# Patient Record
Sex: MALE | Race: WHITE | NOT HISPANIC OR LATINO | Employment: OTHER | URBAN - METROPOLITAN AREA
[De-identification: names, ages, dates, MRNs, and addresses within clinical notes are randomized per-mention and may not be internally consistent; named-entity substitution may affect disease eponyms.]

---

## 2018-03-05 ENCOUNTER — HOSPITAL ENCOUNTER (OUTPATIENT)
Dept: OCCUPATIONAL THERAPY | Facility: REHABILITATION | Age: 83
Setting detail: THERAPIES SERIES
Discharge: HOME | End: 2018-03-05
Attending: PSYCHIATRY & NEUROLOGY
Payer: MEDICARE

## 2018-03-05 DIAGNOSIS — G60.0 HEREDITARY MOTOR AND SENSORY NEUROPATHY: Primary | ICD-10-CM

## 2018-03-05 PROCEDURE — G8979 MOBILITY GOAL STATUS: HCPCS | Mod: GP,CI

## 2018-03-05 PROCEDURE — 97112 NEUROMUSCULAR REEDUCATION: CPT | Mod: GP

## 2018-03-05 PROCEDURE — G8980 MOBILITY D/C STATUS: HCPCS | Mod: GP,CJ

## 2018-03-05 PROCEDURE — G8978 MOBILITY CURRENT STATUS: HCPCS | Mod: GP,CJ

## 2018-03-05 RX ORDER — TAMSULOSIN HYDROCHLORIDE 0.4 MG/1
0.4 CAPSULE ORAL 2 TIMES DAILY
COMMUNITY
End: 2018-09-13 | Stop reason: HOSPADM

## 2018-03-05 RX ORDER — CLOPIDOGREL BISULFATE 75 MG/1
75 TABLET ORAL ONCE
COMMUNITY
End: 2018-10-18 | Stop reason: HOSPADM

## 2018-03-05 RX ORDER — GUAIFENESIN 600 MG/1
1200 TABLET, EXTENDED RELEASE ORAL AS NEEDED
COMMUNITY
End: 2019-05-12

## 2018-03-05 RX ORDER — ATORVASTATIN CALCIUM 20 MG/1
20 TABLET, FILM COATED ORAL EVERY EVENING
COMMUNITY

## 2018-03-05 RX ORDER — LEVOTHYROXINE SODIUM 25 UG/1
25 TABLET ORAL DAILY
COMMUNITY

## 2018-03-05 RX ORDER — ASPIRIN 81 MG/1
81 TABLET ORAL DAILY
COMMUNITY
End: 2018-10-18 | Stop reason: HOSPADM

## 2018-03-05 NOTE — PROGRESS NOTES
Patient: Michel Fang  Location: [facility]    MRN: [id number]  Today's date: 3/5/2018    Vitals/Pain    03/05/18 0900   Presence of Pain: denies                  Treatment Summary - 03/05/18 1000        Session Details    Document Type discharge evaluation    Mode of Treatment individual therapy    Patient/Family Observations He said balance exercises are most beneficial.       Time Calculation    Start Time 0900    Stop Time 1000    Time Calculation (min) 60 min       Balance    Balance Assessment Static Standing Balance (Group);Dynamic Standing Balance (Group);Dynamic Balance Activity (Group)       Static Standing Balance    Time Able to Maintain Position other (see comments)   romberg 30 sec x 2       Dynamic Standing Balance    Comment, Reaches Outside Midline ball bounce/pass x 2 min, side stepping 10 ft x 4, backwards walking 10 ft x 4        Dynamic Balance Activity    Therapeutic Training Performed backward walking;obstacle course;side stepping;standing ball toss;tap-ups to block       Aerobic Exercise Activity    Exercise(s) Performed stationary bike, recumbent    Time 10       PT Weekly Outcome Summary    Functional Goal Overall Progress progressing toward functional goals as expected    Weekly Outcome Statement He met his functional and balance activities.    Recommendations patient tolerated treatment well.           Goals:     Increase SLR to 80 degrees. Goal met  DGI improve to 20/24. Currently 18/24. Goal partially met (started at 13/24)  Independent in home exercise program.     Education provided this session.Given written copy of exercise program.

## 2018-09-04 ENCOUNTER — APPOINTMENT (EMERGENCY)
Dept: RADIOLOGY | Facility: HOSPITAL | Age: 83
End: 2018-09-04
Attending: EMERGENCY MEDICINE
Payer: MEDICARE

## 2018-09-04 ENCOUNTER — HOSPITAL ENCOUNTER (OUTPATIENT)
Facility: HOSPITAL | Age: 83
Setting detail: OBSERVATION
Discharge: HOME | End: 2018-09-05
Attending: EMERGENCY MEDICINE | Admitting: HOSPITALIST
Payer: MEDICARE

## 2018-09-04 DIAGNOSIS — I95.1 ORTHOSTATIC SYNCOPE: ICD-10-CM

## 2018-09-04 DIAGNOSIS — R55 SYNCOPE, UNSPECIFIED SYNCOPE TYPE: Primary | ICD-10-CM

## 2018-09-04 DIAGNOSIS — E86.0 DEHYDRATION: ICD-10-CM

## 2018-09-04 LAB
ANION GAP SERPL CALC-SCNC: 12 MEQ/L (ref 3–15)
BASOPHILS # BLD: 0.08 K/UL (ref 0.01–0.1)
BASOPHILS NFR BLD: 1.4 %
BILIRUB UR QL STRIP.AUTO: NEGATIVE MG/DL
BUN SERPL-MCNC: 47 MG/DL (ref 8–20)
CALCIUM SERPL-MCNC: 9 MG/DL (ref 8.9–10.3)
CHLORIDE SERPL-SCNC: 107 MMOL/L (ref 98–109)
CLARITY UR REFRACT.AUTO: CLEAR
CO2 SERPL-SCNC: 20 MMOL/L (ref 22–32)
COLOR UR AUTO: YELLOW
CREAT SERPL-MCNC: 1.2 MG/DL (ref 0.8–1.3)
DIFFERENTIAL METHOD BLD: NORMAL
EOSINOPHIL # BLD: 0.09 K/UL (ref 0.04–0.54)
EOSINOPHIL NFR BLD: 1.6 %
ERYTHROCYTE [DISTWIDTH] IN BLOOD BY AUTOMATED COUNT: 14.4 % (ref 11.6–14.4)
GFR SERPL CREATININE-BSD FRML MDRD: 58 ML/MIN/1.73M*2
GLUCOSE SERPL-MCNC: 136 MG/DL (ref 70–99)
GLUCOSE UR STRIP.AUTO-MCNC: NEGATIVE MG/DL
HCT VFR BLDCO AUTO: 32.4 % (ref 40.1–51)
HGB BLD-MCNC: 11.1 G/DL (ref 13.7–17.5)
HGB UR QL STRIP.AUTO: NEGATIVE
IMM GRANULOCYTES # BLD AUTO: 0.01 K/UL (ref 0–0.08)
IMM GRANULOCYTES NFR BLD AUTO: 0.2 %
INR PPP: 1.2 INR
KETONES UR STRIP.AUTO-MCNC: NEGATIVE MG/DL
LACTATE SERPL-SCNC: 2 MMOL/L (ref 0.4–2)
LACTATE SERPL-SCNC: 3.2 MMOL/L (ref 0.4–2)
LEUKOCYTE ESTERASE UR QL STRIP.AUTO: NEGATIVE
LYMPHOCYTES # BLD: 1.52 K/UL (ref 1.2–3.5)
LYMPHOCYTES NFR BLD: 26.8 %
MCH RBC QN AUTO: 30.7 PG (ref 28–33.2)
MCHC RBC AUTO-ENTMCNC: 34.3 G/DL (ref 32.2–36.5)
MCV RBC AUTO: 89.5 FL (ref 83–98)
MONOCYTES # BLD: 0.86 K/UL (ref 0.3–1)
MONOCYTES NFR BLD: 15.2 %
NEUTROPHILS # BLD: 3.11 K/UL (ref 1.7–7)
NEUTS SEG NFR BLD: 54.8 %
NITRITE UR QL STRIP.AUTO: NEGATIVE
NRBC BLD-RTO: 0 %
PDW BLD AUTO: 10.4 FL (ref 9.4–12.4)
PH UR STRIP.AUTO: 5.5 [PH]
PLATELET # BLD AUTO: 146 K/UL (ref 150–350)
POTASSIUM SERPL-SCNC: 4.3 MMOL/L (ref 3.6–5.1)
PROT UR QL STRIP.AUTO: NEGATIVE
PROTHROMBIN TIME: 15.2 SEC. (ref 12.2–14.5)
RBC # BLD AUTO: 3.62 M/UL (ref 4.5–5.8)
SODIUM SERPL-SCNC: 139 MMOL/L (ref 136–144)
SP GR UR REFRACT.AUTO: 1.02
TROPONIN I SERPL-MCNC: <0.03 NG/ML
UROBILINOGEN UR STRIP-ACNC: 0.2 EU/DL
WBC # BLD AUTO: 5.67 K/UL (ref 3.8–10.5)

## 2018-09-04 PROCEDURE — 83605 ASSAY OF LACTIC ACID: CPT | Performed by: EMERGENCY MEDICINE

## 2018-09-04 PROCEDURE — 99220 PR INITIAL OBSERVATION CARE/DAY 70 MINUTES: CPT | Performed by: HOSPITALIST

## 2018-09-04 PROCEDURE — 81003 URINALYSIS AUTO W/O SCOPE: CPT | Performed by: EMERGENCY MEDICINE

## 2018-09-04 PROCEDURE — 3E0337Z INTRODUCTION OF ELECTROLYTIC AND WATER BALANCE SUBSTANCE INTO PERIPHERAL VEIN, PERCUTANEOUS APPROACH: ICD-10-PCS | Performed by: EMERGENCY MEDICINE

## 2018-09-04 PROCEDURE — 36415 COLL VENOUS BLD VENIPUNCTURE: CPT | Performed by: EMERGENCY MEDICINE

## 2018-09-04 PROCEDURE — 63700000 HC SELF-ADMINISTRABLE DRUG: Performed by: HOSPITALIST

## 2018-09-04 PROCEDURE — 80048 BASIC METABOLIC PNL TOTAL CA: CPT | Performed by: EMERGENCY MEDICINE

## 2018-09-04 PROCEDURE — 83605 ASSAY OF LACTIC ACID: CPT | Mod: 91 | Performed by: EMERGENCY MEDICINE

## 2018-09-04 PROCEDURE — 96360 HYDRATION IV INFUSION INIT: CPT

## 2018-09-04 PROCEDURE — G0378 HOSPITAL OBSERVATION PER HR: HCPCS

## 2018-09-04 PROCEDURE — 93005 ELECTROCARDIOGRAM TRACING: CPT | Performed by: EMERGENCY MEDICINE

## 2018-09-04 PROCEDURE — 99285 EMERGENCY DEPT VISIT HI MDM: CPT | Mod: 25

## 2018-09-04 PROCEDURE — 71045 X-RAY EXAM CHEST 1 VIEW: CPT

## 2018-09-04 PROCEDURE — 84484 ASSAY OF TROPONIN QUANT: CPT | Performed by: EMERGENCY MEDICINE

## 2018-09-04 PROCEDURE — 25800000 HC PHARMACY IV SOLUTIONS: Performed by: EMERGENCY MEDICINE

## 2018-09-04 PROCEDURE — 85025 COMPLETE CBC W/AUTO DIFF WBC: CPT | Performed by: EMERGENCY MEDICINE

## 2018-09-04 PROCEDURE — 85610 PROTHROMBIN TIME: CPT | Performed by: EMERGENCY MEDICINE

## 2018-09-04 RX ORDER — CIPROFLOXACIN 500 MG/1
500 TABLET ORAL EVERY 12 HOURS
Status: DISCONTINUED | OUTPATIENT
Start: 2018-09-04 | End: 2018-09-05

## 2018-09-04 RX ORDER — NITROGLYCERIN 0.4 MG/1
0.4 TABLET SUBLINGUAL EVERY 5 MIN PRN
Status: DISCONTINUED | OUTPATIENT
Start: 2018-09-04 | End: 2018-09-05 | Stop reason: HOSPADM

## 2018-09-04 RX ORDER — TAMSULOSIN HYDROCHLORIDE 0.4 MG/1
0.4 CAPSULE ORAL
Status: DISCONTINUED | OUTPATIENT
Start: 2018-09-04 | End: 2018-09-05 | Stop reason: HOSPADM

## 2018-09-04 RX ORDER — LEVOTHYROXINE SODIUM 100 UG/1
100 TABLET ORAL DAILY
Status: DISCONTINUED | OUTPATIENT
Start: 2018-09-04 | End: 2018-09-05 | Stop reason: HOSPADM

## 2018-09-04 RX ORDER — DEXTROSE 50 % IN WATER (D50W) INTRAVENOUS SYRINGE
25 AS NEEDED
Status: DISCONTINUED | OUTPATIENT
Start: 2018-09-04 | End: 2018-09-05 | Stop reason: HOSPADM

## 2018-09-04 RX ORDER — ATORVASTATIN CALCIUM 40 MG/1
40 TABLET, FILM COATED ORAL
Status: DISCONTINUED | OUTPATIENT
Start: 2018-09-04 | End: 2018-09-05 | Stop reason: HOSPADM

## 2018-09-04 RX ORDER — AMOXICILLIN 250 MG
1 CAPSULE ORAL 2 TIMES DAILY
Status: DISCONTINUED | OUTPATIENT
Start: 2018-09-04 | End: 2018-09-05 | Stop reason: HOSPADM

## 2018-09-04 RX ORDER — TAMSULOSIN HYDROCHLORIDE 0.4 MG/1
0.4 CAPSULE ORAL
COMMUNITY
End: 2018-09-05 | Stop reason: HOSPADM

## 2018-09-04 RX ORDER — TAMSULOSIN HYDROCHLORIDE 0.4 MG/1
0.4 CAPSULE ORAL DAILY
Status: DISCONTINUED | OUTPATIENT
Start: 2018-09-05 | End: 2018-09-04

## 2018-09-04 RX ORDER — ACETAMINOPHEN 325 MG/1
650 TABLET ORAL EVERY 4 HOURS PRN
Status: DISCONTINUED | OUTPATIENT
Start: 2018-09-04 | End: 2018-09-05 | Stop reason: HOSPADM

## 2018-09-04 RX ORDER — CIPROFLOXACIN 500 MG/1
TABLET ORAL
Refills: 0 | COMMUNITY
Start: 2018-08-17 | End: 2018-09-05 | Stop reason: HOSPADM

## 2018-09-04 RX ORDER — POLYETHYLENE GLYCOL 3350 17 G/17G
17 POWDER, FOR SOLUTION ORAL DAILY
Status: DISCONTINUED | OUTPATIENT
Start: 2018-09-04 | End: 2018-09-05 | Stop reason: HOSPADM

## 2018-09-04 RX ORDER — IBUPROFEN 200 MG
16-32 TABLET ORAL AS NEEDED
Status: DISCONTINUED | OUTPATIENT
Start: 2018-09-04 | End: 2018-09-05 | Stop reason: HOSPADM

## 2018-09-04 RX ORDER — DEXTROSE 40 %
15-30 GEL (GRAM) ORAL AS NEEDED
Status: DISCONTINUED | OUTPATIENT
Start: 2018-09-04 | End: 2018-09-05 | Stop reason: HOSPADM

## 2018-09-04 RX ORDER — CLOPIDOGREL BISULFATE 75 MG/1
75 TABLET ORAL ONCE
Status: DISPENSED | OUTPATIENT
Start: 2018-09-04 | End: 2018-09-05

## 2018-09-04 RX ORDER — ASPIRIN 81 MG/1
81 TABLET ORAL DAILY
Status: DISCONTINUED | OUTPATIENT
Start: 2018-09-05 | End: 2018-09-04

## 2018-09-04 RX ORDER — ASPIRIN 81 MG/1
81 TABLET ORAL EVERY EVENING
Status: DISCONTINUED | OUTPATIENT
Start: 2018-09-04 | End: 2018-09-05 | Stop reason: HOSPADM

## 2018-09-04 RX ADMIN — POLYETHYLENE GLYCOL 3350 17 G: 17 POWDER, FOR SOLUTION ORAL at 20:34

## 2018-09-04 RX ADMIN — SODIUM CHLORIDE 1000 ML: 900 INJECTION, SOLUTION INTRAVENOUS at 15:58

## 2018-09-04 RX ADMIN — SENNOSIDES AND DOCUSATE SODIUM 1 TABLET: 8.6; 5 TABLET ORAL at 20:35

## 2018-09-04 RX ADMIN — ASPIRIN 81 MG: 81 TABLET, DELAYED RELEASE ORAL at 21:30

## 2018-09-04 RX ADMIN — CIPROFLOXACIN HYDROCHLORIDE 500 MG: 500 TABLET, FILM COATED ORAL at 20:35

## 2018-09-04 RX ADMIN — SODIUM CHLORIDE 1000 ML: 900 INJECTION, SOLUTION INTRAVENOUS at 18:32

## 2018-09-04 RX ADMIN — TAMSULOSIN HYDROCHLORIDE 0.4 MG: 0.4 CAPSULE ORAL at 21:30

## 2018-09-04 RX ADMIN — ATORVASTATIN CALCIUM 40 MG: 40 TABLET, FILM COATED ORAL at 20:35

## 2018-09-04 ASSESSMENT — ENCOUNTER SYMPTOMS
SORE THROAT: 0
COUGH: 0
ABDOMINAL PAIN: 0
NECK PAIN: 0
SYNCOPE: 1
HEADACHES: 0
APPETITE CHANGE: 0
DIZZINESS: 0
RHINORRHEA: 0
DIFFICULTY URINATING: 1
NAUSEA: 1
FATIGUE: 1
SHORTNESS OF BREATH: 0
CONSTIPATION: 1
BACK PAIN: 0

## 2018-09-04 ASSESSMENT — COGNITIVE AND FUNCTIONAL STATUS - GENERAL
WALKING IN HOSPITAL ROOM: 2 - A LOT
DRESSING REGULAR UPPER BODY CLOTHING: 3 - A LITTLE
EATING MEALS: 3 - A LITTLE
STANDING UP FROM CHAIR USING ARMS: 3 - A LITTLE
MOVING TO AND FROM BED TO CHAIR: 3 - A LITTLE
DRESSING REGULAR LOWER BODY CLOTHING: 3 - A LITTLE
TOILETING: 3 - A LITTLE
CLIMB 3 TO 5 STEPS WITH RAILING: 2 - A LOT
HELP NEEDED FOR PERSONAL GROOMING: 3 - A LITTLE
HELP NEEDED FOR BATHING: 3 - A LITTLE

## 2018-09-04 NOTE — ED PROVIDER NOTES
HPI     Chief Complaint   Patient presents with   • Syncope     Pt was an emergency response. Was coming in for an appointment w/ Dr. Taylor for UTI/retention. Reports he felt nauseous and had a syncopal episode. Pt was lowered to ground. Reports he did not hit his head and does not think he lost consciousness. Denies any pain. Alert and oriented. Family at bedside.        84 y/o M w/ h/o arthritis, HLD, HTN, CAD, and CABG presents to ED s/p syncopal episode. He had a follow-up appointment with Dr. Taylor scheduled for today for reevaluation of recent UTI/urinary retention, but while exiting the elevator in he felt nauseous and had a syncopal episode. During episode he was lowered to the ground. On encounter he denies having any pain and is AAOx3. He denies any h/o UTI's prior to recent one or h/o syncopal episodes. His last void was 3 hours PTA and incomplete.        History provided by:  Patient and relative  Syncope   Episode history:  Single  Most recent episode:  Today  Progression:  Resolved  Chronicity:  New  Context: standing up    Witnessed: yes    Relieved by:  Nothing  Worsened by:  Nothing  Ineffective treatments:  None tried  Associated symptoms: nausea    Associated symptoms: no chest pain, no dizziness, no headaches and no shortness of breath         Patient History     Past Medical History:   Diagnosis Date   • Arthritis    • Heart disease    • Hyperlipidemia    • Hypertension        Past Surgical History:   Procedure Laterality Date   • POPLITEAL VENOUS ANEURYSM REPAIR         History reviewed. No pertinent family history.    Social History   Substance Use Topics   • Smoking status: Former Smoker   • Smokeless tobacco: Never Used   • Alcohol use No       Systems Reviewed from Nursing Triage:          Review of Systems     Review of Systems   Constitutional: Positive for fatigue. Negative for appetite change.   HENT: Negative for congestion, rhinorrhea and sore throat.    Respiratory: Negative for  cough and shortness of breath.    Cardiovascular: Positive for syncope. Negative for chest pain.   Gastrointestinal: Positive for constipation and nausea. Negative for abdominal pain.   Genitourinary: Positive for difficulty urinating (onset this morning).   Musculoskeletal: Negative for back pain and neck pain.   Skin: Negative for rash.   Neurological: Positive for syncope. Negative for dizziness and headaches.   All other systems reviewed and are negative.       Physical Exam     ED Triage Vitals [09/04/18 1421]   Temp Heart Rate Resp BP SpO2   36.5 °C (97.7 °F) 72 18 (!) 148/62 97 %      Temp Source Heart Rate Source Patient Position BP Location FiO2 (%) (Set)   Oral -- -- -- --                     Patient Vitals for the past 24 hrs:   BP Temp Temp src Pulse Resp SpO2   09/04/18 1421 (!) 148/62 36.5 °C (97.7 °F) Oral 72 18 97 %           Physical Exam   Constitutional: He appears well-developed and well-nourished.   HENT:   Head: Normocephalic and atraumatic.   Eyes: Conjunctivae are normal.   Neck: Neck supple.   Cardiovascular: Normal rate and regular rhythm.    Murmur heard.   Systolic murmur is present with a grade of 1/6   Pulmonary/Chest: Effort normal and breath sounds normal. No respiratory distress.   Abdominal: Soft. There is no tenderness.   Musculoskeletal: He exhibits no edema or tenderness.   Neurological: He is alert.   Skin: Skin is warm and dry.   Psychiatric: He has a normal mood and affect.   Nursing note and vitals reviewed.           Procedures    ED Course & MDM     Labs Reviewed   TROPONIN I   LACTATE, VENOUS   BASIC METABOLIC PANEL   CBC AND DIFFERENTIAL    Narrative:     The following orders were created for panel order CBC and differential.  Procedure                               Abnormality         Status                     ---------                               -----------         ------                     CBC[[id number]]                                                                           Diff Count[[id number]]                                                                     Please view results for these tests on the individual orders.   URINALYSIS REFLEX CULTURE    Narrative:     The following orders were created for panel order Urinalysis w/ reflex culture.  Procedure                               Abnormality         Status                     ---------                               -----------         ------                     UA Reflex to Culture (Mac...[[id number]]                                                   Please view results for these tests on the individual orders.   PROTIME-INR   CBC   DIFF COUNT   UA REFLEX CULTURE (MACROSCOPIC)   POCT GLUCOSE       ECG 12 lead    (Results Pending)           MDM  Number of Diagnoses or Management Options  Dehydration:   Syncope, unspecified syncope type:   Diagnosis management comments: The patient presents with syncope.  Has a history of CABG and TAVR.  Patient medical comorbidities warranted admission for syncope at this time.  We will admit for continued evaluation treatment.       Amount and/or Complexity of Data Reviewed  Clinical lab tests: reviewed  Tests in the radiology section of CPT®: reviewed  Tests in the medicine section of CPT®: reviewed             Clinical Impressions as of Sep 04 1901   Syncope, unspecified syncope type   Dehydration      Scribe Attestation  By signing my name below, I, Geoff Fritz, attest that this documentation has been prepared under the direction and in the presence of Dr. Sauceda.    9/4/2018 2:43 PM    Rufus GUPTA, personally performed the services described in this documentation, as documented by the scribe in my presence, and it is both accurate and complete.  9/15/2018 11:56 AM         Geoff Fritz  09/04/18 1510       Rufus Sauceda MD  09/15/18 1200

## 2018-09-04 NOTE — H&P
Hospital Medicine Service -  History & Physical        CHIEF COMPLAINT   Syncope     HISTORY OF PRESENT ILLNESS      Michel Fang is a 83 y.o. male with a past medical history of HTN, CAD s/p CABG, AS s/p AVR c/b perivalvular leak, BPH, pulmonary sarcoid, and mild HFpEF (G1DD) who presents with syncopal episode while coming to an appointment today.  Patient had recently been in [facility] on vacation, where he noted having increased frequency and urgency, and went to local hospital and was started on Ciprofloxacin for 10d treatment course.  When he returned home, he went to his PCP because his urinary symptoms had not fully resolved and his course of Cipro was extended by an additional 10 days.  Currently he is on day 17/20.  He reports that until the last two days, his symptoms were improving and he overall felt well.  He denied F/C, dysuria or discharge, suprapubic or flank pain, perineal pain or tenderness, SOB, CP, or palpitations.  The last two days, he reports the frequent and incomplete voiding have returned.  He denies any F/C still, but notes he has been more fatigued the past two days, and has had poor appetite.  He has limited his fluid intake because of his poor appetite and because he does not like going to the bathroom so frequently.  He was coming to the hospital today to see Dr. Taylor, and on the way to the hospital he had some mild nausea.  After parking in the garage he developed some weakness and lightheadedness when walking to the building.  He sat to wait for the elevator, and when he stood up to take the elevator he fell over and had brief LOC per family.  They deny head trauma.  He had no post-ictal symptoms and was clear and oriented after regaining consciousness.  He reports no preceding symptoms except for lightheadedness - no CP, palpitations, auras, vision changes, etc.  He has since received 1L NSS in ED and feels significantly improved, but still has mild LH w/ positional  changes.      PAST MEDICAL AND SURGICAL HISTORY      Past Medical History:   Diagnosis Date   • Arthritis    • BPH (benign prostatic hyperplasia)    • CHF (congestive heart failure) (CMS/HCC) ([facility])    • Coronary artery disease    • Heart disease    • Hyperlipidemia    • Hypertension    • Sarcoidosis (CMS/HCC)        Past Surgical History:   Procedure Laterality Date   • AORTIC VALVE REPLACEMENT     • CORONARY ARTERY BYPASS GRAFT     • POPLITEAL VENOUS ANEURYSM REPAIR         PCP: Johnathan Leone MD    MEDICATIONS      Prior to Admission medications    Medication Sig Start Date End Date Taking? Authorizing Provider   aspirin 81 mg enteric coated tablet Take 81 mg by mouth daily.      Art Jorgensen MD   atorvastatin (LIPITOR) 40 mg tablet Take 40 mg by mouth daily.      Art Jorgensen MD   ciprofloxacin (CIPRO) 500 mg tablet TAKE 1 TABLET BY MOUTH EVERY 12 HOURS FOR 10 DAYS 8/17/18   Art Jorgensen MD   clopidogrel (PLAVIX) 75 mg tablet Take 75 mg by mouth once.      Art Jorgensen MD   guaiFENesin (MUCINEX) 600 mg 12 hr tablet Take 1,200 mg by mouth 2 (two) times a day.    Art Jorgensen MD   HYALUR AC/CHOND SUL/COLG II/AA (HYALURONIC ACID, CHOND-COLLGN, ORAL) Take by mouth.    Art Jorgensen MD   levothyroxine (SYNTHROID) 100 mcg tablet Take 100 mcg by mouth daily.    Art Jorgensen MD   tamsulosin (FLOMAX) 0.4 mg capsule Take 0.4 mg by mouth 2 (two) times a day.      Art Jorgensen MD   tamsulosin (FLOMAX) 0.4 mg capsule 0.4 mg.    Art Jorgensen MD       ALLERGIES      No known allergies    FAMILY HISTORY      History reviewed. No pertinent family history. Noncontributory to current case    SOCIAL HISTORY      Social History     Social History   • Marital status:      Spouse name: N/A   • Number of children: N/A   • Years of education: N/A     Social History Main Topics   • Smoking status: Former Smoker     Quit date: 1978   • Smokeless  tobacco: Never Used   • Alcohol use No   • Drug use: No   • Sexual activity: Not Asked     Other Topics Concern   • None     Social History Narrative   • None       REVIEW OF SYSTEMS      All other systems reviewed and negative except as noted in HPI    PHYSICAL EXAMINATION      Temp:  [36.5 °C (97.7 °F)-37.4 °C (99.3 °F)] 36.6 °C (97.9 °F)  Heart Rate:  [68-85] 85  Resp:  [16-18] 18  BP: (119-150)/(60-73) 150/73  Body mass index is 29.84 kg/m².    Physical Exam   Constitutional: He is oriented to person, place, and time. He appears well-developed and well-nourished. No distress.   HENT:   Head: Normocephalic and atraumatic.   Mouth/Throat: Oropharynx is clear and moist.   Eyes: EOM are normal. Pupils are equal, round, and reactive to light. No scleral icterus.   Neck: Neck supple. No JVD present.   Cardiovascular: Normal rate, regular rhythm and intact distal pulses.    Murmur (I/VI GYPSY, also possible soft I/VI diastolic murmur best appreciated at LLSB) heard.  Pulmonary/Chest: Effort normal and breath sounds normal. No respiratory distress. He has no wheezes. He has no rales.   Abdominal: Soft. Bowel sounds are normal. He exhibits no distension. There is no tenderness. There is no rebound and no guarding.   Musculoskeletal: Normal range of motion. He exhibits no edema.   Lymphadenopathy:     He has no cervical adenopathy.   Neurological: He is alert and oriented to person, place, and time. No cranial nerve deficit or sensory deficit. He exhibits normal muscle tone.   Skin: Skin is warm and dry. No rash noted. He is not diaphoretic.   Psychiatric: He has a normal mood and affect. His behavior is normal.   Vitals reviewed.      LABS / IMAGING / EKG        Labs  I have reviewed the patient's pertinent labs.  Significant abnormals are Lactate 3.2-->2.0, Cr 1.2 (baseline 1.0), BUN 47, HgB 11.1. Urinalysis completely wnl    Imaging  Significant findings include: CXR overall clear, sternotomy wires noted but no acute  changes or findings.    ECG/Telemetry  I have independently reviewed the ECG. Significant findings include TWI in V2, new from prior, otherwise NSR w/ bifasicular block, mildly prolonged QTc at 503.    ASSESSMENT AND PLAN           * Orthostatic syncope   Assessment & Plan    Renal function, lactate, exam and presentation all c/w volume depletion leading to orthostatic syncope.  Difficult to believe that simple avoidance of fluids may have led to this severe a presentation, but no other exam or lab findings to suggest other cause.  - UA negative for persistent UTI  - given 2L NSS in ED, will recheck orthostatic VS and ensure normalized  - PT evaluation  - will check TTE to evaluate perivalvular leak given patient's history and to exclude this as a cause of syncope  - monitor on tele        Acute cystitis without hematuria   Assessment & Plan    Treated with Cipro over the past two weeks.  Symptoms had improved but returned 2d ago.  - current UA does not suggest active infection, given patient has received >2 weeks Abx at this time, will hold further Abx, especially given patient had elevated QTc and further FQ use may be more risky.  - if symptoms recur may have to consider prostatitis as source of recurrent infection.        Lactic acidosis   Assessment & Plan    Resolved w/ IVF, seems unlikely that this was all triggered by dehydration but no other clear cause/source for hypoperfusion is present - UA negative, VS otherwise stable.  Continue to monitor.        Benign prostatic hyperplasia   Assessment & Plan    Continue flomax BID, watch for worsening orthostatic symptoms after administration.  - if symptoms persistent or not improving, consider Uro c/s as known to Dr. Taylor  - would consider prostatitis if symptoms recur.        Aortic stenosis   Assessment & Plan    Known perivalvular leak since AVR w/ mild AI.  Exam not particularly remarkable but difficult to say if worsening valvular disease could have  contributed to current syncopal episode.  - will check TTE just to ensure no significant valvular functional changes which may have precipitated syncope.        Hyperlipidemia   Assessment & Plan    Continue atorvastatin        Pulmonary sarcoidosis (CMS/HCC) (HCC)   Assessment & Plan    Has never required steroids or other treatment. Follows annually at [facility] for PFTs, imaging and labwork.        HTN (hypertension)   Assessment & Plan    Orthostatic symptoms on admission, currently no active treatment for HTN.  Reports previously being on Metoprolol (OSH notes report some giancarlo-operative SVT).  - clarify metoprolol filling history w/ pharmacy or PCP  - no BP meds for now given c/f orthostasis        CAD (coronary artery disease)   Assessment & Plan    No active CP symptoms, initial trop negative, low/no concern for ACS at this point.  Continue DAPT, statin             VTE Assessment: Padua VTE Score: 2  VTE Prophylaxis Plan: Ambulate ppx  Code Status: Full Code  Estimated Discharge Date: 9/5/2018  Disposition Planning: Home pending improvement in symptoms     Adebayo Alcaraz MD  9/5/2018

## 2018-09-05 ENCOUNTER — APPOINTMENT (OUTPATIENT)
Dept: CARDIOLOGY | Facility: HOSPITAL | Age: 83
Setting detail: OBSERVATION
End: 2018-09-05
Attending: HOSPITALIST
Payer: MEDICARE

## 2018-09-05 VITALS
HEIGHT: 70 IN | WEIGHT: 208 LBS | DIASTOLIC BLOOD PRESSURE: 65 MMHG | RESPIRATION RATE: 20 BRPM | TEMPERATURE: 96.5 F | HEART RATE: 80 BPM | SYSTOLIC BLOOD PRESSURE: 145 MMHG | OXYGEN SATURATION: 98 % | BODY MASS INDEX: 29.78 KG/M2

## 2018-09-05 PROBLEM — I25.10 CAD (CORONARY ARTERY DISEASE): Status: ACTIVE | Noted: 2018-09-05

## 2018-09-05 PROBLEM — I35.0 AORTIC STENOSIS: Status: ACTIVE | Noted: 2017-10-25

## 2018-09-05 PROBLEM — D86.0 PULMONARY SARCOIDOSIS (CMS/HCC): Status: ACTIVE | Noted: 2018-09-05

## 2018-09-05 PROBLEM — E87.20 LACTIC ACIDOSIS: Status: ACTIVE | Noted: 2018-09-05

## 2018-09-05 PROBLEM — I10 HTN (HYPERTENSION): Status: ACTIVE | Noted: 2018-09-05

## 2018-09-05 PROBLEM — N40.0 BENIGN PROSTATIC HYPERPLASIA: Status: ACTIVE | Noted: 2017-10-25

## 2018-09-05 PROBLEM — N30.00 ACUTE CYSTITIS WITHOUT HEMATURIA: Status: ACTIVE | Noted: 2018-09-05

## 2018-09-05 PROBLEM — E78.5 HYPERLIPIDEMIA: Status: ACTIVE | Noted: 2018-09-05

## 2018-09-05 LAB
ANION GAP SERPL CALC-SCNC: 7 MEQ/L (ref 3–15)
AORTIC ROOT ANNULUS - M-MODE: 3.31 CM
AORTIC VALVE MEAN VELOCITY: 1.39 M/S
AORTIC VALVE VELOCITY TIME INTEGRAL: 43.2 CM
ATRIAL RATE: 69
AV MEAN GRADIENT: 9.51 MMHG
AV PEAK GRADIENT: 20.3 MMHG
AV PEAK VELOCITY-S: 2.25 M/S
AV VALVE AREA: 2.37 CM2
BSA FOR ECHO PROCEDURE: 2.16 M2
BUN SERPL-MCNC: 32 MG/DL (ref 8–20)
CALCIUM SERPL-MCNC: 8.4 MG/DL (ref 8.9–10.3)
CHLORIDE SERPL-SCNC: 111 MMOL/L (ref 98–109)
CO2 SERPL-SCNC: 22 MMOL/L (ref 22–32)
CREAT SERPL-MCNC: 1.1 MG/DL (ref 0.8–1.3)
DOP CALC LVOT STROKE VOLUME: 96.99 ML
E WAVE DECELERATION TIME: 0.18 S
E/A RATIO: 0.72
E/E' RATIO: 13.7
E/LAT E' RATIO: 11.6
EDV (BP): 95.2 ML
EDV (MM-TEICH): 177 ML
EF (A4C): 64.5 %
EF (MM-TEICH): 60.5 %
EF A2C: 67.5 %
EJECTION FRACTION: 65.8 %
ERYTHROCYTE [DISTWIDTH] IN BLOOD BY AUTOMATED COUNT: 14.4 % (ref 11.6–14.4)
ESV (BP): 32.6 ML
ESV (MM-TEICH): 69.8 ML
GFR SERPL CREATININE-BSD FRML MDRD: >60 ML/MIN/1.73M*2
GLUCOSE SERPL-MCNC: 107 MG/DL (ref 70–99)
HCT VFR BLDCO AUTO: 28.1 % (ref 40.1–51)
HGB BLD-MCNC: 9.4 G/DL (ref 13.7–17.5)
LA ESV (BP): 71 ML
LA ESV INDEX (BP): 32.87 ML/M2
LA/AORTA RATIO: 1.62
LAD 2D - M-MODE: 5.35 CM
LEFT VENTRICLE DIASTOLIC VOLUME INDEX: 50 ML/M2
LEFT VENTRICLE DIASTOLIC VOLUME: 108 ML
LEFT VENTRICLE SYSTOLIC VOLUME INDEX: 17.64 ML/M2
LEFT VENTRICLE SYSTOLIC VOLUME: 38.1 ML
LV DIASTOLIC VOLUME: 82.7 ML
LV ESV (APICAL 2 CHAMBER): 26.9 ML
LVEDVI(A2C): 38.29 ML/M2
LVEDVI(BP): 44.07 ML/M2
LVESVI(A2C): 12.45 ML/M2
LVESVI(BP): 15.09 ML/M2
LVOT 2D: 2.72 CM
LVOT A: 5.81 CM2
LVOT MG: 1.76 MMHG
LVOT MV: 0.62 M/S
LVOT PEAK VELOCITY: 0.92 M/S
LVOT PG: 3.37 MMHG
LVOT VTI: 16.7 CM
M MODE - INTERVENTRICULAR SEPTUM IN END DIASTOLE: 1.15 CM
M MODE - LEFT INTERNAL DIMENSION IN SYSTOLE: 3.99 CM
M MODE - LEFT VENTRICULAR INTERNAL DIMENSION IN DIASTOLE: 5.95 CM
M MODE - LEFT VENTRICULAR POSTERIOR WALL IN END DIASTOLE: 1.15 CM
M MODE - LEFT VENTRICULAR POSTERIOR WALL IN END SYSTOLE: 1.15 CM
MAGNESIUM SERPL-MCNC: 1.9 MG/DL (ref 1.8–2.5)
MCH RBC QN AUTO: 29.7 PG (ref 28–33.2)
MCHC RBC AUTO-ENTMCNC: 33.5 G/DL (ref 32.2–36.5)
MCV RBC AUTO: 88.9 FL (ref 83–98)
MV E'TISSUE VEL-LAT: 0.07 M/S
MV E'TISSUE VEL-MED: 0.06 M/S
MV PEAK A VEL: 1.08 M/S
MV PEAK E VEL: 0.77 M/S
P AXIS: 19
PDW BLD AUTO: 10.6 FL (ref 9.4–12.4)
PLATELET # BLD AUTO: 122 K/UL (ref 150–350)
POTASSIUM SERPL-SCNC: 4.8 MMOL/L (ref 3.6–5.1)
PR INTERVAL: 190
QRS DURATION: 156
QT INTERVAL: 470
QTC CALCULATION(BAZETT): 503
R AXIS: -52
RBC # BLD AUTO: 3.16 M/UL (ref 4.5–5.8)
SODIUM SERPL-SCNC: 140 MMOL/L (ref 136–144)
T WAVE AXIS: 27
TR MAX PG: 21.3 MMHG
TRICUSPID VALVE PEAK REGURGITATION VELOCITY: 2.31 M/S
VENTRICULAR RATE: 69
WBC # BLD AUTO: 6.32 K/UL (ref 3.8–10.5)

## 2018-09-05 PROCEDURE — 85027 COMPLETE CBC AUTOMATED: CPT | Performed by: HOSPITALIST

## 2018-09-05 PROCEDURE — 93306 TTE W/DOPPLER COMPLETE: CPT | Mod: 26 | Performed by: INTERNAL MEDICINE

## 2018-09-05 PROCEDURE — 83735 ASSAY OF MAGNESIUM: CPT | Performed by: HOSPITALIST

## 2018-09-05 PROCEDURE — 93306 TTE W/DOPPLER COMPLETE: CPT

## 2018-09-05 PROCEDURE — 99217 PR OBSERVATION CARE DISCHARGE MANAGEMENT: CPT | Performed by: HOSPITALIST

## 2018-09-05 PROCEDURE — 63700000 HC SELF-ADMINISTRABLE DRUG: Performed by: HOSPITALIST

## 2018-09-05 PROCEDURE — 36415 COLL VENOUS BLD VENIPUNCTURE: CPT | Performed by: HOSPITALIST

## 2018-09-05 PROCEDURE — 80048 BASIC METABOLIC PNL TOTAL CA: CPT | Performed by: HOSPITALIST

## 2018-09-05 PROCEDURE — G0378 HOSPITAL OBSERVATION PER HR: HCPCS

## 2018-09-05 PROCEDURE — 96361 HYDRATE IV INFUSION ADD-ON: CPT

## 2018-09-05 PROCEDURE — 25800000 HC PHARMACY IV SOLUTIONS: Performed by: HOSPITALIST

## 2018-09-05 RX ADMIN — TAMSULOSIN HYDROCHLORIDE 0.4 MG: 0.4 CAPSULE ORAL at 09:30

## 2018-09-05 RX ADMIN — LEVOTHYROXINE SODIUM 100 MCG: 100 TABLET ORAL at 09:30

## 2018-09-05 RX ADMIN — POLYETHYLENE GLYCOL 3350 17 G: 17 POWDER, FOR SOLUTION ORAL at 09:30

## 2018-09-05 RX ADMIN — SODIUM CHLORIDE 1000 ML: 9 INJECTION, SOLUTION INTRAVENOUS at 10:30

## 2018-09-05 NOTE — PLAN OF CARE
Problem: Patient Care Overview  Goal: Discharge Needs Assessment  Outcome: Ongoing (interventions implemented as appropriate)   09/05/18 1026   DC Needs Assessment   Concerns To Be Addressed basic needs concerns;care coordination/care conferences   Readmission Within The Last 30 Days no previous admission in last 30 days   Provider Choice List(s) Given yes  ([facility])   Anticipated Discharge Disposition home with home health services   Type of Home Care Services home PT;nursing   Equipment Needed After Discharge cane, quad;walker, rolling   Activity/Self Care ROS   Equipment Currently Used at Home grab bar;shower chair   Current Health   Anticipated Changes Related to Illness none   Met with patient, assessment completed.  Patient with PMH of HTN, CAD, CABG, AS, s/p AVR c/b perivalvular leak, BPH, pulmonary sarcoid, and mild CHF present to ED following syncopal episode.  Patient came to hospital yesterday to see Dr. Taylor, after parking in the garage he developed some weakness and lightheadedness when walking to the building.  He sat to wait for the elevator, and when he stood up to take the elevator he fell over and had brief LOC per family.  Patient lives alone in a 2 story home, there are 2 steps to enter at front of home, and ramp into rear of home.  There are bedroom and full bathrooms on 1st and 2nd floor, patient primary remains on 1st floor.  Patient will likely need PT/OT eval and would likely benefit from PT following d/c (home PT vs. outpatient).  PCP and pharmacy verified.

## 2018-09-05 NOTE — ASSESSMENT & PLAN NOTE
Treated with Cipro over the past two weeks.  Symptoms had improved but returned 2d ago.  - current UA does not suggest active infection, given patient has received >2 weeks Abx at this time, will hold further Abx, especially given patient had elevated QTc and further FQ use may be more risky.  - if symptoms recur may have to consider prostatitis as source of recurrent infection.

## 2018-09-05 NOTE — NURSING NOTE
Pt. Discharged at home at this time with son - discharge instructions reviewed with pt. And son. Pt. With no complaints. Ortho VS rechecked prior to discharge and negative for orthostatic hypotension. All belongings sent with pt. Pt. On room air. Discharged to home.

## 2018-09-05 NOTE — ASSESSMENT & PLAN NOTE
Continue flomax BID, watch for worsening orthostatic symptoms as outpatient  - Known to Dr. Taylor, can follow up as outpatient

## 2018-09-05 NOTE — DISCHARGE INSTRUCTIONS
Mr. Fang,    You were admitted after having a syncopal episode (passing out) in the elevator on your way to your appointment with urology. In the ER, your blood pressure was low when you stood up and you felt symptoms of lightheadness as well. You admitted to drinking less water recently and yesterday was a particularly hot day. Your symptoms I believe were due to dehydration leading to low blood pressures with standing causing you to pass out.    We gave you 3 liters of IV fluids and your symptoms and blood pressure improved. Please do your best to drink adequate amounts of water (about 8 - 8oz cups of water per day) to stay hydrated. Do not get up to quickly and if you feel dizzy, just sit back down and get back up slowly.    If you have any more symptoms of dizziness or loss of consciousness, please come to the ER for evaluation.

## 2018-09-05 NOTE — DISCHARGE SUMMARY
Hospital Medicine Service -  Inpatient Discharge Summary        BRIEF OVERVIEW   Admitting Provider: Adebayo Alcaraz MD  Attending Provider: Wenceslao Louise MD Attending phys phone: [phone]    PCP: Johnathan Leone [phone]    Admission Date: 9/4/2018  Discharge Date: 9/5/2018     DISCHARGE DIAGNOSES      Primary Discharge Diagnosis  Orthostatic syncope    Secondary Discharge Diagnoses  Active Hospital Problems    Diagnosis Date Noted   • Orthostatic syncope 09/04/2018     Priority: High   • HTN (hypertension) 09/05/2018     Priority: Medium   • Hyperlipidemia 09/05/2018     Priority: Medium   • Lactic acidosis 09/05/2018     Priority: Medium   • Acute cystitis without hematuria 09/05/2018     Priority: Medium   • Aortic stenosis 10/25/2017     Priority: Medium   • Benign prostatic hyperplasia 10/25/2017     Priority: Medium   • CAD (coronary artery disease) 09/05/2018   • Pulmonary sarcoidosis (CMS/HCC) (HCC) 09/05/2018      Resolved Hospital Problems    Diagnosis Date Noted Date Resolved   No resolved problems to display.       Problem List on Day of Discharge  * Orthostatic syncope   Assessment & Plan    Orthostatic hypotension and syncope in setting of poor PO intake and extremely hot weather. Symptoms and labs improved with IVF. No further orthostatic vital signs positive. Instructed to maintain adequate fluid intake. TTE negative. Doubt cardiac in nature.        Acute cystitis without hematuria   Assessment & Plan    Treated with Cipro over the past two weeks.  Symptoms had improved but returned 2d ago.  - current UA does not suggest active infection, given patient has received >2 weeks Abx at this time, will hold further Abx, especially given patient had elevated QTc and further FQ use may be more risky.  - if symptoms recur may have to consider prostatitis as source of recurrent infection.        Lactic acidosis   Assessment & Plan    Resolved w/ IVF, seems unlikely that this was all  triggered by dehydration but no other clear cause/source for hypoperfusion is present - UA negative, VS otherwise stable.  Continue to monitor.        Hyperlipidemia   Assessment & Plan    Continue atorvastatin        HTN (hypertension)   Assessment & Plan    Orthostatic symptoms on admission, currently no active treatment for HTN.  Reports previously being on Metoprolol (OSH notes report some giancarlo-operative SVT).  - clarify metoprolol filling history w/ pharmacy or PCP  - no BP meds for now given c/f orthostasis        Benign prostatic hyperplasia   Assessment & Plan    Continue flomax BID, watch for worsening orthostatic symptoms as outpatient  - Known to Dr. Taylor, can follow up as outpatient        Aortic stenosis   Assessment & Plan    Known perivalvular leak since AVR w/ mild AI.  Exam not particularly remarkable and TTE negative.         Pulmonary sarcoidosis (CMS/HCC) (HCC)   Assessment & Plan    Has never required steroids or other treatment. Follows annually at [facility] for PFTs, imaging and labwork.        CAD (coronary artery disease)   Assessment & Plan    No active CP symptoms, initial trop negative, low/no concern for ACS at this point.  Continue DAPT, statin          SUMMARY OF HOSPITALIZATION      Presenting Problem/History of Present Illness  Orthostatic syncope    This is a 83 y.o. year-old male admitted on 9/4/2018 with Dehydration [E86.0]  Orthostatic syncope [I95.1]  Syncope, unspecified syncope type [R55].       Hospital Course  Mr. Fang presented for evaluation of syncope in the elevator on his way to [place] [facility] He was dehydrated due to volitional avoidance of PO intake to avoid frequent bathroom trips. His symptoms improved with IVF as did his labs.    Exam on Day of Discharge  Physical Exam   Constitutional: He is oriented to person, place, and time. He appears well-developed and well-nourished.   HENT:   Head: Normocephalic and atraumatic.   Neck: Normal range of  motion. Neck supple. No JVD present.   Cardiovascular: Normal rate and regular rhythm.    No murmur heard.  Pulmonary/Chest: Effort normal and breath sounds normal.   Abdominal: Soft. Bowel sounds are normal.   Musculoskeletal: Normal range of motion. He exhibits no edema.   Neurological: He is alert and oriented to person, place, and time.   Skin: Skin is warm and dry. Capillary refill takes 2 to 3 seconds.   Psychiatric: He has a normal mood and affect. His behavior is normal.       Consults During Admission  None    DISCHARGE MEDICATIONS        Medication List      CHANGE how you take these medications    tamsulosin 0.4 mg capsule  Commonly known as:  FLOMAX  Take 0.4 mg by mouth 2 (two) times a day.  What changed:  Another medication with the same name was removed. Continue taking this medication, and follow the directions you see here.        CONTINUE taking these medications    aspirin 81 mg enteric coated tablet  Take 81 mg by mouth daily.     atorvastatin 40 mg tablet  Commonly known as:  LIPITOR  Take 40 mg by mouth daily.     clopidogrel 75 mg tablet  Commonly known as:  PLAVIX  Take 75 mg by mouth once.     guaiFENesin 600 mg 12 hr tablet  Commonly known as:  MUCINEX  Take 1,200 mg by mouth 2 (two) times a day.     HYALURONIC ACID (CHOND-COLLGN) ORAL  Take by mouth.     levothyroxine 100 mcg tablet  Commonly known as:  SYNTHROID  Take 100 mcg by mouth daily.        STOP taking these medications    ciprofloxacin 500 mg tablet  Commonly known as:  CIPRO            Instructions for after discharge     Call provider for:       Call provider for:  extreme fatigue       Diet       Follow-up with primary physician (PCP)       Instructions for follow-up:  Follow up in 1-2 weeks.    Post-Discharge Activity: Normal activity as tolerated.       Normal activity as tolerated.             PROCEDURES / LABS / IMAGING      Operative Procedures  None    Other Procedures  none    Pertinent Labs  labs: wnl    Pertinent  Imaging  X-ray Chest 1 View    Result Date: 9/5/2018  IMPRESSION: No evidence of active disease.      OUTPATIENT  FOLLOW-UP / REFERRALS / PENDING TESTS        Outpatient Follow-Up Appointments  Encounter Information     You do not currently have any appointments scheduled.          Referrals  No orders of the defined types were placed in this encounter.      Test Results Pending at Discharge  Unresulted Labs     None          Important Issues to Address in Follow-Up  Follow up with urology to address BPH.    DISCHARGE DISPOSITION      Disposition: Home     Code Status At Discharge: Full Code    Physician Order for Life-Sustaining Treatment Document Status      No documents found

## 2018-09-05 NOTE — ASSESSMENT & PLAN NOTE
No active CP symptoms, initial trop negative, low/no concern for ACS at this point.  Continue DAPT, statin

## 2018-09-05 NOTE — ASSESSMENT & PLAN NOTE
Known perivalvular leak since AVR w/ mild AI.  Exam not particularly remarkable and TTE negative.

## 2018-09-05 NOTE — ASSESSMENT & PLAN NOTE
Resolved w/ IVF, seems unlikely that this was all triggered by dehydration but no other clear cause/source for hypoperfusion is present - UA negative, VS otherwise stable.  Continue to monitor.

## 2018-09-05 NOTE — ASSESSMENT & PLAN NOTE
Orthostatic symptoms on admission, currently no active treatment for HTN.  Reports previously being on Metoprolol (OSH notes report some giancarlo-operative SVT).  - clarify metoprolol filling history w/ pharmacy or PCP  - no BP meds for now given c/f orthostasis

## 2018-09-05 NOTE — ASSESSMENT & PLAN NOTE
Has never required steroids or other treatment. Follows annually at [facility] for PFTs, imaging and labwork.

## 2018-09-05 NOTE — PLAN OF CARE
Problem: Fall Risk (Adult)  Goal: Absence of Falls  Outcome: Ongoing (interventions implemented as appropriate)   09/04/18 2115   Fall Risk (Adult)   Absence of Falls making progress toward outcome

## 2018-09-05 NOTE — PLAN OF CARE
Problem: Patient Care Overview  Goal: Plan of Care Review  Outcome: Ongoing (interventions implemented as appropriate)   09/05/18 1509   Coping/Psychosocial   Plan Of Care Reviewed With patient   Plan of Care Review   Progress improving   Outcome Summary patient to be d/c today     Goal: Individualization & Mutuality  Outcome: Ongoing (interventions implemented as appropriate)      Problem: Fall Risk (Adult)  Goal: Identify Related Risk Factors and Signs and Symptoms  Outcome: Ongoing (interventions implemented as appropriate)    Goal: Absence of Falls  Outcome: Ongoing (interventions implemented as appropriate)

## 2018-09-05 NOTE — ASSESSMENT & PLAN NOTE
Orthostatic hypotension and syncope in setting of poor PO intake and extremely hot weather. Symptoms and labs improved with IVF. No further orthostatic vital signs positive. Instructed to maintain adequate fluid intake. TTE negative. Doubt cardiac in nature.

## 2018-09-06 ENCOUNTER — APPOINTMENT (EMERGENCY)
Dept: RADIOLOGY | Facility: HOSPITAL | Age: 83
DRG: 377 | End: 2018-09-06
Attending: EMERGENCY MEDICINE
Payer: MEDICARE

## 2018-09-06 ENCOUNTER — HOSPITAL ENCOUNTER (INPATIENT)
Facility: HOSPITAL | Age: 83
LOS: 6 days | Discharge: ANOTHER HOSPITAL | DRG: 377 | End: 2018-09-12
Attending: EMERGENCY MEDICINE | Admitting: INTERNAL MEDICINE
Payer: MEDICARE

## 2018-09-06 ENCOUNTER — APPOINTMENT (INPATIENT)
Dept: RADIOLOGY | Facility: HOSPITAL | Age: 83
DRG: 377 | End: 2018-09-06
Attending: PHYSICIAN ASSISTANT
Payer: MEDICARE

## 2018-09-06 DIAGNOSIS — R56.9 SEIZURE (CMS/HCC): ICD-10-CM

## 2018-09-06 DIAGNOSIS — K92.2 GASTROINTESTINAL HEMORRHAGE, UNSPECIFIED GASTROINTESTINAL HEMORRHAGE TYPE: Primary | ICD-10-CM

## 2018-09-06 PROBLEM — R11.10 VOMITING: Status: ACTIVE | Noted: 2018-09-06

## 2018-09-06 PROBLEM — R09.02 HYPOXIA: Status: ACTIVE | Noted: 2018-09-06

## 2018-09-06 PROBLEM — J69.0 ASPIRATION PNEUMONIA (CMS/HCC): Status: ACTIVE | Noted: 2018-09-06

## 2018-09-06 LAB
ABO + RH BLD: NORMAL
ALBUMIN SERPL-MCNC: 3.1 G/DL (ref 3.4–5)
ALP SERPL-CCNC: 45 U/L (ref 35–126)
ALT SERPL-CCNC: 18 U/L (ref 16–63)
ANION GAP SERPL CALC-SCNC: 9 MEQ/L (ref 3–15)
APTT PPP: 28 SEC (ref 23–35)
AST SERPL-CCNC: 25 U/L (ref 15–41)
BASOPHILS # BLD: 0.02 K/UL (ref 0.01–0.1)
BASOPHILS NFR BLD: 0.4 %
BILIRUB SERPL-MCNC: 0.8 MG/DL (ref 0.3–1.2)
BLD GP AB SCN SERPL QL: NEGATIVE
BNP SERPL-MCNC: 73 PG/ML
BUN SERPL-MCNC: 37 MG/DL (ref 8–20)
CALCIUM SERPL-MCNC: 8 MG/DL (ref 8.9–10.3)
CHLORIDE SERPL-SCNC: 107 MEQ/L (ref 98–109)
CO2 SERPL-SCNC: 19 MEQ/L (ref 22–32)
CREAT SERPL-MCNC: 1 MG/DL (ref 0.8–1.3)
D AG BLD QL: POSITIVE
DIFFERENTIAL METHOD BLD: ABNORMAL
EOSINOPHIL # BLD: 0.04 K/UL (ref 0.04–0.54)
EOSINOPHIL NFR BLD: 0.8 %
ERYTHROCYTE [DISTWIDTH] IN BLOOD BY AUTOMATED COUNT: 14.6 % (ref 11.6–14.4)
GFR SERPL CREATININE-BSD FRML MDRD: >60 ML/MIN/1.73M*2
GLUCOSE SERPL-MCNC: 163 MG/DL (ref 70–99)
HCT VFR BLDCO AUTO: 27.5 % (ref 40.1–51)
HCT VFR BLDCO AUTO: 27.9 % (ref 40.1–51)
HGB BLD-MCNC: 8.9 G/DL (ref 13.7–17.5)
HGB BLD-MCNC: 9 G/DL (ref 13.7–17.5)
IMM GRANULOCYTES # BLD AUTO: 0.01 K/UL (ref 0–0.08)
IMM GRANULOCYTES NFR BLD AUTO: 0.2 %
INR PPP: 1.2 INR
LABORATORY COMMENT REPORT: NORMAL
LACTATE SERPL-SCNC: 4.4 MMOL/L (ref 0.4–2)
LACTATE SERPL-SCNC: 6.9 MMOL/L (ref 0.4–2)
LIPASE SERPL-CCNC: 21 U/L (ref 20–51)
LYMPHOCYTES # BLD: 1.88 K/UL (ref 1.2–3.5)
LYMPHOCYTES NFR BLD: 39.2 %
MAGNESIUM SERPL-MCNC: 1.9 MG/DL (ref 1.8–2.5)
MCH RBC QN AUTO: 29.8 PG (ref 28–33.2)
MCHC RBC AUTO-ENTMCNC: 32.7 G/DL (ref 32.2–36.5)
MCV RBC AUTO: 91.1 FL (ref 83–98)
MONOCYTES # BLD: 0.35 K/UL (ref 0.3–1)
MONOCYTES NFR BLD: 7.3 %
NEUTROPHILS # BLD: 2.5 K/UL (ref 1.7–7)
NEUTS SEG NFR BLD: 52.1 %
NRBC BLD-RTO: 0.2 %
PDW BLD AUTO: 10.9 FL (ref 9.4–12.4)
PLATELET # BLD AUTO: 145 K/UL (ref 150–350)
POTASSIUM SERPL-SCNC: 3.4 MEQ/L (ref 3.6–5.1)
PROT SERPL-MCNC: 6 G/DL (ref 6–8.2)
PROTHROMBIN TIME: 15.3 SEC (ref 12.2–14.5)
RBC # BLD AUTO: 3.02 M/UL (ref 4.5–5.8)
SODIUM SERPL-SCNC: 135 MEQ/L (ref 136–144)
TROPONIN I SERPL-MCNC: <0.02 NG/ML
WBC # BLD AUTO: 4.8 K/UL (ref 3.8–10.5)

## 2018-09-06 PROCEDURE — 87040 BLOOD CULTURE FOR BACTERIA: CPT | Performed by: PHYSICIAN ASSISTANT

## 2018-09-06 PROCEDURE — 86850 RBC ANTIBODY SCREEN: CPT

## 2018-09-06 PROCEDURE — 85610 PROTHROMBIN TIME: CPT | Performed by: PHYSICIAN ASSISTANT

## 2018-09-06 PROCEDURE — 85730 THROMBOPLASTIN TIME PARTIAL: CPT | Performed by: PHYSICIAN ASSISTANT

## 2018-09-06 PROCEDURE — 93005 ELECTROCARDIOGRAM TRACING: CPT | Performed by: PHYSICIAN ASSISTANT

## 2018-09-06 PROCEDURE — 63600000 HC DRUGS/DETAIL CODE: Performed by: PHYSICIAN ASSISTANT

## 2018-09-06 PROCEDURE — 25000000 HC PHARMACY GENERAL: Performed by: PHYSICIAN ASSISTANT

## 2018-09-06 PROCEDURE — 36415 COLL VENOUS BLD VENIPUNCTURE: CPT | Performed by: PHYSICIAN ASSISTANT

## 2018-09-06 PROCEDURE — 85025 COMPLETE CBC W/AUTO DIFF WBC: CPT | Performed by: PHYSICIAN ASSISTANT

## 2018-09-06 PROCEDURE — 83690 ASSAY OF LIPASE: CPT | Performed by: PHYSICIAN ASSISTANT

## 2018-09-06 PROCEDURE — 80053 COMPREHEN METABOLIC PANEL: CPT | Performed by: PHYSICIAN ASSISTANT

## 2018-09-06 PROCEDURE — 20000000 HC ROOM AND CARE ICU

## 2018-09-06 PROCEDURE — 99285 EMERGENCY DEPT VISIT HI MDM: CPT | Mod: 25

## 2018-09-06 PROCEDURE — 83735 ASSAY OF MAGNESIUM: CPT | Performed by: PHYSICIAN ASSISTANT

## 2018-09-06 PROCEDURE — 96375 TX/PRO/DX INJ NEW DRUG ADDON: CPT

## 2018-09-06 PROCEDURE — 86920 COMPATIBILITY TEST SPIN: CPT

## 2018-09-06 PROCEDURE — 83605 ASSAY OF LACTIC ACID: CPT | Performed by: PHYSICIAN ASSISTANT

## 2018-09-06 PROCEDURE — 63600000 HC DRUGS/DETAIL CODE: Performed by: EMERGENCY MEDICINE

## 2018-09-06 PROCEDURE — 74018 RADEX ABDOMEN 1 VIEW: CPT

## 2018-09-06 PROCEDURE — 25800000 HC PHARMACY IV SOLUTIONS: Performed by: PHYSICIAN ASSISTANT

## 2018-09-06 PROCEDURE — 70450 CT HEAD/BRAIN W/O DYE: CPT

## 2018-09-06 PROCEDURE — 96374 THER/PROPH/DIAG INJ IV PUSH: CPT

## 2018-09-06 PROCEDURE — 85018 HEMOGLOBIN: CPT | Performed by: PHYSICIAN ASSISTANT

## 2018-09-06 PROCEDURE — 81003 URINALYSIS AUTO W/O SCOPE: CPT | Performed by: INTERNAL MEDICINE

## 2018-09-06 PROCEDURE — 84484 ASSAY OF TROPONIN QUANT: CPT | Performed by: PHYSICIAN ASSISTANT

## 2018-09-06 PROCEDURE — 83880 ASSAY OF NATRIURETIC PEPTIDE: CPT | Performed by: PHYSICIAN ASSISTANT

## 2018-09-06 PROCEDURE — 71045 X-RAY EXAM CHEST 1 VIEW: CPT

## 2018-09-06 RX ORDER — DEXTROSE 50 % IN WATER (D50W) INTRAVENOUS SYRINGE
25 AS NEEDED
Status: DISCONTINUED | OUTPATIENT
Start: 2018-09-06 | End: 2018-09-13 | Stop reason: HOSPADM

## 2018-09-06 RX ORDER — POTASSIUM CHLORIDE 1.5 G/1.58G
20 POWDER, FOR SOLUTION ORAL AS NEEDED
Status: DISCONTINUED | OUTPATIENT
Start: 2018-09-06 | End: 2018-09-13 | Stop reason: HOSPADM

## 2018-09-06 RX ORDER — POTASSIUM CHLORIDE 1.5 G/1.58G
40 POWDER, FOR SOLUTION ORAL AS NEEDED
Status: DISCONTINUED | OUTPATIENT
Start: 2018-09-06 | End: 2018-09-13 | Stop reason: HOSPADM

## 2018-09-06 RX ORDER — MAGNESIUM SULFATE HEPTAHYDRATE 40 MG/ML
2 INJECTION, SOLUTION INTRAVENOUS AS NEEDED
Status: DISCONTINUED | OUTPATIENT
Start: 2018-09-06 | End: 2018-09-13 | Stop reason: HOSPADM

## 2018-09-06 RX ORDER — ONDANSETRON HYDROCHLORIDE 2 MG/ML
4 INJECTION, SOLUTION INTRAVENOUS ONCE
Status: COMPLETED | OUTPATIENT
Start: 2018-09-06 | End: 2018-09-06

## 2018-09-06 RX ORDER — DEXTROSE 40 %
15-30 GEL (GRAM) ORAL AS NEEDED
Status: DISCONTINUED | OUTPATIENT
Start: 2018-09-06 | End: 2018-09-13 | Stop reason: HOSPADM

## 2018-09-06 RX ORDER — METRONIDAZOLE 500 MG/100ML
500 INJECTION, SOLUTION INTRAVENOUS
Status: COMPLETED | OUTPATIENT
Start: 2018-09-06 | End: 2018-09-10

## 2018-09-06 RX ORDER — ACETAMINOPHEN 650 MG/1
650 SUPPOSITORY RECTAL EVERY 4 HOURS PRN
Status: DISCONTINUED | OUTPATIENT
Start: 2018-09-06 | End: 2018-09-09

## 2018-09-06 RX ORDER — ACETAMINOPHEN 325 MG/1
650 TABLET ORAL EVERY 4 HOURS PRN
Status: DISCONTINUED | OUTPATIENT
Start: 2018-09-06 | End: 2018-09-13 | Stop reason: HOSPADM

## 2018-09-06 RX ORDER — SODIUM CHLORIDE 9 MG/ML
INJECTION, SOLUTION INTRAVENOUS CONTINUOUS
Status: DISCONTINUED | OUTPATIENT
Start: 2018-09-06 | End: 2018-09-08

## 2018-09-06 RX ORDER — POTASSIUM CHLORIDE 750 MG/1
40 TABLET, FILM COATED, EXTENDED RELEASE ORAL AS NEEDED
Status: DISCONTINUED | OUTPATIENT
Start: 2018-09-06 | End: 2018-09-13 | Stop reason: HOSPADM

## 2018-09-06 RX ORDER — ALBUTEROL SULFATE 0.83 MG/ML
10 SOLUTION RESPIRATORY (INHALATION) ONCE
Status: COMPLETED | OUTPATIENT
Start: 2018-09-06 | End: 2018-09-06

## 2018-09-06 RX ORDER — POTASSIUM CHLORIDE 14.9 MG/ML
20 INJECTION INTRAVENOUS AS NEEDED
Status: DISCONTINUED | OUTPATIENT
Start: 2018-09-06 | End: 2018-09-13 | Stop reason: HOSPADM

## 2018-09-06 RX ORDER — LANOLIN ALCOHOL/MO/W.PET/CERES
400 CREAM (GRAM) TOPICAL 2 TIMES DAILY PRN
Status: DISCONTINUED | OUTPATIENT
Start: 2018-09-06 | End: 2018-09-13 | Stop reason: HOSPADM

## 2018-09-06 RX ORDER — IBUPROFEN 200 MG
16-32 TABLET ORAL AS NEEDED
Status: DISCONTINUED | OUTPATIENT
Start: 2018-09-06 | End: 2018-09-13 | Stop reason: HOSPADM

## 2018-09-06 RX ORDER — ACETAMINOPHEN 650 MG/20.3ML
650 LIQUID ORAL EVERY 4 HOURS PRN
Status: DISCONTINUED | OUTPATIENT
Start: 2018-09-06 | End: 2018-09-09

## 2018-09-06 RX ORDER — IPRATROPIUM BROMIDE 0.5 MG/2.5ML
1 SOLUTION RESPIRATORY (INHALATION) CONTINUOUS
Status: DISCONTINUED | OUTPATIENT
Start: 2018-09-06 | End: 2018-09-06

## 2018-09-06 RX ORDER — IPRATROPIUM BROMIDE AND ALBUTEROL SULFATE 2.5; .5 MG/3ML; MG/3ML
3 SOLUTION RESPIRATORY (INHALATION)
Status: DISCONTINUED | OUTPATIENT
Start: 2018-09-06 | End: 2018-09-13 | Stop reason: HOSPADM

## 2018-09-06 RX ORDER — LORAZEPAM 2 MG/ML
1 INJECTION INTRAMUSCULAR ONCE
Status: COMPLETED | OUTPATIENT
Start: 2018-09-06 | End: 2018-09-06

## 2018-09-06 RX ORDER — POTASSIUM CHLORIDE 750 MG/1
20 TABLET, FILM COATED, EXTENDED RELEASE ORAL AS NEEDED
Status: DISCONTINUED | OUTPATIENT
Start: 2018-09-06 | End: 2018-09-13 | Stop reason: HOSPADM

## 2018-09-06 RX ADMIN — SODIUM CHLORIDE: 9 INJECTION, SOLUTION INTRAVENOUS at 21:52

## 2018-09-06 RX ADMIN — IPRATROPIUM BROMIDE 1 MG: 0.5 SOLUTION RESPIRATORY (INHALATION) at 16:04

## 2018-09-06 RX ADMIN — TRIMETHOBENZAMIDE HYDROCHLORIDE 200 MG: 100 INJECTION INTRAMUSCULAR at 17:36

## 2018-09-06 RX ADMIN — LORAZEPAM 1 MG: 2 INJECTION INTRAMUSCULAR at 17:37

## 2018-09-06 RX ADMIN — SODIUM CHLORIDE 8 MG/HR: 900 INJECTION, SOLUTION INTRAVENOUS at 16:20

## 2018-09-06 RX ADMIN — ALBUTEROL SULFATE 10 MG: 2.5 SOLUTION RESPIRATORY (INHALATION) at 16:05

## 2018-09-06 RX ADMIN — CEFTRIAXONE SODIUM 1 G: 2 INJECTION, POWDER, FOR SOLUTION INTRAMUSCULAR; INTRAVENOUS at 22:07

## 2018-09-06 RX ADMIN — METRONIDAZOLE 500 MG: 500 INJECTION, SOLUTION INTRAVENOUS at 22:07

## 2018-09-06 RX ADMIN — ONDANSETRON 4 MG: 2 INJECTION INTRAMUSCULAR; INTRAVENOUS at 16:32

## 2018-09-06 NOTE — CONSULTS
Cardiology Consult Note    Subjective       HPI:  84 y/o M, known to Dr. Arce, with remarkable PmHx of AS s/p TAVR 5/2016, CAD with CABG, HTN, PVD, recent hospitilization for orthostatic syncopal episode with UTI presents after being seen in the office today with Dr. Arce.  Pt was on his way to a urologist office, and had recurrent syncopal episode.  Pt arrived to the ED tachycardiac, hypotensive, and hypoxic.  Pt also had coffee ground emesis noted.  Pt's family at bedside state he has been weakened and unable to ambulate which is unusual for him.  No CP.        Allergies:  No known allergies    Current Facility-Administered Medications   Medication Dose Route Frequency Provider Last Rate Last Dose   • ipratropium (ATROVENT) 0.02 % nebulizer solution 1 mg  1 mg nebulization Continuous María Saleem PA   1 mg at 09/06/18 1604   • pantoprazole (PROTONIX) 200 mg in sodium chloride 0.9 % 250 mL (0.8 mg/mL) infusion  8 mg/hr intravenous Continuous María Saleem PA 10 mL/hr at 09/06/18 1620 8 mg/hr at 09/06/18 1620     Current Outpatient Prescriptions   Medication Sig Dispense Refill   • aspirin 81 mg enteric coated tablet Take 81 mg by mouth daily.       • atorvastatin (LIPITOR) 40 mg tablet Take 40 mg by mouth daily.       • clopidogrel (PLAVIX) 75 mg tablet Take 75 mg by mouth once.       • guaiFENesin (MUCINEX) 600 mg 12 hr tablet Take 1,200 mg by mouth 2 (two) times a day.     • HYALUR AC/CHOND SUL/COLG II/AA (HYALURONIC ACID, CHOND-COLLGN, ORAL) Take by mouth.     • levothyroxine (SYNTHROID) 100 mcg tablet Take 100 mcg by mouth daily.     • tamsulosin (FLOMAX) 0.4 mg capsule Take 0.4 mg by mouth 2 (two) times a day.           Past Medical History:   Diagnosis Date   • Arthritis    • BPH (benign prostatic hyperplasia)    • CHF (congestive heart failure) (CMS/HCC) ([facility])    • Coronary artery disease    • Heart disease    • Hyperlipidemia    • Hypertension    • Sarcoidosis (CMS/HCC)        Past Surgical  History:   Procedure Laterality Date   • AORTIC VALVE REPLACEMENT     • CORONARY ARTERY BYPASS GRAFT     • POPLITEAL VENOUS ANEURYSM REPAIR         Social History   reports that he quit smoking about 40 years ago. He has never used smokeless tobacco. He reports that he does not drink alcohol or use drugs.    History reviewed. No pertinent family history.      Review of Systems: All other systems reviewed and negative except as noted in the HPI.      Objective   Physical Exam   BP (!) 114/52 (BP Location: Right upper arm, Patient Position: Lying)   Pulse (!) 110   Temp 36.2 °C (97.2 °F) (Tympanic)   Resp (!) 26   SpO2 (!) 74%   The patient appears comfortable and is in apparent distress,    Eyes: Eyelids and sclera normal,   Neck: No jugular venous distention, no thyromegaly, no lymphadenopathy, no carotid bruits,    Lungs: Clear to auscultation, normal respiratory effort,   Heart: Regular rhythm, normal S1 and S2, 2/6 sys murmurs and no rubs or gallops,   Abdomen: Soft, nontender,    Extremities: No edema, no calf tenderness or swelling, pulses intact,   Skin: No rashes,   Neuro: Alert and oriented without focal deficit,   Psych: Pt is nonverbal at this time.      Labs   Lab Results   Component Value Date    WBC 4.80 09/06/2018    HGB 9.0 (L) 09/06/2018     (L) 09/06/2018    ALT 18 09/06/2018    AST 25 09/06/2018     (L) 09/06/2018    K 3.4 (L) 09/06/2018     09/06/2018    CREATININE 1.0 09/06/2018    BUN 37 (H) 09/06/2018    CO2 19 (L) 09/06/2018    INR 1.2 09/06/2018    BNP 73 09/06/2018     Troponin I Results       09/06/18 09/04/18                       1548 1446          Troponin I &lt;0.02 &lt;0.03                         Imaging  I have independently reviewed the pertinent imaging from the last 24 hrs.    ECG   sinus rhythm at 82bpm, no acute ST wave abnormality    Telemetry  sinus rhythm    Other cardiac procedures/testing:      · TTE 9/5/18:  Technically difficult study, the patient  was unable to roll onto his left side.  · Mildly dilated cavity size. Mild concentric left ventricular hypertrophy. Preserved systolic function. Estimated EF = 60%. Unable to assess diastolic filling pattern. Cannot adequately assess regional wall motion.  · Right heart structures are not well visualized.  · Bioprosthetic aortic valve replacement with peak velocity 2.3m/s, mean gradient 10mmHg. Moderate paravalvular aortic regurgitation (anterior, between 12-2 o'clock on short axis views.)  · Thickened mitral leaflets. Trace mitral regurgitation.  · Mildly dilated left atrium.  · No evidence of pericardial effusion.  No prior study available for direct comparison.        Assessment     Orthostatic syncope   Assessment & Plan    Pt had recurrent syncopal episode in the setting of UTI and now upper GIB/hypoxia.  He was recently admitted at [facility] for syncopal episode where an echo 9/5/18 showed moderate perivalvular aortic insufficiency which from what I can tell is a new finding since his TAVR 5/2016 (performed at [facility] by Dr. Cardenas).  Pt is currently being admitted by the ICU team.          Aortic stenosis   Assessment & Plan    S/p TAVR 5/2016, recent echo showed mod perivalvular AI.  Now he is hypoxic.  Pt may be volume overloaded and will diuresis if needed.  Will repeat Echo.          CAD (coronary artery disease)   Assessment & Plan    Hx of CABG, Hold ASA and Plavix with upper GIB.                  NEYDA Jack  9/6/2018

## 2018-09-06 NOTE — ASSESSMENT & PLAN NOTE
BPH with recent UTI, was going to follow-up with urology today prior to admission  Renal US w/o hydronephrosis  Voiding w/ some frequecy    · Urology appreciated  · Continue flomax BID  · Monitor PVR

## 2018-09-06 NOTE — ASSESSMENT & PLAN NOTE
Echo with LVEF 60%, moderate aortic regurg  S/p volume replacement,  S/p 1 PRBC 9/8/18    · Cards consult following, monitor AI and outpatient follow up  · Monitor Orthostatic vitals  · Maintain adequate hydration

## 2018-09-06 NOTE — H&P
Critical Care History & Physical    CHIEF COMPLAINT  Syncope, hematemesis    HISTORY OF PRESENT ILLNESS  Michel Fang is a 83 y.o. male with past medical history of CAD status post CABG, hypertension, BPH, orthostatic hypotension, who was just recently admitted 2 days ago to [facility], and discharged yesterday following syncope, presents today with another syncopal episode, and hematemesis.  Records reviewed from [facility] admission, was admitted for IV fluids hydration, and discharged home.  Patient stood up in the wheelchair to go into a car on his way to outpatient follow-up with PA only with urology, when he became unresponsive, had right hand/UE tremoring, and tongue tremoring per the patient's daughter who witnessed this episode, EMS was called and patient was brought to ED for evaluation.  By the time of arrival to ED, patient was regaining consciousness, lethargic but arousable to voice, oriented ×3, and grossly nonfocal.  History obtained per patient, and his family.  He continued to have lightheadedness, nausea since discharge from [facility] yesterday, but vomiting did not start until today following the syncopal episode.  Patient has coffee-ground hematemesis, and continued to have vomiting in ED. Denies associated headache, visual disturbances, chest pain, palpitation, shortness of breath, abdominal pain, diarrhea.  Reportedly has had darker stool bowel movement several days ago.  No recent travels.    Patient arrived to ED afebrile, heart rate in the 80s, respiratory distress, and hypoxic in the 90s on room air, given hour-long nebs and placed on oxygen with some improvement.  By the time of my evaluation, which he is actually tachycardic, has developed a new productive cough.  Workup was remarkable for airspace disease is concerning for aspiration pneumonia with vomiting preceding symptoms.  Lactate elevated at 4.4 with bicarb of 19, hyponatremia 135, hypokalemia 3.4, normal creatinine 1.0.  Hemoglobin of  9.0, which is stable from yesterday value of 9.4.  Abdomen is soft, but mildly distended with hypoactive bowel sounds, and patient continued to have vomiting in the ED.  Secondary to unstable hemodynamics, tenuous respiration with aspiration pneumonia, and recurrent syncope, will admit to ICU for close monitoring continued management.    PAST MEDICAL AND SURGICAL HISTORY  Past Medical History:   Diagnosis Date   • Arthritis    • BPH (benign prostatic hyperplasia)    • CHF (congestive heart failure) (CMS/HCC) ([facility])    • Coronary artery disease    • Heart disease    • Hyperlipidemia    • Hypertension    • Sarcoidosis (CMS/HCC)      Past Surgical History:   Procedure Laterality Date   • AORTIC VALVE REPLACEMENT     • CORONARY ARTERY BYPASS GRAFT     • POPLITEAL VENOUS ANEURYSM REPAIR         FAMILY HISTORY  History reviewed. No pertinent family history.    SOCIAL HISTORY  Social History     Social History   • Marital status:      Spouse name: N/A   • Number of children: N/A   • Years of education: N/A     Social History Main Topics   • Smoking status: Former Smoker     Quit date: 1978   • Smokeless tobacco: Never Used   • Alcohol use No   • Drug use: No   • Sexual activity: Not Asked     Other Topics Concern   • None     Social History Narrative   • None       REVIEW OF SYSTEMS  All other systems reviewed and negative except as noted in HPI.    MEDICATIONS    (Not in a hospital admission)    ALLERGIES  No known allergies    PHYSICAL EXAM  Temp:  [36.2 °C (97.2 °F)] 36.2 °C (97.2 °F)  Heart Rate:  [] 110  Resp:  [22-26] 26  BP: (114-134)/(52-71) 114/52  There is no height or weight on file to calculate BMI.      GEN: elderly, critically ill  HENT: PERRLA, EOMI, no deformity  NECK: supple  CARD: tachycardic, +murmur  RESP: mildly labored, basilar rhonchi  Gi: soft, distended, markedly hypoactive BS  EXT: no edema, pulses throughout  SKIN: warm, dry, no rash  NEURO: lethargic, arousable to voice, oriented x3,  nonfocal      REVIEW OF SYSTEMS  All other systems reviewed and negative except as noted in HPI.    LAB RESULTS  Recent Results (from the past 24 hour(s))   Comprehensive metabolic panel    Collection Time: 09/06/18  3:48 PM   Result Value Ref Range    Sodium 135 (L) 136 - 144 mEQ/L    Potassium 3.4 (L) 3.6 - 5.1 mEQ/L    Chloride 107 98 - 109 mEQ/L    CO2 19 (L) 22 - 32 mEQ/L    BUN 37 (H) 8 - 20 mg/dL    Creatinine 1.0 0.8 - 1.3 mg/dL    Glucose 163 (H) 70 - 99 mg/dL    Calcium 8.0 (L) 8.9 - 10.3 mg/dL    AST (SGOT) 25 15 - 41 U/L    ALT (SGPT) 18 16 - 63 U/L    Alkaline Phosphatase 45 35 - 126 U/L    Total Protein 6.0 6.0 - 8.2 g/dL    Albumin 3.1 (L) 3.4 - 5.0 g/dL    Bilirubin, Total 0.8 0.3 - 1.2 mg/dL    eGFR >60.0 >=60.0 mL/min/1.73m*2    Anion Gap 9 3 - 15 mEQ/L   Troponin I    Collection Time: 09/06/18  3:48 PM   Result Value Ref Range    Troponin I <0.02 <0.05 ng/mL   Lactate, w/ reflex repeat if > 2.0    Collection Time: 09/06/18  3:48 PM   Result Value Ref Range    Lactate 4.4 (HH) 0.4 - 2.0 mmol/L   CBC    Collection Time: 09/06/18  3:48 PM   Result Value Ref Range    WBC 4.80 3.80 - 10.50 K/uL    RBC 3.02 (L) 4.50 - 5.80 M/uL    Hemoglobin 9.0 (L) 13.7 - 17.5 g/dL    Hematocrit 27.5 (L) 40.1 - 51.0 %    MCV 91.1 83.0 - 98.0 fL    MCH 29.8 28.0 - 33.2 pg    MCHC 32.7 32.2 - 36.5 g/dL    RDW 14.6 (H) 11.6 - 14.4 %    Platelets 145 (L) 150 - 350 K/uL    MPV 10.9 9.4 - 12.4 fL   Diff Count    Collection Time: 09/06/18  3:48 PM   Result Value Ref Range    Differential Type Auto     nRBC 0.2 (H) <=0.0 %    Immature Granulocytes 0.2 %    Neutrophils 52.1 %    Lymphocytes 39.2 %    Monocytes 7.3 %    Eosinophils 0.8 %    Basophils 0.4 %    Immature Granulocytes, Absolute 0.01 0.00 - 0.08 K/uL    Neutrophils, Absolute 2.50 1.70 - 7.00 K/uL    Lymphocytes, Absolute 1.88 1.20 - 3.50 K/uL    Monocytes, Absolute 0.35 0.30 - 1.00 K/uL    Eosinophils, Absolute 0.04 0.04 - 0.54 K/uL    Basophils, Absolute 0.02 0.01 -  0.10 K/uL   Lipase    Collection Time: 09/06/18  3:48 PM   Result Value Ref Range    Lipase 21 20 - 51 U/L   Magnesium    Collection Time: 09/06/18  3:48 PM   Result Value Ref Range    Magnesium 1.9 1.8 - 2.5 mg/dL   Type and Screen [facility] Lab    Collection Time: 09/06/18  3:48 PM   Result Value Ref Range    Antibody Screen Negative     ABO O     Rh Factor Positive     History Check Previous type on file    Protime-INR    Collection Time: 09/06/18  3:51 PM   Result Value Ref Range    PT 15.3 (H) 12.2 - 14.5 sec    INR 1.2 <6.0 INR   APTT    Collection Time: 09/06/18  3:51 PM   Result Value Ref Range    PTT 28 23 - 35 sec   B-type natriuretic peptide    Collection Time: 09/06/18  3:51 PM   Result Value Ref Range    BNP 73 <=100 pg/mL     Laboratory results were independently reviewed.    IMAGING  Ct Head Without Iv Contrast    Result Date: 9/6/2018  IMPRESSION: No evidence of acute intracranial pathology.     X-ray Chest 1 View    Result Date: 9/6/2018  IMPRESSION:  Bibasilar airspace opacities, probably due to atelectasis    X-ray Chest 1 View    Result Date: 9/5/2018  IMPRESSION: No evidence of active disease.    Radiography was independently reviewed.    TELEMETRY/EKG  sinus rhythm, rate 80s, RBBB, bifascular block, qtc 558    ASSESSMENT & PLAN  Vomiting   Assessment & Plan    Vomiting started after syncope  Still active, coffee ground    · Trend hgb  · T/c NGT decompression  · KUB pending  · Repeat lactate        Hypoxia   Assessment & Plan    Hypoxic with evidence of aspiration pneumonia on CXR    · ICU level of care required with tenuous respiration  · Supp o2 to keep O2 sat >92%  · Monitor CXR and ABG  · Check sputum culture  · Rocephin and flagyl  · Pulmonary hygiene/toiletting        CAD (coronary artery disease)   Assessment & Plan    · Cards consult  · Hold aspirin/plavix with hematemesis/active vomiting        Benign prostatic hyperplasia   Assessment & Plan    BPH with recent UTI, was going to follow-up  with urology today prior to admission    · Urology consult for management        Orthostatic syncope   Assessment & Plan    Echo with LVEF 60%, moderate aortic regurg    · Cards consult - to repeat echo  · Bed rest  · IVF resuscitation  · Orthostatic vitals when stable  · Consider carotid US                Acting as a scribe in the presence of NEYDA Epperson

## 2018-09-06 NOTE — ASSESSMENT & PLAN NOTE
Pt had recurrent syncopal episode in the setting of UTI and now upper GIB/hypoxia.  He was recently admitted at [facility] for syncopal episode where an echo 9/5/18 showed moderate perivalvular aortic insufficiency.  Per the family, this was also seen on echo at [place] 7/2018

## 2018-09-06 NOTE — ASSESSMENT & PLAN NOTE
W/o CP, rhythmically stable    · Cards consult following  · OK by GI to resume aspirin/plavix as no sign of bleeding on EGD. Will cont. DAPT

## 2018-09-06 NOTE — ED ATTESTATION NOTE
I have personally seen and examined the patient.  I reviewed and agree with physician assistant / nurse practitioner’s assessment and plan of care.     Exam: Patient presents in acute respiratory distress with marked hypoxia prior to administration of supplemental oxygen, pulse ox 86% on room air.  Patient is lethargic but easily arousable and conversant once aroused.  Heart is regular in the 80s.  Lungs have bilateral wheeze with decreased breath sounds.  Patient is afebrile.  Large scar across the sternum indicative of his previous CABG    Plan: Support his respiratory effort with hour-long DuoNeb.  EKG although abnormal was not consistent with ST elevation MI or arrhythmia.  Patient closed or covered in dark/black appearing emesis concerning for possible GI bleed will check type and screen and start Protonix bolus and drip.  Continue aggressive supportive care, IV fluids will be initiated at a slow rate until CHF can be excluded.           Yoni Hsieh, DO  09/06/18 7599

## 2018-09-06 NOTE — ASSESSMENT & PLAN NOTE
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  No treatment planned at this time.  Outpt f/u

## 2018-09-06 NOTE — ED PROVIDER NOTES
"HPI     Chief Complaint   Patient presents with   • Respiratory Distress     Pt was SOB and had episode of vomitting and went unresponsive.        83-year-old male history of BPH, CHF, CAD, hyperlipidemia, hypertension presents for evaluation of vomiting and unresponsive episode.  Patient was in the process of getting in the car to go to [place] to be evaluated by his urologist today when he started vomiting and became unresponsive. Pt's daughter reports mild \"shaking\" prior to pt becoming unresponsive. This occurred approximately 30 minutes prior to arrival, EMS was called immediately. EMS found pt hypotensive and unresponsive, aroused en route. SpO2 86% on RA in ED, placed on 2L O2.              Patient History     Past Medical History:   Diagnosis Date   • Arthritis    • BPH (benign prostatic hyperplasia)    • CHF (congestive heart failure) (CMS/HCC) ([facility])    • Coronary artery disease    • Heart disease    • Hyperlipidemia    • Hypertension    • Sarcoidosis (CMS/HCC)        Past Surgical History:   Procedure Laterality Date   • AORTIC VALVE REPLACEMENT     • CARDIAC SURGERY     • CORONARY ARTERY BYPASS GRAFT     • POPLITEAL VENOUS ANEURYSM REPAIR         History reviewed. No pertinent family history.    Social History   Substance Use Topics   • Smoking status: Former Smoker     Quit date: 1978   • Smokeless tobacco: Never Used   • Alcohol use No       Systems Reviewed from Nursing Triage:  Allergies  Problems          Review of Systems     Review of Systems     Physical Exam     ED Triage Vitals   Temp Heart Rate Resp BP SpO2   09/06/18 1540 09/06/18 1540 09/06/18 1540 09/06/18 1540 09/06/18 1540   36.2 °C (97.2 °F) 82 (!) 22 134/71 (!) 90 %      Temp Source Heart Rate Source Patient Position BP Location FiO2 (%) (Set)   09/06/18 1540 09/06/18 1744 09/06/18 1744 09/06/18 1744 --   Tympanic Monitor Lying Right upper arm        Pulse Ox %: 86 % (09/06/18 1643)  Pulse Ox Interpretation: Low (09/06/18 1643)  Heart " "Rate: 82 (09/06/18 1643)  Rhythm Strip Interpretation: Normal Sinus Rhythm (09/06/18 1643)    Patient Vitals for the past 24 hrs:   BP Temp Temp src Pulse Resp SpO2 Height Weight   09/06/18 2251 - - - - - - 1.718 m (5' 7.64\") 101 kg (222 lb 10.6 oz)   09/06/18 2200 (!) 116/55 - - (!) 105 (!) 26 100 % - -   09/06/18 2143 - - - - - - - 101 kg (222 lb 10.6 oz)   09/06/18 2100 130/62 - - (!) 107 (!) 27 100 % - -   09/06/18 2038 - - - (!) 121 (!) 22 100 % - -   09/06/18 2027 - - - - - 98 % - -   09/06/18 1929 (!) 116/57 - - 98 (!) 24 97 % - -   09/06/18 1759 - - - (!) 110 (!) 26 (!) 74 % - -   09/06/18 1744 (!) 114/52 - - (!) 104 (!) 24 (!) 80 % - -   09/06/18 1543 - - - - - 96 % - -   09/06/18 1540 134/71 36.2 °C (97.2 °F) Tympanic 82 (!) 22 (!) 90 % - -             NIH Stroke Scale (Adult)     Row Name 09/06/18 17:51:43                NIH Stroke Scale (Adult)    NIH Stroke Scale NIH Stroke Scale (Group)  -       Row Name 09/06/18 17:51:43                NIH Stroke Scale    Interval --   Pt unable to be tested with NIH stroke scale. Pt unable to follow command, pt is arousable to pain/loud voices. NIH stroke scale deferred.   -         User Key  (r) = Recorded By, (t) = Taken By, (c) = Cosigned By    Initials Name    Radha Fernandez RN          Physical Exam   Constitutional: He appears well-developed and well-nourished.   HENT:   Head: Normocephalic and atraumatic.   Black saliva on pt's chin   Cardiovascular: Normal rate, regular rhythm, normal heart sounds and intact distal pulses.    Pulmonary/Chest: Effort normal. He has wheezes (diffuse).   Abdominal: Soft. There is no tenderness.   Neurological: He is alert.   Lethargic, but arousable and answers questions appropriately   Skin: Skin is warm and dry.   Nursing note and vitals reviewed.           ECG 12 lead  Date/Time: 9/6/2018 3:57 PM  Performed by: FINN CABRERA  Authorized by: ERICKSON BOSE     ECG reviewed by ED Physician in the absence of a " cardiologist: yes    Previous ECG:     Previous ECG:  Compared to current    Comparison ECG info:  9/5/18    Similarity:  No change  Rate:     ECG rate:  82    ECG rate assessment: normal    Ectopy:     Ectopy: none    QRS:     QRS intervals:  Wide  Conduction:     Conduction: normal    ST segments:     ST segments:  Normal  T waves:     T waves: normal    Comments:      RBBB  Bifascicular block    [id number]          ED Course & MDM     Labs Reviewed   COMPREHENSIVE METABOLIC PANEL - Abnormal        Result Value    Sodium 135 (*)     Potassium 3.4 (*)     Chloride 107      CO2 19 (*)     BUN 37 (*)     Creatinine 1.0      Glucose 163 (*)     Calcium 8.0 (*)     AST (SGOT) 25      ALT (SGPT) 18      Alkaline Phosphatase 45      Total Protein 6.0      Albumin 3.1 (*)     Bilirubin, Total 0.8      eGFR >60.0      Anion Gap 9     LACTATE, VENOUS - Abnormal     Lactate 4.4 (*)    CBC - Abnormal     WBC 4.80      RBC 3.02 (*)     Hemoglobin 9.0 (*)     Hematocrit 27.5 (*)     MCV 91.1      MCH 29.8      MCHC 32.7      RDW 14.6 (*)     Platelets 145 (*)     MPV 10.9     DIFF COUNT - Abnormal     Differential Type Auto      nRBC 0.2 (*)     Immature Granulocytes 0.2      Neutrophils 52.1      Lymphocytes 39.2      Monocytes 7.3      Eosinophils 0.8      Basophils 0.4      Immature Granulocytes, Absolute 0.01      Neutrophils, Absolute 2.50      Lymphocytes, Absolute 1.88      Monocytes, Absolute 0.35      Eosinophils, Absolute 0.04      Basophils, Absolute 0.02     PROTIME-INR - Abnormal     PT 15.3 (*)     INR 1.2     LACTATE, VENOUS (3HR REPEAT) - Abnormal     Lactate 6.9 (*)    HGB & HCT - Abnormal     Hemoglobin 8.9 (*)     Hematocrit 27.9 (*)    TROPONIN I - Normal    Troponin I <0.02     LIPASE - Normal    Lipase 21     MAGNESIUM - Normal    Magnesium 1.9     PTT - Normal    PTT 28     B-TYPE NATRIURETIC PEPTIDE - Normal    BNP 73     BLOOD CULTURE    Narrative:     The following orders were created for panel  order Blood culture.  Procedure                               Abnormality         Status                     ---------                               -----------         ------                     Blood Culture[[id number]]                                                                  Please view results for these tests on the individual orders.   BLOOD CULTURE    Narrative:     The following orders were created for panel order Blood culture.  Procedure                               Abnormality         Status                     ---------                               -----------         ------                     Blood Culture[[id number]]                                     In process                   Please view results for these tests on the individual orders.   BLOOD CULTURE   BLOOD CULTURE   SPUTUM CULTURE / SMEAR    Narrative:     The following orders were created for panel order Sputum culture / smear.  Procedure                               Abnormality         Status                     ---------                               -----------         ------                     Sputum Gram Stain (Lab Only)[[id number]]                                                   Please view results for these tests on the individual orders.   SPUTUM GRAM STAIN   CBC AND DIFFERENTIAL    Narrative:     The following orders were created for panel order CBC and differential.  Procedure                               Abnormality         Status                     ---------                               -----------         ------                     CBC[[id number]]                           Abnormal            Final result               Diff Count[[id number]]                    Abnormal            Final result                 Please view results for these tests on the individual orders.   TYPE AND SCREEN    Antibody Screen Negative      ABO O      Rh Factor Positive      History Check Previous type on file     REFLEX LACTATE, VENOUS    RAINBOW DRAW PANEL    Narrative:     The following orders were created for panel order [facility] Draw Panel.  Procedure                               Abnormality         Status                     ---------                               -----------         ------                     RAINBOW RED[[id number]]                                       In process                 RAINBOW LAVENDER[[id number]]                                  In process                 RAINBOW LT BLUE[[id number]]                                   In process                 RAINBOW GOLD[[id number]]                                      In process                   Please view results for these tests on the individual orders.   UA HOLD FOR UC (MACRO)   HGB & HCT   BEDSIDE BLOOD GAS, ARTERIAL   RAINBOW RED   RAINBOW LAVENDER   RAINBOW LT BLUE   RAINBOW GOLD       X-RAY ABDOMEN 1 VIEW   Final Result   IMPRESSION: Nonspecific bowel gas pattern.      CT HEAD WITHOUT IV CONTRAST   Final Result   IMPRESSION: No evidence of acute intracranial pathology.            X-RAY CHEST 1 VIEW   Final Result   IMPRESSION:  Bibasilar airspace opacities, probably due to atelectasis      ECG 12 lead    (Results Pending)           MDM         ED Course as of Sep 06 2305   Thu Sep 06, 2018   1634 Evidence of CHF on CXR. ED attending  cardiology for evaluation.  [EK]   1642 After hour-long neb treatment Zofran and some time the patient is much more awake and alert his respiratory status has improved and his blood pressure is up to 96 systolic  [KAMERON]   1643 This was discussed with the patient's daughter who is a nurse and very familiar with his care she relates and never having a history of congestive heart failure.  She states 2 years ago he had a TAVR done for aortic valve disease  [KAMERON]   1644 Patient is known to Dr. Arce from cardiology's service was contacted they will evaluate him.  [KAMERON]   1644 There is some concern that this could also have been secondary to seizure as  the patient was described as shaking prior to becoming unresponsive.  Will check CT of the head to further evaluate  [KAMERON]   1723 Pt actively vomiting coffee-grounds. QTC was 558, cannot order QTC prolonging meds. ordered tigan and ativan. HGb 9.0, records show it was 11.2 -2 days ago.  [EK]   1730 Paged GI for c/s.  [EK]   1736 Paged ICU for c/s.  [EK]   1755 ICU in ED for evaluation  [EK]      ED Course User Index  [EK] María Saleem PA  [KAMERON] Yoni Hsieh DO         Clinical Impressions as of Sep 06 2305   Gastrointestinal hemorrhage, unspecified gastrointestinal hemorrhage type   Seizure (CMS/HCC) (HCC) - possible     Admitted     María Saleem PA  09/06/18 2305

## 2018-09-06 NOTE — ASSESSMENT & PLAN NOTE
Vomiting started after syncope  Had coffee ground  No further episode    · Trend hgb  · Anti-ematic prn

## 2018-09-06 NOTE — ASSESSMENT & PLAN NOTE
Hypoxic with evidence of aspiration pneumonia on CXR  CXR 9/9 w/ pulm congestion    · Needed ICU level of care required with tenuous respiration  · Supp o2 to keep O2 sat >92%  · Monitor CXR and ABG prn  · F/u  sputum culture w/ 1 + gpc  · Rocephin and flagyl for 5 day course ( last day ABX 9/10)  · Pulmonary hygiene/toiletting  · Lasix 40 x 1.  9/9

## 2018-09-07 ENCOUNTER — ANESTHESIA EVENT (INPATIENT)
Dept: ENDOSCOPY | Facility: HOSPITAL | Age: 83
DRG: 377 | End: 2018-09-07
Payer: MEDICARE

## 2018-09-07 ENCOUNTER — APPOINTMENT (INPATIENT)
Dept: RADIOLOGY | Facility: HOSPITAL | Age: 83
DRG: 377 | End: 2018-09-07
Attending: UROLOGY
Payer: MEDICARE

## 2018-09-07 ENCOUNTER — ANESTHESIA (INPATIENT)
Dept: ENDOSCOPY | Facility: HOSPITAL | Age: 83
DRG: 377 | End: 2018-09-07
Payer: MEDICARE

## 2018-09-07 ENCOUNTER — APPOINTMENT (INPATIENT)
Dept: RADIOLOGY | Facility: HOSPITAL | Age: 83
DRG: 377 | End: 2018-09-07
Attending: PHYSICIAN ASSISTANT
Payer: MEDICARE

## 2018-09-07 PROBLEM — K92.2 GASTROINTESTINAL HEMORRHAGE: Status: ACTIVE | Noted: 2018-09-06

## 2018-09-07 PROBLEM — K92.2 GI BLEED: Status: ACTIVE | Noted: 2018-09-07

## 2018-09-07 LAB
ANION GAP SERPL CALC-SCNC: 9 MEQ/L (ref 3–15)
ATRIAL RATE: 82
BACTERIA URNS QL MICRO: ABNORMAL /HPF
BASOPHILS # BLD: 0.05 K/UL (ref 0.01–0.1)
BASOPHILS NFR BLD: 0.7 %
BILIRUB UR QL STRIP.AUTO: NEGATIVE MG/DL
BUN SERPL-MCNC: 35 MG/DL (ref 8–20)
CALCIUM SERPL-MCNC: 7.7 MG/DL (ref 8.9–10.3)
CHLORIDE SERPL-SCNC: 110 MEQ/L (ref 98–109)
CLARITY UR REFRACT.AUTO: CLEAR
CO2 SERPL-SCNC: 21 MEQ/L (ref 22–32)
COLOR UR AUTO: YELLOW
CREAT SERPL-MCNC: 1.2 MG/DL (ref 0.8–1.3)
DIFFERENTIAL METHOD BLD: ABNORMAL
EOSINOPHIL # BLD: 0.01 K/UL (ref 0.04–0.54)
EOSINOPHIL NFR BLD: 0.1 %
ERYTHROCYTE [DISTWIDTH] IN BLOOD BY AUTOMATED COUNT: 15 % (ref 11.6–14.4)
GFR SERPL CREATININE-BSD FRML MDRD: 58 ML/MIN/1.73M*2
GLUCOSE SERPL-MCNC: 135 MG/DL (ref 70–99)
GLUCOSE UR STRIP.AUTO-MCNC: ABNORMAL MG/DL
GRAM STN SPEC: NORMAL
HCT VFR BLDCO AUTO: 23.4 % (ref 40.1–51)
HCT VFR BLDCO AUTO: 24 % (ref 40.1–51)
HCT VFR BLDCO AUTO: 24.5 % (ref 40.1–51)
HCT VFR BLDCO AUTO: 24.5 % (ref 40.1–51)
HGB BLD-MCNC: 7.7 G/DL (ref 13.7–17.5)
HGB BLD-MCNC: 7.9 G/DL (ref 13.7–17.5)
HGB BLD-MCNC: 8 G/DL (ref 13.7–17.5)
HGB BLD-MCNC: 8.2 G/DL (ref 13.7–17.5)
HGB UR QL STRIP.AUTO: ABNORMAL
HYALINE CASTS #/AREA URNS LPF: ABNORMAL /LPF
IMM GRANULOCYTES # BLD AUTO: 0.02 K/UL (ref 0–0.08)
IMM GRANULOCYTES NFR BLD AUTO: 0.3 %
KETONES UR STRIP.AUTO-MCNC: NEGATIVE MG/DL
LACTATE SERPL-SCNC: 3.3 MMOL/L (ref 0.4–2)
LACTATE SERPL-SCNC: 5.7 MMOL/L (ref 0.4–2)
LEUKOCYTE ESTERASE UR QL STRIP.AUTO: NEGATIVE
LYMPHOCYTES # BLD: 1.38 K/UL (ref 1.2–3.5)
LYMPHOCYTES NFR BLD: 18.6 %
MAGNESIUM SERPL-MCNC: 1.9 MG/DL (ref 1.8–2.5)
MCH RBC QN AUTO: 29.6 PG (ref 28–33.2)
MCHC RBC AUTO-ENTMCNC: 32.9 G/DL (ref 32.2–36.5)
MCV RBC AUTO: 90 FL (ref 83–98)
MONOCYTES # BLD: 0.82 K/UL (ref 0.3–1)
MONOCYTES NFR BLD: 11 %
MRSA DNA SPEC QL NAA+PROBE: NEGATIVE
NEUTROPHILS # BLD: 5.15 K/UL (ref 1.7–7)
NEUTS SEG NFR BLD: 69.3 %
NITRITE UR QL STRIP.AUTO: NEGATIVE
NRBC BLD-RTO: 0 %
P AXIS: 18
PDW BLD AUTO: 10.9 FL (ref 9.4–12.4)
PH UR STRIP.AUTO: 5 [PH]
PHOSPHATE SERPL-MCNC: 3.5 MG/DL (ref 2.4–4.7)
PLATELET # BLD AUTO: 134 K/UL (ref 150–350)
POTASSIUM SERPL-SCNC: 4.2 MEQ/L (ref 3.6–5.1)
PR INTERVAL: 182
PROT UR QL STRIP.AUTO: ABNORMAL
QRS DURATION: 160
QT INTERVAL: 478
QTC CALCULATION(BAZETT): 558
R AXIS: -51
RAINBOW HOLD SPECIMEN: NORMAL
RBC # BLD AUTO: 2.6 M/UL (ref 4.5–5.8)
RBC #/AREA URNS HPF: ABNORMAL /HPF
SODIUM SERPL-SCNC: 140 MEQ/L (ref 136–144)
SP GR UR REFRACT.AUTO: 1.03
SQUAMOUS URNS QL MICRO: 1 /HPF
T WAVE AXIS: 62
UROBILINOGEN UR STRIP-ACNC: 0.2 EU/DL
VENTRICULAR RATE: 82
WBC # BLD AUTO: 7.43 K/UL (ref 3.8–10.5)
WBC #/AREA URNS HPF: ABNORMAL /HPF

## 2018-09-07 PROCEDURE — 83735 ASSAY OF MAGNESIUM: CPT | Performed by: PHYSICIAN ASSISTANT

## 2018-09-07 PROCEDURE — 63700000 HC SELF-ADMINISTRABLE DRUG: Performed by: PHYSICIAN ASSISTANT

## 2018-09-07 PROCEDURE — 85025 COMPLETE CBC W/AUTO DIFF WBC: CPT | Performed by: PHYSICIAN ASSISTANT

## 2018-09-07 PROCEDURE — 85014 HEMATOCRIT: CPT | Performed by: PHYSICIAN ASSISTANT

## 2018-09-07 PROCEDURE — P9016 RBC LEUKOCYTES REDUCED: HCPCS

## 2018-09-07 PROCEDURE — 84100 ASSAY OF PHOSPHORUS: CPT | Performed by: PHYSICIAN ASSISTANT

## 2018-09-07 PROCEDURE — 80048 BASIC METABOLIC PNL TOTAL CA: CPT | Performed by: PHYSICIAN ASSISTANT

## 2018-09-07 PROCEDURE — 63600000 HC DRUGS/DETAIL CODE: Performed by: PHYSICIAN ASSISTANT

## 2018-09-07 PROCEDURE — 37000001 HC ANESTHESIA GENERAL: Performed by: INTERNAL MEDICINE

## 2018-09-07 PROCEDURE — 88342 IMHCHEM/IMCYTCHM 1ST ANTB: CPT | Performed by: INTERNAL MEDICINE

## 2018-09-07 PROCEDURE — 76775 US EXAM ABDO BACK WALL LIM: CPT

## 2018-09-07 PROCEDURE — 25000000 HC PHARMACY GENERAL: Performed by: PHYSICIAN ASSISTANT

## 2018-09-07 PROCEDURE — 87205 SMEAR GRAM STAIN: CPT | Performed by: PHYSICIAN ASSISTANT

## 2018-09-07 PROCEDURE — 25800000 HC PHARMACY IV SOLUTIONS: Performed by: PHYSICIAN ASSISTANT

## 2018-09-07 PROCEDURE — 25000000 HC PHARMACY GENERAL: Performed by: ANESTHESIOLOGY

## 2018-09-07 PROCEDURE — 83605 ASSAY OF LACTIC ACID: CPT | Performed by: HOSPITALIST

## 2018-09-07 PROCEDURE — 63700000 HC SELF-ADMINISTRABLE DRUG

## 2018-09-07 PROCEDURE — 20000000 HC ROOM AND CARE ICU

## 2018-09-07 PROCEDURE — 75000060 HC EGD BIOPSY: Performed by: INTERNAL MEDICINE

## 2018-09-07 PROCEDURE — 0DB78ZX EXCISION OF STOMACH, PYLORUS, VIA NATURAL OR ARTIFICIAL OPENING ENDOSCOPIC, DIAGNOSTIC: ICD-10-PCS | Performed by: INTERNAL MEDICINE

## 2018-09-07 PROCEDURE — 71045 X-RAY EXAM CHEST 1 VIEW: CPT

## 2018-09-07 PROCEDURE — 87641 MR-STAPH DNA AMP PROBE: CPT | Performed by: HOSPITALIST

## 2018-09-07 PROCEDURE — 36415 COLL VENOUS BLD VENIPUNCTURE: CPT | Performed by: PHYSICIAN ASSISTANT

## 2018-09-07 PROCEDURE — 85018 HEMOGLOBIN: CPT | Performed by: PHYSICIAN ASSISTANT

## 2018-09-07 PROCEDURE — 87070 CULTURE OTHR SPECIMN AEROBIC: CPT | Performed by: PHYSICIAN ASSISTANT

## 2018-09-07 PROCEDURE — 94640 AIRWAY INHALATION TREATMENT: CPT

## 2018-09-07 PROCEDURE — 63600000 HC DRUGS/DETAIL CODE: Mod: JW | Performed by: ANESTHESIOLOGY

## 2018-09-07 PROCEDURE — 36430 TRANSFUSION BLD/BLD COMPNT: CPT

## 2018-09-07 RX ORDER — NAPROXEN SODIUM 220 MG/1
TABLET, FILM COATED ORAL
Status: DISPENSED
Start: 2018-09-07 | End: 2018-09-08

## 2018-09-07 RX ORDER — ASPIRIN 81 MG/1
TABLET ORAL
Status: COMPLETED
Start: 2018-09-07 | End: 2018-09-07

## 2018-09-07 RX ORDER — PANTOPRAZOLE SODIUM 40 MG/1
40 TABLET, DELAYED RELEASE ORAL 2 TIMES DAILY
Status: DISCONTINUED | OUTPATIENT
Start: 2018-09-07 | End: 2018-09-13 | Stop reason: HOSPADM

## 2018-09-07 RX ORDER — TAMSULOSIN HYDROCHLORIDE 0.4 MG/1
0.4 CAPSULE ORAL 2 TIMES DAILY
Status: DISCONTINUED | OUTPATIENT
Start: 2018-09-07 | End: 2018-09-10

## 2018-09-07 RX ORDER — LEVOTHYROXINE SODIUM 100 UG/1
100 TABLET ORAL DAILY
Status: DISCONTINUED | OUTPATIENT
Start: 2018-09-07 | End: 2018-09-13 | Stop reason: HOSPADM

## 2018-09-07 RX ORDER — CLOPIDOGREL BISULFATE 75 MG/1
75 TABLET ORAL DAILY
Status: DISCONTINUED | OUTPATIENT
Start: 2018-09-07 | End: 2018-09-07

## 2018-09-07 RX ORDER — ATORVASTATIN CALCIUM 40 MG/1
40 TABLET, FILM COATED ORAL DAILY
Status: DISCONTINUED | OUTPATIENT
Start: 2018-09-07 | End: 2018-09-13 | Stop reason: HOSPADM

## 2018-09-07 RX ORDER — SODIUM CHLORIDE 9 MG/ML
5 INJECTION, SOLUTION INTRAVENOUS AS NEEDED
Status: ACTIVE | OUTPATIENT
Start: 2018-09-07 | End: 2018-09-08

## 2018-09-07 RX ORDER — CLOPIDOGREL BISULFATE 75 MG/1
75 TABLET ORAL ONCE
Status: DISCONTINUED | OUTPATIENT
Start: 2018-09-07 | End: 2018-09-07

## 2018-09-07 RX ORDER — LIDOCAINE HYDROCHLORIDE 10 MG/ML
INJECTION, SOLUTION INFILTRATION; PERINEURAL AS NEEDED
Status: DISCONTINUED | OUTPATIENT
Start: 2018-09-07 | End: 2018-09-07 | Stop reason: SURG

## 2018-09-07 RX ORDER — PROPOFOL 10 MG/ML
INJECTION, EMULSION INTRAVENOUS AS NEEDED
Status: DISCONTINUED | OUTPATIENT
Start: 2018-09-07 | End: 2018-09-07 | Stop reason: SURG

## 2018-09-07 RX ORDER — ASPIRIN 81 MG/1
81 TABLET ORAL DAILY
Status: DISCONTINUED | OUTPATIENT
Start: 2018-09-07 | End: 2018-09-07

## 2018-09-07 RX ORDER — ETOMIDATE 2 MG/ML
INJECTION INTRAVENOUS AS NEEDED
Status: DISCONTINUED | OUTPATIENT
Start: 2018-09-07 | End: 2018-09-07 | Stop reason: SURG

## 2018-09-07 RX ADMIN — ETOMIDATE 10 MG: 2 INJECTION, SOLUTION INTRAVENOUS at 10:54

## 2018-09-07 RX ADMIN — IPRATROPIUM BROMIDE AND ALBUTEROL SULFATE 3 ML: .5; 3 SOLUTION RESPIRATORY (INHALATION) at 14:21

## 2018-09-07 RX ADMIN — CLOPIDOGREL BISULFATE 75 MG: 75 TABLET ORAL at 14:39

## 2018-09-07 RX ADMIN — TAMSULOSIN HYDROCHLORIDE 0.4 MG: 0.4 CAPSULE ORAL at 21:30

## 2018-09-07 RX ADMIN — ASPIRIN: 81 TABLET, COATED ORAL at 18:12

## 2018-09-07 RX ADMIN — LEVOTHYROXINE SODIUM 100 MCG: 100 TABLET ORAL at 13:45

## 2018-09-07 RX ADMIN — PROPOFOL 100 MG: 10 INJECTION, EMULSION INTRAVENOUS at 10:54

## 2018-09-07 RX ADMIN — IPRATROPIUM BROMIDE AND ALBUTEROL SULFATE 3 ML: .5; 3 SOLUTION RESPIRATORY (INHALATION) at 08:09

## 2018-09-07 RX ADMIN — IPRATROPIUM BROMIDE AND ALBUTEROL SULFATE 3 ML: .5; 3 SOLUTION RESPIRATORY (INHALATION) at 20:27

## 2018-09-07 RX ADMIN — ATORVASTATIN CALCIUM 40 MG: 40 TABLET, FILM COATED ORAL at 14:35

## 2018-09-07 RX ADMIN — SUGAMMADEX 200 MG: 100 INJECTION, SOLUTION INTRAVENOUS at 11:24

## 2018-09-07 RX ADMIN — METRONIDAZOLE 500 MG: 500 INJECTION, SOLUTION INTRAVENOUS at 21:30

## 2018-09-07 RX ADMIN — Medication 100 MG: at 10:54

## 2018-09-07 RX ADMIN — LIDOCAINE HYDROCHLORIDE 5 ML: 10 INJECTION, SOLUTION INFILTRATION; PERINEURAL at 10:54

## 2018-09-07 RX ADMIN — PANTOPRAZOLE SODIUM 40 MG: 40 TABLET, DELAYED RELEASE ORAL at 14:36

## 2018-09-07 RX ADMIN — IPRATROPIUM BROMIDE AND ALBUTEROL SULFATE 3 ML: .5; 3 SOLUTION RESPIRATORY (INHALATION) at 01:21

## 2018-09-07 RX ADMIN — PANTOPRAZOLE SODIUM 40 MG: 40 TABLET, DELAYED RELEASE ORAL at 20:05

## 2018-09-07 RX ADMIN — ASPIRIN: 81 TABLET ORAL at 18:12

## 2018-09-07 RX ADMIN — TAMSULOSIN HYDROCHLORIDE 0.4 MG: 0.4 CAPSULE ORAL at 14:37

## 2018-09-07 RX ADMIN — CEFTRIAXONE SODIUM 1 G: 2 INJECTION, POWDER, FOR SOLUTION INTRAMUSCULAR; INTRAVENOUS at 20:52

## 2018-09-07 RX ADMIN — METRONIDAZOLE 500 MG: 500 INJECTION, SOLUTION INTRAVENOUS at 13:00

## 2018-09-07 RX ADMIN — METRONIDAZOLE 500 MG: 500 INJECTION, SOLUTION INTRAVENOUS at 05:37

## 2018-09-07 ASSESSMENT — COGNITIVE AND FUNCTIONAL STATUS - GENERAL
WALKING IN HOSPITAL ROOM: 3 - A LITTLE
MOVING TO AND FROM BED TO CHAIR: 3 - A LITTLE
STANDING UP FROM CHAIR USING ARMS: 3 - A LITTLE
CLIMB 3 TO 5 STEPS WITH RAILING: 2 - A LOT

## 2018-09-07 ASSESSMENT — PAIN SCALES - GENERAL: PAIN_LEVEL: 0

## 2018-09-07 ASSESSMENT — LIFESTYLE VARIABLES: TOBACCO_USE: 1

## 2018-09-07 NOTE — ASSESSMENT & PLAN NOTE
The patient has had melena 3 days prior and episode of syncope at [facility] 2 days prior to admission.  Now presents with coffee ground emesis, increased bun, and melena on exam.  Concerns that the hypotension prior was from a GI bleed   Transfuse as needed.  PPI gtt  Plan for EGD today.

## 2018-09-07 NOTE — OP NOTE
_______________________________________________________________________________  Patient Name: Michel Fang           Procedure Date: 9/7/2018 10:33 AM  MRN: [id number]                     Account Number: [id number]  YOB: 1935              Age: 83  Gender: Male                          Note Status: Finalized  Attending MD: REDD HERNANDEZ  _______________________________________________________________________________  Procedure:            Upper GI endoscopy  Indications:          Coffee-ground emesis, Melena  Providers:            REDD MCKEON (Doctor), Merlyn Gleason  (Nurse), Shannan Lin (Nurse)  Referring MD:         Johnathan Leone MD, HELEN BERGERON  Requesting Provider:  Medicines:            General Anesthesia  Complications:        No immediate complications. Estimated blood loss:  Minimal.  _______________________________________________________________________________  Procedure:            After obtaining informed consent, the endoscope was  passed under direct vision. Throughout the procedure,  the patient's blood pressure, pulse, and oxygen  saturations were monitored continuously. The scope was  introduced through the mouth, and advanced to the third  part of duodenum. The upper GI endoscopy was  accomplished without difficulty. The patient tolerated  the procedure well.  Estimated Blood Loss: Estimated blood loss was minimal.  Findings:  LA Grade D (one or more mucosal breaks involving at least 75% of  esophageal circumference) esophagitis with no bleeding was found in the  lower third of the esophagus.  A medium-sized hiatus hernia with a few Bolivar ulcers was found.  Patchy moderate inflammation characterized by adherent blood, congestion  (edema), erosions, erythema and granularity was found in the gastric  antrum and in the prepyloric region of the stomach. Biopsies were taken  with a cold forceps for histology.  A few localized erosions  without bleeding were found in the duodenal  bulb.  The 2nd part of the duodenum and 3rd part of the duodenum were normal.  Impression:           - LA Grade D reflux esophagitis.  - Medium-sized hiatus hernia with a few Bolivar ulcers.  - Acute gastritis. Biopsied.  - Duodenal erosions without bleeding.  - Normal 2nd part of the duodenum and 3rd part of the  duodenum.  Recommendation:       - Return patient to ICU for ongoing care.  - Advance diet as tolerated.  - Continue present medications.  - Await pathology results.  Procedure Code(s):    --- Professional ---  54007, Esophagogastroduodenoscopy, flexible, transoral;  with biopsy, single or multiple  Diagnosis Code(s):    --- Professional ---  K21.0, Gastro-esophageal reflux disease with esophagitis  K44.9, Diaphragmatic hernia without obstruction or  gangrene  K25.9, Gastric ulcer, unspecified as acute or chronic,  without hemorrhage or perforation  K29.00, Acute gastritis without bleeding  K26.9, Duodenal ulcer, unspecified as acute or chronic,  without hemorrhage or perforation  K92.0, Hematemesis  K92.1, Melena (includes Hematochezia)  CPT copyright 2015 American Medical Association. All rights reserved.  The codes documented in this report are preliminary and upon  review may  be revised to meet current compliance requirements.  __________________________  REDD HERNANDEZ  9/7/2018 11:34:08 AM  This report has been signed electronically.  Number of Addenda: 0  Note Initiated On: 9/7/2018 10:33 AM

## 2018-09-07 NOTE — PLAN OF CARE
Problem: Patient Care Overview  Goal: Plan of Care Review  Outcome: Ongoing (interventions implemented as appropriate)   09/07/18 0900   Coping/Psychosocial   Plan Of Care Reviewed With patient  (RN)   Plan of Care Review   Progress no change   Outcome Summary Pt NPO for EGD and recent GI bleed. May benefit from cardiac diet when solids resumed.     Recommendations:  1. Await EGD results.  2. May benefit from clears with Breeze bid --> cardiac diet when solids resumed.    Goals:  1. Pt will tolerate > 50-75% of po's to meet estimated needs when diet advanced.

## 2018-09-07 NOTE — CONSULTS
Subjective     Michel Fang is a 83 y.o. male who was admitted for Seizure (CMS/formerly [facility]) (formerly [facility]) [R56.9]  Hypoxia [R09.02]  Gastrointestinal hemorrhage, unspecified gastrointestinal hemorrhage type [K92.2].     This patient has a history of BPH for which he is on tamsulosin with generally stable voiding pattern.  He notes nocturia ×1-2 but no daytime urgency or frequency.  He had no prior history of urinary tract infection, epididymitis, prostatitis, genital or flank trauma, renal disease, renal calculi.  He reported an episode of constipation approximately 10 days ago and then was subsequently admitted to [facility] with syncope where he was treated with a urinary tract infection.  He was due to see my partner yesterday as a follow-up examination when he became unresponsive, had right hand tremoring, and tongue trigone tremoring per his patient's daughter who witnessed this episode.  EMS was called and patient brought to the [facility] emergency room.  By the time of his arrival in the emergency room, the patient had regained consciousness but was lethargic.  Patient had nausea and vomiting and was found to have coffee-ground hematemesis.    Labs in the emergency room included white blood cell count of 4800 with a hemoglobin of 9.0.  Electrolytes included a sodium of 135, potassium 3.4, BUN 37, creatinine 1.0, and calcium 8.0.  Urine analysis was dipstick trace protein and trace blood with no bacteria seen.    Medical History:   Past Medical History:   Diagnosis Date   • Arthritis    • BPH (benign prostatic hyperplasia)    • CHF (congestive heart failure) (CMS/formerly [facility]) (formerly [facility])    • Coronary artery disease    • Heart disease    • Hyperlipidemia    • Hypertension    • Sarcoidosis (CMS/formerly [facility])        Surgical History:   Past Surgical History:   Procedure Laterality Date   • AORTIC VALVE REPLACEMENT     • CARDIAC SURGERY     • CORONARY ARTERY BYPASS GRAFT     • POPLITEAL VENOUS ANEURYSM REPAIR         Social History:   Social  History     Social History Narrative   • No narrative on file       Family History: History reviewed. No pertinent family history.    Allergies: No known allergies    Current Facility-Administered Medications   Medication Dose Route Frequency Provider Last Rate Last Dose   • acetaminophen (TYLENOL) tablet 650 mg  650 mg oral q4h PRN Anuradha Tejeda PA        Or   • acetaminophen (TYLENOL) suppository 650 mg  650 mg rectal q4h PRN Anuradha Tejeda, PA        Or   • acetaminophen (TYLENOL) 650 mg/20.3 mL solution 650 mg  650 mg oral q4h PRN Anuradha Tejeda PA       • cefTRIAXone (ROCEPHIN) 1 g in sodium chloride 0.9 % 100 mL IVPB  1 g intravenous q24h INT Anuradha Tejeda PA   Stopped at 09/06/18 2237   • glucose chewable tablet 16-32 g of dextrose  16-32 g of dextrose oral PRN Anuradha Tejeda PA        Or   • dextrose 40 % oral gel 15-30 g of dextrose  15-30 g of dextrose oral PRN Anuradha Tejeda PA        Or   • glucagon (GLUCAGEN) injection 1 mg  1 mg intramuscular PRN Anuradha Tejeda PA        Or   • dextrose in water injection 12.5 g  25 mL intravenous PRN Anuradha Tejeda PA       • ipratropium-albuterol (DUO-NEB) 0.5-2.5 mg/3 mL nebulizer solution 3 mL  3 mL nebulization q6H TIARA Anuradha Tejeda PA   3 mL at 09/07/18 0809   • magnesium oxide (MAG-OX) tablet 400 mg  400 mg oral 2x daily PRN Anuradha Tejeda PA       • magnesium sulfate in water IVPB premix 2 g  2 g intravenous PRN Anuradha Tejeda PA       • metroNIDAZOLE in NaCl (iso-os) (FLAGYL) IVPB 500 mg  500 mg intravenous q8h INT Anuradha Tejeda  mL/hr at 09/07/18 0537 500 mg at 09/07/18 0537   • pantoprazole (PROTONIX) 200 mg in sodium chloride 0.9 % 250 mL (0.8 mg/mL) infusion  8 mg/hr intravenous Continuous María Saleem PA 10 mL/hr at 09/07/18 0300 8 mg/hr at 09/07/18 0300   • potassium chloride (KLOR-CON) tablet extended release 20 mEq  20 mEq oral PRN Vu, Vy B, PA        Or   • potassium chloride (KLOR-CON) tablet extended release 40 mEq  40 mEq oral PRN Vu, Vy B, PA        Or   • potassium chloride 20 mEq in 100  "mL IVPB  (premix)  20 mEq intravenous PRN Nikhil, Vy B, PA        Or   • potassium chloride 40 mEq in sodium chloride 0.9 % 250 mL IVPB  40 mEq intravenous PRN Vu, Vy B, PA        Or   • potassium chloride 20 mEq packet 20 mEq  20 mEq oral PRN Vu, Vy B, PA        Or   • potassium chloride 20 mEq packet 40 mEq  40 mEq oral PRN Vu, Vy B, PA       • sodium chloride 0.9 % infusion   intravenous Continuous Vu Vy B, PA 80 mL/hr at 09/07/18 0300     • sodium chloride 0.9 % infusion  5 mL/hr intravenous PRN Mckenna Kenney MD       • sodium chloride 0.9 % infusion  5 mL/hr intravenous PRN Nikhil, Vy B, PA            Medication List      CONTINUE taking these medications    aspirin 81 mg enteric coated tablet  Take 81 mg by mouth daily.     atorvastatin 40 mg tablet  Commonly known as:  LIPITOR  Take 40 mg by mouth daily.     clopidogrel 75 mg tablet  Commonly known as:  PLAVIX  Take 75 mg by mouth once.     guaiFENesin 600 mg 12 hr tablet  Commonly known as:  MUCINEX  Take 1,200 mg by mouth 2 (two) times a day.     HYALURONIC ACID (CHOND-COLLGN) ORAL  Take by mouth.     levothyroxine 100 mcg tablet  Commonly known as:  SYNTHROID  Take 100 mcg by mouth daily.     tamsulosin 0.4 mg capsule  Commonly known as:  FLOMAX  Take 0.4 mg by mouth 2 (two) times a day.          Review of Systems  Pertinent items are noted in HPI.    Objective   Labs  I have reviewed the patient's labs.  Current labs are within normal limits.    Imaging  I have reviewed the Imaging from the last 24 hrs.      Physicial Exam  /60   Pulse 76   Temp 36.8 °C (98.2 °F) (Oral)   Resp 16   Ht 1.718 m (5' 7.64\")   Wt 101 kg (223 lb 12.3 oz)   SpO2 100%   BMI 34.39 kg/m²     General Appearance:    Alert, cooperative, no distress, appears stated age   Head:    Normocephalic, without obvious abnormality, atraumatic   Back:     Symmetric, no curvature, ROM normal, no CVA tenderness   Chest wall:    No tenderness or deformity   Abdomen:     Soft, non-tender, bowel " sounds active all four quadrants,     no masses, no organomegaly   Genitalia:    Normal male without lesion, discharge or tenderness   Rectal:    Deferred   Extremities:  Musculoskeletal:   Extremities normal, atraumatic, no cyanosis or edema    No injury or deformity   Pulses:   2+ and symmetric all extremities   Skin:   Skin color, texture, turgor normal, no rashes or lesions   Lymph nodes:   Cervical, supraclavicular, and axillary nodes normal   Neurologic:    Behavior/  Emotional:   CNII-XII intact. Normal strength, sensation and reflexes       throughout    Appropriate, cooperative       Assessment   83 y.o. male being consulted for history of BPH on tamsulosin and with recent treatment of a urinary tract infection while at [facility].  Patient was admitted yesterday with a syncopal event and coffee-ground hematemesis.  His serum chemistry showed normal renal function and analysis was generally unremarkable.     Plan     Patient due for workup of syncope and coffee-ground hematemesis this admission.  Monitor voids and check PVR.  Continue tamsulosin for BPH  Renal ultrasound to be obtained.  Will follow.    Kody Faust MD

## 2018-09-07 NOTE — CONSULTS
GI CONSULT NOTE       HISTORY    LOS: 1 day     Michel Fang is a 83 y.o. male who was admitted for Seizure (CMS/HCC) ([facility]) [R56.9]  Hypoxia [R09.02]  Gastrointestinal hemorrhage, unspecified gastrointestinal hemorrhage type [K92.2]. Michel RHODES was seen in consultation at the request of Fernando Huffman MD for management recommendations related to coffee ground emesis..  Michel RHODES is a gentleman with a history of coronary artery disease status post CABG 2/2000.  The patient subsequently had TAVR 3 years prior.  He has been having issues with orthostatic hypotension.  Recently he was admitted to [facility] after a syncopal episode.  The patient states that he did have black stools a few days prior.  And on admission the patient was witnessed to have coffee-ground emesis.  Patient denies any gross blood per rectum.  He has been taking aspirin and Plavix for his cardiac disease.  The patient currently denies any chest pains or shortness of breath or feelings of lightheadedness.    The patient does have a prior history of colonoscopy 2016 with diverticular disease and a hyperplastic polyp.  Outside records reviewed..    Past Medical History:   Diagnosis Date   • Arthritis    • BPH (benign prostatic hyperplasia)    • CHF (congestive heart failure) (CMS/HCC) ([facility])    • Coronary artery disease    • Heart disease    • Hyperlipidemia    • Hypertension    • Sarcoidosis (CMS/[facility])      Past Surgical History:   Procedure Laterality Date   • AORTIC VALVE REPLACEMENT     • CARDIAC SURGERY     • CORONARY ARTERY BYPASS GRAFT     • POPLITEAL VENOUS ANEURYSM REPAIR       Social History     Social History Narrative   • No narrative on file     History reviewed. No pertinent family history.  Allergies   Allergen Reactions   • No Known Allergies        Home Medications:  •  aspirin, Take 81 mg by mouth daily.    •  atorvastatin, Take 40 mg by mouth daily.    •  clopidogrel, Take 75 mg by mouth once.    •  guaiFENesin, Take 1,200  "mg by mouth 2 (two) times a day.  •  HYALUR AC/CHOND SUL/COLG II/AA (HYALURONIC ACID, CHOND-COLLGN, ORAL), Take by mouth.  •  levothyroxine, Take 100 mcg by mouth daily.  •  tamsulosin, Take 0.4 mg by mouth 2 (two) times a day.      Current Medications:  •  acetaminophen, 650 mg, oral, q4h PRN **OR** acetaminophen, 650 mg, rectal, q4h PRN **OR** acetaminophen, 650 mg, oral, q4h PRN  •  cefTRIAXone, 1 g, intravenous, q24h INT  •  glucose, 16-32 g of dextrose, oral, PRN **OR** dextrose, 15-30 g of dextrose, oral, PRN **OR** glucagon, 1 mg, intramuscular, PRN **OR** dextrose in water, 25 mL, intravenous, PRN  •  ipratropium-albuterol, 3 mL, nebulization, q6H TIARA  •  magnesium oxide, 400 mg, oral, 2x daily PRN  •  magnesium sulfate, 2 g, intravenous, PRN  •  metroNIDAZOLE, 500 mg, intravenous, q8h INT  •  [COMPLETED] pantoprazole in 0.9 % NaCl, 80 mg, intravenous, Once **AND** pantoprozole (PROTONIX) infusion, 8 mg/hr, intravenous, Continuous  •  potassium chloride, 20 mEq, oral, PRN **OR** potassium chloride, 40 mEq, oral, PRN **OR** potassium chloride, 20 mEq, intravenous, PRN **OR** potassium chloride, 40 mEq, intravenous, PRN **OR** potassium chloride, 20 mEq, oral, PRN **OR** potassium chloride, 40 mEq, oral, PRN  •  sodium chloride 0.9 %, , intravenous, Continuous    Review of Systems  All other systems were reviewed and are negative other than as stated in the HPI (10 point review).     PHYSICAL EXAM     Physical Exam  BP (!) 114/52   Pulse 84   Temp 36.2 °C (97.2 °F) (Tympanic)   Resp 19   Ht 1.718 m (5' 7.64\")   Wt 101 kg (222 lb 10.6 oz)   SpO2 100%   BMI 34.22 kg/m²     General appearance: alert, appears stated age and cooperative  Head: normocephalic, without abnormality, atraumatic  Lungs: clear to auscultation b/l  Heart: regular rate and rhythm, S1, S2 normal, no murmur, click, rub or gallop  Abdomen: Soft, non-tender, normal bowel sounds; no bruits, organomegaly or masses.  Rectal: No " masses,normal tone, positive melena on exam  Extremities: extremities normal, warm and well-perfused; no cyanosis, clubbing, or edema  Skin: Skin color, texture, turgor normal. No rashes or lesions  Neurologic: Grossly normal       LABS & IMAGING   Labs      Results from last 7 days  Lab Units 09/06/18  1548   SODIUM mEQ/L 135*   POTASSIUM mEQ/L 3.4*   CHLORIDE mEQ/L 107   CO2 mEQ/L 19*   BUN mg/dL 37*   CREATININE mg/dL 1.0   CALCIUM mg/dL 8.0*   ALBUMIN g/dL 3.1*   BILIRUBIN TOTAL mg/dL 0.8   ALK PHOS U/L 45   ALT U/L 18   AST U/L 25   GLUCOSE mg/dL 163*       Results from last 7 days  Lab Units 09/07/18  0036  09/06/18  1548   WBC K/uL  --   --  4.80   HEMOGLOBIN g/dL 8.0*  < > 9.0*   HEMATOCRIT % 24.5*  < > 27.5*   PLATELETS K/uL  --   --  145*   < > = values in this interval not displayed.    Results from last 7 days  Lab Units 09/06/18  1551   INR INR 1.2       Imaging  Not applicable     X-ray Abdomen 1 View    Result Date: 9/6/2018  IMPRESSION: Nonspecific bowel gas pattern.    Ct Head Without Iv Contrast    Result Date: 9/6/2018  IMPRESSION: No evidence of acute intracranial pathology.     X-ray Chest 1 View    Result Date: 9/6/2018  IMPRESSION:  Bibasilar airspace opacities, probably due to atelectasis    X-ray Chest 1 View    Result Date: 9/5/2018  IMPRESSION: No evidence of active disease.       ASSESSMENT AND PLAN   Active Problems:    Orthostatic syncope    Aortic stenosis    Benign prostatic hyperplasia    CAD (coronary artery disease)    Hypoxia    Vomiting    GI bleed    GI bleed   Assessment & Plan    The patient has had melena 3 days prior and episode of syncope at [facility] 2 days prior to admission.  Now presents with coffee ground emesis, increased bun, and melena on exam.  Concerns that the hypotension prior was from a GI bleed   Transfuse as needed.  PPI gtt  Plan for EGD today.        Aortic stenosis   Assessment & Plan    S/p TAVR 3 years prior  Management per Cardiology                Roberto  ARELY Baumann MD  9/7/2018  5:47 AM

## 2018-09-07 NOTE — ANESTHESIA PREPROCEDURE EVALUATION
Anesthesia ROS/MED HX    Anesthesia History - neg  Pulmonary    history of tobacco use and ex-smoker  Neuro/Psych - neg  Cardiovascular   Valvular problems/murmurs (s/p AVR)   CAD   hypertension   CHF (not at present)   ECG reviewed  Hematological    anemia  GI/Hepatic   GI Bleeding  Musculoskeletal- neg  Renal Disease- neg  Endo/Other  Body Habitus: Obese      Past Surgical History:   Procedure Laterality Date   • AORTIC VALVE REPLACEMENT     • CARDIAC SURGERY     • CORONARY ARTERY BYPASS GRAFT     • POPLITEAL VENOUS ANEURYSM REPAIR         Physical Exam    Airway   Mallampati: II   TM distance: >3 FB   Neck ROM: limited  Cardiovascular - normal   Rhythm: regular   Rate: normal  Pulmonary - normal   clear to auscultation  Dental - normal        Anesthesia Plan    Plan: general    Technique: general endotracheal     Airway: video laryngoscope     not instructed to abstain from smoking on day of procedure    Patient did not smoke on day of surgery  ASA 4  Blood Products:     Use of Blood Products Discussed: Yes     Consented to blood products  Anesthetic plan and risks discussed with: patient  Induction:    intravenous   Comments:    Plan: Pt receiving blood transfusion at present, 2nd unit to be infused.

## 2018-09-07 NOTE — PROGRESS NOTES
"Daily Progress Note    Subjective    Interval History: S/p EGD - results noted.  He feels better today.  Noc ardiac symptoms    Objective    Vital signs in last 24 hours:  Temp:  [36.2 °C (97.2 °F)-37.8 °C (100 °F)] 37.1 °C (98.7 °F)  Heart Rate:  [] 75  Resp:  [13-27] 16  BP: ()/(50-71) 141/63      Intake/Output Summary (Last 24 hours) at 09/07/18 1339  Last data filed at 09/07/18 1230   Gross per 24 hour   Intake          1664.67 ml   Output              500 ml   Net          1164.67 ml       Physical Exam:  BP (!) 141/63   Pulse 75   Temp 37.1 °C (98.7 °F) (Temporal)   Resp 16   Ht 1.718 m (5' 7.64\")   Wt 101 kg (223 lb 12.3 oz)   SpO2 100%   BMI 34.39 kg/m²               Lungs:     Clear to auscultation bilaterally   Heart:    Regular rhythm, S1 and S2 normal, II/VI DSM   Extremities:  GI: No edema  Soft, nontender   Other:          Labs  Lab Results   Component Value Date    WBC 7.43 09/07/2018    HGB 7.7 (L) 09/07/2018    HCT 23.4 (L) 09/07/2018     (L) 09/07/2018    ALT 18 09/06/2018    AST 25 09/06/2018     09/07/2018    K 4.2 09/07/2018     (H) 09/07/2018    CREATININE 1.2 09/07/2018    BUN 35 (H) 09/07/2018    CO2 21 (L) 09/07/2018    INR 1.2 09/06/2018         ECG/Telemetry  I have independently reviewed the telemetry. No events for the last 24 hours.       Assessment & Plan    CAD (coronary artery disease)   Assessment & Plan    Hx of CABG, Hold ASA and Plavix with upper GIB.          Aortic stenosis   Assessment & Plan    S/p TAVR 5/2016, recent echo showed mod perivalvular AI.  No treatment planned at this time.  Outpt f/u        Orthostatic syncope   Assessment & Plan    Pt had recurrent syncopal episode in the setting of UTI and now upper GIB/hypoxia.  He was recently admitted at [facility] for syncopal episode where an echo 9/5/18 showed moderate perivalvular aortic insufficiency.  Per the family, this was also seen on echo at [place] 7/2018      "             Lawrence S. Mendelson, MD  9/7/2018

## 2018-09-07 NOTE — PROGRESS NOTES
Critical Care Progress Note    SUBJECTIVE  ID by name and   S/p EDG found to have gastritis, duodenitis  No signs of active bleeding  No ABD complaint  Denies SOB    OBJECTIVE    Vitals:    18 1800   BP: (!) 117/58   Pulse: 88   Resp: 20   Temp:    SpO2: (!) 90%         Intake/Output Summary (Last 24 hours) at 18 1840  Last data filed at 18 1800   Gross per 24 hour   Intake          2594.67 ml   Output             1180 ml   Net          1414.67 ml            Lab Results   Component Value Date    GLUCOSE 135 (H) 2018    CALCIUM 7.7 (L) 2018     2018    K 4.2 2018    CO2 21 (L) 2018     (H) 2018    BUN 35 (H) 2018    CREATININE 1.2 2018       Lab Results   Component Value Date    WBC 7.43 2018    HGB 8.2 (L) 2018    HCT 24.5 (L) 2018    MCV 90.0 2018     (L) 2018       Lab Results   Component Value Date    MG 1.9 2018    PHOS 3.5 2018         Physical Exam:  GEN: NAD in bed  HEENT: AT/NC,  Neck supple  Cv: RRR, S1,S2  Lungs: CTA w/ upper rhonchi  ABD: Soft, normoactive, NTP  EXT: symmetric  Pv: pulses intact  Neuro: Ao3, nonfocal  Skin: warm, dry    Labs  I have reviewed the patient's pertinent labs.     Imaging  I have independently reviewed the patient's pertinent imaging.     GI bleed   Assessment & Plan    S/p hematemeses  S/p EGD gastritis, duodenitis. No signs of bleeding    - continue PPI BID  - advance diet as tolerated  - trend h/h  - GI consult appreciated          Vomiting   Assessment & Plan    Vomiting started after syncope  Had coffee ground  No further episode    · Trend hgb  · Anti-ematic prn        Hypoxia   Assessment & Plan    Hypoxic with evidence of aspiration pneumonia on CXR    · Needed ICU level of care required with tenuous respiration  · Supp o2 to keep O2 sat >92%  · Monitor CXR and ABG prn  · Check sputum culture  · Rocephin and flagyl for 5 day  course  · Pulmonary hygiene/toiletting        CAD (coronary artery disease)   Assessment & Plan    · Cards consult following  · OK by GI to resume aspirin/plavix as no sign of bleeding on EGD        Benign prostatic hyperplasia   Assessment & Plan    BPH with recent UTI, was going to follow-up with urology today prior to admission  Renal US w/o hydronephrosis    · Urology consult appreciated  · Continue flomax  · Monitor PVR        Orthostatic syncope   Assessment & Plan    Echo with LVEF 60%, moderate aortic regurg    · Cards consult following, monitor AI and outpatient follow up  · Orthostatic vitals when stable                NEYDA Lopez Scribed for and in presence of DR Fernando Huffman  I have reviewed and agree with the above note.  Fernando Huffman MD  [id number].  9/8/2018  9:26 AM

## 2018-09-07 NOTE — NURSING NOTE
, denies discomfort. One episode of coffee ground emesis since ER. Hg this morning 7.7. Prepared for blood transfusion, pt consented. Daughter (Maeve) notified of condition.

## 2018-09-07 NOTE — ASSESSMENT & PLAN NOTE
S/p hematemeses  S/p EGD gastritis, duodenitis. No signs of bleeding  Drop hbg 7.1 but no sign of active bleed 9/8    - continue PPI BID  - advance diet as tolerated  - transfuse prn  - trend h/h  - GI consult appreciated, f/u bx from EGD

## 2018-09-07 NOTE — ANESTHESIA POSTPROCEDURE EVALUATION
Patient: Michel Fang    Procedure Summary     Date:  09/07/18 Room / Location:  [facility] GI 2 / [facility]    Anesthesia Start:  1054 Anesthesia Stop:  1133    Procedure:  UPPER GASTROINTESTINAL ENDOSCOPY WITH BIOPSY (N/A Esophagus) Diagnosis:       Gastrointestinal hemorrhage, unspecified gastrointestinal hemorrhage type      (Gastrointestinal hemorrhage, unspecified gastrointestinal hemorrhage type [K92.2])    Provider:  Niles Jane MD Responsible Provider:  Margarita Valiente MD    Anesthesia Type:  general ASA Status:  4          Anesthesia Type: general  PACU Vitals  9/7/2018 1129 - 9/7/2018 1229      9/7/2018 1130 9/7/2018 1145 9/7/2018 1200       BP: (!)  118/58 137/63 (!)  141/63     Temp: 37.1 °C (98.7 °F) - -     Pulse: 94 83 75     Resp: 16 16 16     SpO2: - 99 % 100 %             Anesthesia Post Evaluation    Pain score: 0  Pain management: satisfactory to patient  Patient location during evaluation: PACU  Patient participation: complete - patient participated  Level of consciousness: awake  Cardiovascular status: acceptable  Airway Patency: adequate  Respiratory status: acceptable  Hydration status: stable  Anesthetic complications: no  Comments: Return to icu

## 2018-09-07 NOTE — PROGRESS NOTES
Pt lives alone in a 3SH w/ bed and bath on first floor. Not known to home care or dme. Has not been to snf in the past. His children live in the area and come by frequently to assist pt. Requested PT eval for assistance w/ dcp

## 2018-09-07 NOTE — ANESTHESIA PROCEDURE NOTES
Airway  Urgency: elective    Start Time: 9/7/2018 11:06 AM  Airway not difficult    General Information and Staff    Patient location during procedure: OR  Anesthesiologist: MISHEL PERALES  Performed: anesthesiologist     Indications and Patient Condition  Indications for airway management: anesthesia  Sedation level: deep  Preoxygenated: yes  Patient position: sniffing  Mask difficulty assessment: 1 - vent by mask    Final Airway Details  Final airway type: endotracheal airway      Successful airway: ETT  Cuffed: yes   Successful intubation technique: video laryngoscopy  Facilitating devices/methods: intubating stylet  Endotracheal tube insertion site: oral  Blade: Samia  Blade size: #3  ETT size: 8.0 mm  Cormack-Lehane Classification: grade I - full view of glottis  Placement verified by: chest auscultation and capnometry   Measured from: teeth  Number of attempts at approach: 1  Number of other approaches attempted: 0  Atraumatic airway insertion

## 2018-09-08 ENCOUNTER — APPOINTMENT (INPATIENT)
Dept: RADIOLOGY | Facility: HOSPITAL | Age: 83
DRG: 377 | End: 2018-09-08
Attending: INTERNAL MEDICINE
Payer: MEDICARE

## 2018-09-08 LAB
ANION GAP SERPL CALC-SCNC: 8 MEQ/L (ref 3–15)
BASOPHILS # BLD: 0.03 K/UL (ref 0.01–0.1)
BASOPHILS NFR BLD: 0.7 %
BUN SERPL-MCNC: 19 MG/DL (ref 8–20)
CALCIUM SERPL-MCNC: 7.4 MG/DL (ref 8.9–10.3)
CHLORIDE SERPL-SCNC: 110 MEQ/L (ref 98–109)
CO2 SERPL-SCNC: 22 MEQ/L (ref 22–32)
CREAT SERPL-MCNC: 1 MG/DL (ref 0.8–1.3)
DIFFERENTIAL METHOD BLD: ABNORMAL
EOSINOPHIL # BLD: 0.2 K/UL (ref 0.04–0.54)
EOSINOPHIL NFR BLD: 4.5 %
ERYTHROCYTE [DISTWIDTH] IN BLOOD BY AUTOMATED COUNT: 16.3 % (ref 11.6–14.4)
GFR SERPL CREATININE-BSD FRML MDRD: >60 ML/MIN/1.73M*2
GLUCOSE SERPL-MCNC: 104 MG/DL (ref 70–99)
GRAM STN SPEC: NORMAL
HCT VFR BLDCO AUTO: 22.1 % (ref 40.1–51)
HCT VFR BLDCO AUTO: 29.7 % (ref 40.1–51)
HGB BLD-MCNC: 10 G/DL (ref 13.7–17.5)
HGB BLD-MCNC: 7.1 G/DL (ref 13.7–17.5)
IMM GRANULOCYTES # BLD AUTO: 0.03 K/UL (ref 0–0.08)
IMM GRANULOCYTES NFR BLD AUTO: 0.7 %
LYMPHOCYTES # BLD: 1.04 K/UL (ref 1.2–3.5)
LYMPHOCYTES NFR BLD: 23.6 %
MAGNESIUM SERPL-MCNC: 2.1 MG/DL (ref 1.8–2.5)
MCH RBC QN AUTO: 29 PG (ref 28–33.2)
MCHC RBC AUTO-ENTMCNC: 32.1 G/DL (ref 32.2–36.5)
MCV RBC AUTO: 90.2 FL (ref 83–98)
MONOCYTES # BLD: 0.53 K/UL (ref 0.3–1)
MONOCYTES NFR BLD: 12 %
NEUTROPHILS # BLD: 2.57 K/UL (ref 1.7–7)
NEUTS SEG NFR BLD: 58.5 %
NRBC BLD-RTO: 0 %
PDW BLD AUTO: 10.8 FL (ref 9.4–12.4)
PHOSPHATE SERPL-MCNC: 2.5 MG/DL (ref 2.4–4.7)
PLATELET # BLD AUTO: 103 K/UL (ref 150–350)
POTASSIUM SERPL-SCNC: 3.6 MEQ/L (ref 3.6–5.1)
RBC # BLD AUTO: 2.45 M/UL (ref 4.5–5.8)
SODIUM SERPL-SCNC: 140 MEQ/L (ref 136–144)
WBC # BLD AUTO: 4.4 K/UL (ref 3.8–10.5)

## 2018-09-08 PROCEDURE — 97161 PT EVAL LOW COMPLEX 20 MIN: CPT | Mod: GP

## 2018-09-08 PROCEDURE — 25000000 HC PHARMACY GENERAL: Performed by: PHYSICIAN ASSISTANT

## 2018-09-08 PROCEDURE — 83735 ASSAY OF MAGNESIUM: CPT | Performed by: PHYSICIAN ASSISTANT

## 2018-09-08 PROCEDURE — 36569 INSJ PICC 5 YR+ W/O IMAGING: CPT

## 2018-09-08 PROCEDURE — 36415 COLL VENOUS BLD VENIPUNCTURE: CPT | Performed by: PHYSICIAN ASSISTANT

## 2018-09-08 PROCEDURE — C1751 CATH, INF, PER/CENT/MIDLINE: HCPCS

## 2018-09-08 PROCEDURE — 87070 CULTURE OTHR SPECIMN AEROBIC: CPT | Performed by: PHYSICIAN ASSISTANT

## 2018-09-08 PROCEDURE — 71045 X-RAY EXAM CHEST 1 VIEW: CPT

## 2018-09-08 PROCEDURE — 97530 THERAPEUTIC ACTIVITIES: CPT | Mod: GP

## 2018-09-08 PROCEDURE — 63600000 HC DRUGS/DETAIL CODE: Performed by: PHYSICIAN ASSISTANT

## 2018-09-08 PROCEDURE — 63700000 HC SELF-ADMINISTRABLE DRUG: Performed by: PHYSICIAN ASSISTANT

## 2018-09-08 PROCEDURE — 85014 HEMATOCRIT: CPT | Performed by: PHYSICIAN ASSISTANT

## 2018-09-08 PROCEDURE — P9016 RBC LEUKOCYTES REDUCED: HCPCS

## 2018-09-08 PROCEDURE — 85025 COMPLETE CBC W/AUTO DIFF WBC: CPT | Performed by: PHYSICIAN ASSISTANT

## 2018-09-08 PROCEDURE — 25800000 HC PHARMACY IV SOLUTIONS: Performed by: PHYSICIAN ASSISTANT

## 2018-09-08 PROCEDURE — 87205 SMEAR GRAM STAIN: CPT | Performed by: PHYSICIAN ASSISTANT

## 2018-09-08 PROCEDURE — 84100 ASSAY OF PHOSPHORUS: CPT | Performed by: PHYSICIAN ASSISTANT

## 2018-09-08 PROCEDURE — 36430 TRANSFUSION BLD/BLD COMPNT: CPT

## 2018-09-08 PROCEDURE — 80048 BASIC METABOLIC PNL TOTAL CA: CPT | Performed by: PHYSICIAN ASSISTANT

## 2018-09-08 PROCEDURE — 94640 AIRWAY INHALATION TREATMENT: CPT

## 2018-09-08 PROCEDURE — 20000000 HC ROOM AND CARE ICU

## 2018-09-08 RX ORDER — SODIUM CHLORIDE 0.9 % (FLUSH) 0.9 %
10 SYRINGE (ML) INJECTION AS NEEDED
Status: DISCONTINUED | OUTPATIENT
Start: 2018-09-08 | End: 2018-09-13 | Stop reason: HOSPADM

## 2018-09-08 RX ORDER — SODIUM CHLORIDE 0.9 % (FLUSH) 0.9 %
10 SYRINGE (ML) INJECTION EVERY 8 HOURS
Status: DISCONTINUED | OUTPATIENT
Start: 2018-09-08 | End: 2018-09-13 | Stop reason: HOSPADM

## 2018-09-08 RX ORDER — SODIUM CHLORIDE 9 MG/ML
5 INJECTION, SOLUTION INTRAVENOUS AS NEEDED
Status: ACTIVE | OUTPATIENT
Start: 2018-09-08 | End: 2018-09-09

## 2018-09-08 RX ADMIN — PANTOPRAZOLE SODIUM 40 MG: 40 TABLET, DELAYED RELEASE ORAL at 20:12

## 2018-09-08 RX ADMIN — CEFTRIAXONE SODIUM 1 G: 2 INJECTION, POWDER, FOR SOLUTION INTRAMUSCULAR; INTRAVENOUS at 20:15

## 2018-09-08 RX ADMIN — PANTOPRAZOLE SODIUM 40 MG: 40 TABLET, DELAYED RELEASE ORAL at 09:39

## 2018-09-08 RX ADMIN — Medication 10 ML: at 13:11

## 2018-09-08 RX ADMIN — LEVOTHYROXINE SODIUM 100 MCG: 100 TABLET ORAL at 09:39

## 2018-09-08 RX ADMIN — METRONIDAZOLE 500 MG: 500 INJECTION, SOLUTION INTRAVENOUS at 05:44

## 2018-09-08 RX ADMIN — TAMSULOSIN HYDROCHLORIDE 0.4 MG: 0.4 CAPSULE ORAL at 20:12

## 2018-09-08 RX ADMIN — IPRATROPIUM BROMIDE AND ALBUTEROL SULFATE 3 ML: .5; 3 SOLUTION RESPIRATORY (INHALATION) at 01:16

## 2018-09-08 RX ADMIN — ATORVASTATIN CALCIUM 40 MG: 40 TABLET, FILM COATED ORAL at 09:38

## 2018-09-08 RX ADMIN — IPRATROPIUM BROMIDE AND ALBUTEROL SULFATE 3 ML: .5; 3 SOLUTION RESPIRATORY (INHALATION) at 07:59

## 2018-09-08 RX ADMIN — Medication 10 ML: at 22:42

## 2018-09-08 RX ADMIN — IPRATROPIUM BROMIDE AND ALBUTEROL SULFATE 3 ML: .5; 3 SOLUTION RESPIRATORY (INHALATION) at 19:55

## 2018-09-08 RX ADMIN — METRONIDAZOLE 500 MG: 500 INJECTION, SOLUTION INTRAVENOUS at 13:10

## 2018-09-08 RX ADMIN — IPRATROPIUM BROMIDE AND ALBUTEROL SULFATE 3 ML: .5; 3 SOLUTION RESPIRATORY (INHALATION) at 13:17

## 2018-09-08 RX ADMIN — POTASSIUM CHLORIDE 20 MEQ: 200 INJECTION, SOLUTION INTRAVENOUS at 06:57

## 2018-09-08 RX ADMIN — TAMSULOSIN HYDROCHLORIDE 0.4 MG: 0.4 CAPSULE ORAL at 09:39

## 2018-09-08 RX ADMIN — METRONIDAZOLE 500 MG: 500 INJECTION, SOLUTION INTRAVENOUS at 22:43

## 2018-09-08 ASSESSMENT — COGNITIVE AND FUNCTIONAL STATUS - GENERAL: DO YOU HAVE SERIOUS DIFFICULTY WALKING OR CLIMBING STAIRS: AMBULATION DIFFICULTY, ASSISTANCE 1 PERSON

## 2018-09-08 NOTE — PLAN OF CARE
Problem: Patient Care Overview  Goal: Plan of Care Review  Outcome: Ongoing (interventions implemented as appropriate)   09/08/18 1227   Coping/Psychosocial   Plan Of Care Reviewed With patient   Plan of Care Review   Progress progress toward functional goals as expected       Problem: Fall Risk (Adult)  Goal: Absence of Falls  Outcome: Ongoing (interventions implemented as appropriate)   09/08/18 1227   Fall Risk (Adult)   Absence of Falls making progress toward outcome

## 2018-09-08 NOTE — ASSESSMENT & PLAN NOTE
A/P  83 y.o. male with BPH on tamsulosin 0.4 mg bid and with recent treatment of a urinary tract infection while at [facility] admitted for GI bleed.  UA now clear and renal ultrasound shows no evidence of mass, stone, or hydronephrosis.  Creat 1.0.  PVR mild incomplete emptying-- 130 ml.     Plan  Monitor voids as you are.  Continue tamsulosin bid for BPH -has had recent issues with orthostatic hypotension -if no other sources are found, would not be unreasonable to decrease Flomax to 1 times daily  Will follow

## 2018-09-08 NOTE — PLAN OF CARE
Problem: Patient Care Overview  Goal: Plan of Care Review  Outcome: Ongoing (interventions implemented as appropriate)   09/08/18 0434   Coping/Psychosocial   Plan Of Care Reviewed With patient   Plan of Care Review   Progress improving       Problem: Fall Risk (Adult)  Goal: Identify Related Risk Factors and Signs and Symptoms  Outcome: Ongoing (interventions implemented as appropriate)    Goal: Absence of Falls  Outcome: Ongoing (interventions implemented as appropriate)      Problem: Pressure Ulcer Risk (Raffaele Scale) (Adult,Obstetrics,Pediatric)  Goal: Identify Related Risk Factors and Signs and Symptoms  Outcome: Ongoing (interventions implemented as appropriate)    Goal: Skin Integrity  Outcome: Ongoing (interventions implemented as appropriate)

## 2018-09-08 NOTE — PROGRESS NOTES
Patient: Michel Fang  Location: [facility] Intensive Care Unit ICU 03  MRN: [id number]  Today's date: 9/8/2018    Patient in chair, to receive transfusion, call bell and belongings in reach, nurse Christine present assisting w/ IV. Mildly orthostatic (nurse aware).        Therapy Pain/Vitals     Row Name 09/08/18 1138 09/08/18 1145 09/08/18 1148       Pain/Comfort/Sleep    Presence of Pain denies  --  --    Preferred Pain Scale number (Numeric Rating Pain Scale)  --  --    Pain Rating (0-10): Rest 0  --  --    Pain Rating (0-10): Activity 0  --  --       Vital Signs    SpO2 98 %  --  --    Patient Activity At rest  --  --    Oxygen Therapy Supplemental oxygen  --  --    O2 Delivery Method Nasal cannula  --  --    O2 Flow Rate (L/min) 2 L/min  --  --    BP (!)  124/57 (!)  116/55   nurse aware of orthostasis (!)  116/55   same as when sitting    Patient Position Lying Sitting Standing    Row Name 09/08/18 1158             Vital Signs    /60      Patient Position Sitting            Prior Living Environment  Lives With: alone  Living Arrangements: house  Home Accessibility:  (2 no HR)  Living Environment Comment: 1st fl bed/bath (3-story home)         Prior Level of Function              PT Evaluation - 09/08/18 1214        Session Details    Document Type initial evaluation    Mode of Treatment physical therapy       Time Calculation    Start Time 1138    Stop Time 1204    Time Calculation (min) 26 min                        Education provided this session. See the Patient Education summary report for full details.    PT Care Plan Goals      Most Recent Value   Stair Goal, PT   PT STG: Stairs  minimum assist (75% or less patient effort)   PT STG: Number of Stairs  2   PT STG Assistive Device: Stairs  cane, straight   PT STG Duration: Stairs  5 days or less   PT STG Outcome: Stairs  goal ongoing   PT LTG: Stairs  independent   PT LTG: Number of Stairs  2   PT LTG Duration: Stairs  14 days or less   PT LTG  Outcome: Stairs  goal ongoing      PT Care Plan Goals      Most Recent Value   Bed Mobility Goal   Time to Achieve Goal: Bed Mobility  5 days   Goal Activity: Bed Mobility  all bed mobility activities   Level of [place] Goal: Bed Mobility  independent   Assistive Device: Bed Mobility  bed rails   Goal Outcome: Bed Mobility  goal ongoing   Gait Goal   Time to Achieve Goal: Gait Training  3 days   Level of [place]  modified independence   Distance Goal: Gait Training (feet)  150 feet   Goal Outcome: Gait Training  goal ongoing

## 2018-09-08 NOTE — PROGRESS NOTES
"Urology Progress Note    Subjective    Interval History: Voiding without complaints.     Objective    Vital signs in last 24 hours:  Temp:  [36.8 °C (98.2 °F)-37.8 °C (100 °F)] 37.2 °C (99 °F)  Heart Rate:  [72-94] 76  Resp:  [13-67] 25  BP: (102-147)/(51-71) 123/58      Intake/Output Summary (Last 24 hours) at 09/08/18 0710  Last data filed at 09/08/18 0544   Gross per 24 hour   Intake             2500 ml   Output             1780 ml   Net              720 ml       Physical Exam:  BP (!) 123/58   Pulse 76   Temp 37.2 °C (99 °F) (Oral)   Resp (!) 25   Ht 1.718 m (5' 7.64\")   Wt 101 kg (223 lb 12.3 oz)   SpO2 98%   BMI 34.39 kg/m²     General Appearance:  Alert, cooperative, no distress, appears stated age   Head:  Normocephalic, without obvious abnormality, atraumatic   Back:   No CVA tenderness   Lungs:   Respirations unlabored   Chest wall:  No tenderness or deformity       Abdomen:   Soft, non-tender, bowel sounds active all four quadrants,  no masses, no organomegaly   : No edema       Extremities:  Musculoskeletal: Extremities normal, atraumatic, no cyanosis or edema  No injury or deformity       Skin: Skin color, texture, turgor normal, no rashes or lesions     Labs  Lab Results   Component Value Date    WBC 4.40 09/08/2018    HGB 7.1 (L) 09/08/2018    HCT 22.1 (L) 09/08/2018     (L) 09/08/2018    ALT 18 09/06/2018    AST 25 09/06/2018     09/08/2018    K 3.6 09/08/2018     (H) 09/08/2018    CREATININE 1.0 09/08/2018    BUN 19 09/08/2018    CO2 22 09/08/2018    INR 1.2 09/06/2018         Imaging  Significant findings include:   Renal ultrasound normal.       Assessment & Plan     Patient Active Problem List   Diagnosis   • Orthostatic syncope   • Aortic stenosis   • Benign prostatic hyperplasia   • CAD (coronary artery disease)   • HTN (hypertension)   • Pulmonary sarcoidosis (CMS/HCC) (HCC)   • Hyperlipidemia   • Lactic acidosis   • Acute cystitis without hematuria   • Hypoxia   • " Vomiting   • GI bleed   • Gastrointestinal hemorrhage       Benign prostatic hyperplasia   Assessment & Plan    A/P  83 y.o. male with BPH on tamsulosin and with recent treatment of a urinary tract infection while at [facility].  UA now clear and renal ultrasound shows no evidence of mass, stone, or hydronephrosis.  Creat 1.0     Plan  Monitor voids and check PVR.  Continue tamsulosin bid for BPH                  Expected Discharge Date:  9/28/2018  No discharge date for patient encounter.  Con Mason MD  9/8/2018

## 2018-09-08 NOTE — PROGRESS NOTES
"Daily Progress Note    Subjective    Interval History: He is feeling well. No cardiac symptoms    Objective    Vital signs in last 24 hours:  Temp:  [36.8 °C (98.2 °F)-37.2 °C (99 °F)] 37.2 °C (98.9 °F)  Heart Rate:  [72-88] 81  Resp:  [13-67] 25  BP: (102-144)/(51-65) 116/58      Intake/Output Summary (Last 24 hours) at 09/08/18 1207  Last data filed at 09/08/18 0915   Gross per 24 hour   Intake             1370 ml   Output             1380 ml   Net              -10 ml       Physical Exam:  BP (!) 116/58 (BP Location: Left upper arm)   Pulse 81   Temp 37.2 °C (98.9 °F) (Oral)   Resp (!) 25   Ht 1.718 m (5' 7.64\")   Wt 101 kg (223 lb 12.3 oz)   SpO2 98%   BMI 34.39 kg/m²               Lungs:     Clear to auscultation bilaterally   Heart:    Regular rhythm, S1 and S2 normal, II/VI DSM   Extremities:  GI: No edema  Soft, nontender   Other:          Labs  Lab Results   Component Value Date    WBC 4.40 09/08/2018    HGB 7.1 (L) 09/08/2018    HCT 22.1 (L) 09/08/2018     (L) 09/08/2018    ALT 18 09/06/2018    AST 25 09/06/2018     09/08/2018    K 3.6 09/08/2018     (H) 09/08/2018    CREATININE 1.0 09/08/2018    BUN 19 09/08/2018    CO2 22 09/08/2018    INR 1.2 09/06/2018         ECG/Telemetry  I have independently reviewed the telemetry. No events for the last 24 hours.       Assessment & Plan    CAD (coronary artery disease)   Assessment & Plan    Hx of CABG, Off ASA and Plavix with upper GIB.  Would restart ASA first.  He is on plavix b/c of popliteal aneurysm.  Eventually restart plavix        Aortic stenosis   Assessment & Plan    S/p TAVR 5/2016, recent echo showed mod perivalvular AI.  No treatment planned at this time.  Outpt f/u        Orthostatic syncope   Assessment & Plan    Follow.  IVf, PRBC as needed                  Lawrence S. Mendelson, MD  9/8/2018      "

## 2018-09-08 NOTE — PROGRESS NOTES
Critical Care Progress Note    SUBJECTIVE  ID by name and   Had episode of orthostatic hypotension. No CP or SOB  Hgb 7.1 today but no acute signs of bleeding.  No GI complaints      OBJECTIVE    Vitals:    18 1400   BP: (!) 117/58   Pulse: 79   Resp: (!) 21   Temp: 36.9 °C (98.5 °F)   SpO2: 99%         Intake/Output Summary (Last 24 hours) at 18 1800  Last data filed at 18 1330   Gross per 24 hour   Intake             1490 ml   Output             1400 ml   Net               90 ml            Lab Results   Component Value Date    GLUCOSE 104 (H) 2018    CALCIUM 7.4 (L) 2018     2018    K 3.6 2018    CO2 22 2018     (H) 2018    BUN 19 2018    CREATININE 1.0 2018       Lab Results   Component Value Date    WBC 4.40 2018    HGB 7.1 (L) 2018    HCT 22.1 (L) 2018    MCV 90.2 2018     (L) 2018       Lab Results   Component Value Date    MG 2.1 2018    PHOS 2.5 2018         Physical Exam:  GEN: NAD in chair  HEENT: AT/NC,  Neck supple  Cv: RRR, S1,S2, + murmur  Lungs: CTA w/ mild basilar crackles  ABD: Soft, normoactive, NTP  EXT: symmetric  Pv: pulses intact  Neuro: Ao3, nonfocal  Skin: warm, dry    Labs  I have reviewed the patient's pertinent labs.     Imaging  I have independently reviewed the patient's pertinent imaging.     GI bleed   Assessment & Plan    S/p hematemeses  S/p EGD gastritis, duodenitis. No signs of bleeding  Drop hbg 7.1 but no sign of active bleed     - continue PPI BID  - advance diet as tolerated  - transfuse 1 PRBC  - trend h/h  - GI consult appreciated, f/u bx from EGD          Vomiting   Assessment & Plan    Vomiting started after syncope  Had coffee ground  No further episode    · Trend hgb  · Anti-ematic prn        Hypoxia   Assessment & Plan    Hypoxic with evidence of aspiration pneumonia on CXR    · Needed ICU level of care required with tenuous  respiration  · Supp o2 to keep O2 sat >92%  · Monitor CXR and ABG prn  · F/u  sputum culture w/ 1 + gpc  · Rocephin and flagyl for 5 day course  · Pulmonary hygiene/toiletting        CAD (coronary artery disease)   Assessment & Plan    W/o CP, rhythmically stable    · Cards consult following  · OK by GI to resume aspirin/plavix as no sign of bleeding on EGD        Benign prostatic hyperplasia   Assessment & Plan    BPH with recent UTI, was going to follow-up with urology today prior to admission  Renal US w/o hydronephrosis    · Urology appreciated  · Continue flomax  · Monitor PVR        Orthostatic syncope   Assessment & Plan    Echo with LVEF 60%, moderate aortic regurg    · Cards consult following, monitor AI and outpatient follow up  · Orthostatic vitals  · Receiving volume, 1 unit of PRBC  · Maintain adequate hydration                NEYDA Lopez Scribed for and in presence of DR Fernando Huffman  I have reviewed and agree with the above note.  Fernando Huffman MD  [id number].  9/9/2018  9:09 AM

## 2018-09-08 NOTE — PLAN OF CARE
Problem: Acute Therapy Services Goal & Intervention Plan  Goal: Bed Mobility Goal  Outcome: Ongoing (interventions implemented as appropriate)   09/08/18 1228   Bed Mobility Goal   Time to Achieve Goal: Bed Mobility 5 days   Goal Activity: Bed Mobility all bed mobility activities   Level of [place] Goal: Bed Mobility independent   Assistive Device: Bed Mobility bed rails   Goal Outcome: Bed Mobility goal ongoing     Goal: Gait Training Goal  Outcome: Ongoing (interventions implemented as appropriate)   09/08/18 1228   Gait Goal   Time to Achieve Goal: Gait Training 3 days   Level of [place] modified independence   Distance Goal: Gait Training (feet) 150 feet   Goal Outcome: Gait Training goal ongoing     Goal: Stairs Goal  Outcome: Ongoing (interventions implemented as appropriate)   09/08/18 1228   Stair Goal, PT   PT STG: Stairs minimum assist (75% or less patient effort)   PT STG: Number of Stairs 2   PT STG Assistive Device: Stairs cane, straight   PT STG Duration: Stairs 5 days or less   PT STG Outcome: Stairs goal ongoing   PT LTG: Stairs independent   PT LTG: Number of Stairs 2   PT LTG Duration: Stairs 14 days or less   PT LTG Outcome: Stairs goal ongoing

## 2018-09-09 ENCOUNTER — APPOINTMENT (INPATIENT)
Dept: RADIOLOGY | Facility: HOSPITAL | Age: 83
DRG: 377 | End: 2018-09-09
Attending: INTERNAL MEDICINE
Payer: MEDICARE

## 2018-09-09 LAB
ANION GAP SERPL CALC-SCNC: 5 MEQ/L (ref 3–15)
ANISOCYTOSIS BLD QL SMEAR: ABNORMAL
BASOPHILS # BLD: 0.05 K/UL (ref 0.01–0.1)
BASOPHILS NFR BLD: 1.2 %
BUN SERPL-MCNC: 13 MG/DL (ref 8–20)
CALCIUM SERPL-MCNC: 7.9 MG/DL (ref 8.9–10.3)
CHLORIDE SERPL-SCNC: 110 MEQ/L (ref 98–109)
CO2 SERPL-SCNC: 25 MEQ/L (ref 22–32)
CREAT SERPL-MCNC: 1 MG/DL (ref 0.8–1.3)
CROSSMATCH: NORMAL
DIFFERENTIAL METHOD BLD: ABNORMAL
EOSINOPHIL # BLD: 0.22 K/UL (ref 0.04–0.54)
EOSINOPHIL NFR BLD: 5.3 %
ERYTHROCYTE [DISTWIDTH] IN BLOOD BY AUTOMATED COUNT: 16.1 % (ref 11.6–14.4)
GFR SERPL CREATININE-BSD FRML MDRD: >60 ML/MIN/1.73M*2
GLUCOSE SERPL-MCNC: 108 MG/DL (ref 70–99)
HCT VFR BLDCO AUTO: 27.1 % (ref 40.1–51)
HCT VFR BLDCO AUTO: 29.7 % (ref 40.1–51)
HGB BLD-MCNC: 9.3 G/DL (ref 13.7–17.5)
HGB BLD-MCNC: 9.9 G/DL (ref 13.7–17.5)
HYPOCHROMIA BLD QL SMEAR: ABNORMAL
IMM GRANULOCYTES # BLD AUTO: 0.01 K/UL (ref 0–0.08)
IMM GRANULOCYTES NFR BLD AUTO: 0.2 %
ISBT CODE: 5100
LYMPHOCYTES # BLD: 0.93 K/UL (ref 1.2–3.5)
LYMPHOCYTES NFR BLD: 22.2 %
MAGNESIUM SERPL-MCNC: 2.2 MG/DL (ref 1.8–2.5)
MCH RBC QN AUTO: 30.3 PG (ref 28–33.2)
MCHC RBC AUTO-ENTMCNC: 34.3 G/DL (ref 32.2–36.5)
MCV RBC AUTO: 88.3 FL (ref 83–98)
MICROORGANISM SPEC CULT: NORMAL
MONOCYTES # BLD: 0.5 K/UL (ref 0.3–1)
MONOCYTES NFR BLD: 11.9 %
NEUTROPHILS # BLD: 2.48 K/UL (ref 1.7–7)
NEUTS SEG NFR BLD: 59.2 %
NRBC BLD-RTO: 0.5 %
OVALOCYTES BLD QL SMEAR: ABNORMAL
PDW BLD AUTO: 10.2 FL (ref 9.4–12.4)
PHOSPHATE SERPL-MCNC: 3.1 MG/DL (ref 2.4–4.7)
PLAT MORPH BLD: NORMAL
PLATELET # BLD AUTO: 100 K/UL (ref 150–350)
PLATELET # BLD EST: ABNORMAL 10*3/UL
POLYCHROMASIA BLD QL SMEAR: ABNORMAL
POTASSIUM SERPL-SCNC: 4.1 MEQ/L (ref 3.6–5.1)
PRODUCT CODE: NORMAL
PRODUCT STATUS: NORMAL
RBC # BLD AUTO: 3.07 M/UL (ref 4.5–5.8)
SODIUM SERPL-SCNC: 140 MEQ/L (ref 136–144)
SPECIMEN EXP DATE BLD: NORMAL
UNIT ABO: NORMAL
UNIT ID: NORMAL
UNIT RH: POSITIVE
WBC # BLD AUTO: 4.19 K/UL (ref 3.8–10.5)

## 2018-09-09 PROCEDURE — 63700000 HC SELF-ADMINISTRABLE DRUG: Performed by: INTERNAL MEDICINE

## 2018-09-09 PROCEDURE — 25000000 HC PHARMACY GENERAL: Performed by: PHYSICIAN ASSISTANT

## 2018-09-09 PROCEDURE — 94640 AIRWAY INHALATION TREATMENT: CPT

## 2018-09-09 PROCEDURE — 85014 HEMATOCRIT: CPT | Performed by: PHYSICIAN ASSISTANT

## 2018-09-09 PROCEDURE — 80048 BASIC METABOLIC PNL TOTAL CA: CPT | Performed by: PHYSICIAN ASSISTANT

## 2018-09-09 PROCEDURE — 63700000 HC SELF-ADMINISTRABLE DRUG: Performed by: PHYSICIAN ASSISTANT

## 2018-09-09 PROCEDURE — 83735 ASSAY OF MAGNESIUM: CPT | Performed by: PHYSICIAN ASSISTANT

## 2018-09-09 PROCEDURE — 71045 X-RAY EXAM CHEST 1 VIEW: CPT

## 2018-09-09 PROCEDURE — 63600000 HC DRUGS/DETAIL CODE: Performed by: PHYSICIAN ASSISTANT

## 2018-09-09 PROCEDURE — 85025 COMPLETE CBC W/AUTO DIFF WBC: CPT | Performed by: PHYSICIAN ASSISTANT

## 2018-09-09 PROCEDURE — 36415 COLL VENOUS BLD VENIPUNCTURE: CPT | Performed by: PHYSICIAN ASSISTANT

## 2018-09-09 PROCEDURE — 21400000 HC ROOM AND CARE CCU/INTERMEDIATE

## 2018-09-09 PROCEDURE — 84100 ASSAY OF PHOSPHORUS: CPT | Performed by: PHYSICIAN ASSISTANT

## 2018-09-09 PROCEDURE — 25800000 HC PHARMACY IV SOLUTIONS: Performed by: PHYSICIAN ASSISTANT

## 2018-09-09 RX ORDER — FUROSEMIDE 40 MG/1
40 TABLET ORAL ONCE
Status: COMPLETED | OUTPATIENT
Start: 2018-09-09 | End: 2018-09-09

## 2018-09-09 RX ADMIN — PANTOPRAZOLE SODIUM 40 MG: 40 TABLET, DELAYED RELEASE ORAL at 08:14

## 2018-09-09 RX ADMIN — TAMSULOSIN HYDROCHLORIDE 0.4 MG: 0.4 CAPSULE ORAL at 08:14

## 2018-09-09 RX ADMIN — IPRATROPIUM BROMIDE AND ALBUTEROL SULFATE 3 ML: .5; 3 SOLUTION RESPIRATORY (INHALATION) at 08:40

## 2018-09-09 RX ADMIN — IPRATROPIUM BROMIDE AND ALBUTEROL SULFATE 3 ML: .5; 3 SOLUTION RESPIRATORY (INHALATION) at 20:57

## 2018-09-09 RX ADMIN — METRONIDAZOLE 500 MG: 500 INJECTION, SOLUTION INTRAVENOUS at 12:44

## 2018-09-09 RX ADMIN — METRONIDAZOLE 500 MG: 500 INJECTION, SOLUTION INTRAVENOUS at 05:59

## 2018-09-09 RX ADMIN — IPRATROPIUM BROMIDE AND ALBUTEROL SULFATE 3 ML: .5; 3 SOLUTION RESPIRATORY (INHALATION) at 14:22

## 2018-09-09 RX ADMIN — TAMSULOSIN HYDROCHLORIDE 0.4 MG: 0.4 CAPSULE ORAL at 20:56

## 2018-09-09 RX ADMIN — ATORVASTATIN CALCIUM 40 MG: 40 TABLET, FILM COATED ORAL at 08:18

## 2018-09-09 RX ADMIN — Medication 10 ML: at 12:44

## 2018-09-09 RX ADMIN — FUROSEMIDE 40 MG: 40 TABLET ORAL at 10:25

## 2018-09-09 RX ADMIN — IPRATROPIUM BROMIDE AND ALBUTEROL SULFATE 3 ML: .5; 3 SOLUTION RESPIRATORY (INHALATION) at 02:30

## 2018-09-09 RX ADMIN — CEFTRIAXONE SODIUM 1 G: 2 INJECTION, POWDER, FOR SOLUTION INTRAMUSCULAR; INTRAVENOUS at 22:33

## 2018-09-09 RX ADMIN — METRONIDAZOLE 500 MG: 500 INJECTION, SOLUTION INTRAVENOUS at 23:16

## 2018-09-09 RX ADMIN — Medication 10 ML: at 22:42

## 2018-09-09 RX ADMIN — LEVOTHYROXINE SODIUM 100 MCG: 100 TABLET ORAL at 08:14

## 2018-09-09 RX ADMIN — Medication 10 ML: at 05:59

## 2018-09-09 RX ADMIN — PANTOPRAZOLE SODIUM 40 MG: 40 TABLET, DELAYED RELEASE ORAL at 20:57

## 2018-09-09 NOTE — PLAN OF CARE
Problem: Patient Care Overview  Goal: Plan of Care Review  Outcome: Ongoing (interventions implemented as appropriate)   09/09/18 0837   Coping/Psychosocial   Plan Of Care Reviewed With patient;daughter   Plan of Care Review   Progress improving   Outcome Summary Pt tolerates regular diet. VSS. Denies rectal bleeding, N,V. Will cont.to mtr.       Problem: Fall Risk (Adult)  Goal: Identify Related Risk Factors and Signs and Symptoms  Outcome: Outcome(s) Achieved Date Met: 09/09/18    Goal: Absence of Falls  Outcome: Ongoing (interventions implemented as appropriate)      Problem: Pressure Ulcer Risk (Raffaele Scale) (Adult,Obstetrics,Pediatric)  Goal: Identify Related Risk Factors and Signs and Symptoms  Outcome: Outcome(s) Achieved Date Met: 09/09/18    Goal: Skin Integrity  Outcome: Ongoing (interventions implemented as appropriate)

## 2018-09-09 NOTE — PROGRESS NOTES
"Urology Progress Note    Subjective    Interval History: Voiding with some frequency, not bothersome.     Objective    Vital signs in last 24 hours:  Temp:  [36.8 °C (98.2 °F)-37.2 °C (98.9 °F)] 36.8 °C (98.3 °F)  Heart Rate:  [] 69  Resp:  [9-33] 18  BP: (112-181)/(55-82) 147/72      Intake/Output Summary (Last 24 hours) at 09/09/18 0906  Last data filed at 09/09/18 0743   Gross per 24 hour   Intake             1250 ml   Output             1050 ml   Net              200 ml       Physical Exam:  BP (!) 147/72   Pulse 69   Temp 36.8 °C (98.3 °F) (Oral)   Resp 18   Ht 1.718 m (5' 7.64\")   Wt 101 kg (223 lb 12.3 oz)   SpO2 99%   BMI 34.39 kg/m²     General Appearance:  Alert, cooperative, no distress, appears stated age   Head:  Normocephalic, without obvious abnormality, atraumatic   Back:   No CVA tenderness   Lungs:   Respirations unlabored   Chest wall:  No tenderness or deformity       Abdomen:   Soft, non-tender, bowel sounds active all four quadrants,  no masses, no organomegaly   : No edema       Extremities:  Musculoskeletal: Extremities normal, atraumatic, no cyanosis or edema  No injury or deformity       Skin: Skin color, texture, turgor normal, no rashes or lesions     Labs  Lab Results   Component Value Date    WBC 4.19 09/09/2018    HGB 9.3 (L) 09/09/2018    HCT 27.1 (L) 09/09/2018     (L) 09/09/2018    ALT 18 09/06/2018    AST 25 09/06/2018     09/09/2018    K 4.1 09/09/2018     (H) 09/09/2018    CREATININE 1.0 09/09/2018    BUN 13 09/09/2018    CO2 25 09/09/2018    INR 1.2 09/06/2018         Imaging  Not applicable       Assessment & Plan     Patient Active Problem List   Diagnosis   • Orthostatic syncope   • Aortic stenosis   • Benign prostatic hyperplasia   • CAD (coronary artery disease)   • HTN (hypertension)   • Pulmonary sarcoidosis (CMS/HCC) (HCC)   • Hyperlipidemia   • Lactic acidosis   • Acute cystitis without hematuria   • Hypoxia   • Vomiting   • GI bleed "   • Gastrointestinal hemorrhage       Benign prostatic hyperplasia   Assessment & Plan    A/P  83 y.o. male with BPH on tamsulosin 0.4 mg bid and with recent treatment of a urinary tract infection while at [facility] admitted for GI bleed.  UA now clear and renal ultrasound shows no evidence of mass, stone, or hydronephrosis.  Creat 1.0.  PVR mild incomplete emptying-- 130 ml.     Plan  Monitor voids as you are.  Continue tamsulosin bid for BPH                  Expected Discharge Date:  9/28/2018  No discharge date for patient encounter.  Con Mason MD  9/9/2018

## 2018-09-09 NOTE — PROGRESS NOTES
"Daily Progress Note    Subjective    Interval History: He is feeling well. No cardiac symptoms    Objective    Vital signs in last 24 hours:  Temp:  [36.8 °C (98.2 °F)-37.2 °C (99 °F)] 37.2 °C (99 °F)  Heart Rate:  [] 70  Resp:  [11-35] 35  BP: (115-181)/(56-96) 126/62      Intake/Output Summary (Last 24 hours) at 09/09/18 1333  Last data filed at 09/09/18 1259   Gross per 24 hour   Intake              610 ml   Output             1600 ml   Net             -990 ml       Physical Exam:  /62   Pulse 70   Temp 37.2 °C (99 °F) (Oral)   Resp (!) 35   Ht 1.718 m (5' 7.64\")   Wt 101 kg (223 lb 12.3 oz)   SpO2 94%   BMI 34.39 kg/m²               Lungs:     Clear to auscultation bilaterally   Heart:    Regular rhythm, S1 and S2 normal, II/VI DSM   Extremities:  GI: No edema  Soft, nontender   Other:          Labs  Lab Results   Component Value Date    WBC 4.19 09/09/2018    HGB 9.9 (L) 09/09/2018    HCT 29.7 (L) 09/09/2018     (L) 09/09/2018    ALT 18 09/06/2018    AST 25 09/06/2018     09/09/2018    K 4.1 09/09/2018     (H) 09/09/2018    CREATININE 1.0 09/09/2018    BUN 13 09/09/2018    CO2 25 09/09/2018    INR 1.2 09/06/2018         ECG/Telemetry  I have independently reviewed the telemetry. No events for the last 24 hours.       Assessment & Plan    CAD (coronary artery disease)   Assessment & Plan    Hx of CABG, Off ASA and Plavix with upper GIB.  Would restart ASA at some point.  He is on plavix b/c of popliteal aneurysm.  Eventually restart plavix        Aortic stenosis   Assessment & Plan    S/p TAVR 5/2016, recent echo showed mod perivalvular AI.  No treatment planned at this time.  Outpt f/u        Orthostatic syncope   Assessment & Plan    Follow.  IVf, PRBC as needed                  Lawrence S. Mendelson, MD  9/9/2018    "

## 2018-09-09 NOTE — PROGRESS NOTES
Critical Care Progress Note    SUBJECTIVE  ID by name and   Had bowel movement overnight.  Tolerating diet. No sign of bleeding.  H/H holding.   No SOB/CP  Afebrile    OBJECTIVE    Vitals:    18 1300   BP:    Pulse: 70   Resp: (!) 35   Temp:    SpO2: 94%         Intake/Output Summary (Last 24 hours) at 18 1417  Last data filed at 18 1355   Gross per 24 hour   Intake              710 ml   Output             1600 ml   Net             -890 ml            Lab Results   Component Value Date    GLUCOSE 108 (H) 2018    CALCIUM 7.9 (L) 2018     2018    K 4.1 2018    CO2 25 2018     (H) 2018    BUN 13 2018    CREATININE 1.0 2018       Lab Results   Component Value Date    WBC 4.19 2018    HGB 9.9 (L) 2018    HCT 29.7 (L) 2018    MCV 88.3 2018     (L) 2018       Lab Results   Component Value Date    MG 2.2 2018    PHOS 3.1 2018         Physical Exam:  GEN: NAD in chair in bed  HEENT: AT/NC,  Neck supple  Cv: RRR, S1,S2, + murmur  Lungs: CTA w/ mild basilar crackles. No wheeze or rhonchi  ABD: Soft, normoactive, NTP  EXT: symmetric  Pv: pulses intact  Neuro: Ao3, nonfocal  Skin: warm, dry    Labs  I have reviewed the patient's pertinent labs.     Imaging  I have independently reviewed the patient's pertinent imaging.     GI bleed   Assessment & Plan    S/p hematemeses  S/p EGD gastritis, duodenitis. No signs of bleeding  Drop hbg 7.1 but no sign of active bleed     - continue PPI BID  - advance diet as tolerated  - transfuse prn  - trend h/h  - GI consult appreciated, f/u bx from EGD          Vomiting   Assessment & Plan    Vomiting started after syncope  Had coffee ground  No further episode    · Trend hgb  · Anti-ematic prn        Hypoxia   Assessment & Plan    Hypoxic with evidence of aspiration pneumonia on CXR  CXR  w/ pulm congestion    · Needed ICU level of care required with tenuous  respiration  · Supp o2 to keep O2 sat >92%  · Monitor CXR and ABG prn  · F/u  sputum culture w/ 1 + gpc  · Rocephin and flagyl for 5 day course ( last day ABX 9/10)  · Pulmonary hygiene/toiletting  · Lasix 40 x 1.  9/9        CAD (coronary artery disease)   Assessment & Plan    W/o CP, rhythmically stable    · Cards consult following  · OK by GI to resume aspirin/plavix as no sign of bleeding on EGD. Will cont. DAPT        Benign prostatic hyperplasia   Assessment & Plan    BPH with recent UTI, was going to follow-up with urology today prior to admission  Renal US w/o hydronephrosis  Voiding w/ some frequecy    · Urology appreciated  · Continue flomax BID  · Monitor PVR        Orthostatic syncope   Assessment & Plan    Echo with LVEF 60%, moderate aortic regurg  S/p volume replacement,  S/p 1 PRBC 9/8/18    · Cards consult following, monitor AI and outpatient follow up  · Monitor Orthostatic vitals  · Maintain adequate hydration                NEYDA Lopez Scribed for and in presence of DR Fernando Huffman  I have reviewed and agree with the above note.  Fernando Huffman MD  [id number].needs lasix today.  9/10/2018  9:18 AM

## 2018-09-10 LAB
ANION GAP SERPL CALC-SCNC: 6 MEQ/L (ref 3–15)
BASOPHILS # BLD: 0.04 K/UL (ref 0.01–0.1)
BASOPHILS NFR BLD: 0.9 %
BUN SERPL-MCNC: 12 MG/DL (ref 8–20)
CALCIUM SERPL-MCNC: 7.9 MG/DL (ref 8.9–10.3)
CHLORIDE SERPL-SCNC: 108 MEQ/L (ref 98–109)
CO2 SERPL-SCNC: 24 MEQ/L (ref 22–32)
CREAT SERPL-MCNC: 1.1 MG/DL (ref 0.8–1.3)
CROSSMATCH: NORMAL
DIFFERENTIAL METHOD BLD: ABNORMAL
EOSINOPHIL # BLD: 0.22 K/UL (ref 0.04–0.54)
EOSINOPHIL NFR BLD: 4.8 %
ERYTHROCYTE [DISTWIDTH] IN BLOOD BY AUTOMATED COUNT: 15.8 % (ref 11.6–14.4)
GFR SERPL CREATININE-BSD FRML MDRD: >60 ML/MIN/1.73M*2
GLUCOSE SERPL-MCNC: 107 MG/DL (ref 70–99)
HCT VFR BLDCO AUTO: 30.2 % (ref 40.1–51)
HGB BLD-MCNC: 10.1 G/DL (ref 13.7–17.5)
IMM GRANULOCYTES # BLD AUTO: 0.02 K/UL (ref 0–0.08)
IMM GRANULOCYTES NFR BLD AUTO: 0.4 %
ISBT CODE: 5100
LYMPHOCYTES # BLD: 0.95 K/UL (ref 1.2–3.5)
LYMPHOCYTES NFR BLD: 20.6 %
MAGNESIUM SERPL-MCNC: 2.1 MG/DL (ref 1.8–2.5)
MCH RBC QN AUTO: 29.5 PG (ref 28–33.2)
MCHC RBC AUTO-ENTMCNC: 33.4 G/DL (ref 32.2–36.5)
MCV RBC AUTO: 88.3 FL (ref 83–98)
MICROORGANISM SPEC CULT: ABNORMAL
MONOCYTES # BLD: 0.62 K/UL (ref 0.3–1)
MONOCYTES NFR BLD: 13.4 %
NEUTROPHILS # BLD: 2.76 K/UL (ref 1.7–7)
NEUTS SEG NFR BLD: 59.9 %
NRBC BLD-RTO: 0 %
PDW BLD AUTO: 10.5 FL (ref 9.4–12.4)
PHOSPHATE SERPL-MCNC: 3.3 MG/DL (ref 2.4–4.7)
PLATELET # BLD AUTO: 113 K/UL (ref 150–350)
POTASSIUM SERPL-SCNC: 3.7 MEQ/L (ref 3.6–5.1)
PRODUCT CODE: NORMAL
PRODUCT STATUS: NORMAL
RBC # BLD AUTO: 3.42 M/UL (ref 4.5–5.8)
SODIUM SERPL-SCNC: 138 MEQ/L (ref 136–144)
SPECIMEN EXP DATE BLD: NORMAL
UNIT ABO: NORMAL
UNIT ID: NORMAL
UNIT RH: POSITIVE
WBC # BLD AUTO: 4.61 K/UL (ref 3.8–10.5)

## 2018-09-10 PROCEDURE — 25800000 HC PHARMACY IV SOLUTIONS: Performed by: PHYSICIAN ASSISTANT

## 2018-09-10 PROCEDURE — 63700000 HC SELF-ADMINISTRABLE DRUG: Performed by: PHYSICIAN ASSISTANT

## 2018-09-10 PROCEDURE — 25000000 HC PHARMACY GENERAL: Performed by: PHYSICIAN ASSISTANT

## 2018-09-10 PROCEDURE — 36415 COLL VENOUS BLD VENIPUNCTURE: CPT | Performed by: PHYSICIAN ASSISTANT

## 2018-09-10 PROCEDURE — 83735 ASSAY OF MAGNESIUM: CPT | Performed by: PHYSICIAN ASSISTANT

## 2018-09-10 PROCEDURE — 97530 THERAPEUTIC ACTIVITIES: CPT | Mod: GP

## 2018-09-10 PROCEDURE — 85025 COMPLETE CBC W/AUTO DIFF WBC: CPT | Performed by: PHYSICIAN ASSISTANT

## 2018-09-10 PROCEDURE — 97165 OT EVAL LOW COMPLEX 30 MIN: CPT | Mod: GO

## 2018-09-10 PROCEDURE — 80048 BASIC METABOLIC PNL TOTAL CA: CPT | Performed by: PHYSICIAN ASSISTANT

## 2018-09-10 PROCEDURE — 21400000 HC ROOM AND CARE CCU/INTERMEDIATE

## 2018-09-10 PROCEDURE — 99232 SBSQ HOSP IP/OBS MODERATE 35: CPT | Performed by: INTERNAL MEDICINE

## 2018-09-10 PROCEDURE — 84100 ASSAY OF PHOSPHORUS: CPT | Performed by: PHYSICIAN ASSISTANT

## 2018-09-10 PROCEDURE — 63600000 HC DRUGS/DETAIL CODE: Performed by: PHYSICIAN ASSISTANT

## 2018-09-10 RX ORDER — TAMSULOSIN HYDROCHLORIDE 0.4 MG/1
0.4 CAPSULE ORAL NIGHTLY
Status: DISCONTINUED | OUTPATIENT
Start: 2018-09-11 | End: 2018-09-13 | Stop reason: HOSPADM

## 2018-09-10 RX ORDER — PANTOPRAZOLE SODIUM 40 MG/1
40 TABLET, DELAYED RELEASE ORAL 2 TIMES DAILY
Qty: 60 TABLET | Refills: 0 | Status: SHIPPED | OUTPATIENT
Start: 2018-09-10 | End: 2018-10-10

## 2018-09-10 RX ORDER — TAMSULOSIN HYDROCHLORIDE 0.4 MG/1
0.4 CAPSULE ORAL DAILY
Status: DISCONTINUED | OUTPATIENT
Start: 2018-09-11 | End: 2018-09-10

## 2018-09-10 RX ORDER — SODIUM CHLORIDE 9 MG/ML
INJECTION, SOLUTION INTRAVENOUS CONTINUOUS
Status: DISCONTINUED | OUTPATIENT
Start: 2018-09-10 | End: 2018-09-13 | Stop reason: HOSPADM

## 2018-09-10 RX ADMIN — PANTOPRAZOLE SODIUM 40 MG: 40 TABLET, DELAYED RELEASE ORAL at 08:36

## 2018-09-10 RX ADMIN — IPRATROPIUM BROMIDE AND ALBUTEROL SULFATE 3 ML: .5; 3 SOLUTION RESPIRATORY (INHALATION) at 14:03

## 2018-09-10 RX ADMIN — METRONIDAZOLE 500 MG: 500 INJECTION, SOLUTION INTRAVENOUS at 14:03

## 2018-09-10 RX ADMIN — METRONIDAZOLE 500 MG: 500 INJECTION, SOLUTION INTRAVENOUS at 20:32

## 2018-09-10 RX ADMIN — SODIUM CHLORIDE: 9 INJECTION, SOLUTION INTRAVENOUS at 16:15

## 2018-09-10 RX ADMIN — Medication 10 ML: at 05:59

## 2018-09-10 RX ADMIN — IPRATROPIUM BROMIDE AND ALBUTEROL SULFATE 3 ML: .5; 3 SOLUTION RESPIRATORY (INHALATION) at 08:36

## 2018-09-10 RX ADMIN — PANTOPRAZOLE SODIUM 40 MG: 40 TABLET, DELAYED RELEASE ORAL at 20:31

## 2018-09-10 RX ADMIN — METRONIDAZOLE 500 MG: 500 INJECTION, SOLUTION INTRAVENOUS at 05:59

## 2018-09-10 RX ADMIN — ATORVASTATIN CALCIUM 40 MG: 40 TABLET, FILM COATED ORAL at 20:31

## 2018-09-10 RX ADMIN — TAMSULOSIN HYDROCHLORIDE 0.4 MG: 0.4 CAPSULE ORAL at 08:36

## 2018-09-10 RX ADMIN — LEVOTHYROXINE SODIUM 100 MCG: 100 TABLET ORAL at 05:59

## 2018-09-10 RX ADMIN — Medication 10 ML: at 14:03

## 2018-09-10 RX ADMIN — CEFTRIAXONE SODIUM 1 G: 2 INJECTION, POWDER, FOR SOLUTION INTRAMUSCULAR; INTRAVENOUS at 22:17

## 2018-09-10 RX ADMIN — IPRATROPIUM BROMIDE AND ALBUTEROL SULFATE 3 ML: .5; 3 SOLUTION RESPIRATORY (INHALATION) at 20:31

## 2018-09-10 ASSESSMENT — COGNITIVE AND FUNCTIONAL STATUS - GENERAL
DRESSING REGULAR UPPER BODY CLOTHING: 3 - A LITTLE
CLIMB 3 TO 5 STEPS WITH RAILING: 3 - A LITTLE
STANDING UP FROM CHAIR USING ARMS: 3 - A LITTLE
HELP NEEDED FOR BATHING: 2 - A LOT
DRESSING REGULAR LOWER BODY CLOTHING: 3 - A LITTLE
HELP NEEDED FOR PERSONAL GROOMING: 3 - A LITTLE
MOVING TO AND FROM BED TO CHAIR: 3 - A LITTLE
EATING MEALS: 4 - NONE
TOILETING: 3 - A LITTLE
WALKING IN HOSPITAL ROOM: 3 - A LITTLE

## 2018-09-10 NOTE — PLAN OF CARE
Problem: Patient Care Overview  Goal: Plan of Care Review  Outcome: Ongoing (interventions implemented as appropriate)   09/10/18 1301   Coping/Psychosocial   Plan Of Care Reviewed With patient   Plan of Care Review   Progress progress toward functional goals as expected   Outcome Summary Pt requires MinAx2 in STS transfers & bed<chair> transfers w/ RW. Additional functional mobility not assessed 2* to orthostatic hypotension. Pt requires supervision in LB dressing in supported sitting. D/c rec to SNF for continuous ADL training.        Problem: Fall Risk (Adult)  Goal: Absence of Falls  Outcome: Ongoing (interventions implemented as appropriate)      Problem: Acute Therapy Services Goal & Intervention Plan  Goal: Bed Mobility Goal  Outcome: Ongoing (interventions implemented as appropriate)    Goal: Lower Body Dressing Goal  Outcome: Ongoing (interventions implemented as appropriate)    Goal: Transfer Training Goal  Outcome: Ongoing (interventions implemented as appropriate)

## 2018-09-10 NOTE — PROGRESS NOTES
Met with pt at bedside.  Pt resides alone but has great family support.  Ind pta.  He has been to [facility] in the past.  Not known to home care.  He feels he will need physical therapy upon discharge.  Pt was orthostatic on 9/8 last PT eval.  Will have PT evaluate pt today to assist in determining best dc plan.  Pt was in agreement with this.     Addendum:  Noted that pt did evaluate patient and he was very orthostatic.  They were recommending snf at discharge.  Discussed with pt and his daughter at bedside.  They are unsure and took my business card to have pts other daughterMaeve give me a call to discuss snf.  Will await a return call.

## 2018-09-10 NOTE — PROGRESS NOTES
Patient: Michel Fang  Location: [facility] 6A 0604  MRN: [id number]  Today's date: 9/10/2018  Patient presents supine in bed at end of session 2* to orthostatic hypotension. Call alarm & personal items nearby, son present in room. Nsg notified.           Therapy Pain/Vitals - 09/10/18 1310        Vital Signs    BP (!)  101/57    Patient Position Standing       Patient Observation    Observations reports mild LH, returned to sitting position, /65          Prior Living Environment  Lives With: alone  Living Arrangements: house  Home Accessibility:  (2 no HR)  Living Environment Comment: Pt lives alone in 1st fl set up home. B+B on 1st w/ handicap bathroom Equipment Currently Used at Home: grab bar, shower chair       Prior Level of Function  Ambulation: independent  Transferring: independent  Toileting: independent  Bathing: assistive equipment  Dressing: independent  Eating: independent  Communication: understands/communicates without difficulty  Swallowing: swallows foods/liquids without difficulty  Equipment Currently Used at Home: grab bar, shower chair           OT Evaluation - 09/10/18 1301        Session Details    Document Type initial evaluation    Mode of Treatment individual therapy;occupational therapy    Patient/Family Observations Pt presents supine in bed, son present in room.        Time Calculation    Start Time 1238    Stop Time 1301    Time Calculation (min) 23 min       General Information    Patient Profile Reviewed? yes    Pertinent History of Current Functional Problem GI Hemorrhage, syncope     Existing Precautions/Restrictions fall    Limitations/Impairments other (see comments)   Orthostatic hypotension       Orientation Log    Comment AAOx3       Cognition/Psychosocial    Safety Awareness intact       Range of Motion (ROM)    General Range of Motion no range of motion deficits identified    Comment, General Range of Motion B UE       Manual Muscle Testing (MMT)    General  MMT Assessment no strength deficits identified    Comment B UE       Bed Mobility    Bed Mobility supine to sit;sit to supine    [place], Supine to Sit supervision    [place], Sit to Supine supervision       Transfers    Comment MinAx2 w/ RW       Sit to Stand Transfer    [place], Sit to Stand Transfer verbal cues;minimum assist (75% patient effort);2 person assist    Verbal Cues hand placement;proper use of assistive device;safety;technique    Assistive Device walker, front-wheeled       Stand to Sit Transfer    [place], Stand to Sit Transfer verbal cues;minimum assist (75% patient effort);2 person assist    Verbal Cues hand placement;safety;technique    Assistive Device walker, front-wheeled       Lower Body Dressing    Lower Body Dressing Tasks doff;don;socks    Lower Body Dressing Position supported sitting    Lower Body Dressing [place] supervision       AM-PAC (TM) - ADL (Current Function)    Putting on and taking off regular lower body clothing? 3 - A Little    Bathing? 2 - A Lot    Toileting? 3 - A Little    Putting on/taking off regular upper body clothing? 3 - A Little    How much help for taking care of personal grooming? 3 - A Little    Eating meals? 4 - None    AM-PAC (TM) ADL Score 18       OT Clinical Impression    Patient's Goals For Discharge return to all previous roles/activities    Plan For Care Reviewed: Occupational Therapy OT plan for care discussed with family;OT plan for care discussed with patient    Impairments Found (OT Eval) aerobic capacity/endurance    Rehab Potential/Prognosis: Occupational Therapy good, to achieve stated therapy goals    OT Frequency of Treatment 3-5 times per week    Problem List: Occupational Therapy strength decreased    Anticipated Equipment Needs at Discharge other (see comments)   TBD at facility    Expected Discharge Disposition skilled nursing facility    Daily Outcome Statement Pt required supervision in supine to sit/sit to supine  bed mobility. Pt required MinAx2 in STS transfers & in bed<chair transfer w/ RW. Further functional mobility not assessed 2* to orthostatic hypotension. Pt required supervision in LB dressing in supported sitting. D/c rec to SNF for continuous ADL training.                         Education provided this session. See the Patient Education summary report for full details.    OT Care Plan Goals      Most Recent Value   Bed Mobility Goal   Time to Achieve Goal: Bed Mobility  by discharge   Goal Activity: Bed Mobility  all bed mobility activities   Level of [place] Goal: Bed Mobility  independent   Assistive Device: Bed Mobility  bed rails   Goal Outcome: Bed Mobility  goal ongoing   Lower Body Dressing Goal   Time to Achieve Goal: Lower Body Dressing  by discharge   Level of [place]  independent   Goal Outcome: Lower Body Dressing  goal ongoing   Transfer Goal   Time to Achieve Goal: Transfer Training  by discharge   Goal Activity: Transfer Training  sit to stand/stand to sit, stand pivot/stand step   Level of [place] Goal: Transfer Training  minimum assist (75% or more patient effort)   Assistive Device: Transfer Training  walker, rolling   Goal Outcome: Transfer Training  goal ongoing

## 2018-09-10 NOTE — ASSESSMENT & PLAN NOTE
Evidence of aspiration pneumonia on chest x-ray, completed 5 days of abx  Continue duo nebs, check amatory sat on room air today  9/9 had an episode of pulmonary congestion on chest x-ray was given 1 dose of Lasix, continue to monitor volume status  sputum culture with normal nishi

## 2018-09-10 NOTE — PROGRESS NOTES
"Daily Progress Note    Subjective    Interval History: He is feeling well. No cardiac symptoms. He feels weak.     Objective    Vital signs in last 24 hours:  Temp:  [36.2 °C (97.1 °F)-37.2 °C (99 °F)] 36.7 °C (98 °F)  Heart Rate:  [64-81] 76  Resp:  [12-35] 16  BP: (126-166)/(62-96) 152/68      Intake/Output Summary (Last 24 hours) at 09/10/18 1058  Last data filed at 09/10/18 0727   Gross per 24 hour   Intake              310 ml   Output             1600 ml   Net            -1290 ml       Physical Exam:  BP (!) 152/68 (BP Location: Left upper arm, Patient Position: Lying)   Pulse 76   Temp 36.7 °C (98 °F) (Oral)   Resp 16   Ht 1.718 m (5' 7.64\")   Wt 101 kg (223 lb 12.3 oz)   SpO2 97%   BMI 34.39 kg/m²               Lungs:     Clear to auscultation bilaterally   Heart:    Regular rhythm, S1 and S2 normal, II/VI DSM   Extremities:  GI: No edema  Soft, nontender   Other:          Labs  Lab Results   Component Value Date    WBC 4.61 09/10/2018    HGB 10.1 (L) 09/10/2018    HCT 30.2 (L) 09/10/2018     (L) 09/10/2018    ALT 18 09/06/2018    AST 25 09/06/2018     09/10/2018    K 3.7 09/10/2018     09/10/2018    CREATININE 1.1 09/10/2018    BUN 12 09/10/2018    CO2 24 09/10/2018    INR 1.2 09/06/2018         ECG/Telemetry  I have independently reviewed the telemetry. No events for the last 24 hours.       Assessment & Plan    CAD (coronary artery disease)   Assessment & Plan    Hx of CABG, Off ASA and Plavix with upper GIB.  Would restart ASA at some point.  He is on plavix b/c of popliteal aneurysm.  Eventually restart plavix        Aortic stenosis   Assessment & Plan    S/p TAVR 5/2016, recent echo showed mod perivalvular AI.  No treatment planned at this time.  Outpt f/u        Orthostatic syncope   Assessment & Plan    Follow.  IVf, PRBC as needed          The patient was seen by PA. I have personally seen and examined the patient.  I have reviewed and edited the note as needed.    Mo HOLLIS" MD Ligia Herman PA  9/10/2018

## 2018-09-10 NOTE — NURSING NOTE
PT is working with PT. Pt orthostatic vitals were standing BP-179/74 HR77, sitting /67 HR 79, Standing /51 HR95. Pt felt dizzy and lightheaded. Vitals were repeated. Sitting /65 and standing BP 83/65. Pt is symptomatic with lightheadedness and dizziness. NEYDA Blanchard was notified. Per PA cardiology will come to see the Pt. Pt is back in bed with PT's help. Will continue to monitor.

## 2018-09-10 NOTE — ASSESSMENT & PLAN NOTE
S/p hematemeses  S/p EGD gastritis, duodenitis. No signs of bleeding  Drop hbg 7.1 but no sign of active bleed 9/8  continue PPI BID  advance diet as tolerated  GI consult appreciated, f/u bx from EGD  Will add folate and B12 levels

## 2018-09-10 NOTE — ASSESSMENT & PLAN NOTE
With history of CABG.  Upper GI okay to resume dual antiplatelet therapy with no evidence of bleeding  Continue aspirin, statin and Plavix

## 2018-09-10 NOTE — PROGRESS NOTES
Patient: Michel Fang  Location: [facility] 6A 0604  MRN: [id number]  Today's date: 9/10/2018     Pt left in bed, alarm on, call bell within reach, nsg notified, pt's son present t/o PT treatment          Therapy Pain/Vitals     Row Name 09/10/18 1300 09/10/18 1309 09/10/18 1310       Pain/Comfort/Sleep    Presence of Pain denies  --  --       Vital Signs    Pulse 77  --  --    Patient Activity At rest  --  --    BP (!)  179/59 (!)  150/67 (!)  101/57    BP Method Automatic  --  --    Patient Position Lying Sitting Standing       Patient Observation    Observations  --  -- reports mild LH, returned to sitting position, /65. After several minutes rest pt stood, reported increased LH, BP 83/53, pt returned to bed. Nsg aware.          Prior Living Environment  Lives With: alone  Living Arrangements: house  Home Accessibility:  (2 no HR)  Living Environment Comment: 1st fl bed/bath (3-story home)         Prior Level of Function              PT Treatment Summary - 09/10/18 1300        Session Details    Document Type daily treatment    Mode of Treatment physical therapy       Time Calculation    Start Time 1245    Stop Time 1300    Time Calculation (min) 15 min       General Information    Patient Profile Reviewed? yes    Onset of Illness/Injury or Date of Surgery 09/06/18    Referring Physician Cheng    Pertinent History of Current Functional Problem syncope    Existing Precautions/Restrictions fall       Bed Mobility    Holiday, Sit to Supine supervision       Sit to Stand Transfer    Holiday, Sit to Stand Transfer minimum assist (75% patient effort);2 person assist;verbal cues    Verbal Cues hand placement;preparatory posture    Assistive Device walker, front-wheeled       Stand to Sit Transfer    Holiday, Stand to Sit Transfer minimum assist (75% patient effort);2 person assist;verbal cues    Verbal Cues hand placement;preparatory posture       Gait Training    Holiday, Gait  minimum assist (75% or more patient effort)    Assistive Device walker, front-wheeled    Distance in Feet 4 feet    Gait Pattern Utilized step-to    Gait Deviations Identified decreased marck    Comment deferred further ambulation d/t symptomatic orthostatic hypotension- returned pt to bed       AM-PAC (TM) - Mobility (Current Function)    Turning from your back to your side while in a flat bed without using bedrails? 4 - None    Moving from lying on your back to sitting on the side of a flat bed without using bedrails? 3 - A Little    Moving to and from a bed to a chair? 3 - A Little    Standing up from a chair using your arms? 3 - A Little    To walk in a hospital room? 3 - A Little    Climbing 3-5 steps with a railing? 3 - A Little    AM-PAC (TM) Mobility Score 19       PT Clinical Impression    Plan For Care Reviewed: Physical Therapy PT plan for care discussed with patient    Impairments Found (PT Eval) gait, locomotion, and balance    PT Frequency of Treatment 3-5 times per week    Problem List impaired balance;decreased strength    Anticipated Equipment Needs at Discharge front wheeled walker    Expected Discharge Disposition skilled nursing facility    Daily Outcome Statement pt requires assitance with mobility, this is a change from baseline, may need SNF, pending progress in acute care hospital                        Education provided this session. See the Patient Education summary report for full details.    PT Care Plan Goals      Most Recent Value   Stair Goal, PT   PT STG: Stairs  minimum assist (75% or less patient effort)   PT STG: Number of Stairs  2   PT STG Assistive Device: Stairs  cane, straight   PT STG Duration: Stairs  5 days or less   PT STG Outcome: Stairs  goal ongoing   PT LTG: Stairs  independent   PT LTG: Number of Stairs  2   PT LTG Duration: Stairs  14 days or less   PT LTG Outcome: Stairs  goal ongoing      PT Care Plan Goals      Most Recent Value   Bed Mobility Goal   Time to  Achieve Goal: Bed Mobility  5 days   Goal Activity: Bed Mobility  all bed mobility activities   Level of Colona Goal: Bed Mobility  independent   Assistive Device: Bed Mobility  bed rails   Goal Outcome: Bed Mobility  goal ongoing   Gait Goal   Time to Achieve Goal: Gait Training  3 days   Level of Col[place]  modified independence   Distance Goal: Gait Training (feet)  150 feet   Goal Outcome: Gait Training  goal ongoing

## 2018-09-10 NOTE — PLAN OF CARE
Problem: Patient Care Overview  Goal: Plan of Care Review  Outcome: Ongoing (interventions implemented as appropriate)   09/10/18 9506   Coping/Psychosocial   Plan Of Care Reviewed With patient   Plan of Care Review   Progress progress towards functional goals is fair   Outcome Summary pt requires assitance with mobility, this is a change from baseline, may need SNF, pending progress in acute care hospital

## 2018-09-10 NOTE — DISCHARGE SUMMARY
Hospital Medicine Service -  Inpatient Discharge Summary        BRIEF OVERVIEW   Admitting Provider: Fernando Huffman MD  Attending Provider: Corona Anand MD Attending phys phone: [phone]    PCP: Johnathan Leone [phone]    Admission Date: 9/6/2018  Discharge Date: 9/12/2018     DISCHARGE DIAGNOSES      Primary Discharge Diagnosis  Gastrointestinal hemorrhage    Secondary Discharge Diagnoses  Active Hospital Problems    Diagnosis Date Noted   • Hypoxia 09/06/2018     Priority: High   • Gastrointestinal hemorrhage 09/06/2018     Priority: High   • Orthostatic syncope 09/04/2018     Priority: Medium   • Aortic stenosis 10/25/2017     Priority: Medium   • Anemia 09/11/2018   • CAD (coronary artery disease) 09/05/2018   • Benign prostatic hyperplasia 10/25/2017      Resolved Hospital Problems    Diagnosis Date Noted Date Resolved   No resolved problems to display.       Problem List on Day of Discharge  Hypoxia   Assessment & Plan    Evidence of aspiration pneumonia on chest x-ray, completed 5 day course of antibiotics   Continue duo nebs, check ambulatory sat on room air today  9/9 had an episode of pulmonary congestion on chest x-ray was given 1 dose of Lasix, continue to monitor volume status  sputum culture with normal nishi        * Gastrointestinal hemorrhage   Assessment & Plan    S/p hematemeses  S/p EGD gastritis, duodenitis. No signs of bleeding  Drop hbg 7.1 but no sign of active bleed 9/8; s/p PRBC transfusion  continue PPI BID  advance diet as tolerated  GI consult appreciated, f/u bx from EGD  Iron studies futile in the setting of recent blood transfusion, hematology has been consulted to evaluate anemia.  Consult is pending at this time        Aortic stenosis   Assessment & Plan    S/p TAVR 5/2016, recent echo showed mod perivalvular AI.  No treatment planned at this time.    Care per Dr. LOU        CAD (coronary artery disease)   Assessment & Plan    With history of CABG  Per GI  okay to resume dual antiplatelet therapy with no evidence of bleeding  Continue aspirin, statin and Plavix        Benign prostatic hyperplasia   Assessment & Plan    Continue Flomax, urology eval appreciated  Reduced to daily given orthostatic BP        Orthostatic syncope   Assessment & Plan    Unfortunately he remains orthostatic despite volume resuscitation  reduced flomax dose to daily 9/10  Continue to monitor volume status  Cardiology following, started on midodrine 5 mg TID today (9/12)          SUMMARY OF HOSPITALIZATION      Presenting Problem/History of Present Illness  Hypoxia    This is a 83 y.o. year-old male admitted on 9/6/2018 with Seizure (CMS/HCC) (HCC) [R56.9]  Hypoxia [R09.02]  Gastrointestinal hemorrhage, unspecified gastrointestinal hemorrhage type [K92.2].         Hospital Course    Michel Fang is a 83 y.o. male with past medical history of CAD status post CABG, hypertension, BPH, aortic valve replacement, and orthostatic hypotension, who was just recently admitted 2 days ago prior to admission to [facility], and discharged the day prior to admission following syncope, presented with another syncopal episode, and hematemesis.  [facility] was admitted for IV fluids hydration, and discharged home.  Patient stood up in the wheelchair to go into a car on his way to outpatient follow-up when he became unresponsive, had right hand/UE tremoring, and tongue tremoring per the patient's daughter who witnessed this episode. EMS was called and patient was brought to ED for evaluation.  By the time of arrival to ED, patient was regaining consciousness, lethargic but arousable to voice, oriented ×3, and grossly nonfocal. He continued to have lightheadedness, nausea since discharge but vomiting did not start until the day of admission following the syncopal episode. Patient had coffee-ground hematemesis, and continued to have vomiting in ED. Denied associated headache, visual  disturbances, chest pain, palpitation, shortness of breath, abdominal pain, diarrhea.  Reportedly has had darker stool bowel movement several days ago.  No recent travels.     Patient arrived to ED afebrile, heart rate in the 80s, respiratory distress, and hypoxic in the 90s on room air, given hour-long nebs and placed on oxygen with some improvement. Progressed to tachycardic, and developed a new productive cough.  Workup was remarkable for airspace disease concerning for aspiration pneumonia with vomiting preceding symptoms.  Lactate elevated at 4.4 with bicarb of 19, hyponatremia 135, hypokalemia 3.4, normal creatinine 1.0.  Hemoglobin of 9.0, which was stable from the day prior.  Abdomen was soft, but mildly distended with hypoactive bowel sounds, and patient continued to have vomiting in the ED. Secondary to unstable hemodynamics, tenuous respiration with aspiration pneumonia, and recurrent syncope, he was admitted to ICU for close monitoring.    The patient was placed on a protonix gtt and underwent an EGD on 9/7 which noted duodenitis/gastritis and he was started on twice daily PPI.  Given no evidence of bleeding, he has been restarted on Plavix.  His hypoxia was likely secondary to an aspiration pneumonia with vomiting episode at the time of syncope and he was treated with a 5 day course of ceftriaxone and Flagyl.  He has had no further syncope, cardiology did evaluate the patient and suspect this was secondary to both UTI previously and now GI bleed in the setting of melena.  Given his persistent orthostatic blood pressure, Flomax has been reduced to daily with attention to postvoid residuals.  The patient has been given several fluid boluses for symptomatic orthostatic hypotension, however h his pressure does continue to drop upon standing.  He has been followed by the cardiology service here, today has been started on midodrine TID.  At the time of this transfer note, a consult has been placed to  hematology for evaluation of anemia.  Neurology eval appreciated and unlikely early stage Parkinson's.        Exam on Day of Discharge  Physical Exam     GENERAL APPEARANCE Awake, alert   MUCUS MEMBRANES Moist   LUNGS Fine crackles in bases b/l   CHEST RRR, no m/g/r   ABDOMEN Soft, NT, ND, +BS   EXTREMITIES No c/c/e   SKIN No obvious rash   HEENT Anicteric   BEHAVIOR Appropriate, cooperative   NEUROLOGIC Grossly normal   MUSCULOSKELETAL Grossly normal   /RECTAL Deferred   NECK Supple   OTHER: N/A         Consults During Admission  IP CONSULT TO UROLOGY  IP CONSULT TO NUTRITION SERVICES  IP CONSULT TO GASTROENTEROLOGY  IP CONSULT TO CARDIOLOGY  IP CONSULT TO IV TEAM  IP CONSULT TO HEMATOLOGY/ONCOLOGY  IP CONSULT TO NEUROLOGY    DISCHARGE MEDICATIONS        Medication List      START taking these medications    pantoprazole 40 mg EC tablet  Commonly known as:  PROTONIX  Take 1 tablet (40 mg total) by mouth 2 (two) times a day.        CHANGE how you take these medications    tamsulosin 0.4 mg capsule  Commonly known as:  FLOMAX  Take 1 capsule (0.4 mg total) by mouth nightly.  What changed:  when to take this        CONTINUE taking these medications    aspirin 81 mg enteric coated tablet  Take 81 mg by mouth daily.     atorvastatin 40 mg tablet  Commonly known as:  LIPITOR  Take 40 mg by mouth daily.     clopidogrel 75 mg tablet  Commonly known as:  PLAVIX  Take 75 mg by mouth once.     guaiFENesin 600 mg 12 hr tablet  Commonly known as:  MUCINEX  Take 1,200 mg by mouth 2 (two) times a day.     HYALURONIC ACID (CHOND-COLLGN) ORAL  Take by mouth.     levothyroxine 100 mcg tablet  Commonly known as:  SYNTHROID  Take 100 mcg by mouth daily.            Instructions for after discharge     Call provider for:  difficulty breathing, headache or visual disturbances       Call provider for:  extreme fatigue       Call provider for:  persistent dizziness or light-headedness       Diet       Diet Type:  Cardiac (2gm Sodium/Low  Fat)    Follow-up with Provider:       Instructions for follow-up:  call GI office for biopsy results at the end of the week    Follow-up with primary physician (PCP)       Instructions for follow-up:  in 1 week    Post-Discharge Activity: Normal activity as tolerated.       Normal activity as tolerated.             PROCEDURES / LABS / IMAGING      Operative Procedures  n/a    Other Procedures  EGD     Pertinent Labs    Results from last 7 days  Lab Units 09/11/18  0640   WBC K/uL 5.11   HEMOGLOBIN g/dL 10.1*   HEMATOCRIT % 30.1*   PLATELETS K/uL 116*       Results from last 7 days  Lab Units 09/11/18  0640   SODIUM mEQ/L 136   POTASSIUM mEQ/L 3.8   CHLORIDE mEQ/L 107   CO2 mEQ/L 22   BUN mg/dL 10   CREATININE mg/dL 1.0   GLUCOSE mg/dL 102*   CALCIUM mg/dL 7.9*         Pertinent Imaging  X-ray Abdomen 1 View    Result Date: 9/6/2018  IMPRESSION: Nonspecific bowel gas pattern.    Ct Head Without Iv Contrast    Result Date: 9/6/2018  IMPRESSION: No evidence of acute intracranial pathology.     Ultrasound Kidneys    Result Date: 9/7/2018  IMPRESSION: No evidence for hydronephrosis. I certify that I have personally reviewed this examination and agree with this report. Allegra Bernstein MD    X-ray Chest 1 View    Result Date: 9/9/2018  IMPRESSION: 1.  Congestive heart failure with small bilateral pleural effusions mildly progressed. 2.  Bibasilar opacities representing atelectasis and/or pneumonia also progressed.     X-ray Chest 1 View    Result Date: 9/8/2018  IMPRESSION: No significant change.    X-ray Chest 1 View    Result Date: 9/7/2018  IMPRESSION:  Stable chest x ray.    X-ray Chest 1 View    Result Date: 9/6/2018  IMPRESSION:  Bibasilar airspace opacities, probably due to atelectasis    X-ray Chest 1 View    Result Date: 9/5/2018  IMPRESSION: No evidence of active disease.      OUTPATIENT  FOLLOW-UP / REFERRALS / PENDING TESTS        Outpatient Follow-Up Appointments  Encounter Information     You do not currently  have any appointments scheduled.          Referrals  No orders of the defined types were placed in this encounter.      Test Results Pending at Discharge  Unresulted Labs     Start     Ordered    09/08/18 0956  Sputum culture / smear  Once      09/08/18 0955    09/07/18 0600  CBC and Differential  Daily     Start Status   09/11/18 0600 Scheduled   09/12/18 0600 Scheduled   09/13/18 0600 Scheduled       09/06/18 2026 09/07/18 0600  Basic metabolic panel  Daily     Start Status   09/11/18 0600 Scheduled   09/12/18 0600 Scheduled   09/13/18 0600 Scheduled       09/06/18 2026 09/07/18 0600  Magnesium  Daily     Start Status   09/11/18 0600 Scheduled   09/12/18 0600 Scheduled   09/13/18 0600 Scheduled       09/06/18 2026 09/07/18 0545  Type and screen  Once      09/07/18 0544    09/06/18 2100  Hemoglobin and hematocrit, blood  Every 6 hours,   Status:  Canceled     Start Status   09/07/18 1200 Collected (09/07/18 1621)       09/06/18 2026    09/06/18 1545  Blood culture  (ED LAB BLOOD CULTURE X2)  STAT      09/06/18 1545    09/06/18 1545  Blood culture  (ED LAB BLOOD CULTURE X2)  STAT     Comments:  Draw from a second site different than first site.      09/06/18 1545    09/06/18 1545  Blood Culture  PROCEDURE ONCE      09/06/18 1545          Important Issues to Address in Follow-Up  Call GI office for biopsy results    DISCHARGE DISPOSITION      Disposition: Transfer to [place]    Code Status At Discharge: Full Code    Physician Order for Life-Sustaining Treatment Document Status      No documents found

## 2018-09-10 NOTE — ASSESSMENT & PLAN NOTE
HPI Comments: 11:02 AM Roberto Suarez is a 28 y.o. male who presents to the ED via POV c/o fever that started yesterday. Pt's Tmax was 102F which he checked by mouth. Pt also complains of rhinorrhea, nasal congestion, sore throat, diffuse, achy chest pain only with coughing, and productive cough with yellow sputum. He took Mucinex DM last night with no relief. Pt does not smoke or take any regular medications. He has not had contact with any sick individuals. He has not had a flu shot this year. The pt denies nausea and any further complaints. The history is provided by the patient. No past medical history on file. No past surgical history on file. No family history on file. Social History     Social History    Marital status: SINGLE     Spouse name: N/A    Number of children: N/A    Years of education: N/A     Occupational History    Not on file. Social History Main Topics    Smoking status: Not on file    Smokeless tobacco: Not on file    Alcohol use Not on file    Drug use: Not on file    Sexual activity: Not on file     Other Topics Concern    Not on file     Social History Narrative         ALLERGIES: Review of patient's allergies indicates no known allergies. Review of Systems   Constitutional: Positive for fever (TMax 102). Negative for chills. HENT: Positive for congestion, rhinorrhea and sore throat. Eyes: Negative. Negative for visual disturbance. Respiratory: Positive for cough (Productive yellow sputum). Negative for chest tightness and shortness of breath. Cardiovascular: Positive for chest pain (when coughing ). Negative for leg swelling. Gastrointestinal: Negative. Negative for abdominal pain, diarrhea, nausea and vomiting. Genitourinary: Negative. Negative for difficulty urinating and dysuria. Musculoskeletal: Negative. Negative for back pain and myalgias. Skin: Negative. Negative for rash and wound. Neurological: Negative.   Negative for Unfortunately he remains orthostatic despite volume resuscitation  reduced flomax dose to daily 9/10  Continue to monitor volume status  Cardiology following  Dtr has concerns for early parkinson's which could contribute to orthostasis, neurology consulted   dizziness, speech difficulty, weakness and light-headedness. Psychiatric/Behavioral: Negative. Negative for self-injury. All other systems reviewed and are negative. Vitals:    04/19/17 1058   BP: 125/70   Pulse: 87   Resp: 16   Temp: 97.9 °F (36.6 °C)   SpO2: 99%   Weight: 82.6 kg (182 lb)   Height: 6' 4\" (1.93 m)            Physical Exam   Constitutional: He is oriented to person, place, and time. He appears well-developed and well-nourished. No distress. HENT:   Head: Normocephalic and atraumatic. Eyes: Conjunctivae and EOM are normal. Pupils are equal, round, and reactive to light. No scleral icterus. Neck: Normal range of motion. Neck supple. No JVD present. No thyromegaly present. Cardiovascular: Normal rate, regular rhythm, S1 normal and S2 normal.  Exam reveals no gallop and no friction rub. No murmur heard. Pulmonary/Chest: Effort normal and breath sounds normal. No accessory muscle usage. No respiratory distress. Abdominal: Soft. Normal appearance. He exhibits no distension. There is no tenderness. There is no rigidity, no rebound and no guarding. Musculoskeletal: Normal range of motion. He exhibits no edema or tenderness. Neurological: He is alert and oriented to person, place, and time. He has normal strength. No cranial nerve deficit or sensory deficit. Coordination normal.   Skin: Skin is warm and intact. No rash noted. Psychiatric: He has a normal mood and affect. His speech is normal and behavior is normal.   Vitals reviewed. MDM  Number of Diagnoses or Management Options  Viral URI with cough:   Diagnosis management comments: Anitha Cavanaugh is a 28 y.o. Male coming in with sx consistent with a DDx including viral URI, PNA, influenza and bronchitis. No concern for ACS or PE. Will treat symptomatically and refer to outpatient follow up.     ED Course       Procedures    Vitals:  Patient Vitals for the past 12 hrs:   Temp Pulse Resp BP SpO2   04/19/17 1058 97.9 °F (36.6 °C) 87 16 125/70 99 %   Pulse ox reviewed and WNL    Medications ordered:   Medications - No data to display      Lab findings:  Recent Results (from the past 12 hour(s))   INFLUENZA A & B AG (RAPID TEST)    Collection Time: 04/19/17 11:15 AM   Result Value Ref Range    Influenza A Antigen NEGATIVE  NEG      Influenza B Antigen NEGATIVE  NEG     EKG, 12 LEAD, INITIAL    Collection Time: 04/19/17 11:16 AM   Result Value Ref Range    Ventricular Rate 80 BPM    Atrial Rate 80 BPM    P-R Interval 166 ms    QRS Duration 86 ms    Q-T Interval 340 ms    QTC Calculation (Bezet) 392 ms    Calculated P Axis 80 degrees    Calculated R Axis 56 degrees    Calculated T Axis 55 degrees    Diagnosis       Normal sinus rhythm  Normal ECG  No previous ECGs available         EKG interpretation by ED Physician:  NSR, rate 80, no acute ischemic changes per Dr. Stephanie Valencia     X-Ray, CT or other radiology findings or impressions:  XR CHEST PA LAT   Final Result   IMPRESSION:     Negative study.          Progress notes, Consult notes or additional Procedure notes:   11:54 AM I have assessed the patient, who will be discharged in stable condition. I've given the patient precautions to return to the emergency room if there are any new or worsening conditions. I've also instructed the patient to follow up regarding their visit today. Patient has verbalized understanding and agreement with treatment plan, plan to discharge, and follow-up. All questions were answered at this time. Disposition:  Diagnosis:   1.  Viral URI with cough        Disposition: Discharge    Follow-up Information     Follow up With Details Comments [address] Call in 2 days  [address] [place] [facility] EMERGENCY DEPT  As needed, If symptoms worsen [address] [facility] [address]  [phone]           Patient's Medications    No medications on file       [facility] STATEMENT  Documented by: Roderick Bob for, and in the presence of, Annemarie Arroyo MD 12:13 PM     Signed by: Lynette Mendenhall, 04/19/17 12:13 PM     PROVIDER ATTESTATION STATEMENT  I personally performed the services described in the documentation, reviewed the documentation, as recorded by the scribe in my presence, and it accurately and completely records my words and actions.   Annemarie Arroyo MD

## 2018-09-10 NOTE — PROGRESS NOTES
Hospital Medicine Service -  Daily Progress Note       SUBJECTIVE   Interval History: Patient seen and examined earlier today.  Felt well this morning, per nursing when he attempted to walk with PT he was orthostatic and symptomatic.  Discharge delayed.     OBJECTIVE      Vital signs in last 24 hours:  Temp:  [36.2 °C (97.1 °F)-36.9 °C (98.5 °F)] 36.7 °C (98 °F)  Heart Rate:  [64-81] 76  Resp:  [12-35] 16  BP: (128-166)/(65-78) 152/68    Intake/Output Summary (Last 24 hours) at 09/10/18 1259  Last data filed at 09/10/18 0727   Gross per 24 hour   Intake              300 ml   Output              850 ml   Net             -550 ml       PHYSICAL EXAMINATION      Physical Exam     GENERAL APPEARANCE Awake, alert   MUCUS MEMBRANES Moist   LUNGS Fine crackles in the bases   CHEST s1 s2   ABDOMEN Soft, NT, ND, +BS   EXTREMITIES No c/c/e   SKIN No obvious rash   HEENT Anicteric   BEHAVIOR Appropriate, cooperative   NEUROLOGIC Grossly normal   MUSCULOSKELETAL Grossly normal   /RECTAL Deferred   NECK Supple   OTHER: N/A        LABS / IMAGING / TELE      Labs    Results from last 7 days  Lab Units 09/10/18  0535   WBC K/uL 4.61   HEMOGLOBIN g/dL 10.1*   HEMATOCRIT % 30.2*   PLATELETS K/uL 113*       Results from last 7 days  Lab Units 09/10/18  0535   SODIUM mEQ/L 138   POTASSIUM mEQ/L 3.7   CHLORIDE mEQ/L 108   CO2 mEQ/L 24   BUN mg/dL 12   CREATININE mg/dL 1.1   GLUCOSE mg/dL 107*   CALCIUM mg/dL 7.9*         Imaging  X-ray Abdomen 1 View    Result Date: 9/6/2018  IMPRESSION: Nonspecific bowel gas pattern.    Ct Head Without Iv Contrast    Result Date: 9/6/2018  IMPRESSION: No evidence of acute intracranial pathology.     Ultrasound Kidneys    Result Date: 9/7/2018  IMPRESSION: No evidence for hydronephrosis. I certify that I have personally reviewed this examination and agree with this report. Allegra Bernstein MD    X-ray Chest 1 View    Result Date: 9/9/2018  IMPRESSION: 1.  Congestive heart failure with small bilateral  pleural effusions mildly progressed. 2.  Bibasilar opacities representing atelectasis and/or pneumonia also progressed.     X-ray Chest 1 View    Result Date: 9/8/2018  IMPRESSION: No significant change.    X-ray Chest 1 View    Result Date: 9/7/2018  IMPRESSION:  Stable chest x ray.    X-ray Chest 1 View    Result Date: 9/6/2018  IMPRESSION:  Bibasilar airspace opacities, probably due to atelectasis    X-ray Chest 1 View    Result Date: 9/5/2018  IMPRESSION: No evidence of active disease.      ECG/Telemetry  I have independently reviewed the telemetry. No events for the last 24 hours.    ASSESSMENT AND PLAN      Hypoxia   Assessment & Plan    Evidence of aspiration pneumonia on chest x-ray, will complete 5 day course of antibiotics today  Continue duo nebs, check amatory sat on room air today  9/9 had an episode of pulmonary congestion on chest x-ray was given 1 dose of Lasix, continue to monitor volume status  sputum culture with normal nishi        * Gastrointestinal hemorrhage   Assessment & Plan    S/p hematemeses  S/p EGD gastritis, duodenitis. No signs of bleeding  Drop hbg 7.1 but no sign of active bleed 9/8  continue PPI BID  advance diet as tolerated  GI consult appreciated, f/u bx from EGD  Will add folate and B12 levels        Aortic stenosis   Assessment & Plan    S/p TAVR 5/2016, recent echo showed mod perivalvular AI.  No treatment planned at this time.  Outpt f/u        Orthostatic syncope   Assessment & Plan    Unfortunately he remains orthostatic despite volume resuscitation  Will reduce flomax dose to daily today  He is status post fluids and then 1 dose of Lasix 9/9  Continue to monitor volume status  Will ask cardiology to re-evaluate        CAD (coronary artery disease)   Assessment & Plan    With history of CABG  Upper GI okay to resume dual antiplatelet therapy with no evidence of bleeding  Continue aspirin, statin and Plavix        Benign prostatic hyperplasia   Assessment & Plan    Continue  Flomax, urology eval appreciated             VTE Assessment: [place] VTE Score: 2  Code Status: Full Code  Estimated discharge date: 9/11/2018     NOELLE Dobbs  9/10/2018  12:59 PM

## 2018-09-11 PROBLEM — D64.9 ANEMIA: Status: ACTIVE | Noted: 2018-09-11

## 2018-09-11 LAB
ANION GAP SERPL CALC-SCNC: 7 MEQ/L (ref 3–15)
BACTERIA BLD CULT: NORMAL
BASOPHILS # BLD: 0.05 K/UL (ref 0.01–0.1)
BASOPHILS NFR BLD: 1 %
BUN SERPL-MCNC: 10 MG/DL (ref 8–20)
CALCIUM SERPL-MCNC: 7.9 MG/DL (ref 8.9–10.3)
CASE RPRT: NORMAL
CHLORIDE SERPL-SCNC: 107 MEQ/L (ref 98–109)
CLINICAL INFO: NORMAL
CO2 SERPL-SCNC: 22 MEQ/L (ref 22–32)
CREAT SERPL-MCNC: 1 MG/DL (ref 0.8–1.3)
DIFFERENTIAL METHOD BLD: ABNORMAL
EOSINOPHIL # BLD: 0.22 K/UL (ref 0.04–0.54)
EOSINOPHIL NFR BLD: 4.3 %
ERYTHROCYTE [DISTWIDTH] IN BLOOD BY AUTOMATED COUNT: 15.6 % (ref 11.6–14.4)
FOLATE SERPL-MCNC: 11 NG/ML
GFR SERPL CREATININE-BSD FRML MDRD: >60 ML/MIN/1.73M*2
GLUCOSE SERPL-MCNC: 102 MG/DL (ref 70–99)
HCT VFR BLDCO AUTO: 30.1 % (ref 40.1–51)
HGB BLD-MCNC: 10.1 G/DL (ref 13.7–17.5)
IMM GRANULOCYTES # BLD AUTO: 0.02 K/UL (ref 0–0.08)
IMM GRANULOCYTES NFR BLD AUTO: 0.4 %
IMMUNE STAIN STUDY: NORMAL
LYMPHOCYTES # BLD: 0.94 K/UL (ref 1.2–3.5)
LYMPHOCYTES NFR BLD: 18.4 %
MAGNESIUM SERPL-MCNC: 2 MG/DL (ref 1.8–2.5)
MCH RBC QN AUTO: 30.4 PG (ref 28–33.2)
MCHC RBC AUTO-ENTMCNC: 33.6 G/DL (ref 32.2–36.5)
MCV RBC AUTO: 90.7 FL (ref 83–98)
MONOCYTES # BLD: 0.7 K/UL (ref 0.3–1)
MONOCYTES NFR BLD: 13.7 %
NEUTROPHILS # BLD: 3.18 K/UL (ref 1.7–7)
NEUTS SEG NFR BLD: 62.2 %
NRBC BLD-RTO: 0 %
PATH REPORT.FINAL DX SPEC: NORMAL
PATH REPORT.GROSS SPEC: NORMAL
PDW BLD AUTO: 10.4 FL (ref 9.4–12.4)
PLATELET # BLD AUTO: 116 K/UL (ref 150–350)
POTASSIUM SERPL-SCNC: 3.8 MEQ/L (ref 3.6–5.1)
RBC # BLD AUTO: 3.32 M/UL (ref 4.5–5.8)
SODIUM SERPL-SCNC: 136 MEQ/L (ref 136–144)
VIT B12 SERPL-MCNC: 557 PG/ML (ref 180–914)
WBC # BLD AUTO: 5.11 K/UL (ref 3.8–10.5)

## 2018-09-11 PROCEDURE — 82746 ASSAY OF FOLIC ACID SERUM: CPT | Performed by: NURSE PRACTITIONER

## 2018-09-11 PROCEDURE — 80048 BASIC METABOLIC PNL TOTAL CA: CPT | Performed by: PHYSICIAN ASSISTANT

## 2018-09-11 PROCEDURE — 83735 ASSAY OF MAGNESIUM: CPT | Performed by: PHYSICIAN ASSISTANT

## 2018-09-11 PROCEDURE — 99232 SBSQ HOSP IP/OBS MODERATE 35: CPT | Performed by: INTERNAL MEDICINE

## 2018-09-11 PROCEDURE — 25800000 HC PHARMACY IV SOLUTIONS: Performed by: PHYSICIAN ASSISTANT

## 2018-09-11 PROCEDURE — 63700000 HC SELF-ADMINISTRABLE DRUG: Performed by: PHYSICIAN ASSISTANT

## 2018-09-11 PROCEDURE — 25000000 HC PHARMACY GENERAL: Performed by: PHYSICIAN ASSISTANT

## 2018-09-11 PROCEDURE — 85025 COMPLETE CBC W/AUTO DIFF WBC: CPT | Performed by: PHYSICIAN ASSISTANT

## 2018-09-11 PROCEDURE — 97530 THERAPEUTIC ACTIVITIES: CPT | Mod: GP

## 2018-09-11 PROCEDURE — 63700000 HC SELF-ADMINISTRABLE DRUG: Performed by: INTERNAL MEDICINE

## 2018-09-11 PROCEDURE — 36415 COLL VENOUS BLD VENIPUNCTURE: CPT | Performed by: PHYSICIAN ASSISTANT

## 2018-09-11 PROCEDURE — 97530 THERAPEUTIC ACTIVITIES: CPT | Mod: GO

## 2018-09-11 PROCEDURE — 82607 VITAMIN B-12: CPT | Performed by: NURSE PRACTITIONER

## 2018-09-11 PROCEDURE — 21400000 HC ROOM AND CARE CCU/INTERMEDIATE

## 2018-09-11 RX ORDER — TAMSULOSIN HYDROCHLORIDE 0.4 MG/1
0.4 CAPSULE ORAL NIGHTLY
Qty: 30 CAPSULE | Refills: 0 | Status: SHIPPED | OUTPATIENT
Start: 2018-09-11 | End: 2018-10-11

## 2018-09-11 RX ORDER — SODIUM CHLORIDE 9 MG/ML
INJECTION, SOLUTION INTRAVENOUS CONTINUOUS
Status: DISCONTINUED | OUTPATIENT
Start: 2018-09-11 | End: 2018-09-13 | Stop reason: HOSPADM

## 2018-09-11 RX ADMIN — IPRATROPIUM BROMIDE AND ALBUTEROL SULFATE 3 ML: .5; 3 SOLUTION RESPIRATORY (INHALATION) at 20:40

## 2018-09-11 RX ADMIN — IPRATROPIUM BROMIDE AND ALBUTEROL SULFATE 3 ML: .5; 3 SOLUTION RESPIRATORY (INHALATION) at 09:34

## 2018-09-11 RX ADMIN — PANTOPRAZOLE SODIUM 40 MG: 40 TABLET, DELAYED RELEASE ORAL at 20:40

## 2018-09-11 RX ADMIN — Medication 10 ML: at 06:41

## 2018-09-11 RX ADMIN — LEVOTHYROXINE SODIUM 100 MCG: 100 TABLET ORAL at 06:41

## 2018-09-11 RX ADMIN — TAMSULOSIN HYDROCHLORIDE 0.4 MG: 0.4 CAPSULE ORAL at 22:37

## 2018-09-11 RX ADMIN — ATORVASTATIN CALCIUM 40 MG: 40 TABLET, FILM COATED ORAL at 20:40

## 2018-09-11 RX ADMIN — Medication 10 ML: at 22:37

## 2018-09-11 RX ADMIN — PANTOPRAZOLE SODIUM 40 MG: 40 TABLET, DELAYED RELEASE ORAL at 09:33

## 2018-09-11 RX ADMIN — IPRATROPIUM BROMIDE AND ALBUTEROL SULFATE 3 ML: .5; 3 SOLUTION RESPIRATORY (INHALATION) at 14:51

## 2018-09-11 RX ADMIN — Medication 10 ML: at 14:52

## 2018-09-11 RX ADMIN — SODIUM CHLORIDE: 9 INJECTION, SOLUTION INTRAVENOUS at 12:08

## 2018-09-11 ASSESSMENT — COGNITIVE AND FUNCTIONAL STATUS - GENERAL
CLIMB 3 TO 5 STEPS WITH RAILING: 3 - A LITTLE
MOVING TO AND FROM BED TO CHAIR: 3 - A LITTLE
HELP NEEDED FOR BATHING: 3 - A LITTLE
DRESSING REGULAR LOWER BODY CLOTHING: 3 - A LITTLE
EATING MEALS: 4 - NONE
STANDING UP FROM CHAIR USING ARMS: 3 - A LITTLE
HELP NEEDED FOR PERSONAL GROOMING: 3 - A LITTLE
DRESSING REGULAR UPPER BODY CLOTHING: 3 - A LITTLE
TOILETING: 3 - A LITTLE
WALKING IN HOSPITAL ROOM: 3 - A LITTLE

## 2018-09-11 NOTE — PROGRESS NOTES
Patient: Michel Fang  Location: [facility]A 0604  MRN: [id number]  Today's date: 9/11/2018  Patient presents upright in chair at end of session w/ seat alarm on & call bell and personal items nearby. Son present in room.          Therapy Pain/Vitals - 09/11/18 1110        Pain/Comfort/Sleep    Presence of Pain denies       Vital Signs    Pulse 79    BP (!)  149/67    BP Location Left upper arm    BP Method Automatic    Patient Position Sitting          Prior Living Environment  Lives With: alone  Living Arrangements: house  Home Accessibility:  (2 no HR)  Living Environment Comment: Pt lives alone in 1st fl set up home. B+B on 1st w/ handicap bathroom Equipment Currently Used at Home: grab bar, shower chair       Prior Level of Function  Ambulation: independent  Transferring: independent  Toileting: independent  Bathing: assistive equipment  Dressing: independent  Eating: independent  Communication: understands/communicates without difficulty  Swallowing: swallows foods/liquids without difficulty  Equipment Currently Used at Home: grab bar, shower chair           OT Treatment Summary - 09/11/18 1110        Session Details    Document Type daily treatment    Mode of Treatment individual therapy;occupational therapy    Patient/Family Observations Pt presents supine in bed, son in room.        Time Calculation    Start Time 1047    Stop Time 1110    Time Calculation (min) 23 min       General Information    Patient Profile Reviewed? yes    Pertinent History of Current Functional Problem Syncope, orthostatic hypotension    Existing Precautions/Restrictions fall       Orientation Log    Comment AAOx3       Cognition/Psychosocial    Safety Awareness intact       Bed Mobility    Bed Mobility supine to sit;sit to supine    [place], Supine to Sit close supervision    [place], Sit to Supine close supervision       Transfers    Comment MinAx2 w/ RW       Bed to Chair Transfer    [place], Bed to  Chair minimum assist (75% patient effort);2 person assist    Assistive Device walker, front-wheeled       Chair to Bed Transfer    [place], Chair to Bed minimum assist (75% patient effort);2 person assist    Assistive Device walker, front-wheeled       Sit to Stand Transfer    [place], Sit to Stand Transfer verbal cues;minimum assist (75% patient effort);2 person assist    Verbal Cues hand placement;safety;technique    Assistive Device walker, front-wheeled       Stand to Sit Transfer    [place], Stand to Sit Transfer verbal cues;minimum assist (75% patient effort);2 person assist    Verbal Cues hand placement;safety;technique    Assistive Device walker, front-wheeled       Lower Body Dressing    Lower Body Dressing Tasks doff;don;socks    Lower Body Dressing Position supported sitting    Lower Body Dressing [place] supervision       AM-PAC (TM) - ADL (Current Function)    Putting on and taking off regular lower body clothing? 3 - A Little    Bathing? 3 - A Little    Toileting? 3 - A Little    Putting on/taking off regular upper body clothing? 3 - A Little    How much help for taking care of personal grooming? 3 - A Little    Eating meals? 4 - None    AM-PAC (TM) ADL Score 19       OT Clinical Impression    Patient's Goals For Discharge return to all previous roles/activities    Plan For Care Reviewed: Occupational Therapy OT plan for care discussed with patient    Impairments Found (OT Eval) aerobic capacity/endurance    Rehab Potential/Prognosis: Occupational Therapy good, to achieve stated therapy goals    OT Frequency of Treatment 3-5 times per week    Problem List: Occupational Therapy strength decreased    Anticipated Equipment Needs at Discharge other (see comments)   TBD at facility    Expected Discharge Disposition skilled nursing facility    Daily Outcome Statement Pt required close supervision in supine to sit/ sit to supine bed mobility. Pt required MinAx2 in STS transfers & in  bed<chair> transfers w/ RW. Futher functional mobility not assessed 2* to orthostatic hypotension. D/c rec to SNF for continuous safe ADL training.                    Education provided this session. See the Patient Education summary report for full details.         OT Care Plan Goals      Most Recent Value   Bed Mobility Goal   Time to Achieve Goal: Bed Mobility  by discharge   Goal Activity: Bed Mobility  all bed mobility activities   Level of [place] Goal: Bed Mobility  supervision required   Assistive Device: Bed Mobility  bed rails   Goal Outcome: Bed Mobility  goal ongoing   Lower Body Dressing Goal   Time to Achieve Goal: Lower Body Dressing  by discharge   Level of [place]  supervision required   Goal Outcome: Lower Body Dressing  goal ongoing   Transfer Goal   Time to Achieve Goal: Transfer Training  by discharge   Goal Activity: Transfer Training  all transfers   Level of [place] Goal: Transfer Training  supervision required   Assistive Device: Transfer Training  walker, rolling   Goal Outcome: Transfer Training  goal ongoing

## 2018-09-11 NOTE — PLAN OF CARE
Problem: Patient Care Overview  Goal: Plan of Care Review  Outcome: Ongoing (interventions implemented as appropriate)   09/11/18 1513   Coping/Psychosocial   Plan Of Care Reviewed With patient;family   Plan of Care Review   Progress improving   Outcome Summary able to tolerate sitting in the chair     Goal: Interprofessional Rounds/Family Conf  Outcome: Ongoing (interventions implemented as appropriate)   09/11/18 1513   Interdisciplinary Rounds/Family Conf   Summary Pt D/C was canceled due to symptomatic orthostaic Hypotention   Participants ;dietitian/nutrition services;advanced practice nurse;nursing;occupational therapy;social work/services       Problem: Fall Risk (Adult)  Goal: Absence of Falls  Outcome: Ongoing (interventions implemented as appropriate)      Problem: Pressure Ulcer Risk (Raffaele Scale) (Adult,Obstetrics,Pediatric)  Goal: Skin Integrity  Outcome: Ongoing (interventions implemented as appropriate)   09/11/18 1513   Pressure Ulcer Risk (Raffaele Scale) (Adult,Obstetrics,Pediatric)   Skin Integrity making progress toward outcome

## 2018-09-11 NOTE — PROGRESS NOTES
Rec'd a call from daughter Maeve that she would like pt to be transferred to [place]. She had an accepting physician from [place].  I called Lo our 6A NP and gave her this information.  She initiated a phone call to the transfer center.  RN made aware.

## 2018-09-11 NOTE — NURSING NOTE
OT worked with Pt. Laying /65 HR 73 sitting /57 HR 85, standing BP 03/50 HR 95. Pt is symptomatic. D/C was canceled. Ligia Crane was notified. Pt is in the chair call bell within reach, chair alarm is on. Will continue to monitor.

## 2018-09-11 NOTE — PROGRESS NOTES
Patient: Michel Fang  Location: [facility] 6A 0604  MRN: [id number]  Today's date: 9/11/2018   Pt left supine in bed, bed alarm, call bell within reach, notified nursing           Therapy Pain/Vitals     Row Name 09/11/18 0805 09/11/18 0806 09/11/18 0807       Pain/Comfort/Sleep    Presence of Pain denies  --  --    Preferred Pain Scale word (verbal rating pain scale)  --  --       Vital Signs    Pulse 67 77 88    SpO2 95 %  --  --    Patient Activity At rest  --  --    Oxygen Therapy None (Room air)  --  --    BP (!)  145/63 125/65 (!)  108/52    BP Method Automatic Automatic Automatic    Patient Position Lying Sitting Standing          Prior Living Environment  Lives With: alone  Living Arrangements: house  Home Accessibility:  (2 no HR)  Living Environment Comment: Pt lives alone in 1st fl set up home. B+B on 1st w/ handicap bathroom Equipment Currently Used at Home: grab bar, shower chair       Prior Level of Function  Ambulation: independent  Transferring: independent  Toileting: independent  Bathing: assistive equipment  Dressing: independent  Eating: independent  Communication: understands/communicates without difficulty  Swallowing: swallows foods/liquids without difficulty  Equipment Currently Used at Home: grab bar, shower chair           PT Treatment Summary - 09/11/18 0805        Session Details    Document Type daily treatment    Mode of Treatment physical therapy       Time Calculation    Start Time 0745    Stop Time 0805    Time Calculation (min) 20 min       General Information    Patient Profile Reviewed? yes    Onset of Illness/Injury or Date of Surgery 09/06/18    Referring Physician Dr. Anand    Pertinent History of Current Functional Problem Pt admitted with syncope; past medical history of CAD status post CABG, hypertension, BPH, orthostatic hypotension    Existing Precautions/Restrictions fall       Orientation Log    Name of [facility] 3-->spontaneous/free recall    Month  3-->spontaneous/free recall    Date 3-->spontaneous/free recall    Year 3-->spontaneous/free recall    Day of Week 3-->spontaneous/free recall       Cognition/Psychosocial    Safety Awareness intact       Bed Mobility    Bed Mobility supine to sit;sit to supine    [place], Supine to Sit 1 person assist;minimum assist (75% patient effort)    [place], Sit to Supine 1 person assist;minimum assist (75% patient effort)       Sit to Stand Transfer    [place], Sit to Stand Transfer 1 person assist;minimum assist (75% patient effort);verbal cues    Verbal Cues safety;proper use of assistive device    Assistive Device walker, front-wheeled       Stand to Sit Transfer    [place], Stand to Sit Transfer 1 person assist;minimum assist (75% patient effort);verbal cues    Verbal Cues safety;proper use of assistive device    Assistive Device walker, front-wheeled       Gait Analysis/Training    Gait/Stairs Locomotion Gait Training (Group)       Gait Training    [place], Gait 1 person assist;minimum assist (75% or more patient effort);verbal cues    Assistive Device walker, front-wheeled    Distance in Feet 3 feet    Gait Pattern Utilized step-to    Gait Deviations Identified decreased marck;decreased gait speed    Comment pt (+) hypontension, vitals noted in flow sheet, ambulation deferred, side step to HOB for repositioning       Stairs Training    [place], Stairs not tested       AM-PAC (TM) - Mobility (Current Function)    Turning from your back to your side while in a flat bed without using bedrails? 4 - None    Moving from lying on your back to sitting on the side of a flat bed without using bedrails? 3 - A Little    Moving to and from a bed to a chair? 3 - A Little    Standing up from a chair using your arms? 3 - A Little    To walk in a hospital room? 3 - A Little    Climbing 3-5 steps with a railing? 3 - A Little    AM-PAC (TM) Mobility Score 19       PT Clinical Impression    Plan For Care  Reviewed: Physical Therapy PT plan for care discussed with patient    System Pathology/Pathophysiology Noted musculoskeletal    Impairments Found (PT Eval) aerobic capacity/endurance;motor function;muscle performance;gait, locomotion, and balance    Functional Limitations in Following Categories (PT Eval) self-care;home management    Rehab Potential/Prognosis good, to achieve stated therapy goals    PT Frequency of Treatment 3-5 times per week    Problem List decreased strength;impaired balance    Anticipated Equipment Needs at Discharge front wheeled walker    Expected Discharge Disposition other (see comments)   SNF pending pt progress    Daily Outcome Statement 9/11/18 Pt orthostatic with standing; reports some dizziness; vitals noted in flow sheet, further activity deferred, nursing notified                         Education provided this session. See the Patient Education summary report for full details.    PT Care Plan Goals      Most Recent Value   Stair Goal, PT   PT STG: Stairs  minimum assist (75% or less patient effort)   PT STG: Number of Stairs  2   PT STG Assistive Device: Stairs  cane, straight   PT STG Duration: Stairs  5 days or less   PT STG Outcome: Stairs  goal ongoing   PT LTG: Stairs  independent   PT LTG: Number of Stairs  2   PT LTG Duration: Stairs  14 days or less   PT LTG Outcome: Stairs  goal ongoing      PT Care Plan Goals      Most Recent Value   Bed Mobility Goal   Time to Achieve Goal: Bed Mobility  by discharge   Goal Activity: Bed Mobility  all bed mobility activities   Level of [place] Goal: Bed Mobility  independent   Assistive Device: Bed Mobility  bed rails   Goal Outcome: Bed Mobility  goal ongoing   Gait Goal   Time to Achieve Goal: Gait Training  3 days   Level of [place]  modified independence   Distance Goal: Gait Training (feet)  150 feet   Goal Outcome: Gait Training  goal ongoing   Transfer Goal   Time to Achieve Goal: Transfer Training  by discharge   Goal  Activity: Transfer Training  sit to stand/stand to sit, stand pivot/stand step   Level of [place] Goal: Transfer Training  minimum assist (75% or more patient effort)   Assistive Device: Transfer Training  walker, rolling   Goal Outcome: Transfer Training  goal ongoing

## 2018-09-11 NOTE — PROGRESS NOTES
"Daily Progress Note    Subjective  No CP or SOB, some dizziness and orthostasis yesterday. Patient given IVF.    Objective    Vital signs in last 24 hours:  Temp:  [36.7 °C (98 °F)-37.1 °C (98.7 °F)] 36.9 °C (98.4 °F)  Heart Rate:  [64-77] 64  Resp:  [16-18] 18  BP: (101-179)/(57-68) 148/67      Intake/Output Summary (Last 24 hours) at 09/11/18 0847  Last data filed at 09/11/18 0700   Gross per 24 hour   Intake              820 ml   Output              100 ml   Net              720 ml       Physical Exam:  BP (!) 148/67 (BP Location: Left upper arm, Patient Position: Lying)   Pulse 64   Temp 36.9 °C (98.4 °F) (Oral)   Resp 18   Ht 1.718 m (5' 7.64\")   Wt 101 kg (223 lb 12.3 oz)   SpO2 94%   BMI 34.39 kg/m²               Lungs:     Clear to auscultation bilaterally   Heart:    Regular rhythm, S1 and S2 normal, II/VI DSM   Extremities:  GI: No edema  Soft, nontender   Other:          Labs  Lab Results   Component Value Date    WBC 5.11 09/11/2018    HGB 10.1 (L) 09/11/2018    HCT 30.1 (L) 09/11/2018     (L) 09/11/2018    ALT 18 09/06/2018    AST 25 09/06/2018     09/11/2018    K 3.8 09/11/2018     09/11/2018    CREATININE 1.0 09/11/2018    BUN 10 09/11/2018    CO2 22 09/11/2018    INR 1.2 09/06/2018         ECG/Telemetry  I have independently reviewed the telemetry. No events for the last 24 hours.       Assessment & Plan    CAD (coronary artery disease)   Assessment & Plan    Hx of CABG, Off ASA and Plavix with upper GIB.  Would restart ASA 81 mg daily on DC. He is on plavix b/c of popliteal aneurysm.  Eventually restart plavix but would hold for now.        Aortic stenosis   Assessment & Plan    S/p TAVR 5/2016, recent echo showed mod perivalvular AI.  No treatment planned at this time.  Outpt f/u        Orthostatic syncope   Assessment & Plan    Re eval ortho VS today.        Close FU Dr Arce on DC.    Mo Herman MD  9/11/2018  "

## 2018-09-11 NOTE — PLAN OF CARE
Problem: Patient Care Overview  Goal: Plan of Care Review  Outcome: Ongoing (interventions implemented as appropriate)   09/11/18 1041   Coping/Psychosocial   Plan Of Care Reviewed With patient   Plan of Care Review   Progress progress toward functional goals as expected   Outcome Summary pt presents with a moderate level of impairment for mobility        Problem: Acute Therapy Services Goal & Intervention Plan  Goal: Bed Mobility Goal  Outcome: Ongoing (interventions implemented as appropriate)    Goal: Gait Training Goal  Outcome: Ongoing (interventions implemented as appropriate)    Goal: Stairs Goal  Outcome: Ongoing (interventions implemented as appropriate)

## 2018-09-11 NOTE — PROGRESS NOTES
Hospital Medicine Service -  Daily Progress Note       SUBJECTIVE   Interval History: Patient felt well at rest but persistent dizziness when attempted to walk with PT.  Denies any pain or shortness of breath     OBJECTIVE      Vital signs in last 24 hours:  Temp:  [36.4 °C (97.6 °F)-37.1 °C (98.7 °F)] 36.4 °C (97.6 °F)  Heart Rate:  [64-88] 72  Resp:  [16-18] 16  BP: (101-179)/(52-67) 108/52    Intake/Output Summary (Last 24 hours) at 09/11/18 1200  Last data filed at 09/11/18 0700   Gross per 24 hour   Intake              820 ml   Output              100 ml   Net              720 ml       PHYSICAL EXAMINATION      Physical Exam     GENERAL APPEARANCE Awake, alert   MUCUS MEMBRANES Moist   LUNGS CTAB, no w/r/r   CHEST RRR, no m/g/r   ABDOMEN Soft, NT, ND, +BS   EXTREMITIES No c/c/e   SKIN No obvious rash   HEENT Anicteric   BEHAVIOR Appropriate, cooperative   NEUROLOGIC Grossly normal   MUSCULOSKELETAL Grossly normal   /RECTAL Deferred   NECK Supple   OTHER: N/A        LABS / IMAGING / TELE      Labs    Results from last 7 days  Lab Units 09/11/18  0640   WBC K/uL 5.11   HEMOGLOBIN g/dL 10.1*   HEMATOCRIT % 30.1*   PLATELETS K/uL 116*       Results from last 7 days  Lab Units 09/11/18  0640   SODIUM mEQ/L 136   POTASSIUM mEQ/L 3.8   CHLORIDE mEQ/L 107   CO2 mEQ/L 22   BUN mg/dL 10   CREATININE mg/dL 1.0   GLUCOSE mg/dL 102*   CALCIUM mg/dL 7.9*         Imaging  X-ray Abdomen 1 View    Result Date: 9/6/2018  CLINICAL HISTORY: Vomiting COMMENT: Two AP supine views of the abdomen were obtained on 9/6/2018. No dilated loops of bowel are demonstrated.  There is a nonspecific bowel gas pattern.  No large volume of stool is demonstrated.  No radiopaque biliary or urinary calculi are demonstrated.  There are sutures within the right lower pelvic region.     IMPRESSION: Nonspecific bowel gas pattern.    Ct Head Without Iv Contrast    Result Date: 9/6/2018  CLINICAL HISTORY:Possible seizure COMMENT: Noncontrast CT the  brain was performed 9/6/2018.  Comparison is made to previous study performed 12/31/2014. CT DOSE:  One or more dose reduction techniques (e.g. automated exposure control, adjustment of the mA and/or kV according to patient size, use of iterative reconstruction technique) utilized for this examination. The cortical sulci and ventricles are enlarged due to age related cerebral volume loss.  There is no evidence of acute intraparenchymal or extra-axial hemorrhage.  No extra-axial collections are demonstrated and there is no midline shift.  There is no mass effect within the brain.  There is no evidence of an acute infarction.  There are a few nonspecific foci of decreased attenuation scattered throughout the subcortical and deeper white matter of bilateral cerebral hemispheres which given this patient's age are most likely the sequela of small vessel ischemia. There is atheromatous calcification of bilateral cavernous carotid arteries. Visualized portions of the paranasal sinuses and mastoid air cells are clear.     IMPRESSION: No evidence of acute intracranial pathology.     Ultrasound Kidneys    Result Date: 9/7/2018  CLINICAL HISTORY: URINARY TRACT INFECTION   . COMMENT: A limited retroperitoneal ultrasound with attention to the kidneys is performed.  Grayscale and color Doppler imaging is utilized. COMPARISON: There are no prior examinations available for comparison. The right kidney measures  5.4 x 5.1 x 10.6  cm. The left kidney measures 4.8  x 5.0 x 10.1  cm. The kidneys are normal in size and homogeneous in echotexture.  There is a left lower pole renal cyst measuring 1.3 x 1.5 x 1.4 cm. No focal solid lesions are identified. There is no hydronephrosis. There are no renal calculi. Both ureteral jets are visualized on limited evaluation of the bladder.     IMPRESSION: No evidence for hydronephrosis. I certify that I have personally reviewed this examination and agree with this report. Allegra Bernstein MD    X-ray  Chest 1 View    Result Date: 9/9/2018  CLINICAL HISTORY: Hypoxia. COMMENT: Single AP view of the chest is performed. COMPARISON: Chest radiograph performed on 9/8/2018 and 9/7/2018 Cardiac silhouette is enlarged. Trachea is midline.  Pulmonary vascular markings are prominent.  Small bilateral pleural effusions are seen.  Bibasilar opacities are visualized.  There is no pneumothorax.  Right arm catheter is seen with the tip located in the region of the axillary vein.     IMPRESSION: 1.  Congestive heart failure with small bilateral pleural effusions mildly progressed. 2.  Bibasilar opacities representing atelectasis and/or pneumonia also progressed.     X-ray Chest 1 View    Result Date: 9/8/2018  CLINICAL HISTORY: Hypoxia COMMENT:  Single view chest compared the x-ray from one day prior. Stable cardiomegaly.  The patient is status post sternotomy.  Mild pleural parenchymal change at the lung bases similar the prior study.  No pneumothorax.     IMPRESSION: No significant change.    X-ray Chest 1 View    Result Date: 9/7/2018  CLINICAL HISTORY: hypoxia COMMENT: 1 view of the chest COMPARISON: Most Recent Prior Chest X Ray. Lines and tubes: Cardiac monitoring leads overlie the patient.   Median sternotomy wires are present. Lungs: Improved aeration of both lower lobes. Pleura: No pleural effusion or pneumothorax. Cardiomediastinal silhouette: Stable     IMPRESSION:  Stable chest x ray.    X-ray Chest 1 View    Result Date: 9/6/2018  CLINICAL HISTORY: Shortness of breath, cough COMMENT: Two AP views of the chest were obtained and compared to prior study from 9/4/18. There are mild bibasilar airspace opacities.  There is no pneumothorax.  The heart is normal in size.  There is a cardiac valve prosthesis.  There are median sternotomy wires.     IMPRESSION:  Bibasilar airspace opacities, probably due to atelectasis    X-ray Chest 1 View    Result Date: 9/5/2018  CLINICAL HISTORY: syncope COMMENT: Frontal view of the chest.   Comparison radiograph 1/3/2018.  No definite focal lung consolidation.  Unchanged cardiomediastinal silhouette. Sternal wires are present.  No pneumothorax or sizable pleural effusion.     IMPRESSION: No evidence of active disease.    Transthoracic Echo (tte) Complete    Result Date: 9/5/2018  · Technically difficult study, the patient was unable to roll onto his left side. · Mildly dilated cavity size. Mild concentric left ventricular hypertrophy. Preserved systolic function. Estimated EF = 60%. Unable to assess diastolic filling pattern. Cannot adequately assess regional wall motion. · Right heart structures are not well visualized. · Bioprosthetic aortic valve replacement with peak velocity 2.3m/s, mean gradient 10mmHg. Moderate paravalvular aortic regurgitation (anterior, between 12-2 o'clock on short axis views.) · Thickened mitral leaflets. Trace mitral regurgitation. · Mildly dilated left atrium. · No evidence of pericardial effusion. · No prior study available for direct comparison.        ECG/Telemetry  I have independently reviewed the telemetry. No events for the last 24 hours.    ASSESSMENT AND PLAN      Hypoxia   Assessment & Plan    Evidence of aspiration pneumonia on chest x-ray, completed 5 days of abx  Continue duo nebs, check amatory sat on room air today  9/9 had an episode of pulmonary congestion on chest x-ray was given 1 dose of Lasix, continue to monitor volume status  sputum culture with normal nishi        * Gastrointestinal hemorrhage   Assessment & Plan    S/p hematemeses  S/p EGD gastritis, duodenitis. No signs of bleeding  Drop hbg 7.1 but no sign of active bleed 9/8  continue PPI BID  advance diet as tolerated  GI consult appreciated, f/u bx from EGD  Will add folate and B12 levels        Aortic stenosis   Assessment & Plan    S/p TAVR 5/2016, recent echo showed mod perivalvular AI.  No treatment planned at this time.  Outpt f/u        Orthostatic syncope   Assessment & Plan     Unfortunately he remains orthostatic despite volume resuscitation  reduced flomax dose to daily 9/10  Continue to monitor volume status  Cardiology following  Dtr has concerns for early parkinson's which could contribute to orthostasis, neurology consulted        Anemia   Assessment & Plan    GI work up as above  Patient received PRBC this admission  B12 and folate pending, Fe studies inaccurate s/p blood transfusion  Hematology eval requested per family        CAD (coronary artery disease)   Assessment & Plan    With history of CABG.  Upper GI okay to resume dual antiplatelet therapy with no evidence of bleeding  Continue aspirin, statin and Plavix        Benign prostatic hyperplasia   Assessment & Plan    Continue Flomax, urology eval appreciated  Dose decreased for concerns of orthostasis             VTE Assessment: Pad[place] VTE Score: 2  Code Status: Full Code  Estimated discharge date: 9/12/2018     NOELLE Dobbs  9/11/2018  12:00 PM

## 2018-09-11 NOTE — ASSESSMENT & PLAN NOTE
GI work up as above  Patient received PRBC this admission  B12 and folate pending, Fe studies inaccurate s/p blood transfusion  Hematology eval requested per family

## 2018-09-11 NOTE — PLAN OF CARE
Problem: Patient Care Overview  Goal: Plan of Care Review  Outcome: Ongoing (interventions implemented as appropriate)   09/11/18 1201   Coping/Psychosocial   Plan Of Care Reviewed With patient;son   Plan of Care Review   Progress progress toward functional goals as expected   Outcome Summary Pt required close supervision in bed mobility. Pt required MinAx2 in STS transfers & bed<chair> transfers w/ RW. Futher functional mobility not assessed 2* to orthostatic hypotension, nsg notified.        Problem: Fall Risk (Adult)  Goal: Absence of Falls  Outcome: Ongoing (interventions implemented as appropriate)      Problem: Acute Therapy Services Goal & Intervention Plan  Goal: Bed Mobility Goal  Outcome: Ongoing (interventions implemented as appropriate)    Goal: Lower Body Dressing Goal  Outcome: Ongoing (interventions implemented as appropriate)    Goal: Transfer Training Goal  Outcome: Ongoing (interventions implemented as appropriate)

## 2018-09-11 NOTE — PROGRESS NOTES
Pt stood this AM and had blood pressure drop from 142/65 to 93/50 and was very dizzy. Discharge has been canceled for today. He has not been eating or drinking much since admission. Appears dry. Will give another 500cc IVF and reassess later today. If he is still orthostatic, will consider initiation of Midodrine.    Also noted that his Flomax has been lowered from BID to once daily at night.

## 2018-09-11 NOTE — ASSESSMENT & PLAN NOTE
Pt admitted with syncope and orthostatic hypotension.  Neuro exam non-focal without evidence for parkinson disease at this time.  Gen med and cardiology eval underway

## 2018-09-11 NOTE — PLAN OF CARE
Problem: Patient Care Overview  Goal: Plan of Care Review  Outcome: Ongoing (interventions implemented as appropriate)   09/11/18 0527   Coping/Psychosocial   Plan Of Care Reviewed With patient   Plan of Care Review   Progress improving   Outcome Summary cough improving; pt continues to require assistance with mobility       Problem: Fall Risk (Adult)  Goal: Absence of Falls  Outcome: Ongoing (interventions implemented as appropriate)      Problem: Pressure Ulcer Risk (Raffaele Scale) (Adult,Obstetrics,Pediatric)  Goal: Skin Integrity  Outcome: Ongoing (interventions implemented as appropriate)

## 2018-09-11 NOTE — PROGRESS NOTES
Noted pts discharge order was in and noted again with significant orthostasis with PT.  Unsure of dc plan due to pt unable to ambulate due to dizziness.  Called pts daughter, Maeve who is pts POA, her cell#[phone] to discuss dc planning.  Maeve agrees that she was unsure of dc plan, home with home care vs snf.  She would prefer home with home care at dc and pt has great family support but feels uncomfortable with pt to be discharged yet due to the orthostasis.  [facility] will continue to follow for dc needs.

## 2018-09-11 NOTE — NURSING NOTE
Pt seen by PT. VS laying /76 HR 68, sitting 145/66 HR 79, standing /59 HR 88. NP Lo notified. Will ambulate Pt per NP's request. Will continue to monitor.

## 2018-09-11 NOTE — CONSULTS
Neurology Consult Note    Subjective     Michel Fang is a 83 y.o. male who was admitted for GI bleed and syncope.      Review of Systems     Constitutional: negative  Eyes: negative  Cardiovascular: dizziness with standing  Musculoskeletal:negative    Allergies: No known allergies    Current Facility-Administered Medications   Medication Dose Route Frequency Provider Last Rate Last Dose   • acetaminophen (TYLENOL) tablet 650 mg  650 mg oral q4h PRN Anuradha Tejeda PA       • atorvastatin (LIPITOR) tablet 40 mg  40 mg oral Daily Trista Long PA   40 mg at 09/10/18 2031   • glucose chewable tablet 16-32 g of dextrose  16-32 g of dextrose oral PRN Anuradha Tejeda PA        Or   • dextrose 40 % oral gel 15-30 g of dextrose  15-30 g of dextrose oral PRN Anuradha Tejeda PA        Or   • glucagon (GLUCAGEN) injection 1 mg  1 mg intramuscular PRN Anuradha Tejeda PA        Or   • dextrose in water injection 12.5 g  25 mL intravenous PRN Anuradha Tejeda PA       • ipratropium-albuterol (DUO-NEB) 0.5-2.5 mg/3 mL nebulizer solution 3 mL  3 mL nebulization q6H TIARA Anuradha Tejeda PA   3 mL at 09/11/18 0934   • levothyroxine (SYNTHROID) tablet 100 mcg  100 mcg oral Daily Trista Long PA   100 mcg at 09/11/18 0641   • magnesium oxide (MAG-OX) tablet 400 mg  400 mg oral 2x daily PRN Anuradha Tejeda PA       • magnesium sulfate in water IVPB premix 2 g  2 g intravenous PRN Anuradha Tejeda PA       • pantoprazole (PROTONIX) tablet,delayed release (DR/EC) 40 mg  40 mg oral BID Trista Long PA   40 mg at 09/11/18 0933   • potassium chloride (KLOR-CON) tablet extended release 20 mEq  20 mEq oral PRN Anuradha Tejeda PA        Or   • potassium chloride (KLOR-CON) tablet extended release 40 mEq  40 mEq oral PRN Anuradha Tejeda PA        Or   • potassium chloride 20 mEq in 100 mL IVPB  (premix)  20 mEq intravenous PRN Anuradha Tejeda PA   Stopped at 09/08/18 0857    Or   • potassium chloride 40 mEq in sodium chloride 0.9 % 250 mL IVPB  40 mEq intravenous PRN Anuradha Tejeda PA        Or    • potassium chloride 20 mEq packet 20 mEq  20 mEq oral PRN Anuradha Tejeda PA        Or   • potassium chloride 20 mEq packet 40 mEq  40 mEq oral PRN Anuradha Tejeda PA       • sodium chloride 0.9 % infusion   intravenous Continuous Ligia Phillips PA   Stopped at 09/11/18 0759   • sodium chloride 0.9 % infusion   intravenous Continuous Ligia Phillips  mL/hr at 09/11/18 1208     • sodium chloride flush 10 mL  10 mL intravenous q8h TAIRA Fernando Huffman MD   10 mL at 09/11/18 0641   • sodium chloride flush 10 mL  10 mL intravenous PRN Fernando Huffman MD       • tamsulosin (FLOMAX) 24 hr ER capsule 0.4 mg  0.4 mg oral Nightly Corona Anand MD           Medical History:   Past Medical History:   Diagnosis Date   • Arthritis    • BPH (benign prostatic hyperplasia)    • CHF (congestive heart failure) (CMS/HCC) (HCC)    • Coronary artery disease    • Heart disease    • Hyperlipidemia    • Hypertension    • Sarcoidosis (CMS/HCC)        Surgical History:   Past Surgical History:   Procedure Laterality Date   • AORTIC VALVE REPLACEMENT     • CARDIAC SURGERY     • CORONARY ARTERY BYPASS GRAFT     • POPLITEAL VENOUS ANEURYSM REPAIR         Social History:   Social History     Social History   • Marital status:      Spouse name: N/A   • Number of children: N/A   • Years of education: N/A     Social History Main Topics   • Smoking status: Former Smoker     Quit date: 1978   • Smokeless tobacco: Never Used   • Alcohol use No   • Drug use: No   • Sexual activity: Not Asked     Other Topics Concern   • None     Social History Narrative   • None       Family History: History reviewed. No pertinent family history.    Objective     Physical Exam    General appearance: alert and cooperative  Eyes: conjunctivae clear. PERRL, EOM's intact.  Extremities: extremities normal, warm and well-perfused; no cyanosis, clubbing, or edema  Skin: Skin color, texture, turgor normal. No rashes or lesions  Neurologic: Grossly  normal    Muusculoskeletal: no injury or deformity  Skin is warm and dry  Normocepalic, atraumatic  Alert and oriented X3  Cn: EOMI , VFF, Fundi flat, face symmetric with intact sensation, tongue midline, palate upgoing bilaterally,sternocleidomastoid and trapezius muscles intact, hearing intact  Motor: 5/5 all 4 extremities  Sens: intact to light touch all 4 extremities  Cerebellar: normal finder to nose, coordination normal  Deep tendon reflexes 2+ throughout  Toes flexor bilaterally    Labs  I have reviewed the patient's labs to the time of note. No new clinical concern.    Imaging  Not applicable    Assessment and Plan    Orthostatic syncope   Assessment & Plan    Pt admitted with syncope and orthostatic hypotension.  Neuro exam non-focal without evidence for parkinson disease at this time.  Gen med and cardiology eval underway

## 2018-09-12 VITALS
HEART RATE: 78 BPM | RESPIRATION RATE: 18 BRPM | HEIGHT: 68 IN | DIASTOLIC BLOOD PRESSURE: 67 MMHG | OXYGEN SATURATION: 94 % | SYSTOLIC BLOOD PRESSURE: 143 MMHG | WEIGHT: 223.77 LBS | BODY MASS INDEX: 33.91 KG/M2 | TEMPERATURE: 98.7 F

## 2018-09-12 LAB
ANION GAP SERPL CALC-SCNC: 8 MEQ/L (ref 3–15)
BASOPHILS # BLD: 0.05 K/UL (ref 0.01–0.1)
BASOPHILS NFR BLD: 1 %
BUN SERPL-MCNC: 10 MG/DL (ref 8–20)
CALCIUM SERPL-MCNC: 7.9 MG/DL (ref 8.9–10.3)
CHLORIDE SERPL-SCNC: 106 MEQ/L (ref 98–109)
CO2 SERPL-SCNC: 21 MEQ/L (ref 22–32)
CREAT SERPL-MCNC: 1.1 MG/DL (ref 0.8–1.3)
DIFFERENTIAL METHOD BLD: ABNORMAL
EOSINOPHIL # BLD: 0.13 K/UL (ref 0.04–0.54)
EOSINOPHIL NFR BLD: 2.5 %
ERYTHROCYTE [DISTWIDTH] IN BLOOD BY AUTOMATED COUNT: 15.6 % (ref 11.6–14.4)
GFR SERPL CREATININE-BSD FRML MDRD: >60 ML/MIN/1.73M*2
GLUCOSE SERPL-MCNC: 108 MG/DL (ref 70–99)
HCT VFR BLDCO AUTO: 29.7 % (ref 40.1–51)
HGB BLD-MCNC: 9.7 G/DL (ref 13.7–17.5)
IMM GRANULOCYTES # BLD AUTO: 0.03 K/UL (ref 0–0.08)
IMM GRANULOCYTES NFR BLD AUTO: 0.6 %
LYMPHOCYTES # BLD: 0.94 K/UL (ref 1.2–3.5)
LYMPHOCYTES NFR BLD: 18.4 %
MAGNESIUM SERPL-MCNC: 2.1 MG/DL (ref 1.8–2.5)
MCH RBC QN AUTO: 29.1 PG (ref 28–33.2)
MCHC RBC AUTO-ENTMCNC: 32.7 G/DL (ref 32.2–36.5)
MCV RBC AUTO: 89.2 FL (ref 83–98)
MONOCYTES # BLD: 0.61 K/UL (ref 0.3–1)
MONOCYTES NFR BLD: 11.9 %
NEUTROPHILS # BLD: 3.35 K/UL (ref 1.7–7)
NEUTS SEG NFR BLD: 65.6 %
NRBC BLD-RTO: 0.2 %
PDW BLD AUTO: 10.8 FL (ref 9.4–12.4)
PLATELET # BLD AUTO: 117 K/UL (ref 150–350)
POTASSIUM SERPL-SCNC: 3.5 MEQ/L (ref 3.6–5.1)
RBC # BLD AUTO: 3.33 M/UL (ref 4.5–5.8)
SODIUM SERPL-SCNC: 135 MEQ/L (ref 136–144)
WBC # BLD AUTO: 5.11 K/UL (ref 3.8–10.5)

## 2018-09-12 PROCEDURE — 85025 COMPLETE CBC W/AUTO DIFF WBC: CPT | Performed by: PHYSICIAN ASSISTANT

## 2018-09-12 PROCEDURE — 63700000 HC SELF-ADMINISTRABLE DRUG: Performed by: PHYSICIAN ASSISTANT

## 2018-09-12 PROCEDURE — 99238 HOSP IP/OBS DSCHRG MGMT 30/<: CPT | Performed by: INTERNAL MEDICINE

## 2018-09-12 PROCEDURE — 21400000 HC ROOM AND CARE CCU/INTERMEDIATE

## 2018-09-12 PROCEDURE — 36415 COLL VENOUS BLD VENIPUNCTURE: CPT | Performed by: PHYSICIAN ASSISTANT

## 2018-09-12 PROCEDURE — 25000000 HC PHARMACY GENERAL: Performed by: PHYSICIAN ASSISTANT

## 2018-09-12 PROCEDURE — 80048 BASIC METABOLIC PNL TOTAL CA: CPT | Performed by: PHYSICIAN ASSISTANT

## 2018-09-12 PROCEDURE — 94640 AIRWAY INHALATION TREATMENT: CPT

## 2018-09-12 PROCEDURE — 83735 ASSAY OF MAGNESIUM: CPT | Performed by: PHYSICIAN ASSISTANT

## 2018-09-12 RX ORDER — MIDODRINE HYDROCHLORIDE 5 MG/1
5 TABLET ORAL
Qty: 269 TABLET | Refills: 0
Start: 2018-09-13 | End: 2018-10-18 | Stop reason: HOSPADM

## 2018-09-12 RX ORDER — MIDODRINE HYDROCHLORIDE 5 MG/1
5 TABLET ORAL
Status: DISCONTINUED | OUTPATIENT
Start: 2018-09-12 | End: 2018-09-13 | Stop reason: HOSPADM

## 2018-09-12 RX ADMIN — IPRATROPIUM BROMIDE AND ALBUTEROL SULFATE 3 ML: .5; 3 SOLUTION RESPIRATORY (INHALATION) at 19:54

## 2018-09-12 RX ADMIN — IPRATROPIUM BROMIDE AND ALBUTEROL SULFATE 3 ML: .5; 3 SOLUTION RESPIRATORY (INHALATION) at 08:32

## 2018-09-12 RX ADMIN — IPRATROPIUM BROMIDE AND ALBUTEROL SULFATE 3 ML: .5; 3 SOLUTION RESPIRATORY (INHALATION) at 13:08

## 2018-09-12 RX ADMIN — PANTOPRAZOLE SODIUM 40 MG: 40 TABLET, DELAYED RELEASE ORAL at 08:56

## 2018-09-12 RX ADMIN — IPRATROPIUM BROMIDE AND ALBUTEROL SULFATE 3 ML: .5; 3 SOLUTION RESPIRATORY (INHALATION) at 03:11

## 2018-09-12 RX ADMIN — POTASSIUM CHLORIDE 20 MEQ: 750 TABLET, FILM COATED, EXTENDED RELEASE ORAL at 11:16

## 2018-09-12 RX ADMIN — LEVOTHYROXINE SODIUM 100 MCG: 100 TABLET ORAL at 08:56

## 2018-09-12 RX ADMIN — PANTOPRAZOLE SODIUM 40 MG: 40 TABLET, DELAYED RELEASE ORAL at 19:55

## 2018-09-12 RX ADMIN — Medication 10 ML: at 13:09

## 2018-09-12 RX ADMIN — ATORVASTATIN CALCIUM 40 MG: 40 TABLET, FILM COATED ORAL at 19:55

## 2018-09-12 RX ADMIN — Medication 10 ML: at 06:43

## 2018-09-12 RX ADMIN — MIDODRINE HYDROCHLORIDE 5 MG: 5 TABLET ORAL at 15:18

## 2018-09-12 NOTE — PROGRESS NOTES
"Daily Progress Note    Subjective  No CP or SOB, some dizziness and orthostasis persist. He has not felt improvement from the IVF. BP is better.     Objective    Vital signs in last 24 hours:  Temp:  [36.6 °C (97.8 °F)-37.7 °C (99.8 °F)] 36.9 °C (98.5 °F)  Heart Rate:  [70-87] 74  Resp:  [16-18] 18  BP: (120-148)/(59-72) 126/61      Intake/Output Summary (Last 24 hours) at 09/12/18 1237  Last data filed at 09/12/18 1000   Gross per 24 hour   Intake             1145 ml   Output             1025 ml   Net              120 ml       Physical Exam:  /61 (BP Location: Left upper arm, Patient Position: Lying)   Pulse 74   Temp 36.9 °C (98.5 °F) (Oral)   Resp 18   Ht 1.718 m (5' 7.64\")   Wt 101 kg (223 lb 12.3 oz)   SpO2 95%   BMI 34.39 kg/m²               Lungs:     Clear to auscultation bilaterally   Heart:    Regular rhythm, S1 and S2 normal, II/VI DSM   Extremities:  GI: No edema  Soft, nontender   Other:          Labs  Lab Results   Component Value Date    WBC 5.11 09/12/2018    HGB 9.7 (L) 09/12/2018    HCT 29.7 (L) 09/12/2018     (L) 09/12/2018    ALT 18 09/06/2018    AST 25 09/06/2018     (L) 09/12/2018    K 3.5 (L) 09/12/2018     09/12/2018    CREATININE 1.1 09/12/2018    BUN 10 09/12/2018    CO2 21 (L) 09/12/2018    INR 1.2 09/06/2018         ECG/Telemetry  I have independently reviewed the telemetry. No events for the last 24 hours.       Assessment & Plan    CAD (coronary artery disease)   Assessment & Plan    Hx of CABG, Off ASA and Plavix with upper GIB.  Would restart ASA 81 mg daily on DC. He is on plavix b/c of popliteal aneurysm.  Eventually restart plavix but would hold for now.        Aortic stenosis   Assessment & Plan    S/p TAVR 5/2016, recent echo showed mod perivalvular AI.  No treatment planned at this time.  Outpt f/u        Orthostatic syncope   Assessment & Plan    He continues to feel dizzy with sitting and standing. Unclear if this is due to OH but will give " trial of Midodrine 5mg TID. If he feels better later today, consider discharge home with close OP follow-up. He has office appt with Dr. Arce on 9/14.         The patient was seen by PA. I have personally seen and examined the patient.  I have reviewed and edited the note as needed.    MD Ligia Bansal PA  9/12/2018

## 2018-09-12 NOTE — PROGRESS NOTES
"Daily Progress Note    Subjective    Interval History: voiding spontaneously with no dysuria or hematuria    Objective    Vital signs in last 24 hours:  Temp:  [36.4 °C (97.6 °F)-37.7 °C (99.8 °F)] 37.7 °C (99.8 °F)  Heart Rate:  [70-95] 79  Resp:  [16-17] 16  BP: ()/(50-72) 124/59      Intake/Output Summary (Last 24 hours) at 09/12/18 0838  Last data filed at 09/12/18 0600   Gross per 24 hour   Intake              920 ml   Output             1025 ml   Net             -105 ml       Physical Exam:  BP (!) 124/59 (BP Location: Left upper arm, Patient Position: Lying)   Pulse 79   Temp 37.7 °C (99.8 °F) (Oral)   Resp 16   Ht 1.718 m (5' 7.64\")   Wt 101 kg (223 lb 12.3 oz)   SpO2 93%   BMI 34.39 kg/m²     General Appearance:    Alert, cooperative, no distress, appears stated age   Head:    Normocephalic, without obvious abnormality, atraumatic   Back:     No CVA tenderness   Lungs:     Rrespirations unlabored   Chest wall:    No tenderness or deformity       Abdomen:     Soft, non-tender, bowel sounds active all four quadrants,     no masses, no organomegaly   Gu:   Unremarkable       Extremities:  Musculoskeletal:   Extremities normal, atraumatic, no cyanosis or edema    No injury or deformity       Skin:   Skin color, texture, turgor normal, no rashes or lesions               Labs  Lab Results   Component Value Date    WBC 5.11 09/12/2018    HGB 9.7 (L) 09/12/2018    HCT 29.7 (L) 09/12/2018     (L) 09/12/2018    ALT 18 09/06/2018    AST 25 09/06/2018     (L) 09/12/2018    K 3.5 (L) 09/12/2018     09/12/2018    CREATININE 1.1 09/12/2018    BUN 10 09/12/2018    CO2 21 (L) 09/12/2018    INR 1.2 09/06/2018     WBC and renal function normal  Imaging  I have independently reviewed the pertinent imaging from the last 24 hrs.      VTE Assessment: I have reassessed and the patient's VTE risk and treatment plan is appropriate.     Assessment & Plan  Patient Active Problem List   Diagnosis   • " Orthostatic syncope   • Aortic stenosis   • Benign prostatic hyperplasia   • CAD (coronary artery disease)   • HTN (hypertension)   • Pulmonary sarcoidosis (CMS/HCC) (HCC)   • Hyperlipidemia   • Lactic acidosis   • Acute cystitis without hematuria   • Hypoxia   • Gastrointestinal hemorrhage   • Anemia     Benign prostatic hyperplasia   Assessment & Plan    A/P  83 y.o. male with BPH on tamsulosin 0.4 mg bid and with recent treatment of a urinary tract infection while at [facility] admitted for GI bleed.  UA now clear and renal ultrasound shows no evidence of mass, stone, or hydronephrosis.  Creat 1.1.  PVR improved     Plan  Monitor voids as you are  Check PVR prn  Continue tamsulosin daily for BPH as he has had recent issues with orthostatic hypotension and reduced dose of Flomax from bid to daily  Will follow            Kody Faust MD  9/12/2018

## 2018-09-12 NOTE — PLAN OF CARE
Problem: Fall Risk (Adult)  Goal: Absence of Falls  Outcome: Ongoing (interventions implemented as appropriate)   09/12/18 3780   Fall Risk (Adult)   Absence of Falls making progress toward outcome

## 2018-09-12 NOTE — NURSING NOTE
Telephone call from [place] at call center at [employer] [facility]; no bed available for pt; they will re-evaluate tomorrow and call when bed is available; I did let pt and son know that he will be staying tonight.

## 2018-09-12 NOTE — NURSING NOTE
Pt complaining of dry crusty eyes; applied cool washcloth to eyes for relief; will continue to monitor; will notify MD.

## 2018-09-13 NOTE — NURSING NOTE
Pt being transferred to [place]; transportation here to pick him up;Report called;  removed cardiac monitor; pt leaving with IV's x2;(midline and peripheral) printed new MAR; pt left floor alert and oriented x 3 and in no distress.

## 2018-10-18 ENCOUNTER — TRANSCRIBE ORDERS (OUTPATIENT)
Dept: SCHEDULING | Age: 83
End: 2018-10-18

## 2018-10-18 ENCOUNTER — TELEPHONE (OUTPATIENT)
Dept: TRANSFER UNIT | Age: 83
End: 2018-10-18

## 2018-10-18 DIAGNOSIS — I72.4 ANEURYSM OF ARTERY OF LOWER EXTREMITY (CMS/HCC): Primary | ICD-10-CM

## 2018-10-18 RX ORDER — TAMSULOSIN HYDROCHLORIDE 0.4 MG/1
0.4 CAPSULE ORAL NIGHTLY
COMMUNITY

## 2018-10-18 RX ORDER — PANTOPRAZOLE SODIUM 40 MG/1
40 TABLET, DELAYED RELEASE ORAL DAILY
COMMUNITY

## 2018-10-18 RX ORDER — ASCORBIC ACID 500 MG
500 TABLET ORAL DAILY
COMMUNITY
End: 2019-05-12

## 2018-10-22 ENCOUNTER — HOSPITAL ENCOUNTER (OUTPATIENT)
Dept: RADIOLOGY | Facility: HOSPITAL | Age: 83
Discharge: HOME | End: 2018-10-22
Attending: SURGERY
Payer: MEDICARE

## 2018-10-22 DIAGNOSIS — I72.4 ANEURYSM OF ARTERY OF LOWER EXTREMITY (CMS/HCC): ICD-10-CM

## 2018-10-22 PROCEDURE — 75635 CT ANGIO ABDOMINAL ARTERIES: CPT

## 2018-10-22 PROCEDURE — 63600105 HC IODINE BASED CONTRAST: Performed by: SURGERY

## 2018-10-22 RX ADMIN — IOHEXOL 150 ML: 350 INJECTION, SOLUTION INTRAVENOUS at 10:38

## 2018-11-15 ENCOUNTER — HOSPITAL ENCOUNTER (OUTPATIENT)
Facility: HOSPITAL | Age: 83
Setting detail: HOSPITAL OUTPATIENT SURGERY
End: 2018-11-15
Attending: SURGERY | Admitting: SURGERY
Payer: MEDICARE

## 2018-11-15 DIAGNOSIS — I72.4 ANEURYSM OF ARTERY OF LOWER EXTREMITY (CMS/HCC): ICD-10-CM

## 2019-02-15 ENCOUNTER — TRANSCRIBE ORDERS (OUTPATIENT)
Dept: SCHEDULING | Age: 84
End: 2019-02-15

## 2019-02-15 DIAGNOSIS — R09.02 HYPOXEMIA: ICD-10-CM

## 2019-02-15 DIAGNOSIS — D86.0 SARCOIDOSIS OF LUNG (CMS/HCC): Primary | ICD-10-CM

## 2019-02-20 ENCOUNTER — HOSPITAL ENCOUNTER (OUTPATIENT)
Dept: RADIOLOGY | Age: 84
Discharge: HOME | End: 2019-02-20
Attending: INTERNAL MEDICINE
Payer: MEDICARE

## 2019-02-20 DIAGNOSIS — D86.0 SARCOIDOSIS OF LUNG (CMS/HCC): ICD-10-CM

## 2019-02-20 DIAGNOSIS — R09.02 HYPOXEMIA: ICD-10-CM

## 2019-02-20 PROCEDURE — 71250 CT THORAX DX C-: CPT

## 2019-03-06 ENCOUNTER — TRANSCRIBE ORDERS (OUTPATIENT)
Dept: SCHEDULING | Age: 84
End: 2019-03-06

## 2019-03-06 DIAGNOSIS — R05.9 COUGH: Primary | ICD-10-CM

## 2019-03-07 ENCOUNTER — HOSPITAL ENCOUNTER (OUTPATIENT)
Dept: RADIOLOGY | Age: 84
Discharge: HOME | End: 2019-03-07
Attending: FAMILY MEDICINE
Payer: MEDICARE

## 2019-03-07 DIAGNOSIS — R05.9 COUGH: ICD-10-CM

## 2019-03-07 PROCEDURE — 71046 X-RAY EXAM CHEST 2 VIEWS: CPT

## 2019-03-10 ENCOUNTER — APPOINTMENT (EMERGENCY)
Dept: RADIOLOGY | Facility: HOSPITAL | Age: 84
End: 2019-03-10
Attending: EMERGENCY MEDICINE
Payer: MEDICARE

## 2019-03-10 ENCOUNTER — HOSPITAL ENCOUNTER (EMERGENCY)
Facility: HOSPITAL | Age: 84
Discharge: HOME | End: 2019-03-10
Attending: EMERGENCY MEDICINE
Payer: MEDICARE

## 2019-03-10 VITALS
SYSTOLIC BLOOD PRESSURE: 145 MMHG | OXYGEN SATURATION: 95 % | HEART RATE: 78 BPM | TEMPERATURE: 99 F | DIASTOLIC BLOOD PRESSURE: 65 MMHG | RESPIRATION RATE: 20 BRPM | BODY MASS INDEX: 31.5 KG/M2 | WEIGHT: 205 LBS

## 2019-03-10 DIAGNOSIS — J10.1 INFLUENZA A: Primary | ICD-10-CM

## 2019-03-10 LAB
FLUAV RNA SPEC QL NAA+PROBE: POSITIVE
FLUBV RNA SPEC QL NAA+PROBE: NEGATIVE
RSV RNA SPEC QL NAA+PROBE: NEGATIVE

## 2019-03-10 PROCEDURE — 63700000 HC SELF-ADMINISTRABLE DRUG: Performed by: PHYSICIAN ASSISTANT

## 2019-03-10 PROCEDURE — 87631 RESP VIRUS 3-5 TARGETS: CPT | Performed by: PHYSICIAN ASSISTANT

## 2019-03-10 PROCEDURE — 25000000 HC PHARMACY GENERAL: Performed by: PHYSICIAN ASSISTANT

## 2019-03-10 PROCEDURE — 94664 DEMO&/EVAL PT USE INHALER: CPT

## 2019-03-10 PROCEDURE — 99283 EMERGENCY DEPT VISIT LOW MDM: CPT | Mod: 25

## 2019-03-10 PROCEDURE — 94640 AIRWAY INHALATION TREATMENT: CPT

## 2019-03-10 PROCEDURE — 71046 X-RAY EXAM CHEST 2 VIEWS: CPT

## 2019-03-10 RX ORDER — LEVOFLOXACIN 500 MG/1
TABLET, FILM COATED ORAL
Refills: 0 | COMMUNITY
Start: 2019-03-06 | End: 2019-05-11

## 2019-03-10 RX ORDER — ALBUTEROL SULFATE 90 UG/1
2 INHALANT RESPIRATORY (INHALATION) SEE ADMIN INSTRUCTIONS
Qty: 1 INHALER | Refills: 0 | Status: SHIPPED | OUTPATIENT
Start: 2019-03-10 | End: 2019-03-10

## 2019-03-10 RX ORDER — ALBUTEROL SULFATE 90 UG/1
2 INHALANT RESPIRATORY (INHALATION) SEE ADMIN INSTRUCTIONS
Qty: 1 INHALER | Refills: 0 | Status: SHIPPED | OUTPATIENT
Start: 2019-03-10 | End: 2019-05-11

## 2019-03-10 RX ORDER — ALBUTEROL SULFATE 90 UG/1
2 INHALANT RESPIRATORY (INHALATION) ONCE
Status: COMPLETED | OUTPATIENT
Start: 2019-03-10 | End: 2019-03-10

## 2019-03-10 RX ORDER — ALBUTEROL SULFATE 90 UG/1
INHALANT RESPIRATORY (INHALATION)
Refills: 0 | COMMUNITY
Start: 2019-02-23 | End: 2022-05-10

## 2019-03-10 RX ORDER — ALBUTEROL SULFATE 0.83 MG/ML
2.5 SOLUTION RESPIRATORY (INHALATION) ONCE
Status: COMPLETED | OUTPATIENT
Start: 2019-03-10 | End: 2019-03-10

## 2019-03-10 RX ADMIN — ALBUTEROL SULFATE 2 PUFF: 90 AEROSOL, METERED RESPIRATORY (INHALATION) at 11:30

## 2019-03-10 RX ADMIN — ALBUTEROL SULFATE 2.5 MG: 2.5 SOLUTION RESPIRATORY (INHALATION) at 10:08

## 2019-03-10 ASSESSMENT — ENCOUNTER SYMPTOMS
SEIZURES: 0
AGITATION: 0
ARTHRALGIAS: 0
TROUBLE SWALLOWING: 0
DYSURIA: 0
NUMBNESS: 0
ABDOMINAL DISTENTION: 0
SPEECH DIFFICULTY: 0
SORE THROAT: 0
COUGH: 1
HEADACHES: 0
PSYCHIATRIC NEGATIVE: 1
WEAKNESS: 0
CHEST TIGHTNESS: 0
CHILLS: 0
NECK PAIN: 0
FEVER: 0
FLANK PAIN: 0
LIGHT-HEADEDNESS: 0
SHORTNESS OF BREATH: 0
STRIDOR: 0
NAUSEA: 0
DIZZINESS: 0
VOMITING: 0
COLOR CHANGE: 0
DIARRHEA: 0
ABDOMINAL PAIN: 0
EYE PAIN: 0
NECK STIFFNESS: 0
PALPITATIONS: 0
BACK PAIN: 0
WHEEZING: 0
HEMATURIA: 0

## 2019-03-10 NOTE — DISCHARGE INSTRUCTIONS
Please call and follow up with your primary care Dr. In the next 5-7 days. Please call them tomorrow to see if they want you to continue to take the antibiotics or not. Return here if your symptoms change, get worse or if you have any other concerns.

## 2019-03-10 NOTE — ED PROVIDER NOTES
HPI     Chief Complaint   Patient presents with   • Cough     Patient is a 83-year-old male with past medical history of high blood pressure hyperlipidemia arthritis CHF heart disease and is in the process for being worked up for sarcoidosis that came in the emergency room today due to a cough.  Patient reports she has had a very dry cough for about 5 days now he was actually evaluated by his primary care doctor and had an x-ray on 3/7/2019 that did not show any infiltrates or anything acute. He also had a CT of his chest without IV contrast on 2/20/2019 which showed minimal worsening of the parenchymal opacities and pulmonary nodules bilaterally.  Patient's primary care doctor started him on Levaquin which she has been taking for the last couple of days but he reports he has not noticed any improvement in the cough.  Patient was concerned because he still has the dry cough despite taking the antibiotics    HPI     Patient History     Past Medical History:   Diagnosis Date   • Arthritis    • BPH (benign prostatic hyperplasia)    • CHF (congestive heart failure) (CMS/HCC) ([facility])    • Coronary artery disease    • Heart disease    • Hyperlipidemia    • Hypertension    • Sarcoidosis        Past Surgical History:   Procedure Laterality Date   • AORTIC VALVE REPLACEMENT     • CARDIAC SURGERY     • CORONARY ARTERY BYPASS GRAFT     • POPLITEAL VENOUS ANEURYSM REPAIR         History reviewed. No pertinent family history.    Social History   Substance Use Topics   • Smoking status: Former Smoker     Quit date: 1978   • Smokeless tobacco: Never Used   • Alcohol use No       Systems Reviewed from Nursing Triage:          Review of Systems     Review of Systems   Constitutional: Negative for chills and fever.   HENT: Negative for sore throat and trouble swallowing.    Eyes: Negative for pain.   Respiratory: Positive for cough. Negative for chest tightness, shortness of breath, wheezing and stridor.    Cardiovascular: Negative for  chest pain, palpitations and leg swelling.   Gastrointestinal: Negative for abdominal distention, abdominal pain, diarrhea, nausea and vomiting.   Genitourinary: Negative for dysuria, flank pain and hematuria.   Musculoskeletal: Negative for arthralgias, back pain, neck pain and neck stiffness.   Skin: Negative for color change.   Neurological: Negative for dizziness, seizures, syncope, speech difficulty, weakness, light-headedness, numbness and headaches.   Psychiatric/Behavioral: Negative.  Negative for agitation.   All other systems reviewed and are negative.       Physical Exam     ED Triage Vitals   Temp Heart Rate Resp BP SpO2   03/10/19 0912 03/10/19 0912 03/10/19 0912 03/10/19 0912 03/10/19 0912   37.1 °C (98.8 °F) 84 16 139/74 98 %      Temp Source Heart Rate Source Patient Position BP Location FiO2 (%) (Set)   03/10/19 0912 03/10/19 1141 03/10/19 1141 03/10/19 1141 --   Tympanic Monitor Sitting Right upper arm                      Patient Vitals for the past 24 hrs:   BP Temp Temp src Pulse Resp SpO2 Weight   03/10/19 1141 (!) 145/65 37.2 °C (99 °F) Tympanic 78 20 95 % -   03/10/19 0912 139/74 37.1 °C (98.8 °F) Tympanic 84 16 98 % 93 kg (205 lb)           Physical Exam   Constitutional: He is oriented to person, place, and time. He appears well-developed and well-nourished.   HENT:   Head: Normocephalic and atraumatic.   Neck: Normal range of motion. Neck supple.   Cardiovascular: Normal rate and regular rhythm.    No murmur heard.  Pulmonary/Chest: Effort normal and breath sounds normal. No respiratory distress. He has no wheezes. He exhibits no tenderness.   Mild to moderate rhonchi b/l, mild decrease air movement b/l   Abdominal: Soft. He exhibits no distension and no mass. There is no tenderness. There is no rebound and no guarding.   Musculoskeletal: Normal range of motion. He exhibits no edema.   Neurological: He is alert and oriented to person, place, and time. No cranial nerve deficit or sensory  deficit. He exhibits normal muscle tone. Coordination normal.   Skin: Skin is warm and dry.   Psychiatric: He has a normal mood and affect. His behavior is normal.   Nursing note and vitals reviewed.           Procedures    ED Course & MDM     Labs Reviewed   RSV AND INFLUENZA A/B NUCLEIC ACIDS, PCR - Abnormal        Result Value    Influenza A Positive (*)     Influenza B Negative      Respiratory Syncytial Virus Negative         X-RAY CHEST 2 VIEWS   Final Result   IMPRESSION:  No active disease                  MDM         ED Course as of Mar 10 1159   Sun Mar 10, 2019   1158 Patient seem to be improved after the breathing treatment he is influenza A positive he is well outside the wound Tamiflu and overall looks well he is afebrile oxygen is within normal limits heart rate is within normal limits will be discharged with albuterol inhaler with spacer will follow up with his primary care doctor.  [TK]      ED Course User Index  [TK] Oscar Gonzalez PA C         Clinical Impressions as of Mar 10 1159   Influenza A        Oscar Gonzalez PA C  03/10/19 1155

## 2019-03-10 NOTE — ED ATTESTATION NOTE
I have personally seen and examined the patient.  I reviewed and agree with physician assistant / nurse practitioner’s assessment and plan of care, with the following exceptions: None  My examination, assessment, and plan of care of Michel Fang is as follows:       Cough  Appears well  Exam scattered ronchi  + flu  Inhaler  D/c  outside tamiflu window     Johnathan Saavedra, DO  03/10/19 1040

## 2019-05-11 ENCOUNTER — APPOINTMENT (EMERGENCY)
Dept: RADIOLOGY | Facility: HOSPITAL | Age: 84
DRG: 872 | End: 2019-05-11
Attending: EMERGENCY MEDICINE
Payer: MEDICARE

## 2019-05-11 ENCOUNTER — HOSPITAL ENCOUNTER (INPATIENT)
Facility: HOSPITAL | Age: 84
LOS: 5 days | Discharge: HOME | DRG: 872 | End: 2019-05-17
Attending: EMERGENCY MEDICINE | Admitting: HOSPITALIST
Payer: MEDICARE

## 2019-05-11 DIAGNOSIS — I25.83 CORONARY ARTERY DISEASE DUE TO LIPID RICH PLAQUE: ICD-10-CM

## 2019-05-11 DIAGNOSIS — I25.10 CORONARY ARTERY DISEASE DUE TO LIPID RICH PLAQUE: ICD-10-CM

## 2019-05-11 DIAGNOSIS — R50.9 FEVER, UNSPECIFIED FEVER CAUSE: Primary | ICD-10-CM

## 2019-05-11 DIAGNOSIS — R55 SYNCOPE, UNSPECIFIED SYNCOPE TYPE: ICD-10-CM

## 2019-05-11 DIAGNOSIS — E87.1 HYPONATREMIA: ICD-10-CM

## 2019-05-11 LAB
ALBUMIN SERPL-MCNC: 3.3 G/DL (ref 3.4–5)
ALP SERPL-CCNC: 74 IU/L (ref 35–126)
ALT SERPL-CCNC: 13 IU/L (ref 16–63)
ANION GAP SERPL CALC-SCNC: 10 MEQ/L (ref 3–15)
AST SERPL-CCNC: 17 IU/L (ref 15–41)
BACTERIA URNS QL MICRO: ABNORMAL /HPF
BASOPHILS # BLD: 0.04 K/UL (ref 0.01–0.1)
BASOPHILS NFR BLD: 0.6 %
BILIRUB SERPL-MCNC: 1.1 MG/DL (ref 0.3–1.2)
BILIRUB UR QL STRIP.AUTO: NEGATIVE MG/DL
BUN SERPL-MCNC: 14 MG/DL (ref 8–20)
CALCIUM SERPL-MCNC: 8.3 MG/DL (ref 8.9–10.3)
CHLORIDE SERPL-SCNC: 99 MEQ/L (ref 98–109)
CLARITY UR REFRACT.AUTO: CLEAR
CO2 SERPL-SCNC: 21 MEQ/L (ref 22–32)
COLOR UR AUTO: YELLOW
CREAT SERPL-MCNC: 1.2 MG/DL
DIFFERENTIAL METHOD BLD: ABNORMAL
EOSINOPHIL # BLD: 0.01 K/UL (ref 0.04–0.54)
EOSINOPHIL NFR BLD: 0.1 %
ERYTHROCYTE [DISTWIDTH] IN BLOOD BY AUTOMATED COUNT: 14.4 % (ref 11.6–14.4)
FLUAV RNA SPEC QL NAA+PROBE: NEGATIVE
FLUBV RNA SPEC QL NAA+PROBE: NEGATIVE
GFR SERPL CREATININE-BSD FRML MDRD: 58 ML/MIN/1.73M*2
GLUCOSE SERPL-MCNC: 185 MG/DL (ref 70–99)
GLUCOSE UR STRIP.AUTO-MCNC: NEGATIVE MG/DL
HCT VFR BLDCO AUTO: 36.9 %
HGB BLD-MCNC: 11.9 G/DL
HGB UR QL STRIP.AUTO: ABNORMAL
HYALINE CASTS #/AREA URNS LPF: ABNORMAL /LPF
IMM GRANULOCYTES # BLD AUTO: 0.03 K/UL (ref 0–0.08)
IMM GRANULOCYTES NFR BLD AUTO: 0.4 %
KETONES UR STRIP.AUTO-MCNC: NEGATIVE MG/DL
LACTATE SERPL-SCNC: 2.3 MMOL/L (ref 0.4–2)
LEUKOCYTE ESTERASE UR QL STRIP.AUTO: NEGATIVE
LIPASE SERPL-CCNC: 22 U/L (ref 20–51)
LYMPHOCYTES # BLD: 0.65 K/UL (ref 1.2–3.5)
LYMPHOCYTES NFR BLD: 9.2 %
MCH RBC QN AUTO: 28.5 PG (ref 28–33.2)
MCHC RBC AUTO-ENTMCNC: 32.2 G/DL (ref 32.2–36.5)
MCV RBC AUTO: 88.5 FL (ref 83–98)
MONOCYTES # BLD: 0.62 K/UL (ref 0.3–1)
MONOCYTES NFR BLD: 8.8 %
NEUTROPHILS # BLD: 5.7 K/UL (ref 1.7–7)
NEUTS SEG NFR BLD: 80.9 %
NITRITE UR QL STRIP.AUTO: NEGATIVE
NRBC BLD-RTO: 0 %
PDW BLD AUTO: 9.9 FL (ref 9.4–12.4)
PH UR STRIP.AUTO: 6 [PH]
PLATELET # BLD AUTO: 147 K/UL
POTASSIUM SERPL-SCNC: 4 MEQ/L (ref 3.6–5.1)
PROT SERPL-MCNC: 7.3 G/DL (ref 6–8.2)
PROT UR QL STRIP.AUTO: ABNORMAL
RBC # BLD AUTO: 4.17 M/UL (ref 4.5–5.8)
RBC #/AREA URNS HPF: ABNORMAL /HPF
RSV RNA SPEC QL NAA+PROBE: NEGATIVE
SODIUM SERPL-SCNC: 130 MEQ/L (ref 136–144)
SP GR UR REFRACT.AUTO: 1.02
SQUAMOUS URNS QL MICRO: ABNORMAL /HPF
TROPONIN I SERPL-MCNC: <0.02 NG/ML
TSH SERPL DL<=0.05 MIU/L-ACNC: 2.42 MIU/L (ref 0.34–5.6)
UROBILINOGEN UR STRIP-ACNC: 1 EU/DL
WBC # BLD AUTO: 7.05 K/UL
WBC #/AREA URNS HPF: ABNORMAL /HPF

## 2019-05-11 PROCEDURE — 80053 COMPREHEN METABOLIC PANEL: CPT | Performed by: PHYSICIAN ASSISTANT

## 2019-05-11 PROCEDURE — 87150 DNA/RNA AMPLIFIED PROBE: CPT | Performed by: PHYSICIAN ASSISTANT

## 2019-05-11 PROCEDURE — 25800000 HC PHARMACY IV SOLUTIONS: Performed by: PHYSICIAN ASSISTANT

## 2019-05-11 PROCEDURE — 99285 EMERGENCY DEPT VISIT HI MDM: CPT | Mod: 25

## 2019-05-11 PROCEDURE — 83605 ASSAY OF LACTIC ACID: CPT | Performed by: PHYSICIAN ASSISTANT

## 2019-05-11 PROCEDURE — 83690 ASSAY OF LIPASE: CPT | Performed by: PHYSICIAN ASSISTANT

## 2019-05-11 PROCEDURE — 70450 CT HEAD/BRAIN W/O DYE: CPT

## 2019-05-11 PROCEDURE — 81001 URINALYSIS AUTO W/SCOPE: CPT | Performed by: PHYSICIAN ASSISTANT

## 2019-05-11 PROCEDURE — 84484 ASSAY OF TROPONIN QUANT: CPT | Performed by: PHYSICIAN ASSISTANT

## 2019-05-11 PROCEDURE — 71046 X-RAY EXAM CHEST 2 VIEWS: CPT

## 2019-05-11 PROCEDURE — 87633 RESP VIRUS 12-25 TARGETS: CPT | Performed by: INTERNAL MEDICINE

## 2019-05-11 PROCEDURE — 63700000 HC SELF-ADMINISTRABLE DRUG: Performed by: PHYSICIAN ASSISTANT

## 2019-05-11 PROCEDURE — 87631 RESP VIRUS 3-5 TARGETS: CPT | Performed by: PHYSICIAN ASSISTANT

## 2019-05-11 PROCEDURE — 74177 CT ABD & PELVIS W/CONTRAST: CPT

## 2019-05-11 PROCEDURE — 36415 COLL VENOUS BLD VENIPUNCTURE: CPT | Performed by: PHYSICIAN ASSISTANT

## 2019-05-11 PROCEDURE — 63600105 HC IODINE BASED CONTRAST: Performed by: PHYSICIAN ASSISTANT

## 2019-05-11 PROCEDURE — 87040 BLOOD CULTURE FOR BACTERIA: CPT | Performed by: PHYSICIAN ASSISTANT

## 2019-05-11 PROCEDURE — 93005 ELECTROCARDIOGRAM TRACING: CPT | Performed by: PHYSICIAN ASSISTANT

## 2019-05-11 PROCEDURE — 84443 ASSAY THYROID STIM HORMONE: CPT | Performed by: PHYSICIAN ASSISTANT

## 2019-05-11 PROCEDURE — 85025 COMPLETE CBC W/AUTO DIFF WBC: CPT | Performed by: PHYSICIAN ASSISTANT

## 2019-05-11 RX ORDER — ACETAMINOPHEN 325 MG/1
975 TABLET ORAL ONCE
Status: COMPLETED | OUTPATIENT
Start: 2019-05-11 | End: 2019-05-11

## 2019-05-11 RX ORDER — ASPIRIN 325 MG
325 TABLET, DELAYED RELEASE (ENTERIC COATED) ORAL DAILY
COMMUNITY
Start: 2018-09-18

## 2019-05-11 RX ORDER — SODIUM CHLORIDE 9 MG/ML
125 INJECTION, SOLUTION INTRAVENOUS CONTINUOUS
Status: DISCONTINUED | OUTPATIENT
Start: 2019-05-11 | End: 2019-05-12 | Stop reason: DRUGHIGH

## 2019-05-11 RX ADMIN — IOHEXOL 135 ML: 300 INJECTION, SOLUTION INTRAVENOUS at 23:32

## 2019-05-11 RX ADMIN — SODIUM CHLORIDE 1000 ML: 9 INJECTION, SOLUTION INTRAVENOUS at 21:43

## 2019-05-11 RX ADMIN — ACETAMINOPHEN 975 MG: 325 TABLET, FILM COATED ORAL at 21:41

## 2019-05-11 ASSESSMENT — ENCOUNTER SYMPTOMS
PALPITATIONS: 0
NAUSEA: 0
ABDOMINAL PAIN: 0
SHORTNESS OF BREATH: 0
DIZZINESS: 0
VOMITING: 0
CHEST TIGHTNESS: 0
LIGHT-HEADEDNESS: 0

## 2019-05-11 NOTE — Clinical Note
LEXUS probe inserted with jaw lift maneuver. Probe inserted without difficulty. Probe insertion attempts: 1.

## 2019-05-12 ENCOUNTER — APPOINTMENT (OUTPATIENT)
Dept: RADIOLOGY | Facility: HOSPITAL | Age: 84
Setting detail: OBSERVATION
DRG: 872 | End: 2019-05-12
Attending: FAMILY MEDICINE
Payer: MEDICARE

## 2019-05-12 PROBLEM — I35.0 AORTIC STENOSIS: Status: ACTIVE | Noted: 2019-05-12

## 2019-05-12 PROBLEM — Z87.19 HISTORY OF GI BLEED: Status: ACTIVE | Noted: 2019-05-12

## 2019-05-12 PROBLEM — Z95.2 S/P TAVR (TRANSCATHETER AORTIC VALVE REPLACEMENT): Status: ACTIVE | Noted: 2019-05-12

## 2019-05-12 LAB
A BAUMANNII DNA SPEC QL NAA+PROBE: NOT DETECTED
ANION GAP SERPL CALC-SCNC: 10 MEQ/L (ref 3–15)
ATRIAL RATE: 75
BUN SERPL-MCNC: 12 MG/DL (ref 8–20)
C ALBICANS DNA BLD QL NAA+PROBE: NOT DETECTED
C GLABRATA DNA BLD QL NAA+PROBE: NOT DETECTED
C KRUSEI DNA BLD QL NAA+PROBE: NOT DETECTED
C PARAP DNA BLD QL NAA+PROBE: NOT DETECTED
C TROPICLS DNA BLD QL NAA+PROBE: NOT DETECTED
CALCIUM SERPL-MCNC: 8.4 MG/DL (ref 8.9–10.3)
CHLORIDE SERPL-SCNC: 105 MEQ/L (ref 98–109)
CO2 SERPL-SCNC: 20 MEQ/L (ref 22–32)
CREAT SERPL-MCNC: 1 MG/DL
E CLOAC COMP DNA BLD POS NAA+NON-PROBE: NOT DETECTED
E COLI DNA SPEC QL NAA+PROBE: NOT DETECTED
ENTEROBACTERIACEAE: NOT DETECTED
ENTEROCOC DNA SPEC QL NAA+PROBE: NOT DETECTED
ERYTHROCYTE [DISTWIDTH] IN BLOOD BY AUTOMATED COUNT: 14.4 % (ref 11.6–14.4)
FLUAV RNA SPEC QL NAA+PROBE: NEGATIVE
FLUBV RNA SPEC QL NAA+PROBE: NEGATIVE
GFR SERPL CREATININE-BSD FRML MDRD: >60 ML/MIN/1.73M*2
GLUCOSE SERPL-MCNC: 107 MG/DL (ref 70–99)
GP B STREP DNA SPEC QL NAA+PROBE: NOT DETECTED
HADV DNA SPEC QL NAA+PROBE: NEGATIVE
HAEM INFLU DNA SPEC QL NAA+PROBE: NOT DETECTED
HCOV 229E RNA SPEC QL NAA+PROBE: NEGATIVE
HCOV HKU1 RNA SPEC QL NAA+PROBE: NEGATIVE
HCOV NL63 RNA SPEC QL NAA+PROBE: NEGATIVE
HCOV OC43 RNA SPEC QL NAA+PROBE: NEGATIVE
HCT VFR BLDCO AUTO: 38 %
HGB BLD-MCNC: 12.5 G/DL
HMPV RNA SPEC QL NAA+PROBE: NEGATIVE
HPIV1 RNA SPEC QL NAA+PROBE: NEGATIVE
HPIV2 RNA SPEC QL NAA+PROBE: NEGATIVE
HPIV3 RNA SPEC QL NAA+PROBE: NEGATIVE
HPIV4 RNA SPEC QL NAA+PROBE: NEGATIVE
K OXYTOCA DNA BLD QL NAA+PROBE: NOT DETECTED
K PNEUMON DNA SPEC QL NAA+PROBE: NOT DETECTED
L MONOCYTOG DNA SPEC QL NAA+PROBE: NOT DETECTED
LACTATE SERPL-SCNC: 1 MMOL/L (ref 0.4–2)
MCH RBC QN AUTO: 28.9 PG (ref 28–33.2)
MCHC RBC AUTO-ENTMCNC: 32.9 G/DL (ref 32.2–36.5)
MCV RBC AUTO: 87.8 FL (ref 83–98)
N MEN DNA BLD QL NAA+PROBE: NOT DETECTED
P AERUGINOSA DNA SPEC QL NAA+PROBE: NOT DETECTED
P AXIS: -24
PDW BLD AUTO: 10 FL (ref 9.4–12.4)
PLATELET # BLD AUTO: 148 K/UL
POTASSIUM SERPL-SCNC: 4 MEQ/L (ref 3.6–5.1)
PR INTERVAL: 206
PROTEUS SP DNA BLD POS QL NAA+NON-PROBE: NOT DETECTED
QRS DURATION: 146
QT INTERVAL: 444
QTC CALCULATION(BAZETT): 495
R AXIS: -60
RBC # BLD AUTO: 4.33 M/UL (ref 4.5–5.8)
RSV RNA SPEC QL NAA+PROBE: NEGATIVE
RV+EV RNA SPEC QL NAA+PROBE: NEGATIVE
S AUREUS DNA SPEC QL NAA+PROBE: NOT DETECTED
S AUREUS+CONS DNA BLD POS NAA+NON-PROBE: NOT DETECTED
S MARCESCENS DNA SPEC QL NAA+PROBE: NOT DETECTED
S PNEUM DNA BLD QL NAA+PROBE: NOT DETECTED
S PYO DNA SPEC NAA+PROBE: NOT DETECTED
SODIUM SERPL-SCNC: 135 MEQ/L (ref 136–144)
STREPTOCOCCUS DNA BLD QL NAA+PROBE: DETECTED
T WAVE AXIS: 21
TEST PERFORMANCE INFO SPEC: ABNORMAL
TEST PERFORMANCE INFO SPEC: NORMAL
VENTRICULAR RATE: 75
WBC # BLD AUTO: 6.5 K/UL

## 2019-05-12 PROCEDURE — 80048 BASIC METABOLIC PNL TOTAL CA: CPT | Performed by: INTERNAL MEDICINE

## 2019-05-12 PROCEDURE — 200200 PR NO CHARGE: Performed by: HOSPITALIST

## 2019-05-12 PROCEDURE — 85027 COMPLETE CBC AUTOMATED: CPT | Performed by: INTERNAL MEDICINE

## 2019-05-12 PROCEDURE — 25800000 HC PHARMACY IV SOLUTIONS: Performed by: PHYSICIAN ASSISTANT

## 2019-05-12 PROCEDURE — G0378 HOSPITAL OBSERVATION PER HR: HCPCS

## 2019-05-12 PROCEDURE — 21400000 HC ROOM AND CARE CCU/INTERMEDIATE

## 2019-05-12 PROCEDURE — 71250 CT THORAX DX C-: CPT

## 2019-05-12 PROCEDURE — 63700000 HC SELF-ADMINISTRABLE DRUG: Performed by: INTERNAL MEDICINE

## 2019-05-12 PROCEDURE — 87077 CULTURE AEROBIC IDENTIFY: CPT | Performed by: INTERNAL MEDICINE

## 2019-05-12 PROCEDURE — 99220 PR INITIAL OBSERVATION CARE/DAY 70 MINUTES: CPT | Performed by: INTERNAL MEDICINE

## 2019-05-12 PROCEDURE — 25800000 HC PHARMACY IV SOLUTIONS: Performed by: INTERNAL MEDICINE

## 2019-05-12 PROCEDURE — 36415 COLL VENOUS BLD VENIPUNCTURE: CPT | Performed by: PHYSICIAN ASSISTANT

## 2019-05-12 PROCEDURE — 63600000 HC DRUGS/DETAIL CODE: Performed by: INTERNAL MEDICINE

## 2019-05-12 PROCEDURE — 83605 ASSAY OF LACTIC ACID: CPT | Performed by: PHYSICIAN ASSISTANT

## 2019-05-12 RX ORDER — DEXTROSE 40 %
15-30 GEL (GRAM) ORAL AS NEEDED
Status: DISCONTINUED | OUTPATIENT
Start: 2019-05-12 | End: 2019-05-17 | Stop reason: HOSPADM

## 2019-05-12 RX ORDER — DEXTROSE 50 % IN WATER (D50W) INTRAVENOUS SYRINGE
25 AS NEEDED
Status: DISCONTINUED | OUTPATIENT
Start: 2019-05-12 | End: 2019-05-17 | Stop reason: HOSPADM

## 2019-05-12 RX ORDER — ATORVASTATIN CALCIUM 40 MG/1
40 TABLET, FILM COATED ORAL
Status: DISCONTINUED | OUTPATIENT
Start: 2019-05-12 | End: 2019-05-17 | Stop reason: HOSPADM

## 2019-05-12 RX ORDER — SODIUM CHLORIDE 9 MG/ML
80 INJECTION, SOLUTION INTRAVENOUS CONTINUOUS
Status: ACTIVE | OUTPATIENT
Start: 2019-05-12 | End: 2019-05-12

## 2019-05-12 RX ORDER — NITROGLYCERIN 0.4 MG/1
0.4 TABLET SUBLINGUAL EVERY 5 MIN PRN
Status: DISCONTINUED | OUTPATIENT
Start: 2019-05-12 | End: 2019-05-17 | Stop reason: HOSPADM

## 2019-05-12 RX ORDER — ACETAMINOPHEN 325 MG/1
650 TABLET ORAL EVERY 4 HOURS PRN
Status: DISCONTINUED | OUTPATIENT
Start: 2019-05-12 | End: 2019-05-17 | Stop reason: HOSPADM

## 2019-05-12 RX ORDER — PANTOPRAZOLE SODIUM 40 MG/1
40 TABLET, DELAYED RELEASE ORAL DAILY
Status: DISCONTINUED | OUTPATIENT
Start: 2019-05-12 | End: 2019-05-17 | Stop reason: HOSPADM

## 2019-05-12 RX ORDER — ATROPINE SULFATE 0.1 MG/ML
0.5 INJECTION INTRAVENOUS EVERY 5 MIN PRN
Status: DISCONTINUED | OUTPATIENT
Start: 2019-05-12 | End: 2019-05-17 | Stop reason: HOSPADM

## 2019-05-12 RX ORDER — IBUPROFEN 200 MG
16-32 TABLET ORAL AS NEEDED
Status: DISCONTINUED | OUTPATIENT
Start: 2019-05-12 | End: 2019-05-17 | Stop reason: HOSPADM

## 2019-05-12 RX ORDER — LEVOTHYROXINE SODIUM 25 UG/1
25 TABLET ORAL
Status: DISCONTINUED | OUTPATIENT
Start: 2019-05-12 | End: 2019-05-17 | Stop reason: HOSPADM

## 2019-05-12 RX ORDER — ACETAMINOPHEN 650 MG/20.3ML
650 LIQUID ORAL EVERY 4 HOURS PRN
Status: DISCONTINUED | OUTPATIENT
Start: 2019-05-12 | End: 2019-05-17 | Stop reason: HOSPADM

## 2019-05-12 RX ORDER — ASPIRIN 325 MG
325 TABLET, DELAYED RELEASE (ENTERIC COATED) ORAL DAILY
Status: DISCONTINUED | OUTPATIENT
Start: 2019-05-12 | End: 2019-05-17 | Stop reason: HOSPADM

## 2019-05-12 RX ORDER — ACETAMINOPHEN 650 MG/1
650 SUPPOSITORY RECTAL EVERY 4 HOURS PRN
Status: DISCONTINUED | OUTPATIENT
Start: 2019-05-12 | End: 2019-05-17 | Stop reason: HOSPADM

## 2019-05-12 RX ORDER — ENOXAPARIN SODIUM 100 MG/ML
40 INJECTION SUBCUTANEOUS
Status: DISCONTINUED | OUTPATIENT
Start: 2019-05-12 | End: 2019-05-17 | Stop reason: HOSPADM

## 2019-05-12 RX ORDER — TAMSULOSIN HYDROCHLORIDE 0.4 MG/1
0.4 CAPSULE ORAL NIGHTLY
Status: DISCONTINUED | OUTPATIENT
Start: 2019-05-12 | End: 2019-05-17 | Stop reason: HOSPADM

## 2019-05-12 RX ADMIN — ATORVASTATIN CALCIUM 40 MG: 40 TABLET ORAL at 17:54

## 2019-05-12 RX ADMIN — CEFTRIAXONE SODIUM 2 G: 2 INJECTION, POWDER, FOR SOLUTION INTRAVENOUS at 23:29

## 2019-05-12 RX ADMIN — LEVOTHYROXINE SODIUM 25 MCG: 25 TABLET ORAL at 06:10

## 2019-05-12 RX ADMIN — ASPIRIN 325 MG: 325 TABLET, DELAYED RELEASE ORAL at 08:23

## 2019-05-12 RX ADMIN — SODIUM CHLORIDE 80 ML/HR: 9 INJECTION, SOLUTION INTRAVENOUS at 02:40

## 2019-05-12 RX ADMIN — ENOXAPARIN SODIUM 40 MG: 100 INJECTION SUBCUTANEOUS at 17:54

## 2019-05-12 RX ADMIN — TAMSULOSIN HYDROCHLORIDE 0.4 MG: 0.4 CAPSULE ORAL at 21:26

## 2019-05-12 RX ADMIN — SODIUM CHLORIDE 125 ML/HR: 9 INJECTION, SOLUTION INTRAVENOUS at 01:06

## 2019-05-12 RX ADMIN — PANTOPRAZOLE SODIUM 40 MG: 40 TABLET, DELAYED RELEASE ORAL at 08:23

## 2019-05-12 ASSESSMENT — COGNITIVE AND FUNCTIONAL STATUS - GENERAL
HELP NEEDED FOR BATHING: 4 - NONE
DRESSING REGULAR UPPER BODY CLOTHING: 4 - NONE
TOILETING: 4 - NONE
HELP NEEDED FOR PERSONAL GROOMING: 4 - NONE
CLIMB 3 TO 5 STEPS WITH RAILING: 4 - NONE
STANDING UP FROM CHAIR USING ARMS: 4 - NONE
DRESSING REGULAR LOWER BODY CLOTHING: 4 - NONE
MOVING TO AND FROM BED TO CHAIR: 4 - NONE
EATING MEALS: 4 - NONE
WALKING IN HOSPITAL ROOM: 3 - A LITTLE

## 2019-05-12 NOTE — UM PHYSICIAN REVIEW NOTE
Inpatient services are appropriate for this 83 year-old man with cough and concern for possible lung infection.

## 2019-05-12 NOTE — PLAN OF CARE
Problem: Patient Care Overview  Goal: Plan of Care Review  Outcome: Ongoing (interventions implemented as appropriate)   05/12/19 0502   Coping/Psychosocial   Plan Of Care Reviewed With patient   Plan of Care Review   Progress no change   Outcome Summary Pt admitted with fever 101.8 on admission. Tylenol administered. Remains afebrile. IVF. VSS on RA. Denies pain, SOB. Rule out origin of fever.      Goal: Individualization & Mutuality  Outcome: Ongoing (interventions implemented as appropriate)   05/12/19 0550   Individualization   Patient Specific Preferences Goes by Michel   Patient Specific Goals Return home today    Patient Specific Interventions IVF        Problem: Fall Risk (Adult)  Goal: Absence of Falls  Outcome: Ongoing (interventions implemented as appropriate)   05/12/19 0500   Fall Risk (Adult)   Absence of Falls making progress toward outcome

## 2019-05-12 NOTE — CONSULTS
Infectious Disease Consult Note    Patient Name: Michel Fang  MR#: [id number]  : 1935  Admission Date: 2019  Consult Date: 19 6:29 PM   Consultant: Richard Pineda MD  Referring Provider: Dr Rod  Reason for Consult: fever unknown origin  History of Present Illness   Michel Fang is a 83 y.o. man who was admitted on 2019 with fever off and on for the past nine months. He had pneumonia last August and has not felt the same since. He gets fever in the afternoon, with fatigue, and malaise. He has not had fever every day since August, but rather, it comes and goes for days at a time.  He has not noticed dyspnea. It feels to him the way he felt when he was first diagnosed with sarcoidosis. He has no headache, change in vision, neck stiffness, chest pain, palpitations, cough, abd pain, nausea, vomiting, diarrhea, joint pain, or rash. He has not been very active.     Allergies:   Allergies   Allergen Reactions   • No Known Allergies      Medical History:   Past Medical History:   Diagnosis Date   • Arthritis    • BPH (benign prostatic hyperplasia)    • CHF (congestive heart failure) (CMS/HCC) ([facility])    • Coronary artery disease    • Heart disease    • Hyperlipidemia    • Hypertension    • Sarcoidosis      Surgical History:   Past Surgical History:   Procedure Laterality Date   • AORTIC VALVE REPLACEMENT     • CARDIAC SURGERY     • CORONARY ARTERY BYPASS GRAFT     • POPLITEAL VENOUS ANEURYSM REPAIR       Social History   Substance Use Topics   • Smoking status: Former Smoker     Quit date:    • Smokeless tobacco: Never Used   • Alcohol use No   No IVDA  Family History: Noncontributory    Review of Systems: All other systems reviewed and negative except as noted in the HPI.    Medications:  Current IP Meds         Frequency     tamsulosin (FLOMAX) 24 hr ER capsule 0.4 mg      Nightly     atorvastatin (LIPITOR) tablet 40 mg      Daily (6p)     enoxaparin (LOVENOX) syringe 40 mg  (Assessed  at increased VTE risk (Padua score greater than or equal to 4))      Daily (6p)     aspirin EC tablet 325 mg      Daily     pantoprazole (PROTONIX) tablet,delayed release (DR/EC) 40 mg      Daily     levothyroxine (SYNTHROID) tablet 25 mcg      Daily (6:30a)     acetaminophen (TYLENOL) tablet 650 mg  (Analgesics - acetaminophen pain or fever)      Every 4 hours PRN     acetaminophen (TYLENOL) suppository 650 mg  (Analgesics - acetaminophen pain or fever)      Every 4 hours PRN     acetaminophen (TYLENOL) 650 mg/20.3 mL solution 650 mg  (Analgesics - acetaminophen pain or fever)      Every 4 hours PRN     atropine injection 0.5 mg      Every 5 min PRN     nitroglycerin (NITROSTAT) SL tablet 0.4 mg      Every 5 min PRN     glucose chewable tablet 16-32 g of dextrose  (Hypoglycemia Treatment Protocol and Hyperglycemia Validation Protocol)      As needed     dextrose 40 % oral gel 15-30 g of dextrose  (Hypoglycemia Treatment Protocol and Hyperglycemia Validation Protocol)      As needed     glucagon (GLUCAGEN) injection 1 mg  (Hypoglycemia Treatment Protocol and Hyperglycemia Validation Protocol)      As needed     dextrose in water injection 12.5 g  (Hypoglycemia Treatment Protocol and Hyperglycemia Validation Protocol)      As needed     sodium chloride 0.9 % infusion      Continuous     iohexol (OMNIPAQUE 350) 350 mg iodine/mL solution 135 mL      Once in imaging     sodium chloride 0.9 % infusion  Status:  Discontinued      Continuous     acetaminophen (TYLENOL) tablet 975 mg      Once     sodium chloride 0.9 % bolus 1,000 mL      Once          Physical Examination:  General: no acute distress  Neurologic: A&A Ox3   Head: normocephalic, atraumatic  Eyes: conjunctivae normal without injection or discharge, pupils equal, round, and reactive to light, no scleral icterus, extraocular muscles intact, visual acuity intact  Ears: external ears normal, no discharge from canals  Nose: no external deformity  Oropharynx: moist  mucous membranes with no lesions  Neck: normal range of motion, supple, with no tracheal deviation  Heart: regular rate, regular rhythm, normal S1, normal S2  Lungs: clear to auscultation bilaterally  Abdomen: soft, normal bowel sounds, no distension, no mass, no tenderness  Musculoskeletal: normal range of motion without deformity  Skin: warm and dry, no rash  Hematologic: no cervical lymphadenopathy   Extremities: no cyanosis, no edema  Vital signs reviewed  Temp (24hrs), Av.3 °C (99.1 °F), Min:36.6 °C (97.9 °F), Max:38.8 °C (101.8 °F)      Labs:  CBC Results       19                    [id number]         WBC 6.50 7.05 5.11         RBC 4.33 (L) 4.17 (L) 3.33 (L)         HGB 12.5 (L) 11.9 (L) 9.7 (L)         HCT 38.0 (L) 36.9 (L) 29.7 (L)         MCV 87.8 88.5 89.2         MCH 28.9 28.5 29.1         MCHC 32.9 32.2 32.7          (L) 147 (L) 117 (L)                   BMP Results       19                    1141 2112 0608          (L) 130 (L) 135 (L)         K 4.0 4.0 3.5 (L)         Cl 105 99 106         CO2 20 (L) 21 (L) 21 (L)         Glucose 107 (H) 185 (H) 108 (H)         BUN 12 14 10         Creatinine 1.0 1.2 1.1         Calcium 8.4 (L) 8.3 (L) 7.9 (L)         Anion Gap 10 10 8         EGFR &gt;60.0 58.0 (L) &gt;60.0         Comment for K at 2 on 19:    Results obtained on plasma. Plasma Potassium values may be up to 0.4 mEQ/L less than serum values. The differences may be greater for patients with high platelet or white cell counts.      PT/PTT Results       18                    1551 0246 1038         PT 15.3 (H) 15.2 (H) 14.0         INR 1.2 1.2 1.1         PTT 28 - -         Comment for INR at 1551 on 18:    Moderate Intensity Anticoagulation = 2.0 to 3.0, High Intensity = 2.5 to 3.5    Comment for INR at 0246 on 18:    Moderate Intensity Anticoagulation = 2.0 to 3.0, High Intensity = 2.5 to 3.5     Comment for INR at 1038 on 02/11/16:  INR HAS NO DEFINED SIGNIFICANCE WHEN PT IS WITHIN THE REFERENCE RANGE.      UA Results       05/11/19 2139           Color Yellow           Clarity Clear           Glucose Negative           Bilirubin Negative           Ketones Negative           Sp Grav 1.018           Blood Trace (A)           Ph 6.0           Protein Trace (A)           Urobilinogen 1.0           Nitrite Negative           Leuk Est Negative           WBC 0 TO 3           RBC 5 TO 9 (A)           Bacteria None Seen           Comment for Blood at 2139 on 05/11/19:    The sensitivity of the occult blood test is equivalent to approximately 4 intact RBC/HPF.    Comment for Protein at 2139 on 05/11/19:    Trace False Positive Protein can be seen with alkaline or highly buffered urines or urine with high specific gravity. Suggest clinical correlation.    Comment for Leuk Est at 2139 on 05/11/19:    Results can be falsely negative due to high specific gravity, some antibiotics, glucose >3 g/dl, or WBC other than neutrophils.      Lactate Results       05/12/19 05/11/19 09/07/18                    0102 2112 0533         Lactate 1.0 2.3 (H) 3.3 (H)                     Microbiology Results     Procedure Component Value Units Date/Time    RSV and Influenza Nucleic Acids, PCR Nasopharynx [[id number]]  (Normal) Collected:  05/11/19 2138    Specimen:  Nasopharyngeal Aspirate from Nasopharynx Updated:  05/11/19 2225     Influenza A Negative     Influenza B Negative     Respiratory Syncytial Virus Negative    Respiratory virus panel, PCR Nasopharynx [[id number]] Collected:  05/11/19 2138    Specimen:  Nasopharyngeal Aspirate from Nasopharynx Updated:  05/12/19 0431     Adenovirus Negative     Coronavirus 229E Negative     Coronavirus HKU1 Negative     Coronavirus NL63 Negative     Coronavirus OC43 Negative     Human Metapneumovirus Negative     Rhinovirus/Enterovirus Negative     Influenza A Negative      Influenza B Negative     Parainfluenza Virus 1 Negative     Parainfluenza Virus 2 Negative     Parainfluenza Virus 3 Negative     Parainfluenza Virus 4 Negative     Respiratory Syncytial Virus Negative     Viral Respiratory Comment --          Imaging: Independent review of admission CXR reveals no evidence of pneumonia        Assessment:   fever, possibly resolving based on today's temperatures, with no localizing signs of infection, pt reports it feels similar to sarcoidosis flares in past    Plan:   hold Abx   monitor temperatures, exam   follow up on pending microbiology   plan to d/w his sarcoidosis physician in the morning    Thank you for the courtesy of the infectious diseases consultation request. The service will follow with you and advise. Please do not hesitate to contact me if you have any questions about this case.     Richard Pineda MD  5/12/2019 6:29 PM

## 2019-05-12 NOTE — PLAN OF CARE
Problem: Patient Care Overview  Goal: Plan of Care Review  Outcome: Ongoing (interventions implemented as appropriate)   05/12/19 1528   Coping/Psychosocial   Plan Of Care Reviewed With patient   Plan of Care Review   Progress no change   Outcome Summary Pt. remains afebrile. Transferred to inpatient. Cultures pending.       Problem: Fall Risk (Adult)  Goal: Absence of Falls  Outcome: Ongoing (interventions implemented as appropriate)

## 2019-05-12 NOTE — H&P
Hospital Medicine Service -  History & Physical        CHIEF COMPLAINT   Fever and lethargy     HISTORY OF PRESENT ILLNESS      Michel Fagn is a 83 y.o. male with a past medical history of BPH, AS s/p TAVR, Sarcoidiosis, HTN, CAD who presents with fever and lethargy. Two daughters and one son is at bedside,   Per Maeve, patient has had a decline in his activity level and overall health since GIB in fall of 2019.   Patient states past 3 days no energy and couldn't leave the house. States intermittent fever and chills for past 3 days as well.     Then patient states fever has been intermittent for months. HE also has night sweats. Denies enlarged nodes. Also his appetite has decreased.     Patient lives alone. Daughter states when patient feels he has some energy he gets around but then for next few days he is down.     Today his other daughter went to check on him. He looked pale, chills and nausea. He then slumped his head down for a few seconds so she immediately called 911/    In ED labs at baseline.    Patient denies headache, neck pain, rashes, abdominal pain, diarrhea or tarry or bloody stools, trouble urinating.    PAST MEDICAL AND SURGICAL HISTORY      Past Medical History:   Diagnosis Date   • Arthritis    • BPH (benign prostatic hyperplasia)    • CHF (congestive heart failure) (CMS/HCC) ([facility])    • Coronary artery disease    • Heart disease    • Hyperlipidemia    • Hypertension    • Sarcoidosis        Past Surgical History:   Procedure Laterality Date   • AORTIC VALVE REPLACEMENT     • CARDIAC SURGERY     • CORONARY ARTERY BYPASS GRAFT     • POPLITEAL VENOUS ANEURYSM REPAIR         PCP: Johnathan Leone MD    MEDICATIONS      Prior to Admission medications    Medication Sig Start Date End Date Taking? Authorizing Provider     Prescriptions Prior to Admission   Medication Sig Dispense Refill Last Dose   • aspirin 325 mg EC tablet Take 325 mg by mouth daily.   5/11/2019 at Unknown time   • atorvastatin  (LIPITOR) 40 mg tablet Take 40 mg by mouth daily.     5/11/2019 at Unknown time   • levothyroxine (SYNTHROID) 25 mcg tablet Take 25 mcg by mouth daily.    5/11/2019 at Unknown time   • pantoprazole (PROTONIX) 40 mg EC tablet Take 40 mg by mouth daily.   5/11/2019 at Unknown time   • tamsulosin (FLOMAX) 0.4 mg capsule Take 0.4 mg by mouth nightly.   5/11/2019 at Unknown time   • albuterol HFA (VENTOLIN HFA) 90 mcg/actuation inhaler TAKE 2 PUFFS BY MOUTH EVERY 4 TO 6 HOURS AS NEEDED FOR BREATHING  0 Unknown at Unknown time         ALLERGIES      No known allergies    FAMILY HISTORY      Family History   Problem Relation Age of Onset   • Ovarian cancer Mother    • Lymphoma Other         age  17   • Lymphoma Son         age 20's       SOCIAL HISTORY      Social History     Social History   • Marital status:      Spouse name: N/A   • Number of children: N/A   • Years of education: N/A     Social History Main Topics   • Smoking status: Former Smoker     Quit date: 1978   • Smokeless tobacco: Never Used   • Alcohol use No   • Drug use: No   • Sexual activity: Defer     Other Topics Concern   • None     Social History Narrative   • None       REVIEW OF SYSTEMS      All other systems reviewed and negative except as noted in HPI    PHYSICAL EXAMINATION      Temp:  [37.3 °C (99.2 °F)-38.8 °C (101.8 °F)] 37.3 °C (99.2 °F)  Heart Rate:  [74-82] 82  Resp:  [16-18] 18  BP: (115-164)/(40-63) 115/40  Body mass index is 27.66 kg/m².    Physical Exam   Constitutional: He is oriented to person, place, and time. He appears well-developed and well-nourished. No distress.   HENT:   Head: Normocephalic and atraumatic.   Mouth/Throat: Oropharynx is clear and moist. No oropharyngeal exudate.   Eyes: Pupils are equal, round, and reactive to light. Conjunctivae and EOM are normal. No scleral icterus.   Neck: Normal range of motion. Neck supple. No JVD present.   Cardiovascular: Regular rhythm and intact distal pulses.    Murmur  heard.  Pulmonary/Chest: Breath sounds normal. No respiratory distress. He has no wheezes. He has no rales.   Abdominal: Soft. Bowel sounds are normal. He exhibits no distension. There is no tenderness. There is no rebound and no guarding.   Musculoskeletal: Normal range of motion. He exhibits no edema.   Neurological: He is alert and oriented to person, place, and time. No cranial nerve deficit.   No focal deficits   Skin: Skin is warm and dry.   Psychiatric: He has a normal mood and affect.   Nursing note and vitals reviewed.      LABS / IMAGING / EKG        Labs  I have reviewed the patient's pertinent labs.  Significant abnormals are Na 130, Lactate 2.3.    Imaging  I have independently reviewed the patient's pertinent imaging for this hospital visit. CXR reviewed by me- heart size normal. No focal lung opacities noted.    ECG/Telemetry  I have independently reviewed the ECG.May 11 2019 20:28AM- NSR 75bpm. RBBB. LAFB. EKG looks similar to 9/6/18    ASSESSMENT AND PLAN           Hyponatremia   Assessment & Plan    A: Na 130. Likely 2/2 poor intake and fever  ? Sarcoid related    P:   IVF  F/u BMP in AM     Pulmonary sarcoidosis (CMS/HCC) (HCC)   Assessment & Plan    No current tx. Follows with Dr. Sophia Castro at [place].   Concern recent intermittent fevers and night sweats are 2/2 sarcoid    Asked daughter to touch base with Dr. Castro about patient's symptoms as CT from Feb had noted mild progression since 2014     * Fever   Assessment & Plan    A: 101.8F on arrival to ED  Has had intermittent fever with night sweats on and off for several months  CXR, UA and WBC unremarkable.  No clear source of infection.  ON review of medical history patient is noted to have dx of sarcoid for which he sees a specialist at [place]. Saw Dr. Sophia Deal, on 5/2/19.She noted that recent imaging in Feb showed mild progression since 2014.She was considering gettting a PET scan based on further eval.    I am concerned if his symptoms  are 2/2 sarcoid. Also his son and grandson have history of lymphoma.      P:  Will f/u blood cx  Check viral panel  F/u CBC in AM  I had a discussion with Maeve, one of his daughters, and I advised her to touch base with Dr. Deal about his fever and sweats and consider exacerbation of sarcoid .  Also I advised f/u with hematologist.  I asked he keep a fever and night sweat diary at home.     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     Continue ASA and LIPITOR     Benign prostatic hyperplasia   Assessment & Plan    Continue FLOMAX          VTE Assessment: Padua VTE Score: 4  VTE Prophylaxis Plan: LOVENOX  Code Status: Full Code  Estimated Discharge Date: 5/13/2019  Disposition Planning: pending repeat labs     Florencia Martino DO  5/12/2019

## 2019-05-12 NOTE — ASSESSMENT & PLAN NOTE
Blood culture +strep  Has had intermittent fever with night sweats on and off for several months. Reports 1 week of worsening chills at home.    Rocephin 2g q24h   ID following   TTE today with PMH of TAVR  No clear source

## 2019-05-12 NOTE — PROGRESS NOTES
"Pt admitted early this morning for fever x many months thought to be due to \"flu\" followed by pneumonia. Was febrile as high as 101.8 on admission. Has hx pulmonary sarcoidosis follows with pulm. Does not see rheumatologist. No localizing signs or symptoms for infection. CT abd/pelvis negative for etiology. Will consult ID and check CT chest given recent pneumonia. CXR did not show any consolidation or effusion. Anticipate d/c in next 24-48 hours if work up including blood cultures remain negative.  "

## 2019-05-12 NOTE — ASSESSMENT & PLAN NOTE
No current tx. Follows with Dr. Sophia Castro at [place]  Diagnosed >10 yr ago    Daughter is touching base with Dr. Castro about patient's symptoms as CT from Feb had noted mild progression since 2014

## 2019-05-12 NOTE — ASSESSMENT & PLAN NOTE
A: GI bleed last fall.   Thought to be 2/2 AVM  EGD- esophagitis  Hgb stable and improved    Continue PPI

## 2019-05-12 NOTE — ED ATTESTATION NOTE
I have personally seen and examined the patient.  I reviewed the physician assistant / nurse practitioner’s assessment and plan of care.  My examination, assessment, and plan of care of Michel Fang is as follows:    83-year-old male, with fever and generalized weakness for the past several days.  Unresponsive episode with loss of consciousness times several minutes, sitting in chair.  Patient with nausea and dry heaving today.  Exam: Abdomen: Positive bowel sounds, soft, nontender nondistended  Plan: Rule out sepsis, rule out pneumonia, rule out flu, rule out UTI.  Given elderly male, dry heaving, fever, would recommend CT of the abdomen to rule out acute inflammatory process.     Tanesha Luong MD  05/11/19 6404

## 2019-05-13 ENCOUNTER — APPOINTMENT (INPATIENT)
Dept: CARDIOLOGY | Facility: HOSPITAL | Age: 84
DRG: 872 | End: 2019-05-13
Attending: INTERNAL MEDICINE
Payer: MEDICARE

## 2019-05-13 PROBLEM — B95.5 STREPTOCOCCAL BACTEREMIA: Status: ACTIVE | Noted: 2019-05-11

## 2019-05-13 PROBLEM — R78.81 STREPTOCOCCAL BACTEREMIA: Status: ACTIVE | Noted: 2019-05-11

## 2019-05-13 PROBLEM — R11.0 NAUSEA: Status: ACTIVE | Noted: 2019-05-13

## 2019-05-13 PROBLEM — R94.31 PROLONGED Q-T INTERVAL ON ECG: Status: ACTIVE | Noted: 2019-05-13

## 2019-05-13 LAB
AORTIC VALVE MEAN VELOCITY: 1.43 M/S
AORTIC VALVE VELOCITY TIME INTEGRAL: 37.2 CM
ATRIAL RATE: 68
AV MEAN GRADIENT: 9 MMHG
AV PEAK GRADIENT: 22 MMHG
AV PEAK VELOCITY-S: 2.35 M/S
AV VALVE AREA: 1.66 CM2
BACTERIA BLD CULT: ABNORMAL
BACTERIA BLD CULT: ABNORMAL
BSA FOR ECHO PROCEDURE: 2.08 M2
DOP CALC LVOT STROKE VOLUME: 61.95 ML
E WAVE DECELERATION TIME: 208 MS
E/A RATIO: 1.2
E/E' RATIO: 12.8
E/LAT E' RATIO: 4.4
EDV (BP): 159 CM3
EF (A4C): 60.2 %
EF A2C: 56.8 %
EJECTION FRACTION: 57 %
ESV (BP): 68.4 CM3
FRACTIONAL SHORTENING: 28.1 %
GRAM STN SPEC: ABNORMAL
GRAM STN SPEC: ABNORMAL
INTERVENTRICULAR SEPTUM: 1.34 CM
LAD 2D: 5.4 CM
LEFT INTERNAL DIMENSION IN SYSTOLE: 3.3 CM (ref 3.04–4.6)
LEFT VENTRICLE DIASTOLIC VOLUME INDEX: 90.38 CM3/M2
LEFT VENTRICLE DIASTOLIC VOLUME: 188 CM3
LEFT VENTRICLE SYSTOLIC VOLUME INDEX: 36.01 CM3/M2
LEFT VENTRICLE SYSTOLIC VOLUME: 74.9 CM3
LEFT VENTRICULAR INTERNAL DIMENSION IN DIASTOLE: 4.59 CM (ref 5.18–7.2)
LEFT VENTRICULAR POSTERIOR WALL IN END DIASTOLE: 0.81 CM (ref 0.67–1.24)
LV DIASTOLIC VOLUME: 129 CM3
LV ESV (APICAL 2 CHAMBER): 55.6 CM3
LVAD-AP2: 35.9 CM2
LVAD-AP4: 44.9 CM2
LVAS-AP2: 21.9 CM2
LVAS-AP4: 24.3 CM2
LVEDVI(A2C): 62.02 CM3/M2
LVEDVI(BP): 76.44 CM3/M2
LVESVI(A2C): 26.73 CM3/M2
LVESVI(BP): 32.88 CM3/M2
LVLD-AP2: 8.38 CM
LVLD-AP4: 8.75 CM
LVLS-AP2: 7.54 CM
LVLS-AP4: 6.63 CM
LVOT 2D: 2.4 CM
LVOT A: 4.52 CM2
LVOT MG: 1 MMHG
LVOT MV: 0.5 M/S
LVOT PEAK VELOCITY: 0.74 M/S
LVOT VTI: 13.7 CM
MV E'TISSUE VEL-LAT: 0.15 M/S
MV E'TISSUE VEL-MED: 0.05 M/S
MV PEAK A VEL: 0.57 M/S
MV PEAK E VEL: 0.66 M/S
MV VALVE AREA P 1/2 METHOD: 3.61 CM2
P AXIS: 25
POSTERIOR WALL: 0.81 CM
PR INTERVAL: 202
QRS DURATION: 152
QT INTERVAL: 462
QTC CALCULATION(BAZETT): 491
R AXIS: -57
T WAVE AXIS: -10
TR MAX PG: 17 MMHG
TRICUSPID VALVE PEAK REGURGITATION VELOCITY: 2.05 M/S
VENTRICULAR RATE: 68
Z-SCORE OF LEFT VENTRICULAR DIMENSION IN END DIASTOLE: -2.85
Z-SCORE OF LEFT VENTRICULAR DIMENSION IN END SYSTOLE: -0.99
Z-SCORE OF LEFT VENTRICULAR POSTERIOR WALL IN END DIASTOLE: -0.59

## 2019-05-13 PROCEDURE — 93306 TTE W/DOPPLER COMPLETE: CPT

## 2019-05-13 PROCEDURE — 21400000 HC ROOM AND CARE CCU/INTERMEDIATE

## 2019-05-13 PROCEDURE — 99232 SBSQ HOSP IP/OBS MODERATE 35: CPT | Performed by: INTERNAL MEDICINE

## 2019-05-13 PROCEDURE — 63700000 HC SELF-ADMINISTRABLE DRUG: Performed by: INTERNAL MEDICINE

## 2019-05-13 PROCEDURE — 63600000 HC DRUGS/DETAIL CODE: Performed by: INTERNAL MEDICINE

## 2019-05-13 PROCEDURE — 93005 ELECTROCARDIOGRAM TRACING: CPT | Performed by: FAMILY MEDICINE

## 2019-05-13 RX ORDER — ONDANSETRON 4 MG/1
4 TABLET, ORALLY DISINTEGRATING ORAL EVERY 8 HOURS PRN
Status: DISCONTINUED | OUTPATIENT
Start: 2019-05-13 | End: 2019-05-13

## 2019-05-13 RX ORDER — ONDANSETRON HYDROCHLORIDE 2 MG/ML
4 INJECTION, SOLUTION INTRAVENOUS EVERY 8 HOURS PRN
Status: DISCONTINUED | OUTPATIENT
Start: 2019-05-13 | End: 2019-05-13

## 2019-05-13 RX ADMIN — ENOXAPARIN SODIUM 40 MG: 100 INJECTION SUBCUTANEOUS at 18:20

## 2019-05-13 RX ADMIN — TAMSULOSIN HYDROCHLORIDE 0.4 MG: 0.4 CAPSULE ORAL at 21:43

## 2019-05-13 RX ADMIN — CEFTRIAXONE SODIUM 2 G: 2 INJECTION, POWDER, FOR SOLUTION INTRAVENOUS at 22:52

## 2019-05-13 RX ADMIN — ASPIRIN 325 MG: 325 TABLET, DELAYED RELEASE ORAL at 08:40

## 2019-05-13 RX ADMIN — PANTOPRAZOLE SODIUM 40 MG: 40 TABLET, DELAYED RELEASE ORAL at 08:40

## 2019-05-13 RX ADMIN — LEVOTHYROXINE SODIUM 25 MCG: 25 TABLET ORAL at 06:01

## 2019-05-13 RX ADMIN — ATORVASTATIN CALCIUM 40 MG: 40 TABLET ORAL at 18:20

## 2019-05-13 NOTE — PLAN OF CARE
Problem: Patient Care Overview  Goal: Discharge Needs Assessment  Outcome: Ongoing (interventions implemented as appropriate)   05/13/19 1300   DC Needs Assessment   Concerns To Be Addressed denies needs/concerns at this time   Readmission Within The Last 30 Days no previous admission in last 30 days   Anticipated Discharge Disposition other (see comments)  (home)   Current Discharge Risk lives alone;chronically ill   Current Health   Anticipated Changes Related to Illness none   Activity/Self Care ROS   Equipment Currently Used at Home walker, rolling   Met with patient. Patient lives alone, independent, drives. Family lives close by. Patient has been to [facility] in the past, has had home care with [facility]. Confirmed pcp, confirmed pharmacy [facility], denies issues accessing services or obtaining medications. ID following for antibiotics.

## 2019-05-13 NOTE — ASSESSMENT & PLAN NOTE
Patient reports mild nausea this AM, no vomiting     [place] breakfast   Tigan if nausea not improving   Repeat EKG for prolonged QTc, avoid Zofran if prolonged

## 2019-05-13 NOTE — CONSULTS
Cardiology Consult Note    Subjective     Michel Fang is a 83 y.o. male who was admitted for Hyponatremia [E87.1]  Fever [R50.9]  Fever, unspecified fever cause [R50.9]  Syncope, unspecified syncope type [R55]  Fever, unspecified fever cause [R50.9].   Refered by: Gen Navarro MD    Michel Fang is an 83 year-old male, known to Dr. 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 His symptoms were the most severe for 3 days prior to admission on 5/12. He reports having various infections since the fall of 2018 including pneumonia, influenza and an upper respiratory infection. His daughter was concerned about this declining function and increased lethargy yesterday.    He overall feels better today. He denies cough, chest pain, shortness of breath, orthopnea or PND. He had dental cavities filled in the past month and did take his prophylactic antibiotics.     Allergies  No known allergies    Prior to Admission medications    Medication Sig Start Date End Date Taking? Authorizing Provider   aspirin 325 mg EC tablet Take 325 mg by mouth daily. 9/18/18  Yes Art Jorgensen MD   atorvastatin (LIPITOR) 40 mg tablet Take 40 mg by mouth daily.     Yes Art Jorgensen MD   levothyroxine (SYNTHROID) 25 mcg tablet Take 25 mcg by mouth daily.    Yes Art Jorgensen MD   pantoprazole (PROTONIX) 40 mg EC tablet Take 40 mg by mouth daily.   Yes Art Jorgensen MD   tamsulosin (FLOMAX) 0.4 mg capsule Take 0.4 mg by mouth nightly.   Yes Art Jorgensen MD   albuterol HFA (VENTOLIN HFA) 90 mcg/actuation inhaler TAKE 2 PUFFS BY MOUTH EVERY 4 TO 6 HOURS AS NEEDED FOR BREATHING 2/23/19   ProviderArt MD       Current Facility-Administered Medications   Medication Dose Route Frequency Provider Last Rate Last Dose   • acetaminophen (TYLENOL)  tablet 650 mg  650 mg oral q4h PRN Skanah, Florencia, DO        Or   • acetaminophen (TYLENOL) suppository 650 mg  650 mg rectal q4h PRN Skariah, Florencia, DO        Or   • acetaminophen (TYLENOL) 650 mg/20.3 mL solution 650 mg  650 mg oral q4h PRN Skariah, Florencia, DO       • aspirin EC tablet 325 mg  325 mg oral Daily Skariaclarence, Florencia, DO   325 mg at 05/13/19 0840   • atorvastatin (LIPITOR) tablet 40 mg  40 mg oral Daily (6p) Skshruthi, Florencia, DO   40 mg at 05/12/19 1754   • atropine injection 0.5 mg  0.5 mg intravenous q5 min PRN Skshruthi, Florencia, DO       • cefTRIAXone (ROCEPHIN) IVPB 2 g in 100 mL NSS vial in bag  2 g intravenous q24h INT Skshruthi, Florencia, DO   Stopped at 05/13/19 0000   • glucose chewable tablet 16-32 g of dextrose  16-32 g of dextrose oral PRN Skshruthi, Florencia, DO        Or   • dextrose 40 % oral gel 15-30 g of dextrose  15-30 g of dextrose oral PRN Skshruthi, Florencia, DO        Or   • glucagon (GLUCAGEN) injection 1 mg  1 mg intramuscular PRN Skshruthi, Florencia, DO        Or   • dextrose in water injection 12.5 g  25 mL intravenous PRN Skshruthi, Florencia, DO       • enoxaparin (LOVENOX) syringe 40 mg  40 mg subcutaneous Daily (6p) Skshruthi, Florencia, DO   40 mg at 05/12/19 1754   • levothyroxine (SYNTHROID) tablet 25 mcg  25 mcg oral Daily (6:30a) Skshruthi, Florencia, DO   25 mcg at 05/13/19 0601   • nitroglycerin (NITROSTAT) SL tablet 0.4 mg  0.4 mg sublingual q5 min PRN Skshruthi, Florencia, DO       • pantoprazole (PROTONIX) tablet,delayed release (DR/EC) 40 mg  40 mg oral Daily Skariah, Florencia, DO   40 mg at 05/13/19 0840   • tamsulosin (FLOMAX) 24 hr ER capsule 0.4 mg  0.4 mg oral Nightly Skshruthi, Florencia, DO   0.4 mg at 05/12/19 2126       Past Medical History:   Diagnosis Date   • Arthritis    • BPH (benign prostatic hyperplasia)    • CHF (congestive heart failure) (CMS/HCC) (HCC)    • Coronary artery disease    • Heart disease    • Hyperlipidemia    • Hypertension    • Sarcoidosis        Past Surgical History:   Procedure Laterality Date   •  "AORTIC VALVE REPLACEMENT     • CARDIAC SURGERY     • CORONARY ARTERY BYPASS GRAFT     • POPLITEAL VENOUS ANEURYSM REPAIR         Social History   reports that he quit smoking about 41 years ago. He has never used smokeless tobacco. He reports that he does not drink alcohol or use drugs.    Family History   Problem Relation Age of Onset   • Ovarian cancer Mother    • Lymphoma Other         age  17   • Lymphoma Son         age 20's         Review of Systems: All other systems reviewed and negative except as noted in the HPI.    Physical Exam     BP (!) 110/56 (BP Location: Left upper arm, Patient Position: Sitting)   Pulse 69   Temp 37.2 °C (98.9 °F) (Oral)   Resp 18   Ht 1.753 m (5' 9\")   Wt 88.5 kg (195 lb)   SpO2 96%   BMI 28.80 kg/m²     The patient appears comfortable and is in no apparent distress,    Eyes: Eyelids and sclera normal,   Neck: No jugular venous distention, no thyromegaly, no lymphadenopathy, no carotid bruits,    Lungs: Clear to auscultation, normal respiratory effort,   Heart: Regular rhythm, normal S1 and S2, Grade II/VI diastolic murmur at RSB   Abdomen: Soft, nontender,  NABS x 4  Extremities: No edema, no calf tenderness or swelling, pulses intact,   Skin: No rashes,   Neuro: Alert and oriented without focal deficit,   Psych: Affect normal.      Objective     Labs   Lab Results   Component Value Date    WBC 6.50 05/12/2019    HGB 12.5 (L) 05/12/2019     (L) 05/12/2019    ALT 13 (L) 05/11/2019    AST 17 05/11/2019     (L) 05/12/2019    K 4.0 05/12/2019     05/12/2019    CREATININE 1.0 05/12/2019    BUN 12 05/12/2019    CO2 20 (L) 05/12/2019    TSH 2.42 05/11/2019    INR 1.2 09/06/2018    BNP 73 09/06/2018     Troponin I Results       05/11/19 09/06/18 09/04/18                    2112 1548 1446         Troponin I &lt;0.02 &lt;0.02 &lt;0.03                       Imaging  I have independently reviewed the pertinent imaging from the last 24 hrs.    Echocardiogram " 9/2018  · Technically difficult study, the patient was unable to roll onto his left side.  · Mildly dilated cavity size. Mild concentric left ventricular hypertrophy. Preserved systolic function. Estimated EF = 60%. Unable to assess diastolic filling pattern. Cannot adequately assess regional wall motion.  · Right heart structures are not well visualized.  · Bioprosthetic aortic valve replacement with peak velocity 2.3m/s, mean gradient 10mmHg. Moderate paravalvular aortic regurgitation (anterior, between 12-2 o'clock on short axis views.)  · Thickened mitral leaflets. Trace mitral regurgitation.  · Mildly dilated left atrium.  · No evidence of pericardial effusion.  · No prior study available for direct comparison.     Cardiac cath 2016  Moderate to Severe AS, EF=55%  Patent LIMA to Lad  Patent radial to RCA  Patent SVG to diagonal    ECG   sinus rhythm, RBBB, LAFB; no change cw 5/2019    Telemetry  sinus rhythm    Outside records reviewed..    Assessment     * Streptococcal bacteremia   Assessment & Plan    He had an echocardiogram today that will be reviewed. He did have dental cavities filled in the past month but says he took prophylactic antibiotics.       CAD (coronary artery disease)   Assessment & Plan    He follows with Dr. Arce in the office setting. Continue aspirin and statin.      Aortic stenosis   Assessment & Plan    He has history of TAVR 2016 and known perivalvular AI since 9/2018. Will review repeat echocardiogram.      HTN (hypertension)   Assessment & Plan    His blood pressure has been labile but overall controlled. He was not on blood pressure medication prior to admission.              NEYDA Mai  5/13/2019

## 2019-05-13 NOTE — ASSESSMENT & PLAN NOTE
His blood pressure has been labile but overall controlled. He was not on blood pressure medication prior to admission.

## 2019-05-13 NOTE — SIGNIFICANT EVENT
Called by RN- 2/4 blood cultures + strep (not agalactiae, pneumoniae or pyogenes).  Spoke with Dr. Pineda. Geoffrey start on ROCEPHIN 2gm q 24hrs.   Will repeat Blood cx.  Ordered ECHO- h/o TAVR

## 2019-05-13 NOTE — PROGRESS NOTES
Infectious Disease Progress Note  Patient Name: Michel Fang MR#: [id number] : 1935 Admitted 2019  Date: 19 Time: 10:59 AM Author: Richard Pineda MD  The patient is seen in follow up today for fever, and for streptococcal septicemia    ROS: no fever, chills, sweats, diarrhea, rash    Physical Examination:  General: no acute distress   Neurologic: alert   Oropharynx: moist mucous membranes with no lesions   Neck: supple, trachea midline   Heart: regular rate, regular rhythm, normal S1, normal S2  Lungs: clear to auscultation bilaterally   Abdomen: soft, normal bowel sounds, no distension, no mass, no tenderness  Musculoskeletal: normal range of motion, no deformity  Skin: no rash   Vital signs reviewed  Temp (24hrs), Av.1 °C (98.8 °F), Min:36.6 °C (97.9 °F), Max:37.9 °C (100.3 °F)    Recent Results (from the past 24 hour(s))   CBC    Collection Time: 19 11:41 AM   Result Value Ref Range    WBC 6.50 3.80 - 10.50 K/uL    RBC 4.33 (L) 4.50 - 5.80 M/uL    Hemoglobin 12.5 (L) 13.7 - 17.5 g/dL    Hematocrit 38.0 (L) 40.1 - 51.0 %    MCV 87.8 83.0 - 98.0 fL    MCH 28.9 28.0 - 33.2 pg    MCHC 32.9 32.2 - 36.5 g/dL    RDW 14.4 11.6 - 14.4 %    Platelets 148 (L) 150 - 350 K/uL    MPV 10.0 9.4 - 12.4 fL   Basic metabolic panel    Collection Time: 19 11:41 AM   Result Value Ref Range    Sodium 135 (L) 136 - 144 mEQ/L    Potassium 4.0 3.6 - 5.1 mEQ/L    Chloride 105 98 - 109 mEQ/L    CO2 20 (L) 22 - 32 mEQ/L    BUN 12 8 - 20 mg/dL    Creatinine 1.0 0.8 - 1.3 mg/dL    Glucose 107 (H) 70 - 99 mg/dL    Calcium 8.4 (L) 8.9 - 10.3 mg/dL    eGFR >60.0 >=60.0 mL/min/1.73m*2    Anion Gap 10 3 - 15 mEQ/L   ECG 12 lead    Collection Time: 19  9:45 AM   Result Value Ref Range    Ventricular rate 68     Atrial rate 68     [place] Interval 202     QRS duration 152     QT Interval 462     QTC Calculation(Bazett) 491     P Axis 25     R Axis -57     T Wave Axis -10      Anti-infectives     Start      Dose/Rate Route Frequency Ordered Stop    05/12/19 2330  cefTRIAXone (ROCEPHIN) IVPB 2 g in 100 mL NSS vial in bag      2 g  200 mL/hr over 30 Minutes intravenous Every 24 hours interval 05/12/19 2310          ASSESSMENT    tolerating antimicrobial therapy     PLAN    continue ceftriaxone   echocardiogram   monitor vital signs, exam   follow up on final microbiology (species identification, susceptibility testing)    Richard Pineda MD  5/13/2019 10:59 AM

## 2019-05-13 NOTE — PLAN OF CARE
Problem: Patient Care Overview  Goal: Plan of Care Review  Outcome: Ongoing (interventions implemented as appropriate)   05/13/19 0532   Coping/Psychosocial   Plan Of Care Reviewed With patient   Plan of Care Review   Progress no change   Outcome Summary patient has slight temp overnight. Blood Culture came back positive, antibiotics started       Problem: Fall Risk (Adult)  Goal: Absence of Falls  Outcome: Ongoing (interventions implemented as appropriate)

## 2019-05-13 NOTE — ASSESSMENT & PLAN NOTE
He has history of TAVR 2016 and known perivalvular AI since 9/2018. The valve function was stable by repeat echocardiogram this admission.

## 2019-05-13 NOTE — PROGRESS NOTES
Hospital Medicine Service -  Daily Progress Note       SUBJECTIVE   Interval History: Patient resting comfortably in bed in no acute distress.  Denies overnight fever, chills, headache, chest pain, shortness of breath.  Admits nausea this morning. Denies vomiting, diarrhea. He is aware of +blood culture. Last colonoscopy 2 years ago with bleeding after but otherwise patient reports normal. Basal cell x 3 removed by OP dermatology 2 weeks ago, no difficulty with wound healing after procedure.      OBJECTIVE      Vital signs in last 24 hours:  Temp:  [36.6 °C (97.9 °F)-37.9 °C (100.3 °F)] 37.2 °C (98.9 °F)  Heart Rate:  [60-76] 67  Resp:  [16-18] 16  BP: (142-177)/(65-78) 148/72    Intake/Output Summary (Last 24 hours) at 05/13/19 0919  Last data filed at 05/13/19 0700   Gross per 24 hour   Intake              480 ml   Output             1025 ml   Net             -545 ml       PHYSICAL EXAMINATION      Physical Exam  General: Awake, alert, well developed, no acute distress  HEENT:  Mucous membranes pink and moist  Respiratory:  Lungs clear to auscultation bilaterally, no wheeze, crackle, or rhonchi  Cardiac: Regular rate and rhythm. GYPSY  Abdomen:  Soft, non-tender, non-distended. Normal bowel sounds.  No rebound tenderness or guarding  Extremities:  No edema   Skin:  Warm, dry, intact  Neuro:  No focal deficit  Psych:  Cooperative, AAOx3   LINES, CATHETERS, DRAINS, AIRWAYS, AND WOUNDS   Lines, Drains, Airways, Wounds:  Peripheral IV 05/11/19 Right Wrist (Active)   Number of days: 2       Peripheral IV 05/11/19 Right Antecubital (Active)   Number of days: 2       Comments:      LABS / IMAGING / TELE      Labs    Lab Results   Component Value Date    WBC 6.50 05/12/2019    HGB 12.5 (L) 05/12/2019    HCT 38.0 (L) 05/12/2019    MCV 87.8 05/12/2019     (L) 05/12/2019     Lab Results   Component Value Date    GLUCOSE 107 (H) 05/12/2019    CALCIUM 8.4 (L) 05/12/2019     (L) 05/12/2019    K 4.0 05/12/2019     CO2 20 (L) 05/12/2019     05/12/2019    BUN 12 05/12/2019    CREATININE 1.0 05/12/2019       Microbiology Results     Procedure Component Value Units Date/Time    RSV and Influenza Nucleic Acids, PCR Nasopharynx [[id number]]  (Normal) Collected:  05/11/19 2138    Specimen:  Nasopharyngeal Aspirate from Nasopharynx Updated:  05/11/19 2225     Influenza A Negative     Influenza B Negative     Respiratory Syncytial Virus Negative    Respiratory virus panel, PCR Nasopharynx [[id number]] Collected:  05/11/19 2138    Specimen:  Nasopharyngeal Aspirate from Nasopharynx Updated:  05/12/19 0431     Adenovirus Negative     Coronavirus 229E Negative     Coronavirus HKU1 Negative     Coronavirus NL63 Negative     Coronavirus OC43 Negative     Human Metapneumovirus Negative     Rhinovirus/Enterovirus Negative     Influenza A Negative     Influenza B Negative     Parainfluenza Virus 1 Negative     Parainfluenza Virus 2 Negative     Parainfluenza Virus 3 Negative     Parainfluenza Virus 4 Negative     Respiratory Syncytial Virus Negative     Viral Respiratory Comment --    Blood Culture Blood, Venous [[id number]]  (Abnormal) Collected:  05/11/19 2112    Specimen:  Blood from Blood, Venous Updated:  05/13/19 0601     Culture **Positive Culture** (AA)     Gram Stain Result Gram positive cocci in chains      Gram positive cocci in chains    Blood Culture Blood, Venous [[id number]]  (Abnormal) Collected:  05/11/19 2112    Specimen:  Blood from Blood, Venous Updated:  05/13/19 0602     Culture **Positive Culture** (AA)     Gram Stain Result Gram positive cocci in chains      Gram positive cocci in chains    Blood Culture PCR Panel Blood, Venous [[id number]]  (Abnormal) Collected:  05/11/19 2112    Specimen:  Blood from Blood, Venous Updated:  05/12/19 2225     Acinetobacter baumannii Not Detected     Candida albicans Not Detected     Candida glabrata Not Detected     Candida krusei Not Detected     Candida parapsilosis Not Detected      Candida tropicalis Not Detected     Enterobacteriaceae Not Detected     Escherichia coli Not Detected     Enterobacter cloacae complex Not Detected     Pseudomonas aeruginosa Not Detected     Klebsiella oxytoca Not Detected     Klebsiella pneumoniae Not Detected     Proteus Not Detected     Serratia marcescens Not Detected     Haemophilus influenzae Not Detected     Neiseria meningitidis Not Detected     Listeria monocytogenes Not Detected     Staphylococcus Not Detected     Staphylococcus aureus Not Detected     Enterococcus Not Detected     Streptococcus Detected (A)     Streptococcus agalactiae (Group B) Not Detected     Streptococcus pneumoniae Not Detected     Streptococcus pyogenes (Group A) Not Detected     Comment: See below    Narrative:       This positive blood culture was tested with a rapid molecular panel that detects Enterococcus species, Listeria monocytogenes, Staphylococcus species, Staphylococcus aureus, Streptococcus agalactiae (Group B), Streptococcus pneumonia, Streptococcus pyogenes (Group A), Acinetobacter baumannii, Haemophilus influenza, Neisseria meningitides, Pseudomonas aeruginosa, Enterobacter cloacae complex, Escherichia coli, Klebsiella oxytoca, Klebsiella pneumonia, Proteus species, Serratia marcescens, Candida albicans, Candida glabrata, Candida krusei, Candida parapsilosis, and Candida tropicalis.          Imaging  I have independently reviewed the pertinent imaging from the last 24 hrs.  Ct Chest Without Iv Contrast    Result Date: 5/12/2019  STUDY:   CT examination of the chest performed the contrast following the [facility] ED standard protocol.  Images were reviewed in soft tissue, lung and bone windows. CT DOSE:  One or more dose reduction techniques (e.g. automated exposure control, adjustment of the mA and/or kV according to patient size, use of iterative reconstruction technique) utilized for this examination. CLINICAL HISTORY: 83-year-old male with left lower lobe  rales. COMPARISON: Chest CT dated 2/20/2019.     IMPRESSION: No acute cardiopulmonary abnormality. COMMENT: Postoperative Changes: Unchanged appearance status post post prior median sternotomy for coronary artery bypass graft and aortic stent placement. Heart and pericardium: Otherwise unchanged enlargement and severe coronary calcifications. Aorta and great vessels: Normal in caliber with mild scattered coarse mural calcifications. [place] and mediastinum: Small to medium hiatal hernia that now other mass or lymphadenopathy. Tracheobronchial tree: Patent. Lungs: Unchanged but unchanged scattered right greater than left basilar-predominant linear scarring with fibrosis in the costophrenic sulci. There is no airspace disease bilaterally. Pleura: No pleural fluid. Lower neck: Normal. Chest wall and axilla : Normal. Bones: Intact. Visualized upper abdomen: Unchanged cholelithiasis.       ECG/Telemetry  I have independently reviewed the telemetry. No events for the last 24 hours.    ASSESSMENT AND PLAN      * Streptococcal bacteremia   Assessment & Plan    Blood culture +strep  Has had intermittent fever with night sweats on and off for several months. Reports 1 week of worsening chills at home.    Rocephin 2g q24h   ID following   TTE today with PMH of TAVR  No clear source      Pulmonary sarcoidosis (CMS/HCC) (HCC)   Assessment & Plan    No current tx. Follows with Dr. Sophia Castro at [place]  Diagnosed >10 yr ago    Daughter is touching base with Dr. Castro about patient's symptoms as CT from Feb had noted mild progression since 2014     Nausea   Assessment & Plan    Patient reports mild nausea this AM, no vomiting     [place] breakfast   Tigan if nausea not improving   Repeat EKG for prolonged QTc, avoid Zofran if prolonged      Prolonged Q-T interval on ECG   Assessment & Plan    QTc 495 on admission     Repeat EKG today, use caution with meds      History of GI bleed   Assessment & Plan    A: GI bleed last fall.    Thought to be 2/2 AVM  EGD- esophagitis  Hgb stable and improved    Continue PPI     Aortic stenosis   Overview    S/p TAVR 5/2016,moderate  perivalvular AI 9/5/18     Assessment & Plan    Continue ASA 325mg daily ( taken off PLAVIX after GIB last fall and put on full dose ASA)       Hyponatremia   Assessment & Plan    Improving since admission     IVF  Follow BMP     CAD (coronary artery disease)   Assessment & Plan    stable    Continue ASA and LIPITOR     Benign prostatic hyperplasia   Assessment & Plan    Continue FLOMAX          VTE Assessment: [place] VTE Score: 4  VTE Prophylaxis Plan: lovenox   Code Status: Full Code  Estimated Discharge Date: 5/13/2019  Disposition Planning: pending      Fernando Shen, DO  5/13/2019

## 2019-05-13 NOTE — PLAN OF CARE
Problem: Patient Care Overview  Goal: Individualization & Mutuality  Outcome: Ongoing (interventions implemented as appropriate)   05/13/19 9756   Individualization   Patient Specific Preferences Pt likes to be called Oh   Mutuality/Individual Preferences   What Anxieties, Fears, Concerns, or Questions Do You Have About Your Care? Pt just wants to make sure okay before discharge.       Problem: Fall Risk (Adult)  Goal: Absence of Falls  Outcome: Ongoing (interventions implemented as appropriate)

## 2019-05-13 NOTE — PATIENT CARE CONFERENCE
Care Progression Rounds Note  Date: 5/13/2019  Time: 10:21 AM     Patient Name: Michel Fang     Medical Record Number: [id number]   YOB: 1935  Sex: Male      Room/Bed: 05    Admitting Diagnosis: Hyponatremia [E87.1]  Fever [R50.9]  Fever, unspecified fever cause [R50.9]  Syncope, unspecified syncope type [R55]  Fever, unspecified fever cause [R50.9]   Admit Date/Time: 5/11/2019  8:23 PM    Primary Diagnosis: Streptococcal bacteremia  Principal Problem: Streptococcal bacteremia    GMLOS: pending  Anticipated Discharge Date: 5/14/2019    AM-PAC  Mobility Score: 23    Discharge Planning:  Concerns To Be Addressed: no discharge needs identified  Anticipated Discharge Disposition: home with home health services  Type of Home Care Services: nursing    Barriers to Discharge:  Barriers to Discharge: Consult pending, Medical issues not resolved  Comment: ECHO pending;  IV antibiotics;  repeat BC    Participants:  , nursing, physical therapy, social work/services

## 2019-05-14 LAB
ANION GAP SERPL CALC-SCNC: 9 MEQ/L (ref 3–15)
BACTERIA BLD CULT: ABNORMAL
BACTERIA BLD CULT: ABNORMAL
BUN SERPL-MCNC: 15 MG/DL (ref 8–20)
CALCIUM SERPL-MCNC: 8.4 MG/DL (ref 8.9–10.3)
CHLORIDE SERPL-SCNC: 106 MEQ/L (ref 98–109)
CO2 SERPL-SCNC: 21 MEQ/L (ref 22–32)
CREAT SERPL-MCNC: 1.1 MG/DL
ERYTHROCYTE [DISTWIDTH] IN BLOOD BY AUTOMATED COUNT: 14.4 % (ref 11.6–14.4)
GFR SERPL CREATININE-BSD FRML MDRD: >60 ML/MIN/1.73M*2
GLUCOSE SERPL-MCNC: 102 MG/DL (ref 70–99)
GRAM STN SPEC: ABNORMAL
GRAM STN SPEC: ABNORMAL
HCT VFR BLDCO AUTO: 34.1 %
HGB BLD-MCNC: 11.3 G/DL
MCH RBC QN AUTO: 28.8 PG (ref 28–33.2)
MCHC RBC AUTO-ENTMCNC: 33.1 G/DL (ref 32.2–36.5)
MCV RBC AUTO: 86.8 FL (ref 83–98)
PDW BLD AUTO: 10.4 FL (ref 9.4–12.4)
PLATELET # BLD AUTO: 148 K/UL
POTASSIUM SERPL-SCNC: 3.9 MEQ/L (ref 3.6–5.1)
RBC # BLD AUTO: 3.93 M/UL (ref 4.5–5.8)
SODIUM SERPL-SCNC: 136 MEQ/L (ref 136–144)
WBC # BLD AUTO: 5.28 K/UL

## 2019-05-14 PROCEDURE — 85027 COMPLETE CBC AUTOMATED: CPT | Performed by: FAMILY MEDICINE

## 2019-05-14 PROCEDURE — 36415 COLL VENOUS BLD VENIPUNCTURE: CPT | Performed by: FAMILY MEDICINE

## 2019-05-14 PROCEDURE — 80048 BASIC METABOLIC PNL TOTAL CA: CPT | Performed by: FAMILY MEDICINE

## 2019-05-14 PROCEDURE — 63600000 HC DRUGS/DETAIL CODE: Performed by: INTERNAL MEDICINE

## 2019-05-14 PROCEDURE — 21400000 HC ROOM AND CARE CCU/INTERMEDIATE

## 2019-05-14 PROCEDURE — 99232 SBSQ HOSP IP/OBS MODERATE 35: CPT | Performed by: INTERNAL MEDICINE

## 2019-05-14 PROCEDURE — 63700000 HC SELF-ADMINISTRABLE DRUG: Performed by: INTERNAL MEDICINE

## 2019-05-14 RX ADMIN — CEFTRIAXONE SODIUM 2 G: 2 INJECTION, POWDER, FOR SOLUTION INTRAVENOUS at 23:20

## 2019-05-14 RX ADMIN — PANTOPRAZOLE SODIUM 40 MG: 40 TABLET, DELAYED RELEASE ORAL at 09:57

## 2019-05-14 RX ADMIN — ENOXAPARIN SODIUM 40 MG: 100 INJECTION SUBCUTANEOUS at 17:31

## 2019-05-14 RX ADMIN — LEVOTHYROXINE SODIUM 25 MCG: 25 TABLET ORAL at 05:35

## 2019-05-14 RX ADMIN — TAMSULOSIN HYDROCHLORIDE 0.4 MG: 0.4 CAPSULE ORAL at 21:27

## 2019-05-14 RX ADMIN — ACETAMINOPHEN 650 MG: 325 TABLET, FILM COATED ORAL at 00:01

## 2019-05-14 RX ADMIN — ATORVASTATIN CALCIUM 40 MG: 40 TABLET ORAL at 17:32

## 2019-05-14 RX ADMIN — ASPIRIN 325 MG: 325 TABLET, DELAYED RELEASE ORAL at 09:57

## 2019-05-14 NOTE — PLAN OF CARE
Problem: Patient Care Overview  Goal: Plan of Care Review  Outcome: Ongoing (interventions implemented as appropriate)   05/14/19 1500   Coping/Psychosocial   Plan Of Care Reviewed With patient   Plan of Care Review   Progress no change   Outcome Summary LEXUS postponed until tomorrow. Pt. aware of plan of care. VSS. Pt. denies any pain.       Problem: Fall Risk (Adult)  Goal: Absence of Falls  Outcome: Ongoing (interventions implemented as appropriate)   05/14/19 1500   Fall Risk (Adult)   Absence of Falls making progress toward outcome       Problem: Infection, Risk/Actual (Adult)  Goal: Infection Prevention/Resolution  Outcome: Ongoing (interventions implemented as appropriate)   05/14/19 1500   Infection, Risk/Actual (Adult)   Infection Prevention/Resolution making progress toward outcome

## 2019-05-14 NOTE — ASSESSMENT & PLAN NOTE
No current tx. Follows with Dr. Sophia Castro at [place]  Diagnosed >10 yr ago    Daughter is touching base with Dr. Castro about patient's symptoms as CT from Feb had noted mild progression since 2014

## 2019-05-14 NOTE — ASSESSMENT & PLAN NOTE
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

## 2019-05-14 NOTE — PLAN OF CARE
Problem: Patient Care Overview  Goal: Plan of Care Review  Outcome: Ongoing (interventions implemented as appropriate)   05/14/19 0538   Coping/Psychosocial   Plan Of Care Reviewed With patient   Plan of Care Review   Progress no change   Outcome Summary Temp max overnight was 100.3. NPO after midnight for LEXUS today. SBP 91, other VSS.       Problem: Fall Risk (Adult)  Goal: Identify Related Risk Factors and Signs and Symptoms  Outcome: Ongoing (interventions implemented as appropriate)      Problem: Infection, Risk/Actual (Adult)  Goal: Identify Related Risk Factors and Signs and Symptoms  Outcome: Outcome(s) Achieved Date Met: 05/14/19    Goal: Infection Prevention/Resolution  Outcome: Ongoing (interventions implemented as appropriate)

## 2019-05-14 NOTE — PROGRESS NOTES
Infectious Disease Progress Note  Patient Name: Michel Fang MR#: [id number] : 1935 Admitted 2019  Date: 19 Time: 8:07 AM Author: Richard Pineda MD  The patient is seen in follow up today for fever, and for Streptococcus mutans septicemia rule out endocarditis    ROS: no fever, chills, sweats, diarrhea, rash    Physical Examination:  General: no acute distress   Neurologic: alert   Oropharynx: moist mucous membranes with no lesions   Neck: supple, trachea midline   Heart: regular rate, regular rhythm, normal S1, normal S2  Lungs: clear to auscultation bilaterally   Abdomen: soft, normal bowel sounds, no distension, no mass, no tenderness  Musculoskeletal: normal range of motion, no deformity  Skin: no rash   Vital signs reviewed  Temp (24hrs), Av.3 °C (99.1 °F), Min:36.5 °C (97.7 °F), Max:37.9 °C (100.3 °F)    Recent Results (from the past 24 hour(s))   ECG 12 lead    Collection Time: 19  9:45 AM   Result Value Ref Range    Ventricular rate 68     Atrial rate 68     IN Interval 202     QRS duration 152     QT Interval 462     QTC Calculation(Bazett) 491     P Axis 25     R Axis -57     T Wave Axis -10    Transthoracic Echo (TTE) Complete    Collection Time: 19 11:50 AM   Result Value Ref Range    BSA 2.08 m2    LV Length-diastolic Apical Two Chamber 8.38 cm    LV Length-diastolic Apical Four Chamber 8.75 cm    LV Area-diastolic Apical Two Chamber 35.90 cm2    LV Area-diastolic Apical Four Chamber 44.90 cm2    LV EDV (apical 2 chamber 129.00 cm3    LV Diastolic Volume 188.00 cm3    End Diastolic Volume Biplane Measurement 159.00 cm3    LV Length-systolic Apical Two Chamber 7.54 cm    LV Length-systolic Apical Four Chamber 6.63 cm    LV Area-systolic Apical Two Chamber 21.90 cm2    LV Area-systolic Apical Four Chamber 24.30 cm2    LV ESV (Apical 2 Chamber) 55.60 cm3    LV Systolic Volume 74.90 cm3    End Systolic Volume Biplane 68.40 cm3    IVS 1.34 cm    LVIDD 4.59 5.18 -  7.20 cm    LVIDS 3.30 3.04 - 4.60 cm    Left Ventricular Outflow Tract Diameter 2D 2.40 cm    Left Ventricular Outflow Tract Mean Gradient 1.00 mmHg    Left Ventricular Outflow Tract VTI 13.70 cm    Left Ventricular Outflow Tract Mean Velocity 0.50 m/s    Left Ventricular Outflow Tract Peak Velocity 0.74 m/s    PW 0.81 cm    LVPWD 0.81 0.67 - 1.24 cm    MV E' Tissue Velocity Lateral 0.15 m/s    MV E' Tissue Velocity Medial 0.05 m/s    Ejection Fraction Apical 2 Chamber 56.80 %    Ejection Fraction (A4C) 60.20 %    EF 57.00 %    Tissue doppler E/ Lateral E' ratio 4.40     E/E' ratio 12.80     FS 28.10 %    Left Ventricular Outflow Tract Cross Section Area 4.52 cm2    LVOT stroke volume 61.95 mL    Aortic valve mean velocity 1.43 m/s    AV mean gradient 9.00 mmHg    Aortic valve velocity time integral 37.20 cm    Aortic Valve Systolic Peak Velocity 2.35 m/s    AV peak gradient 22.00 mmHg    AV valve area 1.66 cm2    Left Atrium Dimension 2D 5.40 cm    E wave decelartion time 208.00 ms    MV Peak A Booker 0.57 m/s    MV Peak E Booker 0.66 m/s    MV valve area p 1/2 method 3.61 cm2    E/A ratio 1.20     Tricuspid valve peak regurgitation velocity 2.05 m/s    Triscuspid Valve Regurgitation Peak Gradient 17.00 mmHg    LV Systolic Volume Index 36.01 cm3/m2    LV Diastolic Volume Index 90.38 cm3/m2    LV Systolic Volume Index (A2C) 26.73 cm3/m2    LV Systolic Volume  Index (BP) 32.88 cm3/m2    LV Diastolic Volume Index (A2C) 62.02 cm3/m2    LV Diastolic Volume Index (BP) 76.44 cm3/m2    ZLVIDD -2.85     ZLVIDS -0.99     ZLVPWD -0.59    CBC    Collection Time: 05/14/19  5:25 AM   Result Value Ref Range    WBC 5.28 3.80 - 10.50 K/uL    RBC 3.93 (L) 4.50 - 5.80 M/uL    Hemoglobin 11.3 (L) 13.7 - 17.5 g/dL    Hematocrit 34.1 (L) 40.1 - 51.0 %    MCV 86.8 83.0 - 98.0 fL    MCH 28.8 28.0 - 33.2 pg    MCHC 33.1 32.2 - 36.5 g/dL    RDW 14.4 11.6 - 14.4 %    Platelets 148 (L) 150 - 350 K/uL    MPV 10.4 9.4 - 12.4 fL   Basic metabolic  panel    Collection Time: 05/14/19  5:25 AM   Result Value Ref Range    Sodium 136 136 - 144 mEQ/L    Potassium 3.9 3.6 - 5.1 mEQ/L    Chloride 106 98 - 109 mEQ/L    CO2 21 (L) 22 - 32 mEQ/L    BUN 15 8 - 20 mg/dL    Creatinine 1.1 0.8 - 1.3 mg/dL    Glucose 102 (H) 70 - 99 mg/dL    Calcium 8.4 (L) 8.9 - 10.3 mg/dL    eGFR >60.0 >=60.0 mL/min/1.73m*2    Anion Gap 9 3 - 15 mEQ/L     Anti-infectives     Start     Dose/Rate Route Frequency Ordered Stop    05/12/19 2330  cefTRIAXone (ROCEPHIN) IVPB 2 g in 100 mL NSS vial in bag      2 g  200 mL/hr over 30 Minutes intravenous Every 24 hours interval 05/12/19 2310          ASSESSMENT    tolerating antimicrobial therapy     PLAN    continue ceftriaxone   LEXUS   monitor vital signs, exam   follow up on final microbiology (species identification, susceptibility testing)    Richard Pineda MD  5/14/2019 8:07 AM

## 2019-05-14 NOTE — PROGRESS NOTES
Hospital Medicine Service -  Daily Progress Note       SUBJECTIVE   Interval History: Patient resting comfortably in bed in no acute distress.  Admits 1 episode of overnight chills with Tmax 100.3. Denies nausea, vomiting, headache, chest pain, shortness of breath.  Blood culture with strep mutans. Had dental procedure on bottom R molar 3-4 weeks ago. Reports he did take amoxicillin 1 hr prior to procedure. Denies oral pain or discharge following procedure.      OBJECTIVE      Vital signs in last 24 hours:  Temp:  [36.5 °C (97.7 °F)-37.9 °C (100.3 °F)] 37 °C (98.6 °F)  Heart Rate:  [57-83] 57  Resp:  [18-20] 20  BP: ()/(53-72) 119/53    Intake/Output Summary (Last 24 hours) at 05/14/19 0982  Last data filed at 05/14/19 0400   Gross per 24 hour   Intake              720 ml   Output             1025 ml   Net             -305 ml       PHYSICAL EXAMINATION      Physical Exam  General: Awake, alert, well developed, no acute distress  HEENT:  Mucous membranes pink and moist. Multiple fillings in teeth, no obvious oral ulceration or abscess.   Respiratory:  Lungs clear to auscultation bilaterally, no wheeze, crackle, or rhonchi  Cardiac: Regular rate and rhythm.  Heart sounds normal.  No rubs, murmurs, or gallops  Abdomen:  Soft, non-tender, non-distended. Normal bowel sounds.  No rebound tenderness or guarding  Extremities:  No edema   Skin:  Warm, dry, intact  Neuro:  No focal deficit  Psych:  Cooperative, AAOx3   LINES, CATHETERS, DRAINS, AIRWAYS, AND WOUNDS   Lines, Drains, Airways, Wounds:  Peripheral IV 05/11/19 Right Wrist (Active)   Number of days: 3       Peripheral IV 05/11/19 Right Antecubital (Active)   Number of days: 3       Comments:      LABS / IMAGING / TELE      Labs    Lab Results   Component Value Date    WBC 5.28 05/14/2019    HGB 11.3 (L) 05/14/2019    HCT 34.1 (L) 05/14/2019    MCV 86.8 05/14/2019     (L) 05/14/2019     Lab Results   Component Value Date    GLUCOSE 102 (H) 05/14/2019     CALCIUM 8.4 (L) 05/14/2019     05/14/2019    K 3.9 05/14/2019    CO2 21 (L) 05/14/2019     05/14/2019    BUN 15 05/14/2019    CREATININE 1.1 05/14/2019       Microbiology Results     Procedure Component Value Units Date/Time    Blood Culture Blood, Venous [[id number]]  (Normal) Collected:  05/12/19 2311    Specimen:  Blood from Blood, Venous Updated:  05/14/19 0500     Culture No growth at 18-24 hours    RSV and Influenza Nucleic Acids, PCR Nasopharynx [[id number]]  (Normal) Collected:  05/11/19 2138    Specimen:  Nasopharyngeal Aspirate from Nasopharynx Updated:  05/11/19 2225     Influenza A Negative     Influenza B Negative     Respiratory Syncytial Virus Negative    Respiratory virus panel, PCR Nasopharynx [[id number]] Collected:  05/11/19 2138    Specimen:  Nasopharyngeal Aspirate from Nasopharynx Updated:  05/12/19 0431     Adenovirus Negative     Coronavirus 229E Negative     Coronavirus HKU1 Negative     Coronavirus NL63 Negative     Coronavirus OC43 Negative     Human Metapneumovirus Negative     Rhinovirus/Enterovirus Negative     Influenza A Negative     Influenza B Negative     Parainfluenza Virus 1 Negative     Parainfluenza Virus 2 Negative     Parainfluenza Virus 3 Negative     Parainfluenza Virus 4 Negative     Respiratory Syncytial Virus Negative     Viral Respiratory Comment --    Blood Culture Blood, Venous [[id number]] Collected:  05/11/19 2112    Specimen:  Blood from Blood, Venous Updated:  05/13/19 1157    Narrative:       The following orders were created for panel order Blood Culture Blood, Venous.  Procedure                               Abnormality         Status                     ---------                               -----------         ------                     Blood Culture Blood, Venous[[id number]]   Abnormal            Final result                 Please view results for these tests on the individual orders.    Blood Culture Blood, Venous [[id number]]  (Abnormal) Collected:   05/11/19 2112    Specimen:  Blood from Blood, Venous Updated:  05/13/19 0601     Culture **Positive Culture** (AA)     Gram Stain Result Gram positive cocci in chains      Gram positive cocci in chains    Blood Culture Blood, Venous [[id number]]  (Abnormal) Collected:  05/11/19 2112    Specimen:  Blood from Blood, Venous Updated:  05/13/19 1157     Culture **Positive Culture** (AA)      Streptococcus mutans     Gram Stain Result Gram positive cocci in chains      Gram positive cocci in chains    Blood Culture PCR Panel Blood, Venous [[id number]]  (Abnormal) Collected:  05/11/19 2112    Specimen:  Blood from Blood, Venous Updated:  05/12/19 2225     Acinetobacter baumannii Not Detected     Candida albicans Not Detected     Candida glabrata Not Detected     Candida krusei Not Detected     Candida parapsilosis Not Detected     Candida tropicalis Not Detected     Enterobacteriaceae Not Detected     Escherichia coli Not Detected     Enterobacter cloacae complex Not Detected     Pseudomonas aeruginosa Not Detected     Klebsiella oxytoca Not Detected     Klebsiella pneumoniae Not Detected     Proteus Not Detected     Serratia marcescens Not Detected     Haemophilus influenzae Not Detected     Neiseria meningitidis Not Detected     Listeria monocytogenes Not Detected     Staphylococcus Not Detected     Staphylococcus aureus Not Detected     Enterococcus Not Detected     Streptococcus Detected (A)     Streptococcus agalactiae (Group B) Not Detected     Streptococcus pneumoniae Not Detected     Streptococcus pyogenes (Group A) Not Detected     Comment: See below    Narrative:       This positive blood culture was tested with a rapid molecular panel that detects Enterococcus species, Listeria monocytogenes, Staphylococcus species, Staphylococcus aureus, Streptococcus agalactiae (Group B), Streptococcus pneumonia, Streptococcus pyogenes (Group A), Acinetobacter baumannii, Haemophilus influenza, Neisseria meningitides, Pseudomonas  aeruginosa, Enterobacter cloacae complex, Escherichia coli, Klebsiella oxytoca, Klebsiella pneumonia, Proteus species, Serratia marcescens, Candida albicans, Candida glabrata, Candida krusei, Candida parapsilosis, and Candida tropicalis.          Imaging    Transthoracic Echo (tte) Complete    Result Date: 5/13/2019  · Normal-sized left ventricular cavity. Concentric left ventricular hypertrophy. Preserved systolic function. Estimated EF = 55- 60%. · Mildly dilated left atrium. · Mild to moderate aortic valve regurgitation. A bioprosthetic aortic valve is present. · Mild mitral valve regurgitation. · Mild tricuspid valve regurgitation. Est PASP 20-25 mm Hg. · These findings are similar to the study of 9/5/2018.        ECG/Telemetry  I have independently reviewed the telemetry. No events for the last 24 hours.    ASSESSMENT AND PLAN      * Streptococcal bacteremia   Assessment & Plan    Blood culture +strep mutans  Dental procedure 3-4 weeks ago, took amoxicillin 1 hr prior to procedure   Denies oral pain since procedure   Tmax 100.3 overnight     Rocephin 2g q24h   ID following   LEXUS today, PMH of TAVR       Pulmonary sarcoidosis (CMS/HCC) ([facility])   Assessment & Plan    No current tx. Follows with Dr. Sophia Castro at [place]  Diagnosed >10 yr ago    Daughter is touching base with Dr. Castro about patient's symptoms as CT from Feb had noted mild progression since 2014     Nausea   Assessment & Plan    Resolved     Tigan for nausea with long QT     Prolonged Q-T interval on ECG   Assessment & Plan    QTc 495 on admission and on repeat EKG    Use caution with meds      History of GI bleed   Assessment & Plan    A: GI bleed last fall.   Thought to be 2/2 AVM  EGD- esophagitis  Hgb stable and improved    Continue PPI     Aortic stenosis   Assessment & Plan    Continue ASA 325mg daily ( taken off PLAVIX after GIB last fall and put on full dose ASA)       Hyponatremia   Assessment & Plan    Stable    Follow BMP     CAD  (coronary artery disease)   Assessment & Plan    stable    Continue ASA and LIPITOR     Benign prostatic hyperplasia   Assessment & Plan    Continue FLOMAX          VTE Assessment: Padua VTE Score: 4  VTE Prophylaxis Plan: lovenox   Code Status: Full Code  Estimated Discharge Date: 5/14/2019  Disposition Planning: >24- 48 h     Fernando Shen DO  5/14/2019

## 2019-05-14 NOTE — PROGRESS NOTES
Daily Progress Note    Subjective    Interval History: has no complaint of chest pain or SOB. He feels better overall today.      Current Facility-Administered Medications   Medication Dose Route Frequency Provider Last Rate Last Dose   • acetaminophen (TYLENOL) tablet 650 mg  650 mg oral q4h PRN SkFlorencia hawkins, DO   650 mg at 05/14/19 0001    Or   • acetaminophen (TYLENOL) suppository 650 mg  650 mg rectal q4h PRN Skshruthi, Florencia, DO        Or   • acetaminophen (TYLENOL) 650 mg/20.3 mL solution 650 mg  650 mg oral q4h PRN Skshruthi, Florencia, DO       • aspirin EC tablet 325 mg  325 mg oral Daily SkFlorencia hawkins, DO   325 mg at 05/14/19 0957   • atorvastatin (LIPITOR) tablet 40 mg  40 mg oral Daily (6p) Florencia Martino, DO   40 mg at 05/13/19 1820   • atropine injection 0.5 mg  0.5 mg intravenous q5 min PRN Gunner, Florencia, DO       • cefTRIAXone (ROCEPHIN) IVPB 2 g in 100 mL NSS vial in bag  2 g intravenous q24h INT Florencia Martino, DO   Stopped at 05/13/19 2348   • glucose chewable tablet 16-32 g of dextrose  16-32 g of dextrose oral PRN Florencia Martino, DO        Or   • dextrose 40 % oral gel 15-30 g of dextrose  15-30 g of dextrose oral PRN Gunner, Florencia, DO        Or   • glucagon (GLUCAGEN) injection 1 mg  1 mg intramuscular PRN Gunner, Florencia, DO        Or   • dextrose in water injection 12.5 g  25 mL intravenous PRN Gunner, Florencia, DO       • enoxaparin (LOVENOX) syringe 40 mg  40 mg subcutaneous Daily (6p) Florencia Martino, DO   40 mg at 05/13/19 1820   • levothyroxine (SYNTHROID) tablet 25 mcg  25 mcg oral Daily (6:30a) Florencia Martino, DO   25 mcg at 05/14/19 0535   • nitroglycerin (NITROSTAT) SL tablet 0.4 mg  0.4 mg sublingual q5 min PRN Gunner, Florencia, DO       • pantoprazole (PROTONIX) tablet,delayed release (DR/EC) 40 mg  40 mg oral Daily Florencia Martino DO   40 mg at 05/14/19 8516   • tamsulosin (FLOMAX) 24 hr ER capsule 0.4 mg  0.4 mg oral Nightly Florencia Martino DO   0.4 mg at 05/13/19 3169       Objective    Vital signs in  "last 24 hours:  Temp:  [36.5 °C (97.7 °F)-37.9 °C (100.3 °F)] 36.7 °C (98.1 °F)  Heart Rate:  [57-83] 81  Resp:  [18-20] 20  BP: ()/(53-61) 101/56      Intake/Output Summary (Last 24 hours) at 05/14/19 1148  Last data filed at 05/14/19 0400   Gross per 24 hour   Intake              720 ml   Output             1025 ml   Net             -305 ml         Physical Exam:  BP (!) 101/56 (BP Location: Right upper arm, Patient Position: Sitting)   Pulse 81   Temp 36.7 °C (98.1 °F) (Temporal)   Resp 20   Ht 1.753 m (5' 9\")   Wt 88.5 kg (195 lb)   SpO2 96%   BMI 28.80 kg/m²     The patient appears comfortable and is in no apparent distress,     Lungs: Clear to auscultation,    Heart: Regular rhythm,    Abdomen: Soft, nontender,     Extremities: No edema,    Neuro: Alert and oriented without focal deficit.     Labs  Lab Results   Component Value Date    WBC 5.28 05/14/2019    HGB 11.3 (L) 05/14/2019    HCT 34.1 (L) 05/14/2019     (L) 05/14/2019    ALT 13 (L) 05/11/2019    AST 17 05/11/2019     05/14/2019    K 3.9 05/14/2019     05/14/2019    CREATININE 1.1 05/14/2019    BUN 15 05/14/2019    CO2 21 (L) 05/14/2019    TSH 2.42 05/11/2019    INR 1.2 09/06/2018     Troponin I Results       05/11/19 09/06/18 09/04/18                    2112 1548 1446         Troponin I &lt;0.02 &lt;0.02 &lt;0.03                       Imaging  I have independently reviewed the pertinent imaging from the last 24 hrs.    TTE 5/13/19  · Normal-sized left ventricular cavity. Concentric left ventricular hypertrophy. Preserved systolic function. Estimated EF = 55- 60%.  · Mildly dilated left atrium.  · Mild to moderate aortic valve regurgitation. A bioprosthetic aortic valve is present.  · Mild mitral valve regurgitation.  · Mild tricuspid valve regurgitation. Est PASP 20-25 mm Hg.  · These findings are similar to the study of 9/5/2018.    ECG/Telemetry  I have independently reviewed the telemetry. No events for the last 24 " hours.     Assessment & Plan    * Streptococcal bacteremia   Assessment & Plan    The echocardiogram did now show evidence of IE. He will have a LEXUS done this admission. Due to scheduling issues, the test will be done tomorrow - 5/15.      CAD (coronary artery disease)   Assessment & Plan    He follows with Dr. Arce in the office setting. Continue aspirin and statin.      Aortic stenosis   Assessment & Plan    He has history of TAVR 2016 and known perivalvular AI since 9/2018. The valve function was stable by repeat echocardiogram this admission.      HTN (hypertension)   Assessment & Plan    His blood pressure has been labile but overall controlled. He was not on blood pressure medication prior to admission.          NEYDA Mai  5/14/2019

## 2019-05-14 NOTE — PATIENT CARE CONFERENCE
Care Progression Rounds Note  Date: 5/14/2019  Time: 10:22 AM     Patient Name: Michel Fang     Medical Record Number: [id number]   YOB: 1935  Sex: Male      Room/Bed: 35    Admitting Diagnosis: Hyponatremia [E87.1]  Fever [R50.9]  Fever, unspecified fever cause [R50.9]  Syncope, unspecified syncope type [R55]  Fever, unspecified fever cause [R50.9]   Admit Date/Time: 5/11/2019  8:23 PM    Primary Diagnosis: Streptococcal bacteremia  Principal Problem: Streptococcal bacteremia    GMLOS: 3.7  Anticipated Discharge Date: 5/16/2019    AM-PAC  Mobility Score: 23    Discharge Planning:  Concerns To Be Addressed: denies needs/concerns at this time  Anticipated Discharge Disposition: other (see comments) (home)  Type of Home Care Services: nursing    Barriers to Discharge:  Barriers to Discharge: Medical issues not resolved, Test pending, Consultant recommendations pending  Comment: bacteremia; needs LEXUS; BC pending    Participants:  advanced practice provider, , social work/services, nursing, physical therapy

## 2019-05-15 ENCOUNTER — ANESTHESIA (INPATIENT)
Dept: CARDIOLOGY | Facility: HOSPITAL | Age: 84
DRG: 872 | End: 2019-05-15
Payer: MEDICARE

## 2019-05-15 ENCOUNTER — APPOINTMENT (INPATIENT)
Dept: CARDIOLOGY | Facility: HOSPITAL | Age: 84
DRG: 872 | End: 2019-05-15
Attending: INTERNAL MEDICINE
Payer: MEDICARE

## 2019-05-15 ENCOUNTER — ANESTHESIA EVENT (INPATIENT)
Dept: CARDIOLOGY | Facility: HOSPITAL | Age: 84
DRG: 872 | End: 2019-05-15
Payer: MEDICARE

## 2019-05-15 LAB
ANION GAP SERPL CALC-SCNC: 9 MEQ/L (ref 3–15)
BSA FOR ECHO PROCEDURE: 2.08 M2
BUN SERPL-MCNC: 16 MG/DL (ref 8–20)
CALCIUM SERPL-MCNC: 8.1 MG/DL (ref 8.9–10.3)
CHLORIDE SERPL-SCNC: 105 MEQ/L (ref 98–109)
CO2 SERPL-SCNC: 21 MEQ/L (ref 22–32)
CREAT SERPL-MCNC: 1.2 MG/DL
ERYTHROCYTE [DISTWIDTH] IN BLOOD BY AUTOMATED COUNT: 14.5 % (ref 11.6–14.4)
GFR SERPL CREATININE-BSD FRML MDRD: 58 ML/MIN/1.73M*2
GLUCOSE SERPL-MCNC: 120 MG/DL (ref 70–99)
HCT VFR BLDCO AUTO: 32.7 %
HGB BLD-MCNC: 11.1 G/DL
LACTATE SERPL-SCNC: 1.5 MMOL/L (ref 0.4–2)
MCH RBC QN AUTO: 29.5 PG (ref 28–33.2)
MCHC RBC AUTO-ENTMCNC: 33.9 G/DL (ref 32.2–36.5)
MCV RBC AUTO: 87 FL (ref 83–98)
PDW BLD AUTO: 10.1 FL (ref 9.4–12.4)
PLATELET # BLD AUTO: 146 K/UL
POTASSIUM SERPL-SCNC: 3.9 MEQ/L (ref 3.6–5.1)
RBC # BLD AUTO: 3.76 M/UL (ref 4.5–5.8)
SODIUM SERPL-SCNC: 135 MEQ/L (ref 136–144)
WBC # BLD AUTO: 5.58 K/UL

## 2019-05-15 PROCEDURE — 87040 BLOOD CULTURE FOR BACTERIA: CPT | Performed by: FAMILY MEDICINE

## 2019-05-15 PROCEDURE — 93312 ECHO TRANSESOPHAGEAL: CPT

## 2019-05-15 PROCEDURE — 71000011 HC PACU PHASE 1 EA ADDL MIN: Performed by: SPECIALIST

## 2019-05-15 PROCEDURE — 63700000 HC SELF-ADMINISTRABLE DRUG: Performed by: INTERNAL MEDICINE

## 2019-05-15 PROCEDURE — 36415 COLL VENOUS BLD VENIPUNCTURE: CPT | Performed by: FAMILY MEDICINE

## 2019-05-15 PROCEDURE — 63600000 HC DRUGS/DETAIL CODE: Performed by: INTERNAL MEDICINE

## 2019-05-15 PROCEDURE — 63600000 HC DRUGS/DETAIL CODE: Mod: JW | Performed by: SPECIALIST

## 2019-05-15 PROCEDURE — 80048 BASIC METABOLIC PNL TOTAL CA: CPT | Performed by: FAMILY MEDICINE

## 2019-05-15 PROCEDURE — 99232 SBSQ HOSP IP/OBS MODERATE 35: CPT | Performed by: INTERNAL MEDICINE

## 2019-05-15 PROCEDURE — 71000001 HC PACU PHASE 1 INITIAL 30MIN: Performed by: SPECIALIST

## 2019-05-15 PROCEDURE — 85027 COMPLETE CBC AUTOMATED: CPT | Performed by: FAMILY MEDICINE

## 2019-05-15 PROCEDURE — 21400000 HC ROOM AND CARE CCU/INTERMEDIATE

## 2019-05-15 PROCEDURE — 83605 ASSAY OF LACTIC ACID: CPT | Performed by: FAMILY MEDICINE

## 2019-05-15 PROCEDURE — 37000001 HC ANESTHESIA GENERAL: Performed by: SPECIALIST

## 2019-05-15 PROCEDURE — B24BZZ4 ULTRASONOGRAPHY OF HEART WITH AORTA, TRANSESOPHAGEAL: ICD-10-PCS | Performed by: INTERNAL MEDICINE

## 2019-05-15 RX ORDER — PROPOFOL 10 MG/ML
INJECTION, EMULSION INTRAVENOUS CONTINUOUS PRN
Status: DISCONTINUED | OUTPATIENT
Start: 2019-05-15 | End: 2019-05-15 | Stop reason: SURG

## 2019-05-15 RX ORDER — PROPOFOL 10 MG/ML
INJECTION, EMULSION INTRAVENOUS AS NEEDED
Status: DISCONTINUED | OUTPATIENT
Start: 2019-05-15 | End: 2019-05-15 | Stop reason: SURG

## 2019-05-15 RX ADMIN — LEVOTHYROXINE SODIUM 25 MCG: 25 TABLET ORAL at 05:51

## 2019-05-15 RX ADMIN — TAMSULOSIN HYDROCHLORIDE 0.4 MG: 0.4 CAPSULE ORAL at 22:37

## 2019-05-15 RX ADMIN — ATORVASTATIN CALCIUM 40 MG: 40 TABLET ORAL at 17:39

## 2019-05-15 RX ADMIN — ASPIRIN 325 MG: 325 TABLET, DELAYED RELEASE ORAL at 14:48

## 2019-05-15 RX ADMIN — ENOXAPARIN SODIUM 40 MG: 100 INJECTION SUBCUTANEOUS at 17:39

## 2019-05-15 RX ADMIN — PROPOFOL 150 MCG/KG/MIN: 10 INJECTION, EMULSION INTRAVENOUS at 13:02

## 2019-05-15 RX ADMIN — PANTOPRAZOLE SODIUM 40 MG: 40 TABLET, DELAYED RELEASE ORAL at 14:48

## 2019-05-15 RX ADMIN — PROPOFOL 40 MG: 10 INJECTION, EMULSION INTRAVENOUS at 13:01

## 2019-05-15 RX ADMIN — ACETAMINOPHEN 650 MG: 325 TABLET, FILM COATED ORAL at 03:03

## 2019-05-15 RX ADMIN — CEFTRIAXONE SODIUM 2 G: 2 INJECTION, POWDER, FOR SOLUTION INTRAVENOUS at 22:45

## 2019-05-15 NOTE — ASSESSMENT & PLAN NOTE
Blood culture +strep mutans  Dental procedure 5/3/19, took amoxicillin 1 hr prior to procedure   Tmax 100.6 overnight     Rocephin 2g q24h   ID following   LEXUS today, PMH of TAVR

## 2019-05-15 NOTE — ANESTHESIA PREPROCEDURE EVALUATION
Anesthesia ROS/MED HX      Cardiovascular   CAD   hypertension   CHF      Past Surgical History:   Procedure Laterality Date   • AORTIC VALVE REPLACEMENT     • CARDIAC SURGERY     • CORONARY ARTERY BYPASS GRAFT     • POPLITEAL VENOUS ANEURYSM REPAIR         Physical Exam    Airway   Mallampati: II  Cardiovascular    Rhythm: regular   Rate: normal  Pulmonary    clear to auscultation        Anesthesia Plan    Plan: general    Technique: total IV anesthesia   ASA 4

## 2019-05-15 NOTE — PROGRESS NOTES
Hospital Medicine Service -  Daily Progress Note       SUBJECTIVE   Interval History: Patient resting comfortably OOB in no acute distress.  Admits overnight fever. Denies overnight chills, headache, chest pain, shortness of breath.  No new complaints.     OBJECTIVE      Vital signs in last 24 hours:  Temp:  [36.7 °C (98.1 °F)-38.1 °C (100.6 °F)] 36.8 °C (98.3 °F)  Heart Rate:  [57-89] 57  Resp:  [16-20] 16  BP: (101-155)/(55-69) 155/69    Intake/Output Summary (Last 24 hours) at 05/15/19 1010  Last data filed at 05/14/19 1700   Gross per 24 hour   Intake              120 ml   Output              800 ml   Net             -680 ml       PHYSICAL EXAMINATION      Physical Exam  General: Awake, alert, well developed, no acute distress  HEENT:  Mucous membranes pink and moist  Respiratory:  Coarse BS, no wheeze, crackle, or rhonchi  Cardiac: Regular rate and rhythm.  GYPSY  Abdomen:  Soft, non-tender, non-distended. Normal bowel sounds.  No rebound tenderness or guarding  Extremities:  No edema   Skin:  Warm, dry, intact  Neuro:  No focal deficit  Psych:  Cooperative, AAOx3   LINES, CATHETERS, DRAINS, AIRWAYS, AND WOUNDS   Lines, Drains, Airways, Wounds:  Peripheral IV 05/11/19 Right Antecubital (Active)   Number of days: 4       Comments:      LABS / IMAGING / TELE      Labs    Lab Results   Component Value Date    WBC 5.58 05/15/2019    HGB 11.1 (L) 05/15/2019    HCT 32.7 (L) 05/15/2019    MCV 87.0 05/15/2019     (L) 05/15/2019     Lab Results   Component Value Date    GLUCOSE 120 (H) 05/15/2019    CALCIUM 8.1 (L) 05/15/2019     (L) 05/15/2019    K 3.9 05/15/2019    CO2 21 (L) 05/15/2019     05/15/2019    BUN 16 05/15/2019    CREATININE 1.2 05/15/2019       Microbiology Results     Procedure Component Value Units Date/Time    Blood Culture Blood, Venous [[id number]]  (Abnormal) Collected:  05/12/19 2311    Specimen:  Blood from Blood, Venous Updated:  05/15/19 0980     Culture **Positive Culture**  (AA)     Gram Stain Result Gram positive cocci      Gram positive cocci in chains    RSV and Influenza Nucleic Acids, PCR Nasopharynx [[id number]]  (Normal) Collected:  05/11/19 2138    Specimen:  Nasopharyngeal Aspirate from Nasopharynx Updated:  05/11/19 2225     Influenza A Negative     Influenza B Negative     Respiratory Syncytial Virus Negative    Respiratory virus panel, PCR Nasopharynx [[id number]] Collected:  05/11/19 2138    Specimen:  Nasopharyngeal Aspirate from Nasopharynx Updated:  05/12/19 0431     Adenovirus Negative     Coronavirus 229E Negative     Coronavirus HKU1 Negative     Coronavirus NL63 Negative     Coronavirus OC43 Negative     Human Metapneumovirus Negative     Rhinovirus/Enterovirus Negative     Influenza A Negative     Influenza B Negative     Parainfluenza Virus 1 Negative     Parainfluenza Virus 2 Negative     Parainfluenza Virus 3 Negative     Parainfluenza Virus 4 Negative     Respiratory Syncytial Virus Negative     Viral Respiratory Comment --    Blood Culture Blood, Venous [[id number]] Collected:  05/11/19 2112    Specimen:  Blood from Blood, Venous Updated:  05/14/19 1145    Narrative:       The following orders were created for panel order Blood Culture Blood, Venous.  Procedure                               Abnormality         Status                     ---------                               -----------         ------                     Blood Culture PCR Panel B...[[id number]]  Abnormal            Final result               Blood Culture Blood, Venous[[id number]]   Abnormal            Final result                 Please view results for these tests on the individual orders.    Blood Culture Blood, Venous [[id number]] Collected:  05/11/19 2112    Specimen:  Blood from Blood, Venous Updated:  05/13/19 1157    Narrative:       The following orders were created for panel order Blood Culture Blood, Venous.  Procedure                               Abnormality         Status                      ---------                               -----------         ------                     Blood Culture Blood, Venous[[id number]]   Abnormal            Final result                 Please view results for these tests on the individual orders.    Blood Culture Blood, Venous [[id number]]  (Abnormal) Collected:  05/11/19 2112    Specimen:  Blood from Blood, Venous Updated:  05/14/19 1145     Culture **Positive Culture** (AA)      Streptococcus mutans     Gram Stain Result Gram positive cocci in chains      Gram positive cocci in chains    Blood Culture Blood, Venous [[id number]]  (Abnormal) Collected:  05/11/19 2112    Specimen:  Blood from Blood, Venous Updated:  05/13/19 1157     Culture **Positive Culture** (AA)      Streptococcus mutans     Gram Stain Result Gram positive cocci in chains      Gram positive cocci in chains    Blood Culture PCR Panel Blood, Venous [[id number]]  (Abnormal) Collected:  05/11/19 2112    Specimen:  Blood from Blood, Venous Updated:  05/12/19 2225     Acinetobacter baumannii Not Detected     Candida albicans Not Detected     Candida glabrata Not Detected     Candida krusei Not Detected     Candida parapsilosis Not Detected     Candida tropicalis Not Detected     Enterobacteriaceae Not Detected     Escherichia coli Not Detected     Enterobacter cloacae complex Not Detected     Pseudomonas aeruginosa Not Detected     Klebsiella oxytoca Not Detected     Klebsiella pneumoniae Not Detected     Proteus Not Detected     Serratia marcescens Not Detected     Haemophilus influenzae Not Detected     Neiseria meningitidis Not Detected     Listeria monocytogenes Not Detected     Staphylococcus Not Detected     Staphylococcus aureus Not Detected     Enterococcus Not Detected     Streptococcus Detected (A)     Streptococcus agalactiae (Group B) Not Detected     Streptococcus pneumoniae Not Detected     Streptococcus pyogenes (Group A) Not Detected     Comment: See below    Narrative:       This positive blood  culture was tested with a rapid molecular panel that detects Enterococcus species, Listeria monocytogenes, Staphylococcus species, Staphylococcus aureus, Streptococcus agalactiae (Group B), Streptococcus pneumonia, Streptococcus pyogenes (Group A), Acinetobacter baumannii, Haemophilus influenza, Neisseria meningitides, Pseudomonas aeruginosa, Enterobacter cloacae complex, Escherichia coli, Klebsiella oxytoca, Klebsiella pneumonia, Proteus species, Serratia marcescens, Candida albicans, Candida glabrata, Candida krusei, Candida parapsilosis, and Candida tropicalis.          Imaging  Not applicable  No results found.    ECG/Telemetry  I have independently reviewed the telemetry. No events for the last 24 hours.    ASSESSMENT AND PLAN      * Streptococcal bacteremia   Assessment & Plan    Blood culture +strep mutans  Dental procedure 5/3/19, took amoxicillin 1 hr prior to procedure   Tmax 100.6 overnight     Rocephin 2g q24h   ID following   LEXUS today, PMH of TAVR       Pulmonary sarcoidosis (CMS/HCC) (HCC)   Assessment & Plan    Stable   No current tx. Follows with Dr. Sophia Castro at [place]  Diagnosed >10 yr ago     Nausea   Assessment & Plan    Resolved     Tigan for nausea with long QT     Prolonged Q-T interval on ECG   Assessment & Plan    QTc 495 on admission and on repeat EKG    Use caution with meds      History of GI bleed   Assessment & Plan    A: GI bleed last fall.   Thought to be 2/2 AVM  EGD- esophagitis  Hgb stable and improved    Continue PPI     Aortic stenosis   Assessment & Plan    Continue ASA 325mg daily ( taken off PLAVIX after GIB last fall and put on full dose ASA)       Hyponatremia   Assessment & Plan    Stable    Follow BMP     CAD (coronary artery disease)   Assessment & Plan    stable    Continue ASA and LIPITOR     Benign prostatic hyperplasia   Assessment & Plan    Continue FLOMAX          VTE Assessment: Padua VTE Score: 4  VTE Prophylaxis Plan: Lovenox  Code Status: Full Code  Estimated  Discharge Date: 5/16/2019  Disposition Planning: pending LEXUS Shen,   5/15/2019

## 2019-05-15 NOTE — PROGRESS NOTES
Infectious Disease Progress Note  Patient Name: Michel Fang MR#: [id number] : 1935 Admitted 2019  Date: 05/15/19 Time: 12:01 PM Author: Richard Pineda MD  The patient is seen in follow up today for fever, and for Streptococcus mutans septicemia rule out endocarditis    ROS: no fever, chills, sweats, diarrhea, rash    Physical Examination:  General: no acute distress   Neurologic: alert   Oropharynx: moist mucous membranes with no lesions   Neck: supple, trachea midline   Heart: regular rate, regular rhythm, normal S1, normal S2  Lungs: clear to auscultation bilaterally   Abdomen: soft, normal bowel sounds, no distension, no mass, no tenderness  Musculoskeletal: normal range of motion, no deformity  Skin: no rash   Vital signs reviewed  Temp (24hrs), Av.3 °C (99.2 °F), Min:36.8 °C (98.3 °F), Max:38.1 °C (100.6 °F)    Recent Results (from the past 24 hour(s))   CBC    Collection Time: 05/15/19  5:36 AM   Result Value Ref Range    WBC 5.58 3.80 - 10.50 K/uL    RBC 3.76 (L) 4.50 - 5.80 M/uL    Hemoglobin 11.1 (L) 13.7 - 17.5 g/dL    Hematocrit 32.7 (L) 40.1 - 51.0 %    MCV 87.0 83.0 - 98.0 fL    MCH 29.5 28.0 - 33.2 pg    MCHC 33.9 32.2 - 36.5 g/dL    RDW 14.5 (H) 11.6 - 14.4 %    Platelets 146 (L) 150 - 350 K/uL    MPV 10.1 9.4 - 12.4 fL   Basic metabolic panel    Collection Time: 05/15/19  5:36 AM   Result Value Ref Range    Sodium 135 (L) 136 - 144 mEQ/L    Potassium 3.9 3.6 - 5.1 mEQ/L    Chloride 105 98 - 109 mEQ/L    CO2 21 (L) 22 - 32 mEQ/L    BUN 16 8 - 20 mg/dL    Creatinine 1.2 0.8 - 1.3 mg/dL    Glucose 120 (H) 70 - 99 mg/dL    Calcium 8.1 (L) 8.9 - 10.3 mg/dL    eGFR 58.0 (L) >=60.0 mL/min/1.73m*2    Anion Gap 9 3 - 15 mEQ/L     Anti-infectives     Start     Dose/Rate Route Frequency Ordered Stop    19 2330  cefTRIAXone (ROCEPHIN) IVPB 2 g in 100 mL NSS vial in bag      2 g  200 mL/hr over 30 Minutes intravenous Every 24 hours interval 19 2310          ASSESSMENT     tolerating antimicrobial therapy     PLAN    continue ceftriaxone   f/u on LEXUS   monitor vital signs, exam   follow up on final microbiology (susceptibility testing)    Richard Pineda MD  5/15/2019 12:01 PM

## 2019-05-15 NOTE — POST-PROCEDURE NOTE
The patient recovered by anesthesia, vital signs intact. The patient opening his eyes without difficulty.

## 2019-05-15 NOTE — ANESTHESIA POSTPROCEDURE EVALUATION
Patient: Michel Fang    Procedure Summary     Date:  05/15/19 Room / Location:  [facility] Cardiology; [facility] Cardiac Cath/EP/Neuro/INV Holding    Anesthesia Start:  1256 Anesthesia Stop:  1328    Procedure:  TRANSESOPHAGEAL ECHO (LEXUS) Diagnosis:      Scheduled Providers:  Darryl Larson MD Responsible Provider:  Darryl Larson MD    Anesthesia Type:  general ASA Status:  4          Anesthesia Type: general  PACU Vitals     No data found.            Anesthesia Post Evaluation    Pain management: satisfactory to patient  Patient location during evaluation: PACU  Patient participation: complete - patient participated  Cardiovascular status: acceptable  Airway Patency: adequate  Respiratory status: acceptable  Hydration status: stable  Anesthetic complications: no

## 2019-05-15 NOTE — NURSING NOTE
Patient's right antecubiteal number twenty gauge intravenous line, flushed and patent, no redness or pain at site.

## 2019-05-15 NOTE — PATIENT CARE CONFERENCE
Care Progression Rounds Note  Date: 5/15/2019  Time: 10:34 AM     Patient Name: Michel Fang     Medical Record Number: [id number]   YOB: 1935  Sex: Male      Room/Bed: [facility]    Admitting Diagnosis: Hyponatremia [E87.1]  Fever [R50.9]  Fever, unspecified fever cause [R50.9]  Syncope, unspecified syncope type [R55]  Fever, unspecified fever cause [R50.9]   Admit Date/Time: 5/11/2019  8:23 PM    Primary Diagnosis: Streptococcal bacteremia  Principal Problem: Streptococcal bacteremia    GMLOS: 3.7  Anticipated Discharge Date: 5/16/2019    AM-PAC  Mobility Score: 23    Discharge Planning:  Concerns To Be Addressed: denies needs/concerns at this time  Anticipated Discharge Disposition: other (see comments) (home)  Type of Home Care Services: nursing    Barriers to Discharge:  Barriers to Discharge: Medical issues not resolved, Consultant recommendations pending  Comment: LEXUS today; febrile overnight    Participants:  , social work/services, nursing, physical therapy

## 2019-05-15 NOTE — PLAN OF CARE
Problem: Patient Care Overview  Goal: Plan of Care Review  Outcome: Ongoing (interventions implemented as appropriate)   05/15/19 0543   Coping/Psychosocial   Plan Of Care Reviewed With patient   Plan of Care Review   Progress no change   Outcome Summary Temp max 100.6, tylenol administered. Receiving IV abx. VSS. NPO after midnight for LEXUS today.       Problem: Fall Risk (Adult)  Goal: Absence of Falls  Outcome: Ongoing (interventions implemented as appropriate)      Problem: Infection, Risk/Actual (Adult)  Goal: Infection Prevention/Resolution  Outcome: Ongoing (interventions implemented as appropriate)

## 2019-05-15 NOTE — PROGRESS NOTES
Daily Progress Note    Subjective    Interval History: he had a slight fever overnight but denies cough or pain. No SOB.       Current Facility-Administered Medications   Medication Dose Route Frequency Provider Last Rate Last Dose   • acetaminophen (TYLENOL) tablet 650 mg  650 mg oral q4h PRN Skshruthi, Florencia, DO   650 mg at 05/15/19 0303    Or   • acetaminophen (TYLENOL) suppository 650 mg  650 mg rectal q4h PRN Skariah, Florencia, DO        Or   • acetaminophen (TYLENOL) 650 mg/20.3 mL solution 650 mg  650 mg oral q4h PRN Skariah, Florencia, DO       • aspirin EC tablet 325 mg  325 mg oral Daily Skariaclarence, Florencia, DO   325 mg at 05/14/19 0957   • atorvastatin (LIPITOR) tablet 40 mg  40 mg oral Daily (6p) Skshruthi, Florencia, DO   40 mg at 05/14/19 1732   • atropine injection 0.5 mg  0.5 mg intravenous q5 min PRN Skanah, Florencia, DO       • cefTRIAXone (ROCEPHIN) IVPB 2 g in 100 mL NSS vial in bag  2 g intravenous q24h INT Skshruthi, Florencia, DO   Stopped at 05/15/19 0011   • glucose chewable tablet 16-32 g of dextrose  16-32 g of dextrose oral PRN Skshruthi, Florencia, DO        Or   • dextrose 40 % oral gel 15-30 g of dextrose  15-30 g of dextrose oral PRN Skanah, Florencia, DO        Or   • glucagon (GLUCAGEN) injection 1 mg  1 mg intramuscular PRN Skshruthi, Florencia, DO        Or   • dextrose in water injection 12.5 g  25 mL intravenous PRN Skshruthi, Florencia, DO       • enoxaparin (LOVENOX) syringe 40 mg  40 mg subcutaneous Daily (6p) Skshruthi, Florencia, DO   40 mg at 05/14/19 1731   • levothyroxine (SYNTHROID) tablet 25 mcg  25 mcg oral Daily (6:30a) Gunner, Florencia, DO   25 mcg at 05/15/19 0551   • nitroglycerin (NITROSTAT) SL tablet 0.4 mg  0.4 mg sublingual q5 min PRN Skshruthi, Florencia, DO       • pantoprazole (PROTONIX) tablet,delayed release (DR/EC) 40 mg  40 mg oral Daily Skshruthi, Florencia, DO   40 mg at 05/14/19 0910   • tamsulosin (FLOMAX) 24 hr ER capsule 0.4 mg  0.4 mg oral Nightly Florencia Martino DO   0.4 mg at 05/14/19 2127       Objective    Vital signs in last  "24 hours:  Temp:  [36.7 °C (98.1 °F)-38.1 °C (100.6 °F)] 36.8 °C (98.3 °F)  Heart Rate:  [57-89] 57  Resp:  [16-20] 16  BP: (101-155)/(55-69) 155/69      Intake/Output Summary (Last 24 hours) at 05/15/19 1017  Last data filed at 05/14/19 1700   Gross per 24 hour   Intake              120 ml   Output              800 ml   Net             -680 ml         Physical Exam:  BP (!) 155/69 (BP Location: Right upper arm, Patient Position: Lying)   Pulse (!) 57   Temp 36.8 °C (98.3 °F) (Oral)   Resp 16   Ht 1.753 m (5' 9\")   Wt 88.5 kg (195 lb)   SpO2 95%   BMI 28.80 kg/m²     The patient appears comfortable and is in no apparent distress,     Lungs: Clear to auscultation,    Heart: Regular rhythm,    Abdomen: Soft, nontender,     Extremities: No edema,    Neuro: Alert and oriented without focal deficit.     Labs  Lab Results   Component Value Date    WBC 5.58 05/15/2019    HGB 11.1 (L) 05/15/2019    HCT 32.7 (L) 05/15/2019     (L) 05/15/2019    ALT 13 (L) 05/11/2019    AST 17 05/11/2019     (L) 05/15/2019    K 3.9 05/15/2019     05/15/2019    CREATININE 1.2 05/15/2019    BUN 16 05/15/2019    CO2 21 (L) 05/15/2019    TSH 2.42 05/11/2019    INR 1.2 09/06/2018     Troponin I Results       05/11/19 09/06/18 09/04/18                    2112 1548 1446         Troponin I &lt;0.02 &lt;0.02 &lt;0.03                       Imaging  I have independently reviewed the pertinent imaging from the last 24 hrs.    TTE 5/13/19  · Normal-sized left ventricular cavity. Concentric left ventricular hypertrophy. Preserved systolic function. Estimated EF = 55- 60%.  · Mildly dilated left atrium.  · Mild to moderate aortic valve regurgitation. A bioprosthetic aortic valve is present.  · Mild mitral valve regurgitation.  · Mild tricuspid valve regurgitation. Est PASP 20-25 mm Hg.  · These findings are similar to the study of 9/5/2018.    ECG/Telemetry  I have independently reviewed the telemetry. No events for the last 24 " hours.     Assessment & Plan    * Streptococcal bacteremia   Assessment & Plan    The echocardiogram did now show evidence of IE. He will have a LEXUS done later today.      CAD (coronary artery disease)   Assessment & Plan    He follows with Dr. Arce in the office setting. Continue aspirin and statin.      Aortic stenosis   Assessment & Plan    He has history of TAVR 2016 and known perivalvular AI since 9/2018. The valve function was stable by repeat echocardiogram this admission.      HTN (hypertension)   Assessment & Plan    His blood pressure has been labile but overall controlled. He was not on blood pressure medication prior to admission.          NEYDA Mai  5/15/2019

## 2019-05-16 LAB
ANION GAP SERPL CALC-SCNC: 8 MEQ/L (ref 3–15)
BACTERIA BLD CULT: ABNORMAL
BACTERIA BLD CULT: ABNORMAL
BUN SERPL-MCNC: 15 MG/DL (ref 8–20)
CALCIUM SERPL-MCNC: 8.2 MG/DL (ref 8.9–10.3)
CHLORIDE SERPL-SCNC: 108 MEQ/L (ref 98–109)
CO2 SERPL-SCNC: 20 MEQ/L (ref 22–32)
CREAT SERPL-MCNC: 1.2 MG/DL
ERYTHROCYTE [DISTWIDTH] IN BLOOD BY AUTOMATED COUNT: 14.5 % (ref 11.6–14.4)
GFR SERPL CREATININE-BSD FRML MDRD: 58 ML/MIN/1.73M*2
GLUCOSE SERPL-MCNC: 106 MG/DL (ref 70–99)
GRAM STN SPEC: ABNORMAL
GRAM STN SPEC: ABNORMAL
HCT VFR BLDCO AUTO: 32.5 %
HGB BLD-MCNC: 10.7 G/DL
MCH RBC QN AUTO: 28.9 PG (ref 28–33.2)
MCHC RBC AUTO-ENTMCNC: 32.9 G/DL (ref 32.2–36.5)
MCV RBC AUTO: 87.8 FL (ref 83–98)
PDW BLD AUTO: 10 FL (ref 9.4–12.4)
PLATELET # BLD AUTO: 146 K/UL
POTASSIUM SERPL-SCNC: 4.1 MEQ/L (ref 3.6–5.1)
RBC # BLD AUTO: 3.7 M/UL (ref 4.5–5.8)
SODIUM SERPL-SCNC: 136 MEQ/L (ref 136–144)
WBC # BLD AUTO: 5.57 K/UL

## 2019-05-16 PROCEDURE — 80048 BASIC METABOLIC PNL TOTAL CA: CPT | Performed by: FAMILY MEDICINE

## 2019-05-16 PROCEDURE — 63600000 HC DRUGS/DETAIL CODE: Performed by: INTERNAL MEDICINE

## 2019-05-16 PROCEDURE — 63700000 HC SELF-ADMINISTRABLE DRUG: Performed by: INTERNAL MEDICINE

## 2019-05-16 PROCEDURE — 63700000 HC SELF-ADMINISTRABLE DRUG: Performed by: FAMILY MEDICINE

## 2019-05-16 PROCEDURE — 97161 PT EVAL LOW COMPLEX 20 MIN: CPT | Mod: GP | Performed by: PHYSICAL THERAPIST

## 2019-05-16 PROCEDURE — 97116 GAIT TRAINING THERAPY: CPT | Mod: GP | Performed by: PHYSICAL THERAPIST

## 2019-05-16 PROCEDURE — 86431 RHEUMATOID FACTOR QUANT: CPT | Performed by: FAMILY MEDICINE

## 2019-05-16 PROCEDURE — 21400000 HC ROOM AND CARE CCU/INTERMEDIATE

## 2019-05-16 PROCEDURE — 99232 SBSQ HOSP IP/OBS MODERATE 35: CPT | Performed by: INTERNAL MEDICINE

## 2019-05-16 PROCEDURE — 85027 COMPLETE CBC AUTOMATED: CPT | Performed by: FAMILY MEDICINE

## 2019-05-16 PROCEDURE — 36415 COLL VENOUS BLD VENIPUNCTURE: CPT | Performed by: FAMILY MEDICINE

## 2019-05-16 RX ORDER — DOCUSATE SODIUM 100 MG/1
100 CAPSULE, LIQUID FILLED ORAL 2 TIMES DAILY PRN
Status: DISCONTINUED | OUTPATIENT
Start: 2019-05-16 | End: 2019-05-17 | Stop reason: HOSPADM

## 2019-05-16 RX ORDER — SENNOSIDES 8.6 MG/1
1 TABLET ORAL 2 TIMES DAILY PRN
Status: DISCONTINUED | OUTPATIENT
Start: 2019-05-16 | End: 2019-05-17 | Stop reason: HOSPADM

## 2019-05-16 RX ADMIN — ENOXAPARIN SODIUM 40 MG: 100 INJECTION SUBCUTANEOUS at 17:11

## 2019-05-16 RX ADMIN — PANTOPRAZOLE SODIUM 40 MG: 40 TABLET, DELAYED RELEASE ORAL at 09:01

## 2019-05-16 RX ADMIN — TAMSULOSIN HYDROCHLORIDE 0.4 MG: 0.4 CAPSULE ORAL at 21:07

## 2019-05-16 RX ADMIN — ASPIRIN 325 MG: 325 TABLET, DELAYED RELEASE ORAL at 09:01

## 2019-05-16 RX ADMIN — LEVOTHYROXINE SODIUM 25 MCG: 25 TABLET ORAL at 06:27

## 2019-05-16 RX ADMIN — SENNOSIDES 1 TABLET: 8.6 TABLET, FILM COATED ORAL at 15:02

## 2019-05-16 RX ADMIN — CEFTRIAXONE SODIUM 2 G: 2 INJECTION, POWDER, FOR SOLUTION INTRAVENOUS at 23:36

## 2019-05-16 RX ADMIN — DOCUSATE SODIUM 100 MG: 100 CAPSULE, LIQUID FILLED ORAL at 15:02

## 2019-05-16 RX ADMIN — ATORVASTATIN CALCIUM 40 MG: 40 TABLET ORAL at 17:12

## 2019-05-16 ASSESSMENT — COGNITIVE AND FUNCTIONAL STATUS - GENERAL
WALKING IN HOSPITAL ROOM: 3 - A LITTLE
CLIMB 3 TO 5 STEPS WITH RAILING: 3 - A LITTLE
STANDING UP FROM CHAIR USING ARMS: 3 - A LITTLE
MOVING TO AND FROM BED TO CHAIR: 3 - A LITTLE

## 2019-05-16 NOTE — PATIENT CARE CONFERENCE
Care Progression Rounds Note  Date: 5/16/2019  Time: 2:42 PM     Patient Name: Michel Fang     Medical Record Number: [id number]   YOB: 1935  Sex: Male      Room/Bed: [facility]    Admitting Diagnosis: Hyponatremia [E87.1]  Fever [R50.9]  Fever, unspecified fever cause [R50.9]  Syncope, unspecified syncope type [R55]  Fever, unspecified fever cause [R50.9]   Admit Date/Time: 5/11/2019  8:23 PM    Primary Diagnosis: Streptococcal bacteremia  Principal Problem: Streptococcal bacteremia    GMLOS: 3.7  Anticipated Discharge Date: 5/16/2019    AM-PAC  Mobility Score: 23    Discharge Planning:  Concerns To Be Addressed: denies needs/concerns at this time  Anticipated Discharge Disposition: other (see comments) (home)  Type of Home Care Services: nursing    Barriers to Discharge:  Barriers to Discharge: Consultant recommendations pending  Comment: ID recommendations needed    Participants:  , social work/services, nursing

## 2019-05-16 NOTE — PROGRESS NOTES
Patient: Michel Fang  Location: [facility] 5A 4708  MRN: [id number]  Today's date: 5/16/2019  Patient seated in recliner, BLE elevated, VSS, NAD, Posey alarm set, Nurse notified of PT Evaluation results, call bell set by patient's hand   Hospital Course  Oh is a 83 y.o. male admitted on 5/11/2019 with Hyponatremia [E87.1]  Fever [R50.9]  Fever, unspecified fever cause [R50.9]  Syncope, unspecified syncope type [R55]  Fever, unspecified fever cause [R50.9]. Principal problem is Streptococcal bacteremia.    Oh has a past medical history of Arthritis; BPH (benign prostatic hyperplasia); CHF (congestive heart failure) (CMS/HCC) ([facility]); Coronary artery disease; Heart disease; Hyperlipidemia; Hypertension; and Sarcoidosis.          Therapy Pain/Vitals - 05/16/19 1400        Pain/Comfort/Sleep    Pain Charting Type Pain Assessment    Presence of Pain denies       Vital Signs    Pulse 66    SpO2 94 %    Patient Activity At rest    Oxygen Therapy None (Room air)    BP (!)  146/68    BP Location Right upper arm    BP Method Automatic    Patient Position Sitting          Prior Living Environment      Most Recent Value   Lives With  alone   Living Environment Comment  patient lives w/ son, 1SH, 1STE, amb w/o AD but owns RW and SPC, retired    Equipment Currently Used at Home  cane, straight, walker, rolling          Prior Level of Function      Most Recent Value   Ambulation  independent   Transferring  independent   Toileting  independent   Bathing  independent   Dressing  independent   Eating  independent   Communication  understands/communicates without difficulty   Swallowing  swallows foods/liquids without difficulty   Equipment Currently Used at Home  cane, straight, walker, rolling   Prior Functional Level Comment  amb w/o AD PTA                 PT Evaluation - 05/16/19 1401        Session Details    Document Type initial evaluation    Mode of Treatment physical therapy       Time  Calculation    Start Time 1401    Stop Time 1425    Time Calculation (min) 24 min       General Information    Patient Profile Reviewed? yes    Onset of Illness/Injury or Date of Surgery 05/12/19    Pertinent History of Current Functional Problem adm w/ strep bacteremia, nausea, prolonged QT, LEXUS Neg 5/15, h/o pulm sarcodosis     Existing Precautions/Restrictions fall;cardiac   I+O, AAT       Orientation Log    [facility] 3-->spontaneous/free recall    Kind of Place 3-->spontaneous/free recall    Name of [facility] 3-->spontaneous/free recall    Month 3-->spontaneous/free recall    Date 3-->spontaneous/free recall    Year 3-->spontaneous/free recall    Day of Week 3-->spontaneous/free recall    Clock Time 3-->spontaneous/free recall    Etiology/Event 3-->spontaneous/free recall    Pathology Deficits 3-->spontaneous/free recall    Total Score 30       Cognition/Psychosocial    Safety Awareness intact       Sensory    Sensory General Assessment no sensation deficits identified       Range of Motion (ROM)    General Range of Motion no range of motion deficits identified       Manual Muscle Testing (MMT)    General MMT Assessment no strength deficits identified       Bed Mobility    Comment (Bed Mobility) OOB w/ nurse        Sit to Stand Transfer    [place], Sit to Stand Transfer supervision    Assistive Device walker, front-wheeled       Stand to Sit Transfer    [place], Stand to Sit Transfer supervision    Assistive Device walker, front-wheeled       Gait Analysis/Training    Gait/Stairs Locomotion Gait Training (Group)       Gait Training    [place], Gait supervision    Assistive Device walker, front-wheeled    Distance in Feet 80 feet    Gait Pattern Utilized step-through    Gait Deviations Identified decreased marck;decreased gait speed    Comment amb 80 ft on 5a w/ RW at S for vcs, no LOB, no [place], VSS, NAD, nurse notified        Stairs Training    [place], Stairs not tested       AM-PAC (TM) -  Mobility (Current Function)    Turning from your back to your side while in a flat bed without using bedrails? 4 - None    Moving from lying on your back to sitting on the side of a flat bed without using bedrails? 4 - None    Moving to and from a bed to a chair? 3 - A Little    Standing up from a chair using your arms? 3 - A Little    To walk in a hospital room? 3 - A Little    Climbing 3-5 steps with a railing? 3 - A Little    AM-PAC (TM) Mobility Score 20       PT Clinical Impression    Patient's Goals For Discharge return home;take care of myself at home;return to all previous roles/activities    Family Goals For Discharge patient able to provide self-care independently;patient able to return to all previous activities/roles    Plan For Care Reviewed: Physical Therapy family voices agreement with PT plan for care;patient voices agreement with PT plan for care    System Pathology/Pathophysiology Noted musculoskeletal;cardiovascular;pulmonary    Impairments Found (PT Eval) gait, locomotion, and balance;muscle performance;ROM (range of motion)    Functional Limitations in Following Categories (PT Eval) home management;community/leisure;self-care    Rehab Potential/Prognosis good, to achieve stated therapy goals    PT Frequency of Treatment 3-5 times per week    Estimated Length of Stay 1 week    Problem List decreased ROM;decreased strength    Activity Limitations Related to Problem List BADL activities not performed adequately or safely;functional mobility not performed adequately or safely for household activity;functional mobility not performed adequately or safely for community activity;IADLs not performed adequately or safely    Anticipated Equipment Needs at Discharge bedside commode;front wheeled walker;shower chair    PT Recommended Discharge Disposition home with assist;home with home health    Daily Outcome Statement S func alda, [facility] 20, dc home w/ family assist, HPT           Pain  Assessment/Intervention  Pain Charting Type: Pain Assessment         Education provided this session. See the Patient Education summary report for full details.    PT Care Plan Goals      Most Recent Value   Stair Goal, PT   PT STG: Stairs  modified independence   PT STG: Number of Stairs  4   PT STG Assistive Device: Stairs  cane, straight   PT STG Date Established: Stairs  05/12/19   PT STG Duration: Stairs  3 days or less   PT STG Date Reviewed: Stairs  05/16/19   PT STG Outcome: Stairs  goal ongoing      PT Care Plan Goals      Most Recent Value   Gait Goal   Date Goal Established: Gait Training  05/16/19   Time to Achieve Goal: Gait Training  2 - 3 days   Level of [place]  modified independence   Assistive Device: Gait Training  walker, front-wheeled   Distance Goal: Gait Training (feet)  200 feet   Date Goal Reviewed: Gait Training  05/16/19   Goal Outcome: Gait Training  goal ongoing

## 2019-05-16 NOTE — PROGRESS NOTES
Met with patient, discussed home care. Patient undecided if he wants home care. Gave patient my contact information.

## 2019-05-16 NOTE — PROGRESS NOTES
"  Daily Progress Note    Subjective   No chest pain or shortness of breath.  Pt ambulated the hallways today without difficulty.      Objective    Vital signs in last 24 hours:  Temp:  [36.6 °C (97.9 °F)-37.8 °C (100 °F)] 36.6 °C (97.9 °F)  Heart Rate:  [63-83] 68  Resp:  [16-18] 18  BP: (129-147)/(61-70) 137/63      Intake/Output Summary (Last 24 hours) at 05/16/19 1515  Last data filed at 05/16/19 1500   Gross per 24 hour   Intake              640 ml   Output              550 ml   Net               90 ml       Physical Exam:  /63 (BP Location: Right upper arm, Patient Position: Sitting)   Pulse 68   Temp 36.6 °C (97.9 °F) (Oral)   Resp 18   Ht 1.753 m (5' 9\")   Wt 88.5 kg (195 lb)   SpO2 96%   BMI 28.80 kg/m²   The patient appears comfortable and is in no apparent distress,    Lungs: Clear to auscultation,   Heart: Regular rhythm,   Extremities: No edema,   Neuro: Alert and oriented without focal deficit.    •  acetaminophen, 650 mg, oral, q4h PRN **OR** acetaminophen, 650 mg, rectal, q4h PRN **OR** acetaminophen, 650 mg, oral, q4h PRN  •  aspirin, 325 mg, oral, Daily  •  atorvastatin, 40 mg, oral, Daily (6p)  •  atropine, 0.5 mg, intravenous, q5 min PRN  •  cefTRIAXone, 2 g, intravenous, q24h INT  •  glucose, 16-32 g of dextrose, oral, PRN **OR** dextrose, 15-30 g of dextrose, oral, PRN **OR** glucagon, 1 mg, intramuscular, PRN **OR** dextrose in water, 25 mL, intravenous, PRN  •  docusate sodium, 100 mg, oral, 2x daily PRN  •  enoxaparin, 40 mg, subcutaneous, Daily (6p)  •  levothyroxine, 25 mcg, oral, Daily (6:30a)  •  nitroglycerin, 0.4 mg, sublingual, q5 min PRN  •  pantoprazole, 40 mg, oral, Daily  •  senna, 1 tablet, oral, 2x daily PRN  •  tamsulosin, 0.4 mg, oral, Nightly    Labs  Lab Results   Component Value Date    WBC 5.57 05/16/2019    HGB 10.7 (L) 05/16/2019     (L) 05/16/2019    ALT 13 (L) 05/11/2019    AST 17 05/11/2019     05/16/2019    K 4.1 05/16/2019     " 05/16/2019    CREATININE 1.2 05/16/2019    BUN 15 05/16/2019    CO2 20 (L) 05/16/2019    TSH 2.42 05/11/2019    INR 1.2 09/06/2018    BNP 73 09/06/2018     Troponin I Results       05/11/19     2112    Troponin I &lt;0.02                 Imaging  I have independently reviewed the pertinent imaging from the last 24 hrs.      ECG/Telemetry  I have independently reviewed the telemetry. No events for the last 24 hours.    LEXUS 5/15/19:    · Normal-sized left ventricular cavity. Preserved systolic function. Estimated EF = 55%. There are regional wall motion abnormalities. Hypokinesis (severity) in the following segment(s): , basal inferoseptal, basal inferior, mid inferior.  · Thickening of the aortic valve. A bioprosthetic aortic valve is present. Moderate para-valvular regurgitation (anterior to the annulus). No vegetation.  · Tricuspid valve structure is normal. No evidence of tricuspid valve regurgitation. No vegetation  · Mild mitral valve regurgitation. There is No vegetation present. of the mitral valve.       Assessment & Plan    Aortic stenosis   Assessment & Plan    He has history of TAVR 2016 and known perivalvular AI since 9/2018. The valve function was stable by repeat echocardiogram this admission.      HTN (hypertension)   Assessment & Plan    His blood pressure has been labile but overall controlled. He was not on blood pressure medication prior to admission.      CAD (coronary artery disease)   Assessment & Plan    He follows with Dr. Isaacs ([place]) outpatient.  He will make f/u appt in 1-2 weeks. Continue aspirin and statin.      * Streptococcal bacteremia   Assessment & Plan    The echocardiogram did now show evidence of IE. His LEXUS was negative for vegetation.  He will be discharged on po axbx per HMS/ID.                     NEYDA Jack  5/16/2019

## 2019-05-16 NOTE — PLAN OF CARE
Problem: Patient Care Overview  Goal: Plan of Care Review  Outcome: Ongoing (interventions implemented as appropriate)   05/16/19 0637   Coping/Psychosocial   Plan Of Care Reviewed With patient   Plan of Care Review   Progress no change   Outcome Summary continues to have low grade temp overnight (max 100.0 f); continues on antibiotic with second set of blood cultures pending       Problem: Fall Risk (Adult)  Goal: Absence of Falls  Outcome: Ongoing (interventions implemented as appropriate)      Problem: Infection, Risk/Actual (Adult)  Goal: Infection Prevention/Resolution  Outcome: Ongoing (interventions implemented as appropriate)

## 2019-05-16 NOTE — PATIENT CARE CONFERENCE
Care Progression Rounds Note  Date: 5/16/2019  Time: 10:41 AM     Patient Name: Michel Fang     Medical Record Number: [id number]   YOB: 1935  Sex: Male      Room/Bed: [facility]    Admitting Diagnosis: Hyponatremia [E87.1]  Fever [R50.9]  Fever, unspecified fever cause [R50.9]  Syncope, unspecified syncope type [R55]  Fever, unspecified fever cause [R50.9]   Admit Date/Time: 5/11/2019  8:23 PM    Primary Diagnosis: Streptococcal bacteremia  Principal Problem: Streptococcal bacteremia    GMLOS: 3.7  Anticipated Discharge Date: 5/16/2019    AM-PAC  Mobility Score: 23    Discharge Planning:  Concerns To Be Addressed: denies needs/concerns at this time  Anticipated Discharge Disposition: other (see comments) (home)  Type of Home Care Services: nursing    Barriers to Discharge:  Barriers to Discharge: Consultant recommendations pending, Medical issues not resolved  Comment: ID recommendations needed    Participants:  , social work/services, nursing, physical therapy

## 2019-05-16 NOTE — PLAN OF CARE
Problem: Patient Care Overview  Goal: Plan of Care Review  Outcome: Ongoing (interventions implemented as appropriate)   05/16/19 8840   Coping/Psychosocial   Plan Of Care Reviewed With patient;son   Plan of Care Review   Progress progress toward functional goals as expected   Outcome Summary S func mob, AMPAC 20, dc home w/ family assist, HPT      Goal: Individualization & Mutuality  Outcome: Ongoing (interventions implemented as appropriate)      Problem: Acute Therapy Services Goal & Intervention Plan  Goal: Gait Training Goal  Outcome: Ongoing (interventions implemented as appropriate)    Goal: Stairs Goal  Outcome: Ongoing (interventions implemented as appropriate)      Problem: Mobility, Physical Impaired (Adult)  Goal: Identify Related Risk Factors and Signs and Symptoms  Outcome: Outcome(s) Achieved Date Met: 05/16/19

## 2019-05-16 NOTE — PROGRESS NOTES
Hospital Medicine Service -  Daily Progress Note       SUBJECTIVE   Interval History: Patient resting comfortably OOB in no acute distress.  Tmax overnight 100F. Denies chills, headache, chest pain, shortness of breath, cough.  No new complaints.     OBJECTIVE      Vital signs in last 24 hours:  Temp:  [36.6 °C (97.9 °F)-37.8 °C (100 °F)] 36.6 °C (97.9 °F)  Heart Rate:  [58-83] 74  Resp:  [16-18] 18  BP: ()/(55-70) 137/63    Intake/Output Summary (Last 24 hours) at 05/16/19 1255  Last data filed at 05/16/19 1000   Gross per 24 hour   Intake              460 ml   Output              350 ml   Net              110 ml       PHYSICAL EXAMINATION      Physical Exam  General: Awake, alert, well developed, no acute distress  HEENT:  Mucous membranes pink and moist  Respiratory:  Coarse BS, no wheeze, crackle, or rhonchi  Cardiac: Regular rate and rhythm.  GYPSY  Abdomen:  Soft, non-tender, non-distended. Normal bowel sounds.  No rebound tenderness or guarding  Extremities:  No edema   Skin:  Warm, dry, intact  Neuro:  No focal deficit  Psych:  Cooperative, AAOx3   LINES, CATHETERS, DRAINS, AIRWAYS, AND WOUNDS   Lines, Drains, Airways, Wounds:  Peripheral IV 05/11/19 Right Antecubital (Active)   Number of days: 5       Comments:      LABS / IMAGING / TELE      Labs    Lab Results   Component Value Date    WBC 5.57 05/16/2019    HGB 10.7 (L) 05/16/2019    HCT 32.5 (L) 05/16/2019    MCV 87.8 05/16/2019     (L) 05/16/2019     Lab Results   Component Value Date    GLUCOSE 106 (H) 05/16/2019    CALCIUM 8.2 (L) 05/16/2019     05/16/2019    K 4.1 05/16/2019    CO2 20 (L) 05/16/2019     05/16/2019    BUN 15 05/16/2019    CREATININE 1.2 05/16/2019       Microbiology Results     Procedure Component Value Units Date/Time    Blood Culture Blood, Venous [[id number]] Collected:  05/12/19 8730    Specimen:  Blood from Blood, Venous Updated:  05/16/19 0861    Narrative:       The following orders were created for  panel order Blood Culture Blood, Venous.  Procedure                               Abnormality         Status                     ---------                               -----------         ------                     Blood Culture Blood, Venous[[id number]]   Abnormal            Final result                 Please view results for these tests on the individual orders.    Blood Culture Blood, Venous [[id number]]  (Abnormal) Collected:  05/12/19 2311    Specimen:  Blood from Blood, Venous Updated:  05/16/19 0857     Culture **Positive Culture** (AA)      Streptococcus mutans     Gram Stain Result Gram positive cocci      Gram positive cocci in chains    RSV and Influenza Nucleic Acids, PCR Nasopharynx [[id number]]  (Normal) Collected:  05/11/19 2138    Specimen:  Nasopharyngeal Aspirate from Nasopharynx Updated:  05/11/19 2225     Influenza A Negative     Influenza B Negative     Respiratory Syncytial Virus Negative    Respiratory virus panel, PCR Nasopharynx [[id number]] Collected:  05/11/19 2138    Specimen:  Nasopharyngeal Aspirate from Nasopharynx Updated:  05/12/19 0431     Adenovirus Negative     Coronavirus 229E Negative     Coronavirus HKU1 Negative     Coronavirus NL63 Negative     Coronavirus OC43 Negative     Human Metapneumovirus Negative     Rhinovirus/Enterovirus Negative     Influenza A Negative     Influenza B Negative     Parainfluenza Virus 1 Negative     Parainfluenza Virus 2 Negative     Parainfluenza Virus 3 Negative     Parainfluenza Virus 4 Negative     Respiratory Syncytial Virus Negative     Viral Respiratory Comment --    Blood Culture Blood, Venous [[id number]] Collected:  05/11/19 2112    Specimen:  Blood from Blood, Venous Updated:  05/14/19 1145    Narrative:       The following orders were created for panel order Blood Culture Blood, Venous.  Procedure                               Abnormality         Status                     ---------                               -----------         ------                      Blood Culture PCR Panel B...[[id number]]  Abnormal            Final result               Blood Culture Blood, Venous[[id number]]   Abnormal            Final result                 Please view results for these tests on the individual orders.    Blood Culture Blood, Venous [[id number]] Collected:  05/11/19 2112    Specimen:  Blood from Blood, Venous Updated:  05/13/19 1157    Narrative:       The following orders were created for panel order Blood Culture Blood, Venous.  Procedure                               Abnormality         Status                     ---------                               -----------         ------                     Blood Culture Blood, Venous[[id number]]   Abnormal            Final result                 Please view results for these tests on the individual orders.    Blood Culture Blood, Venous [[id number]]  (Abnormal) Collected:  05/11/19 2112    Specimen:  Blood from Blood, Venous Updated:  05/14/19 1145     Culture **Positive Culture** (AA)      Streptococcus mutans     Gram Stain Result Gram positive cocci in chains      Gram positive cocci in chains    Blood Culture Blood, Venous [[id number]]  (Abnormal) Collected:  05/11/19 2112    Specimen:  Blood from Blood, Venous Updated:  05/13/19 1157     Culture **Positive Culture** (AA)      Streptococcus mutans     Gram Stain Result Gram positive cocci in chains      Gram positive cocci in chains    Blood Culture PCR Panel Blood, Venous [[id number]]  (Abnormal) Collected:  05/11/19 2112    Specimen:  Blood from Blood, Venous Updated:  05/12/19 2224     Acinetobacter baumannii Not Detected     Candida albicans Not Detected     Candida glabrata Not Detected     Candida krusei Not Detected     Candida parapsilosis Not Detected     Candida tropicalis Not Detected     Enterobacteriaceae Not Detected     Escherichia coli Not Detected     Enterobacter cloacae complex Not Detected     Pseudomonas aeruginosa Not Detected     Klebsiella oxytoca Not  Detected     Klebsiella pneumoniae Not Detected     Proteus Not Detected     Serratia marcescens Not Detected     Haemophilus influenzae Not Detected     Neiseria meningitidis Not Detected     Listeria monocytogenes Not Detected     Staphylococcus Not Detected     Staphylococcus aureus Not Detected     Enterococcus Not Detected     Streptococcus Detected (A)     Streptococcus agalactiae (Group B) Not Detected     Streptococcus pneumoniae Not Detected     Streptococcus pyogenes (Group A) Not Detected     Comment: See below    Narrative:       This positive blood culture was tested with a rapid molecular panel that detects Enterococcus species, Listeria monocytogenes, Staphylococcus species, Staphylococcus aureus, Streptococcus agalactiae (Group B), Streptococcus pneumonia, Streptococcus pyogenes (Group A), Acinetobacter baumannii, Haemophilus influenza, Neisseria meningitides, Pseudomonas aeruginosa, Enterobacter cloacae complex, Escherichia coli, Klebsiella oxytoca, Klebsiella pneumonia, Proteus species, Serratia marcescens, Candida albicans, Candida glabrata, Candida krusei, Candida parapsilosis, and Candida tropicalis.          Imaging  Transesophageal Echo (alberto)    Result Date: 5/15/2019  · Normal-sized left ventricular cavity. Preserved systolic function. Estimated EF = 55%. There are regional wall motion abnormalities. Hypokinesis (severity) in the following segment(s): , basal inferoseptal, basal inferior, mid inferior. · Thickening of the aortic valve. A bioprosthetic aortic valve is present. Moderate para-valvular regurgitation (anterior to the annulus). No vegetation. · Tricuspid valve structure is normal. No evidence of tricuspid valve regurgitation. No vegetation · Mild mitral valve regurgitation. There is No vegetation present. of the mitral valve.        ECG/Telemetry  I have independently reviewed the telemetry. No events for the last 24 hours.    ASSESSMENT AND PLAN      * Streptococcal bacteremia    Assessment & Plan    Blood culture +strep mutans  Dental procedure 5/3/19, took amoxicillin 1 hr prior to procedure   Tmax 100.6 overnight   TTE/LEXUS no visible vegetation   [facility]KE criteria: 1 major, 2 minor - possible endocarditis     Rocephin 2g q24h   Repeat blood cx pending   ID following   Check RF   Possible dc home today if able to switch to PO abx for prolonged course        Pulmonary sarcoidosis (CMS/HCC) (HCC)   Assessment & Plan    Stable   No current tx. Follows with Dr. Sophia Castro at [facility]  Diagnosed >10 yr ago     Nausea   Assessment & Plan    Resolved     Tigan for nausea with long QT     Prolonged Q-T interval on ECG   Assessment & Plan    QTc 495 on admission and on repeat EKG    Use caution with meds      History of GI bleed   Assessment & Plan    A: GI bleed last fall.   Thought to be 2/2 AVM  EGD- esophagitis  Hgb stable and improved    Continue PPI     Aortic stenosis   Assessment & Plan    Continue ASA 325mg daily ( taken off PLAVIX after GIB last fall and put on full dose ASA)       Hyponatremia   Assessment & Plan    Stable    Follow BMP     CAD (coronary artery disease)   Assessment & Plan    stable    Continue ASA and LIPITOR     Benign prostatic hyperplasia   Assessment & Plan    Continue FLOMAX          VTE Assessment: [place] VTE Score: 4  VTE Prophylaxis Plan: lovenox  Code Status: Full Code  Estimated Discharge Date: 2019  Disposition Planninh     Fernando hSen,   2019

## 2019-05-16 NOTE — PLAN OF CARE
Problem: Patient Care Overview  Goal: Plan of Care Review  Outcome: Ongoing (interventions implemented as appropriate)   05/16/19 3814   Coping/Psychosocial   Plan Of Care Reviewed With patient   Plan of Care Review   Progress improving   Outcome Summary no fever during day shift; awaiting i/d recs     Goal: Individualization & Mutuality  Outcome: Ongoing (interventions implemented as appropriate)    Goal: Discharge Needs Assessment  Outcome: Ongoing (interventions implemented as appropriate)    Goal: Interprofessional Rounds/Family Conf  Outcome: Ongoing (interventions implemented as appropriate)      Problem: Fall Risk (Adult)  Goal: Identify Related Risk Factors and Signs and Symptoms  Outcome: Ongoing (interventions implemented as appropriate)      Problem: Infection, Risk/Actual (Adult)  Goal: Infection Prevention/Resolution  Outcome: Ongoing (interventions implemented as appropriate)

## 2019-05-16 NOTE — ASSESSMENT & PLAN NOTE
Blood culture +strep mutans  Dental procedure 5/3/19, took amoxicillin 1 hr prior to procedure   Tmax 100.6 overnight   TTE/LEXUS no visible vegetation   DUKE criteria: 1 major, 2 minor - possible endocarditis     Rocephin 2g q24h   Repeat blood cx pending   ID following   Check RF   Possible dc home today if able to switch to PO abx for prolonged course

## 2019-05-16 NOTE — HOSPITAL COURSE
Oh is a 83 y.o. male admitted on 5/11/2019 with Hyponatremia [E87.1]  Fever [R50.9]  Fever, unspecified fever cause [R50.9]  Syncope, unspecified syncope type [R55]  Fever, unspecified fever cause [R50.9]. Principal problem is Streptococcal bacteremia.    Oh has a past medical history of Arthritis; BPH (benign prostatic hyperplasia); CHF (congestive heart failure) (CMS/HCC) (HCC); Coronary artery disease; Heart disease; Hyperlipidemia; Hypertension; and Sarcoidosis.

## 2019-05-17 VITALS
DIASTOLIC BLOOD PRESSURE: 61 MMHG | BODY MASS INDEX: 28.88 KG/M2 | RESPIRATION RATE: 18 BRPM | OXYGEN SATURATION: 95 % | WEIGHT: 195 LBS | HEIGHT: 69 IN | SYSTOLIC BLOOD PRESSURE: 131 MMHG | TEMPERATURE: 98.8 F | HEART RATE: 79 BPM

## 2019-05-17 LAB
ANION GAP SERPL CALC-SCNC: 9 MEQ/L (ref 3–15)
BUN SERPL-MCNC: 14 MG/DL (ref 8–20)
CALCIUM SERPL-MCNC: 8.3 MG/DL (ref 8.9–10.3)
CHLORIDE SERPL-SCNC: 105 MEQ/L (ref 98–109)
CO2 SERPL-SCNC: 21 MEQ/L (ref 22–32)
CREAT SERPL-MCNC: 1 MG/DL
ERYTHROCYTE [DISTWIDTH] IN BLOOD BY AUTOMATED COUNT: 14.6 % (ref 11.6–14.4)
GFR SERPL CREATININE-BSD FRML MDRD: >60 ML/MIN/1.73M*2
GLUCOSE SERPL-MCNC: 99 MG/DL (ref 70–99)
HCT VFR BLDCO AUTO: 33 %
HGB BLD-MCNC: 10.8 G/DL
MCH RBC QN AUTO: 28.3 PG (ref 28–33.2)
MCHC RBC AUTO-ENTMCNC: 32.7 G/DL (ref 32.2–36.5)
MCV RBC AUTO: 86.4 FL (ref 83–98)
PDW BLD AUTO: 10.1 FL (ref 9.4–12.4)
PLATELET # BLD AUTO: 152 K/UL
POTASSIUM SERPL-SCNC: 4.2 MEQ/L (ref 3.6–5.1)
RBC # BLD AUTO: 3.82 M/UL (ref 4.5–5.8)
RHEUMATOID FACT SERPL-ACNC: <20 IU/ML
SODIUM SERPL-SCNC: 135 MEQ/L (ref 136–144)
WBC # BLD AUTO: 4.67 K/UL

## 2019-05-17 PROCEDURE — 97166 OT EVAL MOD COMPLEX 45 MIN: CPT | Mod: GO

## 2019-05-17 PROCEDURE — 99239 HOSP IP/OBS DSCHRG MGMT >30: CPT | Performed by: INTERNAL MEDICINE

## 2019-05-17 PROCEDURE — 36415 COLL VENOUS BLD VENIPUNCTURE: CPT | Performed by: FAMILY MEDICINE

## 2019-05-17 PROCEDURE — 85027 COMPLETE CBC AUTOMATED: CPT | Performed by: FAMILY MEDICINE

## 2019-05-17 PROCEDURE — 84295 ASSAY OF SERUM SODIUM: CPT | Performed by: FAMILY MEDICINE

## 2019-05-17 PROCEDURE — 63700000 HC SELF-ADMINISTRABLE DRUG: Performed by: INTERNAL MEDICINE

## 2019-05-17 RX ORDER — PENICILLIN V POTASSIUM 500 MG/1
500 TABLET, FILM COATED ORAL 4 TIMES DAILY
Qty: 56 TABLET | Refills: 0 | Status: SHIPPED | OUTPATIENT
Start: 2019-05-17 | End: 2019-05-31

## 2019-05-17 RX ORDER — PENICILLIN V POTASSIUM 500 MG/1
500 TABLET, FILM COATED ORAL 4 TIMES DAILY
Qty: 56 TABLET | Refills: 0 | Status: SHIPPED | OUTPATIENT
Start: 2019-05-17 | End: 2019-05-17

## 2019-05-17 RX ADMIN — ASPIRIN 325 MG: 325 TABLET, DELAYED RELEASE ORAL at 09:18

## 2019-05-17 RX ADMIN — LEVOTHYROXINE SODIUM 25 MCG: 25 TABLET ORAL at 06:29

## 2019-05-17 RX ADMIN — PANTOPRAZOLE SODIUM 40 MG: 40 TABLET, DELAYED RELEASE ORAL at 09:18

## 2019-05-17 ASSESSMENT — COGNITIVE AND FUNCTIONAL STATUS - GENERAL
TOILETING: 4 - NONE
EATING MEALS: 4 - NONE
HELP NEEDED FOR PERSONAL GROOMING: 4 - NONE
DRESSING REGULAR UPPER BODY CLOTHING: 4 - NONE
DRESSING REGULAR LOWER BODY CLOTHING: 4 - NONE
HELP NEEDED FOR BATHING: 4 - NONE

## 2019-05-17 NOTE — PROGRESS NOTES
Infectious Disease Progress Note  Patient Name: Michel Fang MR#: [id number] : 1935 Admitted 2019  Date: 19 Time: 11:45 AM Author: Richard Pineda MD  The patient is seen in follow up today for fever, and for Streptococcus mutans septicemia rule out endocarditis    ROS: no fever, chills, sweats, diarrhea, rash    Physical Examination:  General: no acute distress   Neurologic: alert   Oropharynx: moist mucous membranes with no lesions   Neck: supple, trachea midline   Heart: regular rate, regular rhythm, normal S1, normal S2  Lungs: clear to auscultation bilaterally   Abdomen: soft, normal bowel sounds, no distension, no mass, no tenderness  Musculoskeletal: normal range of motion, no deformity  Skin: no rash   Vital signs reviewed  Temp (24hrs), Av.9 °C (98.5 °F), Min:36.3 °C (97.4 °F), Max:37.8 °C (100.1 °F)    Recent Results (from the past 24 hour(s))   CBC    Collection Time: 19  5:20 AM   Result Value Ref Range    WBC 4.67 3.80 - 10.50 K/uL    RBC 3.82 (L) 4.50 - 5.80 M/uL    Hemoglobin 10.8 (L) 13.7 - 17.5 g/dL    Hematocrit 33.0 (L) 40.1 - 51.0 %    MCV 86.4 83.0 - 98.0 fL    MCH 28.3 28.0 - 33.2 pg    MCHC 32.7 32.2 - 36.5 g/dL    RDW 14.6 (H) 11.6 - 14.4 %    Platelets 152 150 - 350 K/uL    MPV 10.1 9.4 - 12.4 fL   Basic metabolic panel    Collection Time: 19  5:20 AM   Result Value Ref Range    Sodium 135 (L) 136 - 144 mEQ/L    Potassium 4.2 3.6 - 5.1 mEQ/L    Chloride 105 98 - 109 mEQ/L    CO2 21 (L) 22 - 32 mEQ/L    BUN 14 8 - 20 mg/dL    Creatinine 1.0 0.8 - 1.3 mg/dL    Glucose 99 70 - 99 mg/dL    Calcium 8.3 (L) 8.9 - 10.3 mg/dL    eGFR >60.0 >=60.0 mL/min/1.73m*2    Anion Gap 9 3 - 15 mEQ/L     Anti-infectives     Start     Dose/Rate Route Frequency Ordered Stop    19 2330  cefTRIAXone (ROCEPHIN) IVPB 2 g in 100 mL NSS vial in bag      2 g  200 mL/hr over 30 Minutes intravenous Every 24 hours interval 19 2310          ASSESSMENT    tolerating  antimicrobial therapy   LEXUS showed no evidence of endocarditis      PLAN    continue ceftriaxone while inpt, but at this time, may DC home with Rx pen  mg PO QID for two weeks   as d/w Dr Mendelson today, consider repeat LEXUS in 1-2 weeks    Richard Pineda MD  5/17/2019 11:45 AM

## 2019-05-17 NOTE — ASSESSMENT & PLAN NOTE
Blood culture +strep mutans  Dental procedure 5/3/19, took amoxicillin 1 hr prior to procedure   Afebrile overnight (Tmax 100.1)  TTE/LEXUS no visible vegetation   [facility] criteria: 1 major, 2 minor - possible endocarditis     Rocephin 2g q24h   Repeat blood cx NGTD  ID following   Possible dc home today if able to switch to PO abx for prolonged course

## 2019-05-17 NOTE — DISCHARGE INSTRUCTIONS
You were admitted to [facility] for fever.  Following treatment, you were discharged in stable condition.  Be sure to follow up with providers as instrcuted above.  Review any changes to medications that may have been made and review them with your doctors at your follow-up appointments.    MEDICATION:  Penicillin 500mg every 6 hours x 2 weeks    it is important you take entire course of antibiotics as prescribed and finish the entire course. You make take a probiotic or eat yogurt while taking antibiotics to reduce likelihood of GI side effects     Repeat transesophageal echocardiogram in 1-2 weeks     Follow up with infectious disease  Follow up with your PCP  Follow up with cardiology   Follow up with Dr. Castro

## 2019-05-17 NOTE — SIGNIFICANT EVENT
Spoke with daughter Maeve on phone. Reviewed hospital stay and plan. She is aware of antibiotic treatment for next 2 weeks and follow up with cardiology in next 7-12 days. All questions answered

## 2019-05-17 NOTE — PROGRESS NOTES
"  Daily Progress Note    Subjective   No chest pain or shortness of breath.   Pt is scheduled for discharge today on axbx.      Objective    Vital signs in last 24 hours:  Temp:  [36.3 °C (97.4 °F)-37.8 °C (100.1 °F)] 37.8 °C (100.1 °F)  Heart Rate:  [56-74] 70  Resp:  [18-19] 18  BP: (137-161)/(63-74) 142/65      Intake/Output Summary (Last 24 hours) at 05/17/19 1121  Last data filed at 05/17/19 0521   Gross per 24 hour   Intake              280 ml   Output             1000 ml   Net             -720 ml       Physical Exam:  BP (!) 142/65 (BP Location: Right upper arm, Patient Position: Lying)   Pulse 70   Temp 37.8 °C (100.1 °F) (Temporal)   Resp 18   Ht 1.753 m (5' 9\")   Wt 88.5 kg (195 lb)   SpO2 95%   BMI 28.80 kg/m²   The patient appears comfortable and is in no apparent distress,    Lungs: Clear to auscultation,   Heart: Regular rhythm,   Extremities: No edema,   Neuro: Alert and oriented without focal deficit.    •  acetaminophen, 650 mg, oral, q4h PRN **OR** acetaminophen, 650 mg, rectal, q4h PRN **OR** acetaminophen, 650 mg, oral, q4h PRN  •  aspirin, 325 mg, oral, Daily  •  atorvastatin, 40 mg, oral, Daily (6p)  •  atropine, 0.5 mg, intravenous, q5 min PRN  •  cefTRIAXone, 2 g, intravenous, q24h INT  •  glucose, 16-32 g of dextrose, oral, PRN **OR** dextrose, 15-30 g of dextrose, oral, PRN **OR** glucagon, 1 mg, intramuscular, PRN **OR** dextrose in water, 25 mL, intravenous, PRN  •  docusate sodium, 100 mg, oral, 2x daily PRN  •  enoxaparin, 40 mg, subcutaneous, Daily (6p)  •  levothyroxine, 25 mcg, oral, Daily (6:30a)  •  nitroglycerin, 0.4 mg, sublingual, q5 min PRN  •  pantoprazole, 40 mg, oral, Daily  •  senna, 1 tablet, oral, 2x daily PRN  •  tamsulosin, 0.4 mg, oral, Nightly    Labs  Lab Results   Component Value Date    WBC 4.67 05/17/2019    HGB 10.8 (L) 05/17/2019     05/17/2019    ALT 13 (L) 05/11/2019    AST 17 05/11/2019     (L) 05/17/2019    K 4.2 05/17/2019     " 05/17/2019    CREATININE 1.0 05/17/2019    BUN 14 05/17/2019    CO2 21 (L) 05/17/2019    TSH 2.42 05/11/2019    INR 1.2 09/06/2018    BNP 73 09/06/2018     Troponin I Results       05/11/19     2112    Troponin I &lt;0.02                 Imaging  I have independently reviewed the pertinent imaging from the last 24 hrs.      ECG/Telemetry  I have independently reviewed the telemetry. No events for the last 24 hours.    ELXUS 5/15/19:    · Normal-sized left ventricular cavity. Preserved systolic function. Estimated EF = 55%. There are regional wall motion abnormalities. Hypokinesis (severity) in the following segment(s): , basal inferoseptal, basal inferior, mid inferior.  · Thickening of the aortic valve. A bioprosthetic aortic valve is present. Moderate para-valvular regurgitation (anterior to the annulus). No vegetation.  · Tricuspid valve structure is normal. No evidence of tricuspid valve regurgitation. No vegetation  · Mild mitral valve regurgitation. There is No vegetation present. of the mitral valve.       Assessment & Plan    Aortic stenosis   Assessment & Plan    He has history of TAVR 2016 and known perivalvular AI since 9/2018. The valve function was stable by repeat echocardiogram this admission.      HTN (hypertension)   Assessment & Plan    His blood pressure has been labile, and now -160's.  He was not on blood pressure medication prior to admission.  He will f/u with cardiology.  If persistently elevated, may need to add BB.       CAD (coronary artery disease)   Assessment & Plan    He follows with Dr. Isaacs ([place]) outpatient.  He will make f/u appt in 1-2 weeks. Continue aspirin and statin.      * Streptococcal bacteremia   Assessment & Plan    The echocardiogram did now show evidence of IE. His LEXUS was negative for vegetation.  He will be discharged on po axbx per HMS/ID.                     NEYDA Jack  5/17/2019

## 2019-05-17 NOTE — NURSING NOTE
Patient ready for discharge per physician's order. Tele monitor removed. IV line removed. Patient received discharge instructions and prescriptions. Patient verbalized understanding of discharge instructions. Son at bedside. Patient eating lunch and will be transported home by his son.

## 2019-05-17 NOTE — DISCHARGE SUMMARY
Hospital Medicine Service -  Inpatient Discharge Summary        BRIEF OVERVIEW   Admitting Provider: Lilia Rod DO  Attending Provider: Gen Navarro MD Attending phys phone: [phone]    PCP: Johnathan Leone [phone]    Admission Date: 5/11/2019  Discharge Date: 5/17/2019     DISCHARGE DIAGNOSES      Primary Discharge Diagnosis  Streptococcal bacteremia    Secondary Discharge Diagnoses  Active Hospital Problems    Diagnosis Date Noted   • Streptococcal bacteremia 05/11/2019     Priority: High   • Pulmonary sarcoidosis (CMS/HCC) ([facility]) 09/05/2018     Priority: Medium   • Prolonged Q-T interval on ECG 05/13/2019     Priority: Low   • Nausea 05/13/2019     Priority: Low   • Aortic stenosis 05/12/2019     Priority: Low   • History of GI bleed 05/12/2019     Priority: Low   • Hyponatremia 05/11/2019     Priority: Low   • CAD (coronary artery disease) 09/05/2018     Priority: Low   • Benign prostatic hyperplasia 10/25/2017     Priority: Low   • HTN (hypertension) 09/05/2018      Resolved Hospital Problems    Diagnosis Date Noted Date Resolved   No resolved problems to display.       Problem List on Day of Discharge  * Streptococcal bacteremia   Assessment & Plan    Blood culture +strep mutans  Dental procedure 5/3/19, took amoxicillin 1 hr prior to procedure   Afebrile overnight (Tmax 100.1)  TTE/LEXUS no visible vegetation   [facility] criteria: 1 major, 2 minor - possible endocarditis     Rocephin 2g q24h   Repeat blood cx NGTD  ID following   Possible dc home today if able to switch to PO abx for prolonged course        Pulmonary sarcoidosis (CMS/HCC) ([facility])   Assessment & Plan    Stable   No current tx. Follows with Dr. Sophia Castro at [place]  Diagnosed >10 yr ago     Nausea   Assessment & Plan    Resolved     Tigan for nausea with long QT     Prolonged Q-T interval on ECG   Assessment & Plan    QTc 495 on admission and on repeat EKG    Use caution with meds      History of GI bleed   Assessment & Plan    A:  "GI bleed last fall.   Thought to be 2/2 AVM  EGD- esophagitis  Hgb stable and improved    Continue PPI     Aortic stenosis   Assessment & Plan    Continue ASA 325mg daily ( taken off PLAVIX after GIB last fall and put on full dose ASA)       Hyponatremia   Assessment & Plan    Stable    Follow BMP     CAD (coronary artery disease)   Assessment & Plan    stable    Continue ASA and LIPITOR     Benign prostatic hyperplasia   Assessment & Plan    Continue FLOMAX       SUMMARY OF HOSPITALIZATION      Presenting Problem/History of Present Illness  Fever, unspecified fever cause    This is a 83 y.o. year-old male admitted on 5/11/2019 with Hyponatremia [E87.1]  Fever [R50.9]  Fever, unspecified fever cause [R50.9]  Syncope, unspecified syncope type [R55]  Fever, unspecified fever cause [R50.9].    As per HPI 5/12/19:  \"Michel Fang is a 83 y.o. male with a past medical history of BPH, AS s/p TAVR, Sarcoidiosis, HTN, CAD who presents with fever and lethargy. Two daughters and one son is at bedside,   Per Maeve, patient has had a decline in his activity level and overall health since GIB in fall of 2019.   Patient states past 3 days no energy and couldn't leave the house. States intermittent fever and chills for past 3 days as well.      Then patient states fever has been intermittent for months. HE also has night sweats. Denies enlarged nodes. Also his appetite has decreased.      Patient lives alone. Daughter states when patient feels he has some energy he gets around but then for next few days he is down.      Today his other daughter went to check on him. He looked pale, chills and nausea. He then slumped his head down for a few seconds so she immediately called 911/     In ED labs at baseline.     Patient denies headache, neck pain, rashes, abdominal pain, diarrhea or tarry or bloody stools, trouble urinating.\"    Hospital Course    Patient admitted with fever for several months. Imaging and initial labs revealed no " source with normal white count. Blood cultures positive for strep mutans. Suspected secondary to dental procedure 1 week prior to ED visit. Has PMH of TAVR. TTE and LEXUS without vegetation. Possible endocarditis per [facility]QU[facility] criteria. Cardiology and ID following during hospital stay.   Improved with Rocephin IV abx. Repeat blood culture negative. Switched to PENICILLIN 500mg Q6h x 2 weeks. Follow up LEXUS as outpatient in 1-2 weeks with cardiologist.     Exam on Day of Discharge  Physical Exam  General: Awake, alert, well developed, no acute distress  HEENT:  Mucous membranes pink and moist  Respiratory:  Lungs clear to auscultation bilaterally, no wheeze, crackle, or rhonchi  Cardiac: Regular rate and rhythm.  GYPSY  Abdomen:  Soft, non-tender, non-distended. Normal bowel sounds.  No rebound tenderness or guarding  Extremities:  No edema   Skin:  Warm, dry, intact  Neuro:  No focal deficit  Psych:  Cooperative, AAOx3    Consults During Admission  IP CONSULT TO INFECTIOUS DISEASE  IP CONSULT TO CARDIOLOGY    DISCHARGE MEDICATIONS        Medication List      START taking these medications    penicillin v potassium 500 mg tablet  Commonly known as:  VEETID  Take 1 tablet (500 mg total) by mouth 4 (four) times a day for 14 days.  Dose:  500 mg        CONTINUE taking these medications    albuterol HFA 90 mcg/actuation inhaler  Commonly known as:  VENTOLIN HFA  TAKE 2 PUFFS BY MOUTH EVERY 4 TO 6 HOURS AS NEEDED FOR BREATHING     aspirin 325 mg EC tablet  Take 325 mg by mouth daily.  Dose:  325 mg     atorvastatin 40 mg tablet  Commonly known as:  LIPITOR  Take 40 mg by mouth daily.  Dose:  40 mg     levothyroxine 25 mcg tablet  Commonly known as:  SYNTHROID  Take 25 mcg by mouth daily.  Dose:  25 mcg     pantoprazole 40 mg EC tablet  Commonly known as:  PROTONIX  Take 40 mg by mouth daily.  Dose:  40 mg     tamsulosin 0.4 mg capsule  Commonly known as:  FLOMAX  Take 0.4 mg by mouth nightly.  Dose:  0.4 mg            Instructions  for after discharge     Follow-up with Provider:       Instructions for follow-up:  Repeat LEXUS concern for microscopic endocarditis    Follow-up with primary physician (PCP)       Other Follow-up       Instructions for follow-up:  Infectious disease, cardiology, Dr. Castro             PROCEDURES / LABS / IMAGING      Operative Procedures  none    Other Procedures  LEXUS/TTE    Pertinent Labs    Lab Results   Component Value Date    WBC 4.67 05/17/2019    HGB 10.8 (L) 05/17/2019    HCT 33.0 (L) 05/17/2019    MCV 86.4 05/17/2019     05/17/2019     Lab Results   Component Value Date    GLUCOSE 99 05/17/2019    CALCIUM 8.3 (L) 05/17/2019     (L) 05/17/2019    K 4.2 05/17/2019    CO2 21 (L) 05/17/2019     05/17/2019    BUN 14 05/17/2019    CREATININE 1.0 05/17/2019       Microbiology Results     Procedure Component Value Units Date/Time    Blood Culture Blood, Venous [[id number]]  (Normal) Collected:  05/15/19 1646    Specimen:  Blood from Blood, Venous Updated:  05/16/19 2100     Culture No growth at 18-24 hours    Blood Culture Blood, Venous [[id number]]  (Normal) Collected:  05/15/19 1646    Specimen:  Blood from Blood, Venous Updated:  05/16/19 2100     Culture No growth at 18-24 hours    Blood Culture Blood, Venous [[id number]] Collected:  05/12/19 2311    Specimen:  Blood from Blood, Venous Updated:  05/16/19 0857    Narrative:       The following orders were created for panel order Blood Culture Blood, Venous.  Procedure                               Abnormality         Status                     ---------                               -----------         ------                     Blood Culture Blood, Venous[[id number]]   Abnormal            Final result                 Please view results for these tests on the individual orders.    Blood Culture Blood, Venous [[id number]]  (Abnormal) Collected:  05/12/19 2311    Specimen:  Blood from Blood, Venous Updated:  05/16/19 0857     Culture **Positive Culture**  (AA)      Streptococcus mutans     Gram Stain Result Gram positive cocci      Gram positive cocci in chains    RSV and Influenza Nucleic Acids, PCR Nasopharynx [[id number]]  (Normal) Collected:  05/11/19 2138    Specimen:  Nasopharyngeal Aspirate from Nasopharynx Updated:  05/11/19 2225     Influenza A Negative     Influenza B Negative     Respiratory Syncytial Virus Negative    Respiratory virus panel, PCR Nasopharynx [[id number]] Collected:  05/11/19 2138    Specimen:  Nasopharyngeal Aspirate from Nasopharynx Updated:  05/12/19 0431     Adenovirus Negative     Coronavirus 229E Negative     Coronavirus HKU1 Negative     Coronavirus NL63 Negative     Coronavirus OC43 Negative     Human Metapneumovirus Negative     Rhinovirus/Enterovirus Negative     Influenza A Negative     Influenza B Negative     Parainfluenza Virus 1 Negative     Parainfluenza Virus 2 Negative     Parainfluenza Virus 3 Negative     Parainfluenza Virus 4 Negative     Respiratory Syncytial Virus Negative     Viral Respiratory Comment --    Blood Culture Blood, Venous [[id number]] Collected:  05/11/19 2112    Specimen:  Blood from Blood, Venous Updated:  05/14/19 1145    Narrative:       The following orders were created for panel order Blood Culture Blood, Venous.  Procedure                               Abnormality         Status                     ---------                               -----------         ------                     Blood Culture PCR Panel B...[[id number]]  Abnormal            Final result               Blood Culture Blood, Venous[[id number]]   Abnormal            Final result                 Please view results for these tests on the individual orders.    Blood Culture Blood, Venous [[id number]] Collected:  05/11/19 2112    Specimen:  Blood from Blood, Venous Updated:  05/13/19 1157    Narrative:       The following orders were created for panel order Blood Culture Blood, Venous.  Procedure                               Abnormality          Status                     ---------                               -----------         ------                     Blood Culture Blood, Venous[[id number]]   Abnormal            Final result                 Please view results for these tests on the individual orders.    Blood Culture Blood, Venous [[id number]]  (Abnormal) Collected:  05/11/19 2112    Specimen:  Blood from Blood, Venous Updated:  05/14/19 1145     Culture **Positive Culture** (AA)      Streptococcus mutans     Gram Stain Result Gram positive cocci in chains      Gram positive cocci in chains    Blood Culture Blood, Venous [[id number]]  (Abnormal) Collected:  05/11/19 2112    Specimen:  Blood from Blood, Venous Updated:  05/13/19 1157     Culture **Positive Culture** (AA)      Streptococcus mutans     Gram Stain Result Gram positive cocci in chains      Gram positive cocci in chains    Blood Culture PCR Panel Blood, Venous [[id number]]  (Abnormal) Collected:  05/11/19 2112    Specimen:  Blood from Blood, Venous Updated:  05/12/19 2225     Acinetobacter baumannii Not Detected     Candida albicans Not Detected     Candida glabrata Not Detected     Candida krusei Not Detected     Candida parapsilosis Not Detected     Candida tropicalis Not Detected     Enterobacteriaceae Not Detected     Escherichia coli Not Detected     Enterobacter cloacae complex Not Detected     Pseudomonas aeruginosa Not Detected     Klebsiella oxytoca Not Detected     Klebsiella pneumoniae Not Detected     Proteus Not Detected     Serratia marcescens Not Detected     Haemophilus influenzae Not Detected     Neiseria meningitidis Not Detected     Listeria monocytogenes Not Detected     Staphylococcus Not Detected     Staphylococcus aureus Not Detected     Enterococcus Not Detected     Streptococcus Detected (A)     Streptococcus agalactiae (Group B) Not Detected     Streptococcus pneumoniae Not Detected     Streptococcus pyogenes (Group A) Not Detected     Comment: See below    Narrative:        This positive blood culture was tested with a rapid molecular panel that detects Enterococcus species, Listeria monocytogenes, Staphylococcus species, Staphylococcus aureus, Streptococcus agalactiae (Group B), Streptococcus pneumonia, Streptococcus pyogenes (Group A), Acinetobacter baumannii, Haemophilus influenza, Neisseria meningitides, Pseudomonas aeruginosa, Enterobacter cloacae complex, Escherichia coli, Klebsiella oxytoca, Klebsiella pneumonia, Proteus species, Serratia marcescens, Candida albicans, Candida glabrata, Candida krusei, Candida parapsilosis, and Candida tropicalis.          Pertinent Imaging  X-ray Chest 2 Views    Result Date: 5/12/2019  IMPRESSION: No acute disease. I certify that I have reviewed this examination and agree with this report. Houston Guadalupe MD    Ct Head Without Iv Contrast    Result Date: 5/11/2019  IMPRESSION: No evidence of acute territorial infarct, hemorrhage or mass effect. I certify that I have reviewed this examination and agree with this report. Alfredo Rowe MD    Ct Chest Without Iv Contrast    Result Date: 5/12/2019  IMPRESSION: No acute cardiopulmonary abnormality. COMMENT: Postoperative Changes: Unchanged appearance status post post prior median sternotomy for coronary artery bypass graft and aortic stent placement. Heart and pericardium: Otherwise unchanged enlargement and severe coronary calcifications. Aorta and great vessels: Normal in caliber with mild scattered coarse mural calcifications. Jo Ann and mediastinum: Small to medium hiatal hernia that now other mass or lymphadenopathy. Tracheobronchial tree: Patent. Lungs: Unchanged but unchanged scattered right greater than left basilar-predominant linear scarring with fibrosis in the costophrenic sulci. There is no airspace disease bilaterally. Pleura: No pleural fluid. Lower neck: Normal. Chest wall and axilla : Normal. Bones: Intact. Visualized upper abdomen: Unchanged cholelithiasis.     Ct Abdomen Pelvis With Iv  Contrast    Result Date: 5/12/2019  IMPRESSION: *  No focal inflammatory or obstructive bowel process.  No findings to explain fever. *  Additional incidental and chronic findings as described above. I certify that I have reviewed this examination and agree with this report. Marcelino Gunn MD      OUTPATIENT  FOLLOW-UP / REFERRALS / PENDING TESTS        Outpatient Follow-Up Appointments  Encounter Information     You do not currently have any appointments scheduled.          Referrals  No orders of the defined types were placed in this encounter.      Test Results Pending at Discharge  Unresulted Labs     Start     Ordered    05/16/19 1155  Rheumatoid factor  Once      05/16/19 1154    05/15/19 1615  Blood Culture Blood, Venous  (Blood Cultures X 2, Peripheral)  Once      05/15/19 1614    05/15/19 1615  Blood Culture Blood, Venous  (Blood Cultures X 2, Peripheral)  Once     Comments:  From a different site than #1.      05/15/19 1614    05/14/19 0600  CBC  Daily     Start Status   05/18/19 0600 Scheduled   05/19/19 0600 Scheduled   05/20/19 0600 Scheduled       05/13/19 1600    05/14/19 0600  Basic metabolic panel  Daily     Start Status   05/18/19 0600 Scheduled   05/19/19 0600 Scheduled   05/20/19 0600 Scheduled       05/13/19 1600          Important Issues to Address in Follow-Up  Penicillin 500mg every 6 hours x 2 weeks   LEXUS in 1-2 weeks   Follow up with infectious disease  Follow up with your PCP  Follow up with cardiology   Follow up with Dr. Castro    DISCHARGE DISPOSITION      Disposition: Home     Code Status At Discharge: Full Code    Physician Order for Life-Sustaining Treatment Document Status      No documents found

## 2019-05-17 NOTE — PLAN OF CARE
Problem: Patient Care Overview  Goal: Plan of Care Review  Outcome: Ongoing (interventions implemented as appropriate)   05/17/19 1211   Coping/Psychosocial   Plan Of Care Reviewed With patient   Plan of Care Review   Outcome Summary Pt. is independent & has no further OT needs.

## 2019-05-17 NOTE — PLAN OF CARE
Problem: Patient Care Overview  Goal: Plan of Care Review  Outcome: Ongoing (interventions implemented as appropriate)   05/17/19 8993   Coping/Psychosocial   Plan Of Care Reviewed With patient   Plan of Care Review   Progress progress toward functional goals as expected   Outcome Summary Patient CARLIE. He received his last dose of IV antibiotics 5/16 will transition to PO antibiotics today with possible D/C.      Goal: Individualization & Mutuality  Outcome: Ongoing (interventions implemented as appropriate)      Problem: Fall Risk (Adult)  Goal: Identify Related Risk Factors and Signs and Symptoms  Outcome: Outcome(s) Achieved Date Met: 05/17/19      Problem: Infection, Risk/Actual (Adult)  Goal: Infection Prevention/Resolution  Outcome: Ongoing (interventions implemented as appropriate)      Problem: Mobility, Physical Impaired (Adult)  Goal: Enhanced Mobility Skills  Outcome: Ongoing (interventions implemented as appropriate)

## 2019-05-17 NOTE — PROGRESS NOTES
Patient: Michel Fang  Location: [facility] 5A 6081  MRN: [id number]  Today's date: 5/17/2019  Pt. Seated in chair, draw sheet, mobility alarm, call bell & personal items within reach.  Hospital Course  Oh is a 83 y.o. male admitted on 5/11/2019 with Hyponatremia [E87.1]  Fever [R50.9]  Fever, unspecified fever cause [R50.9]  Syncope, unspecified syncope type [R55]  Fever, unspecified fever cause [R50.9]. Principal problem is Streptococcal bacteremia.    Oh has a past medical history of Arthritis; BPH (benign prostatic hyperplasia); CHF (congestive heart failure) (CMS/HCC) ([facility]); Coronary artery disease; Heart disease; Hyperlipidemia; Hypertension; and Sarcoidosis.          Therapy Pain/Vitals - 05/17/19 1028        Pain/Comfort/Sleep    Pain Charting Type Pain Assessment    Presence of Pain denies    Preferred Pain Scale number (Numeric Rating Pain Scale)    Pain Rating (0-10): Rest 0    Pain Rating (0-10): Activity 0       Vital Signs    Pulse 72    SpO2 95 %    Patient Activity At rest    Oxygen Therapy None (Room air)    /61    BP Location Right upper arm    BP Method Automatic    Patient Position Sitting          Prior Living Environment      Most Recent Value   Lives With  alone   Living Arrangements  house   Home Accessibility  stairs to enter home   Living Environment Comment  Lives alone in 1 SH, handicapped bathroom. Has1 or 2 BUCK & is getting a ramp.   Equipment Currently Used at Home  shower chair, grab bar [Has a RW & spc but doesn't use)]          Prior Level of Function      Most Recent Value   Ambulation  independent   Transferring  independent   Toileting  independent   Bathing  assistive equipment   Dressing  independent   Eating  independent   Equipment Currently Used at Home  shower chair, grab bar [Has a RW & spc but doesn't use)]   Prior Functional Level Comment  Pt. reports that he has had multiple illnesses since the fall & doesn't feel that he's fully recovered his  strength. Retired from restaurant business.                OT Evaluation - 05/17/19 1028        Session Details    Document Type initial evaluation    Mode of Treatment occupational therapy    Patient/Family Observations Pt. in chair, finishing breakfast.       Time Calculation    Start Time 1028    Stop Time 1043    Time Calculation (min) 15 min       General Information    Patient Profile Reviewed? yes    Onset of Illness/Injury or Date of Surgery 05/12/19    Existing Precautions/Restrictions fall    Limitations/Impairments hearing   no H.A.s       Orientation Log    Comment A&O x4       Cognition/Psychosocial    Safety Awareness intact       Attention    Behavioral Observations WNL, no concerns       Sensory    Sensory General Assessment no sensation deficits identified       Range of Motion (ROM)    General Range of Motion no range of motion deficits identified       Sit to Stand Transfer    [place], Sit to Stand Transfer modified independence    Assistive Device walker, front-wheeled    Comment Maria A functional mobility w/ RW in the room & dean.       Stand to Sit Transfer    [place], Stand to Sit Transfer modified independence    Assistive Device walker, front-wheeled       Lower Body Dressing    Lower Body Dressing [place] independent       Eating    Feeding [place] independent       Dynamic Standing Balance    [place], Reaches Outside Midline modified independence    Time Able to Maintain Position, Reaches Outside Midline 1 to 2 minutes    Lower Extremity Device, Reaches Outside Midline rolling walker       AM-PAC (TM) - ADL (Current Function)    Putting on and taking off regular lower body clothing? 4 - None    Bathing? 4 - None    Toileting? 4 - None    Putting on/taking off regular upper body clothing? 4 - None    How much help for taking care of personal grooming? 4 - None    Eating meals? 4 - None    AM-PAC (TM) ADL Score 24       OT Clinical Impression    Anticipated Equipment  Needs at Discharge other (see comments)   recommended an emergency alert system.    OT Recommended Discharge Disposition home    Daily Outcome Statement Pt. is at his baseline LOF in ADLs & functional mobility.          Pain Assessment/Intervention  Pain Charting Type: Pain Assessment             Education provided this session. See the Patient Education summary report for full details.

## 2019-05-20 ENCOUNTER — PATIENT OUTREACH (OUTPATIENT)
Dept: CASE MANAGEMENT | Facility: CLINIC | Age: 84
End: 2019-05-20

## 2019-05-20 LAB
BACTERIA BLD CULT: NORMAL
BACTERIA BLD CULT: NORMAL

## 2019-05-20 NOTE — PROGRESS NOTES
DEANA #1 CALL MADE TO PATIENT TODAY AT  [phone]- Outreach call made today to patient without answer, left message on voicemail.   POC: CC RN will outreach again to patient tomorrow.     Patient identified on BPCI List.       Raven BARAHONAN RN CMSRN   Care Coordinator

## 2019-05-21 ENCOUNTER — PATIENT OUTREACH (OUTPATIENT)
Dept: CASE MANAGEMENT | Facility: CLINIC | Age: 84
End: 2019-05-21

## 2019-05-21 NOTE — PROGRESS NOTES
DEANA #2 CALL MADE TO PATIENT TODAY AT  [phone]-Outreach call made today to patient without answer, left message on voicemail with contact information requesting callback.   POC: CC RN will attempt another outreach call to patient for BPCI DEANA.     Raven BARAHONAN RN CMSRN   Care Coordinator

## 2019-05-22 ENCOUNTER — PATIENT OUTREACH (OUTPATIENT)
Dept: CASE MANAGEMENT | Facility: CLINIC | Age: 84
End: 2019-05-22

## 2019-05-22 NOTE — PROGRESS NOTES
OUTREACH CALL MADE TO PCP OFFICE TODAY .[phone]- Called and spoke with Cinthya, informed her that patient is a BPCI patient however CC RN has been unsuccessful in reaching patient.  Cinthya reports that the patient did call in this am and requested an appointment with PCP, she states that the appointment has not been made as of yet.  CC RN again explained that I have left messages however no callback, will attempt one more attempt as patient is BPCI.      Raven Herrera BSN RN CMSRN   Care Coordinator

## 2019-05-22 NOTE — PROGRESS NOTES
DEANA #3 CALL MADE TO PATIENT TODAY .[phone]- Call placed to patient today without answer, left message on voicemail with contact information requesting callback.   POC: CC RN will inform PCP office no contact has been made, will review chart, will make another outreach call.       Raven BARAHONAN RN CMSRN   Care Coordinator

## 2019-05-23 NOTE — PROGRESS NOTES
Late Entry-   Call received from Cinthya at [facility]: Call received from Cinthya that patient does have an appointment with PCP on May 23, 2019.  CC RN appreciated this information.    POC: CC RN will continue to follow.       Raven BARAHONAN RN CMSRN   Care Coordinator

## 2019-05-29 ENCOUNTER — PATIENT OUTREACH (OUTPATIENT)
Dept: CASE MANAGEMENT | Facility: CLINIC | Age: 84
End: 2019-05-29

## 2019-05-29 NOTE — PROGRESS NOTES
OUTREACH CALL MADE TO PATIENT TODAY .[phone]-Outreach call made to patient again, no answer, left message on voicemail.  POC: CC RN will await return call, may close case as calls have been attempted without response.   Patient did have follow up appointment with PCP.    Raven BARAHONAN RN CMSRN   Care Coordinator

## 2019-05-31 ENCOUNTER — PATIENT OUTREACH (OUTPATIENT)
Dept: CASE MANAGEMENT | Facility: CLINIC | Age: 84
End: 2019-05-31

## 2019-09-20 ENCOUNTER — HOSPITAL ENCOUNTER (OUTPATIENT)
Dept: RADIOLOGY | Age: 84
Discharge: HOME | End: 2019-09-20
Attending: FAMILY MEDICINE
Payer: MEDICARE

## 2019-09-20 ENCOUNTER — TRANSCRIBE ORDERS (OUTPATIENT)
Dept: RADIOLOGY | Age: 84
End: 2019-09-20

## 2019-09-20 DIAGNOSIS — R09.89 OTHER SPECIFIED SYMPTOMS AND SIGNS INVOLVING THE CIRCULATORY AND RESPIRATORY SYSTEMS: Primary | ICD-10-CM

## 2019-09-20 DIAGNOSIS — R09.89 OTHER SPECIFIED SYMPTOMS AND SIGNS INVOLVING THE CIRCULATORY AND RESPIRATORY SYSTEMS: ICD-10-CM

## 2019-09-20 PROCEDURE — 71046 X-RAY EXAM CHEST 2 VIEWS: CPT

## 2019-11-10 ENCOUNTER — APPOINTMENT (EMERGENCY)
Dept: RADIOLOGY | Facility: HOSPITAL | Age: 84
End: 2019-11-10
Attending: EMERGENCY MEDICINE
Payer: MEDICARE

## 2019-11-10 ENCOUNTER — HOSPITAL ENCOUNTER (OUTPATIENT)
Facility: HOSPITAL | Age: 84
Setting detail: OBSERVATION
Discharge: HOME | End: 2019-11-10
Attending: EMERGENCY MEDICINE | Admitting: HOSPITALIST
Payer: MEDICARE

## 2019-11-10 VITALS
BODY MASS INDEX: 29.56 KG/M2 | DIASTOLIC BLOOD PRESSURE: 72 MMHG | RESPIRATION RATE: 16 BRPM | TEMPERATURE: 98 F | OXYGEN SATURATION: 96 % | SYSTOLIC BLOOD PRESSURE: 164 MMHG | HEART RATE: 64 BPM | HEIGHT: 70 IN | WEIGHT: 206.5 LBS

## 2019-11-10 DIAGNOSIS — R55 NEAR SYNCOPE: Primary | ICD-10-CM

## 2019-11-10 LAB
ANION GAP SERPL CALC-SCNC: 6 MEQ/L (ref 3–15)
ANION GAP SERPL CALC-SCNC: 8 MEQ/L (ref 3–15)
ATRIAL RATE: 67
BASOPHILS # BLD: 0.05 K/UL (ref 0.01–0.1)
BASOPHILS NFR BLD: 1 %
BILIRUB UR QL STRIP.AUTO: NEGATIVE MG/DL
BUN SERPL-MCNC: 18 MG/DL (ref 8–20)
BUN SERPL-MCNC: 18 MG/DL (ref 8–20)
CALCIUM SERPL-MCNC: 8 MG/DL (ref 8.9–10.3)
CALCIUM SERPL-MCNC: 8.4 MG/DL (ref 8.9–10.3)
CHLORIDE SERPL-SCNC: 108 MEQ/L (ref 98–109)
CHLORIDE SERPL-SCNC: 110 MEQ/L (ref 98–109)
CLARITY UR REFRACT.AUTO: CLEAR
CO2 SERPL-SCNC: 22 MEQ/L (ref 22–32)
CO2 SERPL-SCNC: 24 MEQ/L (ref 22–32)
COLOR UR AUTO: YELLOW
CREAT SERPL-MCNC: 0.9 MG/DL
CREAT SERPL-MCNC: 1 MG/DL
DIFFERENTIAL METHOD BLD: NORMAL
EOSINOPHIL # BLD: 0.09 K/UL (ref 0.04–0.54)
EOSINOPHIL NFR BLD: 1.7 %
ERYTHROCYTE [DISTWIDTH] IN BLOOD BY AUTOMATED COUNT: 15.1 % (ref 11.6–14.4)
ERYTHROCYTE [DISTWIDTH] IN BLOOD BY AUTOMATED COUNT: 15.2 % (ref 11.6–14.4)
GFR SERPL CREATININE-BSD FRML MDRD: >60 ML/MIN/1.73M*2
GFR SERPL CREATININE-BSD FRML MDRD: >60 ML/MIN/1.73M*2
GLUCOSE SERPL-MCNC: 101 MG/DL (ref 70–99)
GLUCOSE SERPL-MCNC: 106 MG/DL (ref 70–99)
GLUCOSE UR STRIP.AUTO-MCNC: NEGATIVE MG/DL
HCT VFR BLDCO AUTO: 38.2 % (ref 40.1–51)
HCT VFR BLDCO AUTO: 39.3 % (ref 40.1–51)
HGB BLD-MCNC: 12.7 G/DL
HGB BLD-MCNC: 13.2 G/DL
HGB UR QL STRIP.AUTO: NEGATIVE
IMM GRANULOCYTES # BLD AUTO: 0.02 K/UL (ref 0–0.08)
IMM GRANULOCYTES NFR BLD AUTO: 0.4 %
KETONES UR STRIP.AUTO-MCNC: NEGATIVE MG/DL
LEUKOCYTE ESTERASE UR QL STRIP.AUTO: NEGATIVE
LYMPHOCYTES # BLD: 1.47 K/UL (ref 1.2–3.5)
LYMPHOCYTES NFR BLD: 28.2 %
MAGNESIUM SERPL-MCNC: 1.9 MG/DL (ref 1.8–2.5)
MAGNESIUM SERPL-MCNC: 2 MG/DL (ref 1.8–2.5)
MCH RBC QN AUTO: 29.7 PG (ref 28–33.2)
MCH RBC QN AUTO: 30.1 PG (ref 28–33.2)
MCHC RBC AUTO-ENTMCNC: 33.2 G/DL (ref 32.2–36.5)
MCHC RBC AUTO-ENTMCNC: 33.6 G/DL (ref 32.2–36.5)
MCV RBC AUTO: 89.5 FL (ref 83–98)
MCV RBC AUTO: 89.7 FL (ref 83–98)
MONOCYTES # BLD: 0.66 K/UL (ref 0.3–1)
MONOCYTES NFR BLD: 12.7 %
NEUTROPHILS # BLD: 2.92 K/UL (ref 1.7–7)
NEUTS SEG NFR BLD: 56 %
NITRITE UR QL STRIP.AUTO: NEGATIVE
NRBC BLD-RTO: 0 %
P AXIS: 37
PDW BLD AUTO: 10.3 FL (ref 9.4–12.4)
PDW BLD AUTO: 10.8 FL (ref 9.4–12.4)
PH UR STRIP.AUTO: 6.5 [PH]
PLATELET # BLD AUTO: 114 K/UL
PLATELET # BLD AUTO: 121 K/UL
POTASSIUM SERPL-SCNC: 3.5 MEQ/L (ref 3.6–5.1)
POTASSIUM SERPL-SCNC: 4 MEQ/L (ref 3.6–5.1)
PR INTERVAL: 212
PROT UR QL STRIP.AUTO: NEGATIVE
QRS DURATION: 142
QT INTERVAL: 466
QTC CALCULATION(BAZETT): 492
R AXIS: -53
RBC # BLD AUTO: 4.27 M/UL (ref 4.5–5.8)
RBC # BLD AUTO: 4.38 M/UL (ref 4.5–5.8)
SODIUM SERPL-SCNC: 138 MEQ/L (ref 136–144)
SODIUM SERPL-SCNC: 140 MEQ/L (ref 136–144)
SP GR UR REFRACT.AUTO: 1.01
T WAVE AXIS: 4
TROPONIN I SERPL-MCNC: 0.02 NG/ML
TROPONIN I SERPL-MCNC: 0.03 NG/ML
TSH SERPL DL<=0.05 MIU/L-ACNC: 2.99 MIU/L (ref 0.34–5.6)
UROBILINOGEN UR STRIP-ACNC: 1 EU/DL
VENTRICULAR RATE: 67
WBC # BLD AUTO: 5.21 K/UL
WBC # BLD AUTO: 5.49 K/UL

## 2019-11-10 PROCEDURE — 84484 ASSAY OF TROPONIN QUANT: CPT | Performed by: PHYSICIAN ASSISTANT

## 2019-11-10 PROCEDURE — G0378 HOSPITAL OBSERVATION PER HR: HCPCS

## 2019-11-10 PROCEDURE — 36415 COLL VENOUS BLD VENIPUNCTURE: CPT | Performed by: PHYSICIAN ASSISTANT

## 2019-11-10 PROCEDURE — 25800000 HC PHARMACY IV SOLUTIONS: Performed by: PHYSICIAN ASSISTANT

## 2019-11-10 PROCEDURE — 99236 HOSP IP/OBS SAME DATE HI 85: CPT | Performed by: HOSPITALIST

## 2019-11-10 PROCEDURE — 84484 ASSAY OF TROPONIN QUANT: CPT | Mod: 91 | Performed by: HOSPITALIST

## 2019-11-10 PROCEDURE — 96360 HYDRATION IV INFUSION INIT: CPT

## 2019-11-10 PROCEDURE — 25800000 HC PHARMACY IV SOLUTIONS: Performed by: HOSPITALIST

## 2019-11-10 PROCEDURE — 80048 BASIC METABOLIC PNL TOTAL CA: CPT | Performed by: PHYSICIAN ASSISTANT

## 2019-11-10 PROCEDURE — 83735 ASSAY OF MAGNESIUM: CPT | Performed by: HOSPITALIST

## 2019-11-10 PROCEDURE — 84443 ASSAY THYROID STIM HORMONE: CPT | Performed by: HOSPITALIST

## 2019-11-10 PROCEDURE — 71046 X-RAY EXAM CHEST 2 VIEWS: CPT

## 2019-11-10 PROCEDURE — 85025 COMPLETE CBC W/AUTO DIFF WBC: CPT | Performed by: PHYSICIAN ASSISTANT

## 2019-11-10 PROCEDURE — 85027 COMPLETE CBC AUTOMATED: CPT | Mod: 59 | Performed by: HOSPITALIST

## 2019-11-10 PROCEDURE — 99285 EMERGENCY DEPT VISIT HI MDM: CPT | Mod: 25

## 2019-11-10 PROCEDURE — 81003 URINALYSIS AUTO W/O SCOPE: CPT | Performed by: HOSPITALIST

## 2019-11-10 PROCEDURE — 93005 ELECTROCARDIOGRAM TRACING: CPT | Performed by: EMERGENCY MEDICINE

## 2019-11-10 PROCEDURE — 63700000 HC SELF-ADMINISTRABLE DRUG: Performed by: HOSPITALIST

## 2019-11-10 PROCEDURE — 80048 BASIC METABOLIC PNL TOTAL CA: CPT | Mod: 91 | Performed by: HOSPITALIST

## 2019-11-10 RX ORDER — ASPIRIN 325 MG
325 TABLET, DELAYED RELEASE (ENTERIC COATED) ORAL NIGHTLY
Status: DISCONTINUED | OUTPATIENT
Start: 2019-11-10 | End: 2019-11-10 | Stop reason: HOSPADM

## 2019-11-10 RX ORDER — PANTOPRAZOLE SODIUM 40 MG/1
40 TABLET, DELAYED RELEASE ORAL DAILY
Status: DISCONTINUED | OUTPATIENT
Start: 2019-11-10 | End: 2019-11-10 | Stop reason: HOSPADM

## 2019-11-10 RX ORDER — ACETAMINOPHEN 325 MG/1
650 TABLET ORAL EVERY 4 HOURS PRN
Status: DISCONTINUED | OUTPATIENT
Start: 2019-11-10 | End: 2019-11-10 | Stop reason: HOSPADM

## 2019-11-10 RX ORDER — POTASSIUM CHLORIDE 750 MG/1
20 TABLET, FILM COATED, EXTENDED RELEASE ORAL AS NEEDED
Status: DISCONTINUED | OUTPATIENT
Start: 2019-11-10 | End: 2019-11-10 | Stop reason: HOSPADM

## 2019-11-10 RX ORDER — DOCUSATE SODIUM 100 MG/1
100 CAPSULE, LIQUID FILLED ORAL 2 TIMES DAILY
COMMUNITY
End: 2022-10-20

## 2019-11-10 RX ORDER — POTASSIUM CHLORIDE 14.9 MG/ML
20 INJECTION INTRAVENOUS AS NEEDED
Status: DISCONTINUED | OUTPATIENT
Start: 2019-11-10 | End: 2019-11-10 | Stop reason: HOSPADM

## 2019-11-10 RX ORDER — ATORVASTATIN CALCIUM 40 MG/1
40 TABLET, FILM COATED ORAL
Status: DISCONTINUED | OUTPATIENT
Start: 2019-11-10 | End: 2019-11-10 | Stop reason: HOSPADM

## 2019-11-10 RX ORDER — DEXTROSE 40 %
15-30 GEL (GRAM) ORAL AS NEEDED
Status: DISCONTINUED | OUTPATIENT
Start: 2019-11-10 | End: 2019-11-10 | Stop reason: HOSPADM

## 2019-11-10 RX ORDER — TAMSULOSIN HYDROCHLORIDE 0.4 MG/1
0.4 CAPSULE ORAL NIGHTLY
Status: DISCONTINUED | OUTPATIENT
Start: 2019-11-10 | End: 2019-11-10 | Stop reason: HOSPADM

## 2019-11-10 RX ORDER — DOCUSATE SODIUM 100 MG/1
100 CAPSULE, LIQUID FILLED ORAL 2 TIMES DAILY
Status: DISCONTINUED | OUTPATIENT
Start: 2019-11-10 | End: 2019-11-10 | Stop reason: HOSPADM

## 2019-11-10 RX ORDER — NITROGLYCERIN 0.4 MG/1
0.4 TABLET SUBLINGUAL EVERY 5 MIN PRN
Status: DISCONTINUED | OUTPATIENT
Start: 2019-11-10 | End: 2019-11-10 | Stop reason: HOSPADM

## 2019-11-10 RX ORDER — ENOXAPARIN SODIUM 100 MG/ML
40 INJECTION SUBCUTANEOUS
Status: DISCONTINUED | OUTPATIENT
Start: 2019-11-10 | End: 2019-11-10 | Stop reason: HOSPADM

## 2019-11-10 RX ORDER — IBUPROFEN 200 MG
16-32 TABLET ORAL AS NEEDED
Status: DISCONTINUED | OUTPATIENT
Start: 2019-11-10 | End: 2019-11-10 | Stop reason: HOSPADM

## 2019-11-10 RX ORDER — ASPIRIN 325 MG
325 TABLET, DELAYED RELEASE (ENTERIC COATED) ORAL ONCE
Status: COMPLETED | OUTPATIENT
Start: 2019-11-10 | End: 2019-11-10

## 2019-11-10 RX ORDER — POTASSIUM CHLORIDE 750 MG/1
20 TABLET, FILM COATED, EXTENDED RELEASE ORAL DAILY PRN
Status: DISCONTINUED | OUTPATIENT
Start: 2019-11-10 | End: 2019-11-10 | Stop reason: HOSPADM

## 2019-11-10 RX ORDER — LEVOTHYROXINE SODIUM 25 UG/1
25 TABLET ORAL
Status: DISCONTINUED | OUTPATIENT
Start: 2019-11-10 | End: 2019-11-10 | Stop reason: HOSPADM

## 2019-11-10 RX ORDER — SODIUM CHLORIDE 9 MG/ML
40 INJECTION, SOLUTION INTRAVENOUS CONTINUOUS
Status: DISCONTINUED | OUTPATIENT
Start: 2019-11-10 | End: 2019-11-10 | Stop reason: HOSPADM

## 2019-11-10 RX ORDER — POTASSIUM CHLORIDE 750 MG/1
40 TABLET, FILM COATED, EXTENDED RELEASE ORAL AS NEEDED
Status: DISCONTINUED | OUTPATIENT
Start: 2019-11-10 | End: 2019-11-10 | Stop reason: HOSPADM

## 2019-11-10 RX ORDER — ALBUTEROL SULFATE 90 UG/1
1 INHALANT RESPIRATORY (INHALATION) EVERY 6 HOURS PRN
Status: DISCONTINUED | OUTPATIENT
Start: 2019-11-10 | End: 2019-11-10 | Stop reason: HOSPADM

## 2019-11-10 RX ORDER — ASPIRIN 325 MG
TABLET ORAL
Status: DISCONTINUED
Start: 2019-11-10 | End: 2019-11-10 | Stop reason: HOSPADM

## 2019-11-10 RX ORDER — DEXTROSE 50 % IN WATER (D50W) INTRAVENOUS SYRINGE
25 AS NEEDED
Status: DISCONTINUED | OUTPATIENT
Start: 2019-11-10 | End: 2019-11-10 | Stop reason: HOSPADM

## 2019-11-10 RX ADMIN — POTASSIUM CHLORIDE 20 MEQ: 750 TABLET, FILM COATED, EXTENDED RELEASE ORAL at 03:56

## 2019-11-10 RX ADMIN — PANTOPRAZOLE SODIUM 40 MG: 40 TABLET, DELAYED RELEASE ORAL at 09:23

## 2019-11-10 RX ADMIN — LEVOTHYROXINE SODIUM 25 MCG: 25 TABLET ORAL at 06:51

## 2019-11-10 RX ADMIN — SODIUM CHLORIDE 40 ML/HR: 9 INJECTION, SOLUTION INTRAVENOUS at 05:20

## 2019-11-10 RX ADMIN — SODIUM CHLORIDE 500 ML: 9 INJECTION, SOLUTION INTRAVENOUS at 02:11

## 2019-11-10 RX ADMIN — DOCUSATE SODIUM 100 MG: 100 CAPSULE, LIQUID FILLED ORAL at 09:23

## 2019-11-10 RX ADMIN — ASPIRIN 325 MG: 325 TABLET, DELAYED RELEASE ORAL at 04:01

## 2019-11-10 ASSESSMENT — ENCOUNTER SYMPTOMS
CHEST TIGHTNESS: 0
DIAPHORESIS: 0
POLYPHAGIA: 0
NECK PAIN: 0
COLOR CHANGE: 0
DIZZINESS: 0
PALPITATIONS: 0
DIZZINESS: 1
AGITATION: 0
DYSURIA: 0
FREQUENCY: 0
ABDOMINAL PAIN: 0
SORE THROAT: 0
WOUND: 0
PHOTOPHOBIA: 0
WEAKNESS: 0
VOMITING: 0
NAUSEA: 0
FEVER: 0
SHORTNESS OF BREATH: 0
SPEECH DIFFICULTY: 0
FATIGUE: 0
BACK PAIN: 0
CONFUSION: 0
NUMBNESS: 0
LIGHT-HEADEDNESS: 1
HEADACHES: 0
CHILLS: 0

## 2019-11-10 ASSESSMENT — COGNITIVE AND FUNCTIONAL STATUS - GENERAL
DO YOU HAVE SERIOUS DIFFICULTY WALKING OR CLIMBING STAIRS: AMBULATION DIFFICULTY, REQUIRES EQUIPMENT;STAIR CLIMBING DIFFICULTY, REQUIRES EQUIPMENT
MOVING TO AND FROM BED TO CHAIR: 3 - A LITTLE
EATING MEALS: 4 - NONE
HELP NEEDED FOR BATHING: 4 - NONE
DRESSING REGULAR LOWER BODY CLOTHING: 4 - NONE
HELP NEEDED FOR PERSONAL GROOMING: 4 - NONE
WALKING IN HOSPITAL ROOM: 3 - A LITTLE
CLIMB 3 TO 5 STEPS WITH RAILING: 3 - A LITTLE
DRESSING REGULAR UPPER BODY CLOTHING: 4 - NONE
TOILETING: 3 - A LITTLE
STANDING UP FROM CHAIR USING ARMS: 4 - NONE

## 2019-11-10 NOTE — ASSESSMENT & PLAN NOTE
The patient had an episode of near syncope while sitting at his computer.  He is without orthostatic hypotension.  His blood pressures actually running high.  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  I would recommend an outpatient MCOT monitor possibly EP study.  He would like to follow this up with Dr. Isaacs his usual cardiologist at the [facility].

## 2019-11-10 NOTE — PLAN OF CARE
Pt had no complaints once arrived to floor. Compliant will fall precaustions  Problem: Adult Inpatient Plan of Care  Goal: Plan of Care Review  Outcome: Progressing  Flowsheets  Taken 11/10/2019 0713  Progress: improving  Taken 11/10/2019 0523  Plan of Care Reviewed With: patient

## 2019-11-10 NOTE — ASSESSMENT & PLAN NOTE
He has had a history of bypass surgery.  He is without angina.  He will continue medical management.

## 2019-11-10 NOTE — CONSULTS
Cardiology Consult Note    Subjective     Michel Fang is a 84 y.o. male who was admitted for Near syncope [R55].   Refered by: Velma Thakkar MD    HPI: The patient is followed by Dr. Isaacs at the [facility].  He has a history of a transcutaneous aortic valve replacement.  He has had prior bypass surgery.  He presents for evaluation of transient lightheadedness and dizziness.  He stated he felt like he might pass out.  He did not have a full syncope.  This occurred while sitting and working on a computer.  He states he had an episode of syncope about a year ago.  He denies any chest pain, shortness of breath or palpitations.    Allergies  No known allergies    Prior to Admission medications    Medication Sig Start Date End Date Taking? Authorizing Provider   aspirin 325 mg EC tablet Take 325 mg by mouth daily. 9/18/18  Yes Art Jorgensen MD   atorvastatin (LIPITOR) 40 mg tablet Take 40 mg by mouth daily.     Yes Art Jorgensen MD   docusate sodium (COLACE) 100 mg capsule Take 100 mg by mouth 2 (two) times a day.   Yes Art Jorgensen MD   levothyroxine (SYNTHROID) 25 mcg tablet Take 25 mcg by mouth daily.    Yes Art Jorgensen MD   pantoprazole (PROTONIX) 40 mg EC tablet Take 40 mg by mouth daily.   Yes Art Jorgensen MD   tamsulosin (FLOMAX) 0.4 mg capsule Take 0.4 mg by mouth nightly.   Yes Art Jorgensen MD   albuterol HFA (VENTOLIN HFA) 90 mcg/actuation inhaler TAKE 2 PUFFS BY MOUTH EVERY 4 TO 6 HOURS AS NEEDED FOR BREATHING 2/23/19   Art Jorgensen MD         Past Medical History:   Diagnosis Date   Coronary artery disease with bypass surgery 2000.    Aortic stenosis with TAVR 2016.  Hypertension.  Popliteal aneurysms.  Hyperlipidemia.  Sarcoidosis.    Social history: He quit smoking.  He does not drink alcohol.    Family history: Negative for premature coronary artery disease.    Review of Systems: All other systems reviewed and negative  "except as noted in the HPI.    Physical Exam   Visit Vitals  BP (!) 186/74 (BP Location: Right upper arm, Patient Position: Lying)   Pulse 65   Temp 36.6 °C (97.9 °F) (Oral)   Resp 16   Ht 1.778 m (5' 10\")   Wt 93.7 kg (206 lb 8 oz)   SpO2 96%   BMI 29.63 kg/m²     The patient appears comfortable and is in no apparent distress,    Eyes: Eyelids and sclera normal,   Neck: No jugular venous distention, no thyromegaly, no lymphadenopathy, no carotid bruits,    Lungs: Clear to auscultation, normal respiratory effort,   Heart: Regular rhythm, normal S1 and S2, 2/6 basal systolic   Abdomen: Soft, nontender,    Extremities: No edema, no calf tenderness or swelling, pulses intact,   Skin: No rashes,   Neuro: Alert and oriented without focal deficit,   Psych: Affect normal.      Objective     Labs   Lab Results   Component Value Date    WBC 5.49 11/10/2019    HGB 12.7 (L) 11/10/2019     (L) 11/10/2019    ALT 13 (L) 05/11/2019    AST 17 05/11/2019     11/10/2019    K 4.0 11/10/2019     (H) 11/10/2019    CREATININE 1.0 11/10/2019    BUN 18 11/10/2019    CO2 24 11/10/2019    TSH 2.99 11/10/2019    INR 1.2 09/06/2018    BNP 73 09/06/2018     Troponin I Results       11/10/19 11/10/19 05/11/19                    0947 0210 2112         Troponin I 0.03 0.02 <0.02                   Normal sinus rhythm, first-degree AV block, right bundle branch block, left anterior fascicular block.      Echocardiogram 10/14/2019:  Normal left ventricular size and systolic function.  EF 66%.  Mild concentric hypertrophy.  Mildly dilated right ventricle with mildly decreased systolic function.  Moderately dilated left atrium.  Mildly dilated right atrium.  TAVR present with moderate paravalvular regurgitation.  Mild mitral regurgitation.        Assessment     Near syncope  Assessment & Plan  The patient had an episode of near syncope while sitting at his computer.  He is without orthostatic hypotension.  His blood pressures " actually running high.  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  I would recommend an outpatient MCOT monitor possibly EP study.  He would like to follow this up with Dr. Isaacs his usual cardiologist at the [facility].    CAD (coronary artery disease)  Assessment & Plan  He has had a history of bypass surgery.  He is without angina.  He will continue medical management.    Aortic stenosis  Assessment & Plan  He has TAVR.  His recent echo showed moderate paravalvular regurgitation.  He is without symptoms of heart failure.  I would not pursue any intervention for this at this time.    HTN (hypertension)  Assessment & Plan  In the past he has had orthostatic hypotension.  His blood pressure readings recently and his doctor's offices have been normal at around 120.  Given his recent near syncope I would hold off on instituting medicine for high blood pressure at this time.    I would recommend close follow-up within the next week.          Mo Herman MD  11/10/2019

## 2019-11-10 NOTE — NURSING NOTE
Pt ambulated in the dean w/rolling walker; pt denied any CP or SOB; pt denied any dizziness; returned pt to room; will continue to monitor pt.

## 2019-11-10 NOTE — ASSESSMENT & PLAN NOTE
In the past he has had orthostatic hypotension.  His blood pressure readings recently and his doctor's offices have been normal at around 120.  Given his recent near syncope I would hold off on instituting medicine for high blood pressure at this time.    I would recommend close follow-up within the next week.

## 2019-11-10 NOTE — DISCHARGE SUMMARY
Hospital Medicine Service -  Inpatient Discharge Summary        BRIEF OVERVIEW   Admitting Provider: Sol Alvarez DO  Attending Provider: Velma Thakkar MD Attending phys phone: [phone]    PCP: Johnathan Leone [phone]    Admission Date: 11/10/2019  Discharge Date: 11/10/2019     DISCHARGE DIAGNOSES      Primary Discharge Diagnosis  Near syncope    Secondary Discharge Diagnoses  Active Hospital Problems    Diagnosis Date Noted   • Near syncope 11/10/2019     Priority: High   • Aortic stenosis 05/12/2019   • Pulmonary sarcoidosis (CMS/HCC) 09/05/2018   • HTN (hypertension) 09/05/2018   • Hyperlipidemia 09/05/2018   • CAD (coronary artery disease) 09/05/2018   • Aortic stenosis 10/25/2017   • Benign prostatic hyperplasia 10/25/2017      Resolved Hospital Problems   No resolved problems to display.       Problem List on Day of Discharge  * Near syncope  Assessment & Plan    ?secondary to possible dehydration vs vasovagal   Not Orthostatic   Troponin x 2 negative   Seen by cardiology , recommended an outpatient MCOT monitor possibly EP study.   He would  follow this up with Dr. Isaacs his usual cardiologist at the [facility] next week.  DC home    Aortic stenosis  Assessment & Plan  S/P TAVR     Hyperlipidemia  Assessment & Plan  Continue Atorvastatin     Pulmonary sarcoidosis (CMS/HCC)  Assessment & Plan  Currently not on any treatment and has not had any active flares  stable    HTN (hypertension)  Assessment & Plan  BP elevated   Would monitor  ?white coat HTN  Advised pt to f/u with his cardiologist      CAD (coronary artery disease)  Assessment & Plan  Continue asa    Benign prostatic hyperplasia  Assessment & Plan  C/w flomax       SUMMARY OF HOSPITALIZATION      Presenting Problem/History of Present Illness  Near syncope    This is a 84 y.o. year-old male admitted on 11/10/2019 with Near syncope [R55].        Hospital Course        Exam on Day of  Discharge  Physical Exam   Constitutional: He is oriented to person, place, and time. He appears well-developed and well-nourished.   HENT:   Head: Normocephalic and atraumatic.   Eyes: Pupils are equal, round, and reactive to light. EOM are normal.   Neck: Normal range of motion. Neck supple.   Cardiovascular: Normal rate, regular rhythm and normal heart sounds.   Pulmonary/Chest: Effort normal and breath sounds normal.   Abdominal: Soft. Bowel sounds are normal. There is no tenderness.   Musculoskeletal: Normal range of motion. He exhibits no edema.   Neurological: He is oriented to person, place, and time. No cranial nerve deficit.   Skin: Skin is warm and dry.   Psychiatric: He has a normal mood and affect.   Nursing note and vitals reviewed.      Consults During Admission  IP CONSULT TO CARDIOLOGY    DISCHARGE MEDICATIONS        Medication List      CONTINUE taking these medications    albuterol HFA 90 mcg/actuation inhaler  Commonly known as:  VENTOLIN HFA  TAKE 2 PUFFS BY MOUTH EVERY 4 TO 6 HOURS AS NEEDED FOR BREATHING     aspirin 325 mg EC tablet  Take 325 mg by mouth daily.  Dose:  325 mg     atorvastatin 40 mg tablet  Commonly known as:  LIPITOR  Take 40 mg by mouth daily.  Dose:  40 mg     docusate sodium 100 mg capsule  Commonly known as:  COLACE  Take 100 mg by mouth 2 (two) times a day.  Dose:  100 mg     levothyroxine 25 mcg tablet  Commonly known as:  SYNTHROID  Take 25 mcg by mouth daily.  Dose:  25 mcg     pantoprazole 40 mg EC tablet  Commonly known as:  PROTONIX  Take 40 mg by mouth daily.  Dose:  40 mg     tamsulosin 0.4 mg capsule  Commonly known as:  FLOMAX  Take 0.4 mg by mouth nightly.  Dose:  0.4 mg            Instructions for after discharge     Discharge diet      Diet Type / Texture:   Regular  Cardiac (Low Sodium, Low Fat)       Other Follow-up      With your cardiologist next week    Post-Discharge Activity: Normal activity as tolerated.      Normal activity as tolerated.              PROCEDURES / LABS / IMAGING      Operative Procedures      Other Procedures      Pertinent Labs  Results from last 7 days   Lab Units 11/10/19  0646   WBC K/uL 5.49   HEMOGLOBIN g/dL 12.7*   HEMATOCRIT % 38.2*   PLATELETS K/uL 114*     Results from last 7 days   Lab Units 11/10/19  0646   SODIUM mEQ/L 140   POTASSIUM mEQ/L 4.0   CHLORIDE mEQ/L 110*   CO2 mEQ/L 24   BUN mg/dL 18   CREATININE mg/dL 1.0   GLUCOSE mg/dL 106*   CALCIUM mg/dL 8.4*     Pertinent Imaging  X-ray Chest 2 Views    Result Date: 11/10/2019  IMPRESSION: No active disease is seen in the chest.      OUTPATIENT  FOLLOW-UP / REFERRALS / PENDING TESTS        Outpatient Follow-Up Appointments  Encounter Information     You do not currently have any appointments scheduled.          Referrals  No orders of the defined types were placed in this encounter.      Test Results Pending at Discharge  Unresulted Labs (From admission, onward)    None          Important Issues to Address in Follow-Up      DISCHARGE DISPOSITION      Disposition: Home     Code Status At Discharge: DNR (A.N.D.)    Physician Order for Life-Sustaining Treatment Document Status      No documents found

## 2019-11-10 NOTE — ED ATTESTATION NOTE
"Physician Attestation:     I personally saw and evaluated the patient, participated in the management, and agree with the findings in the above note except as where stated.  The Physician Assistant and I discussed  the case, workup, and disposition.      Chief Complaint  Chief Complaint   Patient presents with   • Dizziness     was sitting on the computer and became dizzy/lightheaded. BG was 118mg/dl. Pt states he is feeling better upon arrival        84-year-old male presents for evaluation of near syncope/lightheadedness.  Patient states that he was on his computer for a couple of hours and he had not eaten or drinking much throughout the day.  States when he went from a sitting to a standing position he felt lightheaded like he was going to go and pass out.  He states he has a history of this in the past but it is usually from a lying to standing position.  He was concerned because it is never been from sitting to standing before.  Denied chest pain shortness of breath headache neuro deficits abdominal pain or other concerns.    Nontoxic well-appearing no acute distress  Vital signs are stable slightly hypertensive  Atraumatic normocephalic  Tacky mucous membranes   regular rate and rhythm  Lungs clear to auscultation bilaterally no respiratory distress  Abdomen soft nontender nondistended non-peritoneal x4  GCS 15 alert and oriented moving all extremities neuro intact        Vital Signs:   Visit Vitals  BP (!) 184/71   Pulse 66   Temp 37.1 °C (98.7 °F)   Resp 18   Ht 1.778 m (5' 10\")   Wt 90.7 kg (200 lb)   SpO2 98%   BMI 28.70 kg/m²     Vital Signs reviewed       Past Medical History:  Past Medical History:   Diagnosis Date   • Arthritis    • BPH (benign prostatic hyperplasia)    • CHF (congestive heart failure) (CMS/[facility])    • Coronary artery disease    • Heart disease    • Hyperlipidemia    • Hypertension    • Sarcoidosis        Past Surgical History:  Past Surgical History:   Procedure Laterality Date   • " AORTIC VALVE REPLACEMENT     • CARDIAC SURGERY     • CORONARY ARTERY BYPASS GRAFT     • POPLITEAL VENOUS ANEURYSM REPAIR         Current Medications:  Current Facility-Administered Medications   Medication Dose Route Frequency Provider Last Rate Last Dose   • sodium chloride 0.9 % bolus 500 mL  500 mL intravenous Once María Saleem PA C 500 mL/hr at 11/10/19 0211 500 mL at 11/10/19 0211     Current Outpatient Medications   Medication Sig Dispense Refill   • aspirin 325 mg EC tablet Take 325 mg by mouth daily.     • atorvastatin (LIPITOR) 40 mg tablet Take 40 mg by mouth daily.       • docusate sodium (COLACE) 100 mg capsule Take 100 mg by mouth 2 (two) times a day.     • levothyroxine (SYNTHROID) 25 mcg tablet Take 25 mcg by mouth daily.      • pantoprazole (PROTONIX) 40 mg EC tablet Take 40 mg by mouth daily.     • tamsulosin (FLOMAX) 0.4 mg capsule Take 0.4 mg by mouth nightly.     • albuterol HFA (VENTOLIN HFA) 90 mcg/actuation inhaler TAKE 2 PUFFS BY MOUTH EVERY 4 TO 6 HOURS AS NEEDED FOR BREATHING  0       Allergies:  Allergies   Allergen Reactions   • No Known Allergies        Family History  Family History   Problem Relation Age of Onset   • Ovarian cancer Biological Mother    • Lymphoma Other         age  17   • Lymphoma Biological Son         age 20's       Medical, surgical, and family history reviewed.    Social History   Social History     Socioeconomic History   • Marital status:      Spouse name: Not on file   • Number of children: Not on file   • Years of education: Not on file   • Highest education level: Not on file   Occupational History   • Not on file   Social Needs   • Financial resource strain: Not on file   • Food insecurity:     Worry: Not on file     Inability: Not on file   • Transportation needs:     Medical: Not on file     Non-medical: Not on file   Tobacco Use   • Smoking status: Former Smoker     Last attempt to quit:      Years since quittin.8   • Smokeless  tobacco: Never Used   Substance and Sexual Activity   • Alcohol use: No   • Drug use: No   • Sexual activity: Defer   Lifestyle   • Physical activity:     Days per week: Not on file     Minutes per session: Not on file   • Stress: Not on file   Relationships   • Social connections:     Talks on phone: Not on file     Gets together: Not on file     Attends [employer] service: Not on file     Active member of club or organization: Not on file     Attends meetings of clubs or organizations: Not on file     Relationship status: Not on file   • Intimate partner violence:     Fear of current or ex partner: Not on file     Emotionally abused: Not on file     Physically abused: Not on file     Forced sexual activity: Not on file   Other Topics Concern   • Not on file   Social History Narrative   • Not on file         Records Review:  I have reviewed any available documentation of the medications, allergies, and past history for this patient. Vital signs reviewed.              Yoni Garcia,   11/10/19 0225       Yoni Garcia,   11/10/19 0226

## 2019-11-10 NOTE — ASSESSMENT & PLAN NOTE
?secondary to possible dehydration vs vasovagal   Not Orthostatic   Troponin x 2 negative   Seen by cardiology , recommended an outpatient MCOT monitor possibly EP study.   He would  follow this up with Dr. Isaacs his usual cardiologist at the [facility] next week.  [facility]

## 2019-11-10 NOTE — NURSING NOTE
Pt discharged home; removed IV and cardiac monitor; printed discharge instructions; pt verbalized that he understands his discharge plan; pt escorted to ED exit in wheel chair for daughter to pick him up; pt alert and oriented x 3 and in no distress.

## 2019-11-10 NOTE — H&P
Hospital Medicine Service -  History & Physical        CHIEF COMPLAINT   Dizziness      HISTORY OF PRESENT ILLNESS      Michel Fang is a 84 y.o. male with a past medical history of Aortic stenosis s/p TAVR, HTN, HLD, BPH, Hypothyroid who presents with feeling of dizziness. Patient states that he was sitting at his computer playing games for 2-3 hours then suddenly felt lightheaded similar to before when he passed out. He kept sitting and did not try to get up. His son was present and told him he looked slightly pale but otherwise ok. He stated symptoms lasted 2-3 minutes and currently is without any symptoms. He denies any CP, SOB, Nausea, Vomitting, HA, vision changes, weakness, dysuria or frequency. States he is trying to loose wt so has been eating less and not been drinking that much water. Now he is back to his baseline. Patient states he has chronic LE edema but not more than normal.    In ED ECG and CXR done. Labs were done. ED wanted to bring in for observation for near syncope.       PAST MEDICAL AND SURGICAL HISTORY      Past Medical History:   Diagnosis Date   • Arthritis    • BPH (benign prostatic hyperplasia)    • CHF (congestive heart failure) (CMS/[facility])    • Coronary artery disease    • Heart disease    • Hyperlipidemia    • Hypertension    • Sarcoidosis        Past Surgical History:   Procedure Laterality Date   • AORTIC VALVE REPLACEMENT     • CARDIAC SURGERY     • CORONARY ARTERY BYPASS GRAFT     • POPLITEAL VENOUS ANEURYSM REPAIR         MEDICATIONS      Prior to Admission medications    Medication Sig Start Date End Date Taking? Authorizing Provider   aspirin 325 mg EC tablet Take 325 mg by mouth daily. 9/18/18  Yes Art Jorgensen MD   atorvastatin (LIPITOR) 40 mg tablet Take 40 mg by mouth daily.     Yes ProviderArt MD   docusate sodium (COLACE) 100 mg capsule Take 100 mg by mouth 2 (two) times a day.   Yes ProviderArt MD   levothyroxine (SYNTHROID) 25 mcg tablet  Take 25 mcg by mouth daily.    Yes Art Jorgensen MD   pantoprazole (PROTONIX) 40 mg EC tablet Take 40 mg by mouth daily.   Yes Art Jorgensen MD   tamsulosin (FLOMAX) 0.4 mg capsule Take 0.4 mg by mouth nightly.   Yes Art Jorgensen MD   albuterol HFA (VENTOLIN HFA) 90 mcg/actuation inhaler TAKE 2 PUFFS BY MOUTH EVERY 4 TO 6 HOURS AS NEEDED FOR BREATHING 19   Art Jorgensen MD       ALLERGIES      No known allergies    FAMILY HISTORY      Family History   Problem Relation Age of Onset   • Ovarian cancer Biological Mother    • Lymphoma Other         age  17   • Lymphoma Biological Son         age 20's       SOCIAL HISTORY      Social History     Socioeconomic History   • Marital status:      Spouse name: None   • Number of children: None   • Years of education: None   • Highest education level: None   Occupational History   • None   Social Needs   • Financial resource strain: None   • Food insecurity:     Worry: None     Inability: None   • Transportation needs:     Medical: None     Non-medical: None   Tobacco Use   • Smoking status: Former Smoker     Last attempt to quit:      Years since quittin.8   • Smokeless tobacco: Never Used   Substance and Sexual Activity   • Alcohol use: No   • Drug use: No   • Sexual activity: Defer   Lifestyle   • Physical activity:     Days per week: None     Minutes per session: None   • Stress: None   Relationships   • Social connections:     Talks on phone: None     Gets together: None     Attends Congregation service: None     Active member of club or organization: None     Attends meetings of clubs or organizations: None     Relationship status: None   • Intimate partner violence:     Fear of current or ex partner: None     Emotionally abused: None     Physically abused: None     Forced sexual activity: None   Other Topics Concern   • None   Social History Narrative   • None       REVIEW OF SYSTEMS      Review of Systems  "  Constitutional: Negative for chills, fatigue and fever.   HENT: Negative for congestion and sore throat.    Eyes: Negative for photophobia and visual disturbance.   Respiratory: Negative for chest tightness and shortness of breath.    Cardiovascular: Positive for leg swelling. Negative for chest pain and palpitations.   Gastrointestinal: Negative for abdominal pain and vomiting.   Endocrine: Negative for polyphagia and polyuria.   Genitourinary: Negative for dysuria and frequency.   Musculoskeletal: Negative for back pain and neck pain.   Skin: Negative for rash and wound.   Neurological: Positive for dizziness and light-headedness. Negative for syncope, speech difficulty, weakness and headaches.   Psychiatric/Behavioral: Negative for agitation, behavioral problems and confusion.       PHYSICAL EXAMINATION      Temp:  [37.1 °C (98.7 °F)] 37.1 °C (98.7 °F)  Heart Rate:  [59-66] 59  Resp:  [15-18] 15  BP: (175-184)/(60-71) 175/60  Body mass index is 28.7 kg/m².    Visit Vitals  BP (!) 175/60 (Patient Position: Lying)   Pulse (!) 59   Temp 37.1 °C (98.7 °F)   Resp 15   Ht 1.778 m (5' 10\")   Wt 90.7 kg (200 lb)   SpO2 98%   BMI 28.70 kg/m²       General Appearance:    Alert, cooperative, no distress, appears stated age   Head:    Normocephalic, without obvious abnormality, atraumatic   Eyes:    PERRL, conjunctiva/corneas clear, EOM's intact           Throat:   Lips, mucosa, and tongue normal; teeth and gums normal   Neck:   Supple, symmetrical, trachea midline,    Back:     Symmetric, no curvature, ROM normal, no CVA tenderness   Lungs:     Clear to auscultation bilaterally, respirations unlabored   Chest wall:    No tenderness or deformity   Heart:    Regular rate and rhythm, S1 and S2 normal, +murmur   Abdomen:     Soft, non-tender, bowel sounds active all four quadrants,     no masses, no organomegaly           Extremities:   Extremities normal, atraumatic, no cyanosis , +1 b/l LE edema    Pulses:   2+ and " symmetric all extremities   Skin:   Skin color, texture, turgor normal, no rashes or lesions       Neurologic:   CNII-XII intact. Normal strength,      LABS / IMAGING / EKG        Labs  CBC Results       11/10/19 05/17/19 05/16/19                    0210 0520 0507         WBC 5.21 4.67 5.57         RBC 4.38 3.82 3.70         HGB 13.2 10.8 10.7         HCT 39.3 33.0 32.5         MCV 89.7 86.4 87.8         MCH 30.1 28.3 28.9         MCHC 33.6 32.7 32.9          152 146                     CMP Results       11/10/19 05/17/19 05/16/19                    0210 0520 0507          135 136         K 3.5 4.2 4.1         Cl 108 105 108         CO2 22 21 20         Glucose 101 99 106         BUN 18 14 15         Creatinine 0.9 1.0 1.2         Calcium 8.0 8.3 8.2         Anion Gap 8 9 8         EGFR >60.0 >60.0 58.0         Comment for K at 0210 on 11/10/19:    Results obtained on plasma. Plasma Potassium values may be up to 0.4 mEQ/L less than serum values. The differences may be greater for patients with high platelet or white cell counts.        Troponin I Results       11/10/19 05/11/19 09/06/18                    0210 2112 1548         Troponin I 0.02 <0.02 <0.02                     Microbiology Results     ** No results found for the last 720 hours. **        UA Results       05/11/19 2139           Color Yellow           Clarity Clear           Glucose Negative           Bilirubin Negative           Ketones Negative           Sp Grav 1.018           Blood Trace           Ph 6.0           Protein Trace           Urobilinogen 1.0           Nitrite Negative           Leuk Est Negative           WBC 0 TO 3           RBC 5 TO 9           Bacteria None Seen           Comment for Blood at 2139 on 05/11/19:    The sensitivity of the occult blood test is equivalent to approximately 4 intact RBC/HPF.    Comment for Protein at 2139 on 05/11/19:    Trace False Positive Protein can be seen with  alkaline or highly buffered urines or urine with high specific gravity. Suggest clinical correlation.    Comment for Leuk Est at 2139 on 05/11/19:    Results can be falsely negative due to high specific gravity, some antibiotics, glucose >3 g/dl, or WBC other than neutrophils.            Imaging  No results found.    CXR : No consolidations or effusions   ECG/Telemetry  I have independently reviewed the ECG. Significant findings include sinus rhythm with LBBB and L anterior fasicularo block compared to prior ECGs unchanged .    ASSESSMENT AND PLAN           Near syncope  Assessment & Plan  Felt lightheaded while playing games at the [employer]  Patient has been eating and drinking less  Likely secondary to possible dehydration vs vasovagal but will consult cardiology  Orthostatic vitals  Tele  Initial trop neg but will repeat     Aortic stenosis  Assessment & Plan  S/P TAVR     Hyperlipidemia  Assessment & Plan  C/w Atorvastatin     Pulmonary sarcoidosis (CMS/HCC)  Assessment & Plan  Currently not on any treatment and has not had any active flares      HTN (hypertension)  Assessment & Plan  BP elevated in ED but had not had flomax  Also needs orthostats done  Will give flomax   Cardio consult     Benign prostatic hyperplasia  Assessment & Plan  C/w flomax          VTE Assessment: Pad[place]    Code Status: DNR (A.N.D.) Per patient he does not want to be on a ventilator and he does not want anything done to diminish his quality of life. He would prefer to have a natural death. Has a living will at home which may be useful in getting   Estimated discharge date: 11/11/2019     Sol Alvarez, DO  11/10/2019

## 2019-11-10 NOTE — ED PROVIDER NOTES
HPI     Chief Complaint   Patient presents with   • Dizziness     was sitting on the computer and became dizzy/lightheaded. BG was 118mg/dl. Pt states he is feeling better upon arrival        84-year-old male with history of aortic stenosis, BPH, CHF, CAD, hyperlipidemia, hypertension presents for evaluation of near syncope.  Patient was sitting at his computer tonight for approximately 2 hours when he began to feel lightheaded and near syncopal, uncertain whether he also experienced blurred vision at the time.  911 was called, patient states he felt better upon their arrival.  Patient has been trying to lose weight, states he did not eat much throughout the day, and does feel dehydrated.  Denies dizziness, diaphoresis, chest pain, shortness of breath, nausea, vomiting or abdominal pain.  BP was elevated at 200 systolic upon EMS arrival, but improved to 180 with rest. Blood sugar 118.      History provided by:  Patient and EMS personnel       Patient History     Past Medical History:   Diagnosis Date   • Arthritis    • BPH (benign prostatic hyperplasia)    • CHF (congestive heart failure) (CMS/HCC)    • Coronary artery disease    • Heart disease    • Hyperlipidemia    • Hypertension    • Sarcoidosis        Past Surgical History:   Procedure Laterality Date   • AORTIC VALVE REPLACEMENT     • CARDIAC SURGERY     • CORONARY ARTERY BYPASS GRAFT     • POPLITEAL VENOUS ANEURYSM REPAIR         Family History   Problem Relation Age of Onset   • Ovarian cancer Biological Mother    • Lymphoma Other         age  17   • Lymphoma Biological Son         age 20's       Social History     Tobacco Use   • Smoking status: Former Smoker     Last attempt to quit:      Years since quittin.8   • Smokeless tobacco: Never Used   Substance Use Topics   • Alcohol use: No   • Drug use: No       Systems Reviewed from Nursing Triage:          Review of Systems     Review of Systems   Constitutional: Negative for chills, diaphoresis and  "fever.   Respiratory: Negative for chest tightness and shortness of breath.    Cardiovascular: Negative for chest pain and palpitations.   Gastrointestinal: Negative for abdominal pain, nausea and vomiting.   Musculoskeletal: Negative for back pain and neck pain.   Skin: Negative for color change.   Neurological: Positive for light-headedness. Negative for dizziness, syncope, weakness, numbness and headaches.   All other systems reviewed and are negative.       Physical Exam     ED Triage Vitals   Temp Heart Rate Resp BP SpO2   11/10/19 0211 11/10/19 0205 11/10/19 0205 11/10/19 0205 11/10/19 0205   37.1 °C (98.7 °F) 66 18 (!) 184/71 98 %      Temp src Heart Rate Source Patient Position BP Location FiO2 (%) (Set)   -- -- 11/10/19 0223 -- --     Lying         Pulse Ox %: 98 % (11/10/19 0208)  Pulse Ox Interpretation: Normal (11/10/19 0208)  Heart Rate: 66 (11/10/19 0208)  Rhythm Strip Interpretation: Normal Sinus Rhythm (11/10/19 0208)    Patient Vitals for the past 24 hrs:   BP Temp Pulse Resp SpO2 Height Weight   11/10/19 0332 (!) 175/60 -- (!) 59 15 98 % -- --   11/10/19 0223 (!) 176/66 -- 63 16 98 % -- --   11/10/19 0211 -- 37.1 °C (98.7 °F) -- -- -- -- --   11/10/19 0205 (!) 184/71 -- 66 18 98 % 1.778 m (5' 10\") 90.7 kg (200 lb)                                       Physical Exam   Constitutional: He is oriented to person, place, and time. He appears well-developed and well-nourished.   HENT:   Head: Normocephalic and atraumatic.   Eyes: Pupils are equal, round, and reactive to light. Conjunctivae and EOM are normal.   Neck: Normal range of motion.   Cardiovascular: Normal rate, regular rhythm, normal heart sounds and intact distal pulses.   Pulmonary/Chest: Effort normal and breath sounds normal.   Abdominal: Soft. Bowel sounds are normal. There is no tenderness.   Musculoskeletal: Normal range of motion. He exhibits no edema.   Neurological: He is alert and oriented to person, place, and time. No cranial nerve " deficit or sensory deficit. He exhibits normal muscle tone. Coordination normal.   Skin: Skin is warm and dry.   Nursing note and vitals reviewed.           ECG 12 lead  Date/Time: 11/10/2019 3:33 AM  Performed by: María Saleem PA C  Authorized by: Yoni Garcia DO     ECG reviewed by ED Physician in the absence of a cardiologist: yes    Interpretation:     Interpretation: normal    Rate:     ECG rate:  67    ECG rate assessment: normal    Rhythm:     Rhythm: sinus rhythm and A-V block      Rhythm comment:  1st degree (RAMON 212)  Ectopy:     Ectopy: none    QRS:     QRS axis:  Normal    QRS intervals:  Normal  Conduction:     Conduction: normal    ST segments:     ST segments:  Normal  T waves:     T waves: normal    Comments:      Left anterior fascicular block -bifascicular block  Right RBBB  T/QTc 466/492        ED Course & MDM     Labs Reviewed   BASIC METABOLIC PANEL - Abnormal       Result Value    Sodium 138      Potassium 3.5 (*)     Chloride 108      CO2 22      BUN 18      Creatinine 0.9      Glucose 101 (*)     Calcium 8.0 (*)     eGFR >60.0      Anion Gap 8     CBC - Abnormal    WBC 5.21      RBC 4.38 (*)     Hemoglobin 13.2 (*)     Hematocrit 39.3 (*)     MCV 89.7      MCH 30.1      MCHC 33.6      RDW 15.1 (*)     Platelets 121 (*)     MPV 10.8     TROPONIN I - Normal    Troponin I 0.02     MAGNESIUM - Normal    Magnesium 1.9     CBC AND DIFFERENTIAL    Narrative:     The following orders were created for panel order CBC and differential.  Procedure                               Abnormality         Status                     ---------                               -----------         ------                     CBC[[id number]]                           Abnormal            Final result               Diff Count[[id number]]                                        Final result                 Please view results for these tests on the individual orders.   DIFF COUNT    Differential Type Auto      nRBC  0.0      Immature Granulocytes 0.4      Neutrophils 56.0      Lymphocytes 28.2      Monocytes 12.7      Eosinophils 1.7      Basophils 1.0      Immature Granulocytes, Absolute 0.02      Neutrophils, Absolute 2.92      Lymphocytes, Absolute 1.47      Monocytes, Absolute 0.66      Eosinophils, Absolute 0.09      Basophils, Absolute 0.05         ECG 12 lead   ED Interpretation   nsr 67 bpm   1st degree av block   qtc 492   Left axis   bifasicular block   Similar to prior       X-RAY CHEST 2 VIEWS    (Results Pending)               [facility]         ED Course as of Nov 10 0351   Sun Nov 10, 2019   0319 Pt reassessed, denies further episode of near syncope in ED. patient is near syncopal episode is concerning, as it occurred at rest with no provocation and does not appear to be vasovagal in etiology.  Plan to admit for observation.    [EK]      ED Course User Index  [EK] María Saleem PA C         Clinical Impressions as of Nov 10 0351   Near syncope     Admitted     María Saleem PA C  11/10/19 0351

## 2019-11-10 NOTE — ASSESSMENT & PLAN NOTE
He has TAVR.  His recent echo showed moderate paravalvular regurgitation.  He is without symptoms of heart failure.  I would not pursue any intervention for this at this time.

## 2019-12-05 ENCOUNTER — OFFICE VISIT (OUTPATIENT)
Dept: NEUROLOGY | Facility: CLINIC | Age: 84
End: 2019-12-05
Payer: MEDICARE

## 2019-12-05 VITALS — HEART RATE: 75 BPM | DIASTOLIC BLOOD PRESSURE: 72 MMHG | SYSTOLIC BLOOD PRESSURE: 124 MMHG | RESPIRATION RATE: 16 BRPM

## 2019-12-05 DIAGNOSIS — R27.0 ATAXIA: Primary | ICD-10-CM

## 2019-12-05 PROCEDURE — 99205 OFFICE O/P NEW HI 60 MIN: CPT | Performed by: PSYCHIATRY & NEUROLOGY

## 2019-12-05 RX ORDER — ALFUZOSIN HYDROCHLORIDE 10 MG/1
10 TABLET, EXTENDED RELEASE ORAL DAILY
COMMUNITY
End: 2022-05-10

## 2019-12-05 NOTE — PROGRESS NOTES
Michel Fang is a 84 y.o. male  12/5/2019  Johnathan Leone MD    Neurology Consult Note    Subjective     Michel Fang is a 84 y.o. male who is being evaluated for imbalance.  The patient reported that for about a year or 2 he has been experiencing imbalance.  In reviewing his medical records however he had an MRI of the brain in early 2016 with an indication of imbalance.  The patient reported that he feels as though his feet are soft.  This however is not a sensation actually in his feet however feeling he gets when he walks as if his feet and legs are not stable underneath him.  Over time, the patient's condition has declined.  He now walks with a cane.  Fortunately he has not fallen or injured himself.    The patient acknowledges that he does have mild orthostasis and has to take his time when changing positions quickly.  His lightheadedness however is not affecting his balance.  He does not have vertigo.  His vision is normal.  He does have hearing loss.  He often feels as though he is going to fall however has not fallen.  He has no neck pain or arm symptoms.  He has no lower back pain.  Although his feet feel unstable, he denied numbness, paresthesias or dysesthesias.  His legs fatigue easily and can feel somewhat weak and clumsy although he denied any focal weakness.  He has had no bowel or bladder dysfunction.    In general the patient is otherwise doing well.  He has been eating and sleeping well and his mood is good.  There were no symptoms to suggest increased intracranial pressure, meningitis or systemic illness.  He has no history of seizure, TIA, stroke, concussion, meningitis or migraine.  He was recently hospitalized for markedly fluctuating blood pressures with a combination of lightheadedness and dizziness although no vertigo.    Review of Systems  Constitutional: Fatigue  Eyes: negative  Ears, nose, mouth, throat, and face: negative  Respiratory: Dyspnea on exertion  Cardiovascular:  negative  Gastrointestinal: negative  Genitourinary:negative  Integument/breast: negative  Hematologic/lymphatic: negative  Musculoskeletal: Aches and pains  Neurological: negative  Behavioral/Psych: negative  Endocrine: negative  Allergic/Immunologic: negative    Allergy:  Allergies   Allergen Reactions   • No Known Allergies        Current Outpatient Medications   Medication Sig Dispense Refill   • alfuzosin (UROXATRAL) 10 mg 24 hr tablet Take 10 mg by mouth daily.     • lactobacillus comb no.10 (PROBIOTIC) 20 billion cell capsule Take 1 capsule by mouth daily.     • albuterol HFA (VENTOLIN HFA) 90 mcg/actuation inhaler TAKE 2 PUFFS BY MOUTH EVERY 4 TO 6 HOURS AS NEEDED FOR BREATHING  0   • aspirin 325 mg EC tablet Take 325 mg by mouth daily.     • atorvastatin (LIPITOR) 40 mg tablet Take 40 mg by mouth daily.       • docusate sodium (COLACE) 100 mg capsule Take 100 mg by mouth 2 (two) times a day.     • levothyroxine (SYNTHROID) 25 mcg tablet Take 25 mcg by mouth daily.      • pantoprazole (PROTONIX) 40 mg EC tablet Take 40 mg by mouth daily.     • tamsulosin (FLOMAX) 0.4 mg capsule Take 0.4 mg by mouth nightly.       No current facility-administered medications for this visit.        Past Medical History:  Past Medical History:   Diagnosis Date   • Arthritis    • BPH (benign prostatic hyperplasia)    • CHF (congestive heart failure) (CMS/HCC)    • Coronary artery disease    • Disease of thyroid gland    • Heart disease    • Hyperlipidemia    • Hypertension    • Sarcoidosis        Past Surgical History:  Past Surgical History:   Procedure Laterality Date   • ADENOIDECTOMY     • AORTIC VALVE REPLACEMENT     • APPENDECTOMY     • CARDIAC SURGERY     • CORONARY ARTERY BYPASS GRAFT     • EYE SURGERY Bilateral     cataracts    • POPLITEAL VENOUS ANEURYSM REPAIR     • SKIN BIOPSY     • TONSILLECTOMY         Social History:  Social History     Socioeconomic History   • Marital status:      Spouse name: None   •  Number of children: None   • Years of education: None   • Highest education level: None   Occupational History   • None   Social Needs   • Financial resource strain: None   • Food insecurity:     Worry: None     Inability: None   • Transportation needs:     Medical: None     Non-medical: None   Tobacco Use   • Smoking status: Former Smoker     Last attempt to quit:      Years since quittin.9   • Smokeless tobacco: Never Used   Substance and Sexual Activity   • Alcohol use: No   • Drug use: No   • Sexual activity: Defer   Lifestyle   • Physical activity:     Days per week: None     Minutes per session: None   • Stress: None   Relationships   • Social connections:     Talks on phone: None     Gets together: None     Attends [employer] service: None     Active member of club or organization: None     Attends meetings of clubs or organizations: None     Relationship status: None   • Intimate partner violence:     Fear of current or ex partner: None     Emotionally abused: None     Physically abused: None     Forced sexual activity: None   Other Topics Concern   • None   Social History Narrative   • None       Family History:  Family History   Problem Relation Age of Onset   • Ovarian cancer Biological Mother    • Lymphoma Other         age  17   • Lymphoma Biological Son         age 20's       Objective     Physical Exam  Visit Vitals  /72   Pulse 75   Resp 16       General Appearance:  Alert, no distress, appears stated age   Head:  Normocephalic, without obvious abnormality, atraumatic   Eyes:  PERRL, conjunctiva/corneas clear, EOM's intact   Throat:  The tongue and uvula were midline   Neck: Supple, symmetrical, trachea midline, no carotid bruit or JVD   Lungs:  Clear to auscultation bilaterally, respirations unlabored   Chest Wall: No tenderness or deformity   Heart Regular rate and rhythm, S1 and S2 normal, there was a valvular murmur   Extremities: Extremities normal, atraumatic, no cyanosis or edema     Musculoskeletal: No injury or deformity   Pulses: 2+ and symmetric all extremities   Skin: Skin color, texture, turgor normal, no rashes or lesions   Behavior/Emotional: Appropriate, cooperative   Neurologic Exam:  Alert and oriented. Attention, concentration, memory, language, visual spatial orientation, executive function is normal.    Pupils equal round and reactive to light. Extraocular movement full with normal pursuit + saccades. No nystagmus noted.   Facial strength and sensation is normal. Hearing normal.  The tongue and uvula were midline. No dysarthria or dysphagia.   Strength was 5/5 in bulbar, axial + extremity muscles.There was normal bulk and tone with no abnormal movements.   Sensation was mildly reduced in the feet. There was no dysmetria or cerebellar signs.   The gait was cautious and wide-based.  Reflexes were plus and symmetric.     Data Reviewed:  An MRI of the brain and recent CAT scan showed age-appropriate atrophy and small vessel disease.  Blood work was notable for a normal CBC, CMP and TSH    Problem List Items Addressed This Visit        Other    Ataxia - Primary    Current Assessment & Plan     The patient reported imbalance.  His condition is likely multifactorial in nature.  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  He has impaired vision and vestibular function.  He has reduced proprioceptive sensation in his feet.  Weight changes and orthopedic issues have thrown off his center of gravity.    At this time, I think it is reasonable for the patient to have a repeat CAT scan of the head to rule out any structural, ischemic, inflammatory etc. disorders.  In addition, I recommended blood work ruling out systemic causes of his condition.  While he may have a mild polyneuropathy, I do not think the patient needs an EMG as the results will not change his medical management in any meaningful way.  Similarly I do not  think he needs imaging of his spine.    I spoke to the patient and his wife at length regarding his condition.  There are no medications which can help with strength and balance; he will require aggressive physical therapy.  The key is to keep the patient as safe as possible and avoid a fall which could have catastrophic complications in the future.  In the future additional diagnostics and treatments will depend on his clinical course and the results of his pending studies.         Relevant Orders    Folate    Protein Elect, Serum w/Interp    Vitamin B12    Vitamin E    Lyme EIA reflex WB    RPR    CT HEAD WITHOUT IV CONTRAST            It was a real pleasure meeting Michel Fang today, thank you for allowing me to participate in the medical care. If you have any questions, please call me at any time. Michel Fang will follow up with me in the coming weeks to months and keep me updated by telephone. Michel Fang knows to notify me immediately if there is any change in the condition or if there are any new symptoms of transient or static neurologic dysfunction.    Brett Jones MD

## 2019-12-05 NOTE — LETTER
December 5, 2019     Pedrito Casas, JUDIT  [address]  [address]  [place] [address]    Patient: Michel Fang  YOB: 1935  Date of Visit: 12/5/2019      Dear Dr. Casas:    Thank you for referring Michel Fang to me for evaluation. Below are my notes for this consultation.    If you have questions, please do not hesitate to call me. I look forward to following your patient along with you.         Sincerely,        Brett Jones MD        CC: MD Karen Cornell Lucas Z, MD  12/5/2019  2:08 PM  Signed  Michel Fang is a 84 y.o. male  12/5/2019  Johnathan Leone MD    Neurology Consult Note    Subjective     Michel Fang is a 84 y.o. male who is being evaluated for imbalance.  The patient reported that for about a year or 2 he has been experiencing imbalance.  In reviewing his medical records however he had an MRI of the brain in early 2016 with an indication of imbalance.  The patient reported that he feels as though his feet are soft.  This however is not a sensation actually in his feet however feeling he gets when he walks as if his feet and legs are not stable underneath him.  Over time, the patient's condition has declined.  He now walks with a cane.  Fortunately he has not fallen or injured himself.    The patient acknowledges that he does have mild orthostasis and has to take his time when changing positions quickly.  His lightheadedness however is not affecting his balance.  He does not have vertigo.  His vision is normal.  He does have hearing loss.  He often feels as though he is going to fall however has not fallen.  He has no neck pain or arm symptoms.  He has no lower back pain.  Although his feet feel unstable, he denied numbness, paresthesias or dysesthesias.  His legs fatigue easily and can feel somewhat weak and clumsy although he denied any focal weakness.  He has had no bowel or bladder dysfunction.    In general the patient is otherwise doing well.  He has  been eating and sleeping well and his mood is good.  There were no symptoms to suggest increased intracranial pressure, meningitis or systemic illness.  He has no history of seizure, TIA, stroke, concussion, meningitis or migraine.  He was recently hospitalized for markedly fluctuating blood pressures with a combination of lightheadedness and dizziness although no vertigo.    Review of Systems  Constitutional: Fatigue  Eyes: negative  Ears, nose, mouth, throat, and face: negative  Respiratory: Dyspnea on exertion  Cardiovascular: negative  Gastrointestinal: negative  Genitourinary:negative  Integument/breast: negative  Hematologic/lymphatic: negative  Musculoskeletal: Aches and pains  Neurological: negative  Behavioral/Psych: negative  Endocrine: negative  Allergic/Immunologic: negative    Allergy:  Allergies   Allergen Reactions   • No Known Allergies        Current Outpatient Medications   Medication Sig Dispense Refill   • alfuzosin (UROXATRAL) 10 mg 24 hr tablet Take 10 mg by mouth daily.     • lactobacillus comb no.10 (PROBIOTIC) 20 billion cell capsule Take 1 capsule by mouth daily.     • albuterol HFA (VENTOLIN HFA) 90 mcg/actuation inhaler TAKE 2 PUFFS BY MOUTH EVERY 4 TO 6 HOURS AS NEEDED FOR BREATHING  0   • aspirin 325 mg EC tablet Take 325 mg by mouth daily.     • atorvastatin (LIPITOR) 40 mg tablet Take 40 mg by mouth daily.       • docusate sodium (COLACE) 100 mg capsule Take 100 mg by mouth 2 (two) times a day.     • levothyroxine (SYNTHROID) 25 mcg tablet Take 25 mcg by mouth daily.      • pantoprazole (PROTONIX) 40 mg EC tablet Take 40 mg by mouth daily.     • tamsulosin (FLOMAX) 0.4 mg capsule Take 0.4 mg by mouth nightly.       No current facility-administered medications for this visit.        Past Medical History:  Past Medical History:   Diagnosis Date   • Arthritis    • BPH (benign prostatic hyperplasia)    • CHF (congestive heart failure) (CMS/[facility])    • Coronary artery disease    • Disease of  thyroid gland    • Heart disease    • Hyperlipidemia    • Hypertension    • Sarcoidosis        Past Surgical History:  Past Surgical History:   Procedure Laterality Date   • ADENOIDECTOMY     • AORTIC VALVE REPLACEMENT     • APPENDECTOMY     • CARDIAC SURGERY     • CORONARY ARTERY BYPASS GRAFT     • EYE SURGERY Bilateral     cataracts    • POPLITEAL VENOUS ANEURYSM REPAIR     • SKIN BIOPSY     • TONSILLECTOMY         Social History:  Social History     Socioeconomic History   • Marital status:      Spouse name: None   • Number of children: None   • Years of education: None   • Highest education level: None   Occupational History   • None   Social Needs   • Financial resource strain: None   • Food insecurity:     Worry: None     Inability: None   • Transportation needs:     Medical: None     Non-medical: None   Tobacco Use   • Smoking status: Former Smoker     Last attempt to quit:      Years since quittin.9   • Smokeless tobacco: Never Used   Substance and Sexual Activity   • Alcohol use: No   • Drug use: No   • Sexual activity: Defer   Lifestyle   • Physical activity:     Days per week: None     Minutes per session: None   • Stress: None   Relationships   • Social connections:     Talks on phone: None     Gets together: None     Attends [employer] service: None     Active member of club or organization: None     Attends meetings of clubs or organizations: None     Relationship status: None   • Intimate partner violence:     Fear of current or ex partner: None     Emotionally abused: None     Physically abused: None     Forced sexual activity: None   Other Topics Concern   • None   Social History Narrative   • None       Family History:  Family History   Problem Relation Age of Onset   • Ovarian cancer Biological Mother    • Lymphoma Other         age  17   • Lymphoma Biological Son         age 20's       Objective     Physical Exam  Visit Vitals  /72   Pulse 75   Resp 16       General  Appearance:  Alert, no distress, appears stated age   Head:  Normocephalic, without obvious abnormality, atraumatic   Eyes:  PERRL, conjunctiva/corneas clear, EOM's intact   Throat:  The tongue and uvula were midline   Neck: Supple, symmetrical, trachea midline, no carotid bruit or JVD   Lungs:  Clear to auscultation bilaterally, respirations unlabored   Chest Wall: No tenderness or deformity   Heart Regular rate and rhythm, S1 and S2 normal, there was a valvular murmur   Extremities: Extremities normal, atraumatic, no cyanosis or edema    Musculoskeletal: No injury or deformity   Pulses: 2+ and symmetric all extremities   Skin: Skin color, texture, turgor normal, no rashes or lesions   Behavior/Emotional: Appropriate, cooperative   Neurologic Exam:  Alert and oriented. Attention, concentration, memory, language, visual spatial orientation, executive function is normal.    Pupils equal round and reactive to light. Extraocular movement full with normal pursuit + saccades. No nystagmus noted.   Facial strength and sensation is normal. Hearing normal.  The tongue and uvula were midline. No dysarthria or dysphagia.   Strength was 5/5 in bulbar, axial + extremity muscles.There was normal bulk and tone with no abnormal movements.   Sensation was mildly reduced in the feet. There was no dysmetria or cerebellar signs.   The gait was cautious and wide-based.  Reflexes were plus and symmetric.     Data Reviewed:  An MRI of the brain and recent CAT scan showed age-appropriate atrophy and small vessel disease.  Blood work was notable for a normal CBC, CMP and TSH    Problem List Items Addressed This Visit        Other    Ataxia - Primary    Current Assessment & Plan     The patient reported imbalance.  His condition is likely multifactorial in nature.  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  He has impaired vision and vestibular function.  He has reduced  proprioceptive sensation in his feet.  Weight changes and orthopedic issues have thrown off his center of gravity.    At this time, I think it is reasonable for the patient to have a repeat CAT scan of the head to rule out any structural, ischemic, inflammatory etc. disorders.  In addition, I recommended blood work ruling out systemic causes of his condition.  While he may have a mild polyneuropathy, I do not think the patient needs an EMG as the results will not change his medical management in any meaningful way.  Similarly I do not think he needs imaging of his spine.    I spoke to the patient and his wife at length regarding his condition.  There are no medications which can help with strength and balance; he will require aggressive physical therapy.  The key is to keep the patient as safe as possible and avoid a fall which could have catastrophic complications in the future.  In the future additional diagnostics and treatments will depend on his clinical course and the results of his pending studies.         Relevant Orders    Folate    Protein Elect, Serum w/Interp    Vitamin B12    Vitamin E    Lyme EIA reflex WB    RPR    CT HEAD WITHOUT IV CONTRAST            It was a real pleasure meeting Michel Fang today, thank you for allowing me to participate in the medical care. If you have any questions, please call me at any time. Michel Fang will follow up with me in the coming weeks to months and keep me updated by telephone. Michel Fang knows to notify me immediately if there is any change in the condition or if there are any new symptoms of transient or static neurologic dysfunction.    Brett Jones MD

## 2019-12-05 NOTE — ASSESSMENT & PLAN NOTE
The patient reported imbalance.  His condition is likely multifactorial in nature.  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  He has impaired vision and vestibular function.  He has reduced proprioceptive sensation in his feet.  Weight changes and orthopedic issues have thrown off his center of gravity.    At this time, I think it is reasonable for the patient to have a repeat CAT scan of the head to rule out any structural, ischemic, inflammatory etc. disorders.  In addition, I recommended blood work ruling out systemic causes of his condition.  While he may have a mild polyneuropathy, I do not think the patient needs an EMG as the results will not change his medical management in any meaningful way.  Similarly I do not think he needs imaging of his spine.    I spoke to the patient and his wife at length regarding his condition.  There are no medications which can help with strength and balance; he will require aggressive physical therapy.  The key is to keep the patient as safe as possible and avoid a fall which could have catastrophic complications in the future.  In the future additional diagnostics and treatments will depend on his clinical course and the results of his pending studies.

## 2019-12-10 LAB
ALBUMIN SERPL ELPH-MCNC: 3.8 G/DL (ref 2.9–4.4)
ALBUMIN/GLOB SERPL: 1.2 {RATIO} (ref 0.7–1.7)
ALPHA1 GLOB SERPL ELPH-MCNC: 0.2 G/DL (ref 0–0.4)
ALPHA2 GLOB SERPL ELPH-MCNC: 0.8 G/DL (ref 0.4–1)
B BURGDOR IGG+IGM SER-ACNC: <0.91 ISR (ref 0–0.9)
B BURGDOR IGM SER IA-ACNC: <0.8 INDEX (ref 0–0.79)
B-GLOBULIN SERPL ELPH-MCNC: 1 G/DL (ref 0.7–1.3)
FOLATE SERPL-MCNC: 7.5 NG/ML
GAMMA GLOB SERPL ELPH-MCNC: 1.1 G/DL (ref 0.4–1.8)
GLOBULIN SER CALC-MCNC: 3.1 G/DL (ref 2.2–3.9)
LAB CORP PLEASE NOTE:: NORMAL
M PROTEIN SERPL ELPH-MCNC: NORMAL G/DL
PROT SERPL-MCNC: 6.9 G/DL (ref 6–8.5)
RPR SER QL: NON REACTIVE
VIT B12 SERPL-MCNC: 356 PG/ML (ref 232–1245)

## 2019-12-12 LAB
A-TOCOPHEROL VIT E SERPL-MCNC: 9.7 MG/L (ref 9–29)
GAMMA TOCOPHEROL SERPL-MCNC: 1 MG/L (ref 0.5–4.9)

## 2019-12-23 ENCOUNTER — HOSPITAL ENCOUNTER (OUTPATIENT)
Dept: RADIOLOGY | Age: 84
Discharge: HOME | End: 2019-12-23
Attending: PSYCHIATRY & NEUROLOGY
Payer: MEDICARE

## 2019-12-23 DIAGNOSIS — R27.0 ATAXIA: ICD-10-CM

## 2019-12-23 PROCEDURE — 70450 CT HEAD/BRAIN W/O DYE: CPT

## 2020-04-23 ENCOUNTER — TELEMEDICINE (OUTPATIENT)
Dept: NEUROLOGY | Facility: CLINIC | Age: 85
End: 2020-04-23
Payer: MEDICARE

## 2020-04-23 DIAGNOSIS — R27.0 ATAXIA: Primary | ICD-10-CM

## 2020-04-23 PROCEDURE — 99213 OFFICE O/P EST LOW 20 MIN: CPT | Mod: 95 | Performed by: PSYCHIATRY & NEUROLOGY

## 2020-04-23 NOTE — PROGRESS NOTES
Request for Consent:   Video Encounter   Katy, my name is Brett Jones MD.  Before we proceed, can you please verify your identification by telling me your full name and date of birth?  Can you tell me who is in the room with you?    You and I are about to have a telemedicine check-in or visit because you have requested it.  This is a live video-conference.  I am a real person, speaking to you in real time.  There is no one else with me on the video-conference.  However, when we use ([employer], [employer], etc) it is important for you to know that the video-conference may not be secure or private.  I am not recording this conversation and I am asking you not to record it.  This telemedicine visit will be billed to your health insurance or you, if you are self-insured.  You understand you will be responsible for any copayments or coinsurances that apply to your telemedicine visit.  Before starting our telemedicine visit, I am required to get your consent for this virtual check-in or visit by telemedicine. Do you consent?    Patient Response to Request for Consent:  Yes      Visit Documentation:  Subjective    This virtual telemedicine visit was performed through [email or web address]    Patient ID: Michel Fang is a 84 y.o. male.  1935      HPI   I previously saw the patient about 4 months ago.  At that time he reported imbalance.  It was my impression his symptoms were likely multifactorial.  I recommended imaging of the brain, blood work and physical therapy.    Since his last visit, overall the patient reported that things had been doing well.  He started attending physical therapy which he found enjoyable, informative and helpful.  Unfortunately more recently he had a urinary tract infection requiring treatment with antibiotics.  In addition to general fatigue and malaise, the patient reported slightly worsening balance.  The patient will walk with a cane if needed.  Fortunately he has not stumbled, fallen or injured  "himself.    The patient denied feeling lightheaded and has had no vertigo or abnormal sensation of movement.  He has had no change in his vision or hearing; he has chronic hearing loss.  He has had no focal numbness or weakness.  He does occasionally have cramps in his legs.  He has chronic \"bone-on-bone\" in the knees which may make his leg strength and balance worse.  The patient reported that he has been eating and sleeping well.  His mood is good.  He has had no new episodes of transient or static neurologic dysfunction.  There were no symptoms to suggest increased intracranial pressure, meningitis or systemic illness.    The following have been reviewed and updated as appropriate in this visit:  Allergies  Meds  Problems       Review of Systems  A comprehensive review of systems was otherwise unremarkable    The patient had a CAT scan of the head which showed age-appropriate atrophy and small vessel disease.  Blood work was notable for a normal B12, folate, Lyme, RPR, vitamin D and SPEP.    Assessment/Plan   Diagnoses and all orders for this visit:    Ataxia (Primary)  Assessment & Plan:  The patient reported imbalance.  His condition is likely multifactorial in nature.  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  Blood work was unremarkable.  He has impaired vision and vestibular function.  He has reduced proprioceptive sensation in his feet.  Weight changes and orthopedic issues have thrown off his center of gravity.    At this time I do not think any additional diagnostics are indicated.  I spoke to the patient and his wife at length regarding his condition.  There are no medications which can help with strength and balance; he will require aggressive physical therapy.  The key is to keep the patient as safe as possible and avoid a fall which could have catastrophic complications in the future.  In the future additional diagnostics and " treatments will depend on his clinical course and the results of his pending studies.  Fortunately he did note improvement in his balance at physical therapy however had a setback with his recent UTI.

## 2020-04-23 NOTE — ASSESSMENT & PLAN NOTE
The patient reported imbalance.  His condition is likely multifactorial in nature.  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  Blood work was unremarkable.  He has impaired vision and vestibular function.  He has reduced proprioceptive sensation in his feet.  Weight changes and orthopedic issues have thrown off his center of gravity.    At this time I do not think any additional diagnostics are indicated.  I spoke to the patient and his wife at length regarding his condition.  There are no medications which can help with strength and balance; he will require aggressive physical therapy.  The key is to keep the patient as safe as possible and avoid a fall which could have catastrophic complications in the future.  In the future additional diagnostics and treatments will depend on his clinical course and the results of his pending studies.  Fortunately he did note improvement in his balance at physical therapy however had a setback with his recent UTI.

## 2020-09-14 ENCOUNTER — HOSPITAL ENCOUNTER (OUTPATIENT)
Dept: RADIOLOGY | Age: 85
Discharge: HOME | End: 2020-09-14
Attending: FAMILY MEDICINE
Payer: MEDICARE

## 2020-09-14 ENCOUNTER — TRANSCRIBE ORDERS (OUTPATIENT)
Dept: RADIOLOGY | Age: 85
End: 2020-09-14

## 2020-09-14 DIAGNOSIS — M25.552 PAIN IN LEFT HIP: Primary | ICD-10-CM

## 2020-09-14 DIAGNOSIS — M25.552 PAIN IN LEFT HIP: ICD-10-CM

## 2020-09-14 PROCEDURE — 73502 X-RAY EXAM HIP UNI 2-3 VIEWS: CPT | Mod: LT

## 2020-09-21 ENCOUNTER — TRANSCRIBE ORDERS (OUTPATIENT)
Dept: SCHEDULING | Age: 85
End: 2020-09-21

## 2020-09-21 DIAGNOSIS — N39.0 URINARY TRACT INFECTION, SITE NOT SPECIFIED: Primary | ICD-10-CM

## 2020-10-01 ENCOUNTER — HOSPITAL ENCOUNTER (OUTPATIENT)
Dept: RADIOLOGY | Age: 85
Discharge: HOME | End: 2020-10-01
Attending: UROLOGY
Payer: MEDICARE

## 2020-10-01 DIAGNOSIS — N39.0 URINARY TRACT INFECTION, SITE NOT SPECIFIED: ICD-10-CM

## 2020-10-01 PROCEDURE — 76775 US EXAM ABDO BACK WALL LIM: CPT

## 2020-12-15 ENCOUNTER — TRANSCRIBE ORDERS (OUTPATIENT)
Dept: SCHEDULING | Age: 85
End: 2020-12-15

## 2020-12-15 DIAGNOSIS — I72.4 ANEURYSM OF ARTERY OF LOWER EXTREMITY (CMS/HCC): Primary | ICD-10-CM

## 2021-01-04 ENCOUNTER — CLINICAL SUPPORT (OUTPATIENT)
Dept: OTHER | Facility: CLINIC | Age: 86
End: 2021-01-04
Attending: FAMILY MEDICINE
Payer: MEDICARE

## 2021-01-04 ENCOUNTER — TELEPHONE (OUTPATIENT)
Dept: INFECTIOUS DISEASES | Facility: HOSPITAL | Age: 86
End: 2021-01-04

## 2021-01-04 ENCOUNTER — TRANSCRIBE ORDERS (OUTPATIENT)
Dept: OTHER | Facility: CLINIC | Age: 86
End: 2021-01-04

## 2021-01-04 DIAGNOSIS — R05.9 COUGH: ICD-10-CM

## 2021-01-04 DIAGNOSIS — R05.9 COUGH: Primary | ICD-10-CM

## 2021-01-04 NOTE — PROGRESS NOTES
Patient was processed today at [facility].  Pt denies any sort of medical distress today.  After identifying the patient, a nasopharyngeal sample was obtained and placed in viral transport medium.  Pt was given a post-collection education sheet and encouraged to self-isolate until they receive the results.  Pt directed to [facility] website for [facility] Privacy Practices.  Sample sent to the lab noted on the order and requisition.  Pt was without additional questions or concerns and was dispositioned from the testing area to home.

## 2021-01-04 NOTE — TELEPHONE ENCOUNTER
Please schedule this patient for Covid 19 testing.  I have updated the patient's demographic information with the patient's contact information for scheduling.    Patient symptoms:Cough    Provider is: Provider Select: Independent Provider  (Independent providers with Epic access SHOULD NOT put orders in Epic, results will not route)    Ordering provider (First - Last): Dr Johnathan Leone  Provider Best Callback # for NSQ: [phone]    This patient is a [employer] Employee: YES/NO/NA: No      If yes: notify Employee Exposure line and advise ordering provider that Employee's also need to call that team.    If an [facility] provider, advised order needs to be in Epic.    If an independent provider, they are directed to fax the order to [phone]  (Fax should include: patient name, date of birth, Covid 19 [facility] order, ICD-10 for symptoms, and either [facility] account number or fax number for [facility] to send results)    Independent provider results will arrive via [employer]n [employer]   [facility] provider results will route via Epic

## 2021-02-11 ENCOUNTER — IMMUNIZATION (OUTPATIENT)
Dept: IMMUNIZATION | Facility: CLINIC | Age: 86
End: 2021-02-11

## 2021-03-12 ENCOUNTER — IMMUNIZATION (OUTPATIENT)
Dept: IMMUNIZATION | Facility: CLINIC | Age: 86
End: 2021-03-12
Attending: FAMILY MEDICINE

## 2021-04-14 DIAGNOSIS — Z23 ENCOUNTER FOR IMMUNIZATION: ICD-10-CM

## 2021-05-28 ENCOUNTER — TRANSCRIBE ORDERS (OUTPATIENT)
Dept: SCHEDULING | Age: 86
End: 2021-05-28

## 2021-05-28 DIAGNOSIS — I72.4 ANEURYSM OF ARTERY OF LOWER EXTREMITY (CMS/HCC): Primary | ICD-10-CM

## 2021-05-28 DIAGNOSIS — I77.9 DISORDER OF ARTERIES AND ARTERIOLES, UNSPECIFIED: ICD-10-CM

## 2021-06-07 ENCOUNTER — HOSPITAL ENCOUNTER (OUTPATIENT)
Dept: CARDIOLOGY | Facility: HOSPITAL | Age: 86
Discharge: HOME | End: 2021-06-07
Attending: SURGERY
Payer: MEDICARE

## 2021-06-07 DIAGNOSIS — I77.9 DISORDER OF ARTERIES AND ARTERIOLES, UNSPECIFIED: ICD-10-CM

## 2021-06-07 DIAGNOSIS — I72.4 ANEURYSM OF ARTERY OF LOWER EXTREMITY (CMS/HCC): ICD-10-CM

## 2021-06-07 PROCEDURE — 93925 LOWER EXTREMITY STUDY: CPT

## 2021-06-07 PROCEDURE — 93923 UPR/LXTR ART STDY 3+ LVLS: CPT

## 2021-06-08 LAB
LEFT ABI: 1.01
LEFT ARM BP: 118 MMHG
LEFT DORSALIS PEDIS INDEX: 1.01
LEFT DORSALIS PEDIS: 127 MMHG
LEFT LOW THIGH INDEX: 0.67
LEFT LOW THIGH: 84 MMHG
LEFT POST TIBIAL INDEX: 0.76
LEFT POSTERIOR TIBIAL: 96 MMHG
LEFT PROXIMAL CALF INDEX: 0.55
LEFT PROXIMAL CALF: 69 MMHG
LT CFA PROX PSV: 105.65 CM/S
LT PERONEAL MID PSV: 29.25 CM/S
LT POPLITEAL DIST PSV: 46.07 CM/S
LT POPLITEAL PROX PSV: 82.31 CM/S
LT POPLITEAL PROX RATIO: 1.57
LT POST TIBIAL MID PSV: 35.72 CM/S
LT PROFUNDA PROX PSV: 43.49 CM/S
LT SFA DIST PSV: 52.54 CM/S
LT SFA MID PSV: 74.54 CM/S
LT SFA PROX PSV: 61.6 CM/S
RIGHT ABI: 2.02
RIGHT ARM BP: 126 MMHG
RIGHT DORSALIS PEDIS INDEX: 2.02
RIGHT DORSALIS PEDIS: 255 MMHG
RIGHT LOW THIGH INDEX: 2.02
RIGHT LOW THIGH: 255 MMHG
RIGHT POST TIBIAL INDEX: 2.02
RIGHT POSTERIOR TIBIAL: 255 MMHG
RIGHT PROXIMAL CALF INDEX: 0.94
RIGHT PROXIMAL CALF: 118 MMHG
RT CFA PROX PSV: 170.21 CM/S
RT PERONEAL MID PSV: 59.5 CM/S
RT POPLITEAL DIST PSV: 152.26 CM/S
RT POPLITEAL DIST RATIO: 0.92
RT POPLITEAL MID PSV: 166.06 CM/S
RT POPLITEAL MID RATIO: 2.89
RT POPLITEAL PROX PSV: 57.42 CM/S
RT POPLITEAL PROX RATIO: 0.8
RT POST TIBIAL MID PSV: 34.8 CM/S
RT PROFUNDA PROX PSV: 74.72 CM/S
RT SFA DIST PSV: 71.49 CM/S
RT SFA MID PSV: 123.19 CM/S
RT SFA PROX PSV: 89.26 CM/S

## 2021-09-07 ENCOUNTER — HOSPITAL ENCOUNTER (EMERGENCY)
Facility: HOSPITAL | Age: 86
Discharge: HOME | End: 2021-09-07
Attending: EMERGENCY MEDICINE
Payer: MEDICARE

## 2021-09-07 ENCOUNTER — APPOINTMENT (EMERGENCY)
Dept: RADIOLOGY | Facility: HOSPITAL | Age: 86
End: 2021-09-07
Payer: MEDICARE

## 2021-09-07 VITALS
RESPIRATION RATE: 17 BRPM | WEIGHT: 180 LBS | DIASTOLIC BLOOD PRESSURE: 67 MMHG | BODY MASS INDEX: 26.66 KG/M2 | HEART RATE: 61 BPM | TEMPERATURE: 97.9 F | HEIGHT: 69 IN | OXYGEN SATURATION: 95 % | SYSTOLIC BLOOD PRESSURE: 162 MMHG

## 2021-09-07 DIAGNOSIS — K59.00 CONSTIPATION, UNSPECIFIED CONSTIPATION TYPE: Primary | ICD-10-CM

## 2021-09-07 LAB
ALBUMIN SERPL-MCNC: 4.3 G/DL (ref 3.4–5)
ALP SERPL-CCNC: 77 IU/L (ref 35–126)
ALT SERPL-CCNC: 16 IU/L (ref 16–63)
ANION GAP SERPL CALC-SCNC: 11 MEQ/L (ref 3–15)
AST SERPL-CCNC: 21 IU/L (ref 15–41)
BASOPHILS # BLD: 0.06 K/UL (ref 0.01–0.1)
BASOPHILS NFR BLD: 0.8 %
BILIRUB SERPL-MCNC: 1 MG/DL (ref 0.3–1.2)
BUN SERPL-MCNC: 17 MG/DL (ref 8–20)
CALCIUM SERPL-MCNC: 9.2 MG/DL (ref 8.9–10.3)
CHLORIDE SERPL-SCNC: 102 MEQ/L (ref 98–109)
CO2 SERPL-SCNC: 24 MEQ/L (ref 22–32)
CREAT SERPL-MCNC: 1 MG/DL (ref 0.8–1.3)
DIFFERENTIAL METHOD BLD: ABNORMAL
EOSINOPHIL # BLD: 0.09 K/UL (ref 0.04–0.54)
EOSINOPHIL NFR BLD: 1.2 %
ERYTHROCYTE [DISTWIDTH] IN BLOOD BY AUTOMATED COUNT: 15.4 % (ref 11.6–14.4)
GFR SERPL CREATININE-BSD FRML MDRD: >60 ML/MIN/1.73M*2
GLUCOSE SERPL-MCNC: 100 MG/DL (ref 70–99)
HCT VFR BLDCO AUTO: 45.4 % (ref 40.1–51)
HGB BLD-MCNC: 14.3 G/DL (ref 13.7–17.5)
IMM GRANULOCYTES # BLD AUTO: 0.01 K/UL (ref 0–0.08)
IMM GRANULOCYTES NFR BLD AUTO: 0.1 %
LYMPHOCYTES # BLD: 1.3 K/UL (ref 1.2–3.5)
LYMPHOCYTES NFR BLD: 17 %
MCH RBC QN AUTO: 29.5 PG (ref 28–33.2)
MCHC RBC AUTO-ENTMCNC: 31.5 G/DL (ref 32.2–36.5)
MCV RBC AUTO: 93.8 FL (ref 83–98)
MONOCYTES # BLD: 0.72 K/UL (ref 0.3–1)
MONOCYTES NFR BLD: 9.4 %
NEUTROPHILS # BLD: 5.45 K/UL (ref 1.7–7)
NEUTS SEG NFR BLD: 71.5 %
NRBC BLD-RTO: 0 %
PDW BLD AUTO: 10.2 FL (ref 9.4–12.4)
PLATELET # BLD AUTO: 173 K/UL (ref 150–350)
POTASSIUM SERPL-SCNC: 4.4 MEQ/L (ref 3.6–5.1)
PROT SERPL-MCNC: 8 G/DL (ref 6–8.2)
RBC # BLD AUTO: 4.84 M/UL (ref 4.5–5.8)
SODIUM SERPL-SCNC: 137 MEQ/L (ref 136–144)
WBC # BLD AUTO: 7.63 K/UL (ref 3.8–10.5)

## 2021-09-07 PROCEDURE — 63600105 HC IODINE BASED CONTRAST: Performed by: NURSE PRACTITIONER

## 2021-09-07 PROCEDURE — 85025 COMPLETE CBC W/AUTO DIFF WBC: CPT | Performed by: NURSE PRACTITIONER

## 2021-09-07 PROCEDURE — G1004 CDSM NDSC: HCPCS

## 2021-09-07 PROCEDURE — 80053 COMPREHEN METABOLIC PANEL: CPT | Performed by: NURSE PRACTITIONER

## 2021-09-07 PROCEDURE — 36415 COLL VENOUS BLD VENIPUNCTURE: CPT | Performed by: NURSE PRACTITIONER

## 2021-09-07 PROCEDURE — 99284 EMERGENCY DEPT VISIT MOD MDM: CPT | Mod: 25

## 2021-09-07 PROCEDURE — 74177 CT ABD & PELVIS W/CONTRAST: CPT | Mod: ME

## 2021-09-07 RX ORDER — OXYBUTYNIN CHLORIDE 10 MG/1
10 TABLET, EXTENDED RELEASE ORAL
COMMUNITY
Start: 2021-07-07 | End: 2022-05-10

## 2021-09-07 RX ORDER — HYDRALAZINE HYDROCHLORIDE 25 MG/1
25 TABLET, FILM COATED ORAL
COMMUNITY
Start: 2021-08-23 | End: 2024-08-15 | Stop reason: ALTCHOICE

## 2021-09-07 RX ADMIN — IOHEXOL 125 ML: 350 INJECTION, SOLUTION INTRAVENOUS at 16:11

## 2021-09-07 ASSESSMENT — ENCOUNTER SYMPTOMS
WEAKNESS: 0
FLANK PAIN: 0
BLOOD IN STOOL: 0
NAUSEA: 0
CONFUSION: 0
DIARRHEA: 0
DYSURIA: 0
FEVER: 0
NUMBNESS: 0
ABDOMINAL PAIN: 0
FATIGUE: 0
NECK PAIN: 0
VOMITING: 0
BACK PAIN: 0
SORE THROAT: 0
COUGH: 0
SHORTNESS OF BREATH: 0
CONSTIPATION: 1
COLOR CHANGE: 0
DIFFICULTY URINATING: 0

## 2021-09-07 NOTE — ED PROVIDER NOTES
History provided by patient :    Patient with history of arthritis, BPH, CHF, CAD, thyroid disease, HLD, hypertension, sarcoidosis.  Patient with past surgical history of appendectomy.  Patient states over the past 2 days having constipation.  Patient states he does not typically experience constipation and if he does he takes milk of magnesia with relief.  Patient states he took milk of magnesia without relief.  Patient denies any fevers, chest pain, abdominal pain, change in urine or stool, testicle pain or any other symptoms    HPI    Past Medical History:   Diagnosis Date   • Arthritis    • BPH (benign prostatic hyperplasia)    • CHF (congestive heart failure) (CMS/[facility])    • Coronary artery disease    • Disease of thyroid gland    • Heart disease    • Hyperlipidemia    • Hypertension    • Sarcoidosis        Past Surgical History:   Procedure Laterality Date   • ADENOIDECTOMY     • AORTIC VALVE REPLACEMENT     • APPENDECTOMY     • CARDIAC SURGERY     • CORONARY ARTERY BYPASS GRAFT     • EYE SURGERY Bilateral     cataracts    • POPLITEAL VENOUS ANEURYSM REPAIR     • SKIN BIOPSY     • TONSILLECTOMY         Allergies   Allergen Reactions   • No Known Allergies        Social History     Tobacco Use   Smoking Status Former Smoker   • Quit date:    • Years since quittin.7   Smokeless Tobacco Never Used       Social History     Substance and Sexual Activity   Alcohol Use No       Social History     Substance and Sexual Activity   Drug Use No       No LMP for male patient.    Review of Systems   Constitutional: Negative for fatigue and fever.   HENT: Negative for congestion, ear pain and sore throat.    Respiratory: Negative for cough and shortness of breath.    Cardiovascular: Negative for chest pain.   Gastrointestinal: Positive for constipation. Negative for abdominal pain, blood in stool, diarrhea, nausea and vomiting.   Genitourinary: Negative for difficulty urinating, discharge, dysuria, flank pain,  "penile pain and testicular pain.   Musculoskeletal: Negative for back pain and neck pain.   Skin: Negative for color change and rash.   Neurological: Negative for weakness and numbness.   Psychiatric/Behavioral: Negative for confusion.       Vitals:    09/07/21 1239 09/07/21 1446 09/07/21 1704   BP: (!) 196/76 (!) 196/71 (!) 162/67   BP Location: Right upper arm Right upper arm Right upper arm   Patient Position: Sitting Sitting Sitting   Pulse: 62 (!) 58 61   Resp: 16 16 17   Temp: 36.6 °C (97.9 °F)     TempSrc: Temporal     SpO2: 97% 98% 95%   Weight: 81.6 kg (180 lb)     Height: 1.753 m (5' 9\")         Physical Exam  Vitals and nursing note reviewed.   Constitutional:       General: He is not in acute distress.     Appearance: Normal appearance. He is not ill-appearing or toxic-appearing.   HENT:      Head: Normocephalic and atraumatic.      Mouth/Throat:      Comments: Airway intact  Eyes:      Conjunctiva/sclera: Conjunctivae normal.   Cardiovascular:      Rate and Rhythm: Normal rate and regular rhythm.   Pulmonary:      Effort: Pulmonary effort is normal. No respiratory distress.      Breath sounds: Normal breath sounds.   Abdominal:      General: There is distension.      Palpations: Abdomen is soft. Abdomen is not rigid. There is no mass.      Tenderness: There is no abdominal tenderness. There is no guarding.      Comments: Nearly absent bs   Skin:     General: Skin is warm and dry.   Neurological:      Mental Status: He is alert.   Psychiatric:         Behavior: Behavior normal.      Comments: Speech normal         Procedures    ED Course as of Sep 07 2352   Tue Sep 07, 2021   1309 Impression    Constipation for 2 days tolerating p.o.  On exam no tenderness however distended with nearly absent bowel sounds.  History of appendectomy in the past.  Vital stablePlan-labs, CT    [ARABELLA]   1653 Per radiology. Patient with constipation per dr garcia. Due to downtime cannot be entered in epic    Patient manually " disimpacted chaperoned by [facility] ED technician.  Patient then with a subsequent successful bowel movement feeling better    Patient discussed with Dr garcia, agreeable to workup/plan. Has seen patient    To patient's bedside. Patient exam unchanged. Patient sitting quietly and comfortably in the stretcher.  Discussed results and plan with patient.  Patient verbalized understanding and agreeable without any questions or concerns.      Conversation had and education provided on signs and symptoms that would warrant a return visit to the emergency department along need for follow-up.  Patient verbalized understanding and agreeable.  All questions answered    Discharge instructions reviewed with patient.  All questions answered.  Verbalized understanding.  Verbal signature obtained.  IV removed and intact.  Dressing placed and bleeding controlled    [ARABELLA]      ED Course User Index  [ARABELLA] Paola García CRNP         Clinical Impressions as of Sep 07 2352   Constipation, unspecified constipation type       Final diagnoses:   [K59.00] Constipation, unspecified constipation type       Labs Reviewed   CBC AND DIFF - Abnormal       Result Value    WBC 7.63      RBC 4.84      Hemoglobin 14.3      Hematocrit 45.4      MCV 93.8      MCH 29.5      MCHC 31.5 (*)     RDW 15.4 (*)     Platelets 173      MPV 10.2      Differential Type Auto      nRBC 0.0      Immature Granulocytes 0.1      Neutrophils 71.5      Lymphocytes 17.0      Monocytes 9.4      Eosinophils 1.2      Basophils 0.8      Immature Granulocytes, Absolute 0.01      Neutrophils, Absolute 5.45      Lymphocytes, Absolute 1.30      Monocytes, Absolute 0.72      Eosinophils, Absolute 0.09      Basophils, Absolute 0.06     COMPREHENSIVE METABOLIC PANEL - Abnormal    Sodium 137      Potassium 4.4      Chloride 102      CO2 24      BUN 17      Creatinine 1.0      Glucose 100 (*)     Calcium 9.2      AST (SGOT) 21      ALT (SGPT) 16      Alkaline Phosphatase 77      Total  Protein 8.0      Albumin 4.3      Bilirubin, Total 1.0      eGFR >60.0      Anion Gap 11         CT ABDOMEN PELVIS WITH IV CONTRAST   Final Result   IMPRESSION:   1.  Recurrent distention of the rectum with impacted stool and prominent sigmoid   gas.   2.  Unchanged incidental and chronic findings as described.      COMMENT:      LOWER CHEST: Unchanged small hiatal hernia.  Right greater than left basilar   fibrosis and scarring.      LIVER: Stable 1 cm cyst in the right hepatic lobe inferiorly.   BILE DUCTS: Normal caliber.   GALLBLADDER: No calcified gallstones. Normal caliber wall.   PANCREAS: Within normal limits.   SPLEEN: Within normal limits.   ADRENALS: Within normal limits.   KIDNEYS/URETERS/BLADDER: Stable left lower pole 1.5 cm cyst and left upper pole   parapelvic 1.2 cm cyst.  Kidneys enhance and excrete symmetrically.  No   hydronephrosis.  Ureters normal in course and caliber.  Urinary bladder is   within normal limits.      REPRODUCTIVE ORGANS: Prostate gland is mildly enlarged.      BOWEL: See impression.  Terminal ileum and appendix are within normal limits.   Postsurgical changes in right lower quadrant, possibly related to prior   appendectomy and/hernia repair.  Surgical clip is also noted in the   rectovesicular space.  Mid sigmoid diverticulosis without evidence for acute   diverticulitis.  Otherwise normal bowel caliber.   PERITONEUM: No ascites or free air, no fluid collection   VESSELS: Normal caliber abdominal aorta with extensive atherosclerotic   calcifications.  Major vasculature grossly patent.   LYMPH NODES: No lymphadenopathy.   ABDOMINAL WALL: Fat-containing umbilical hernia.  Fat-containing left inguinal   hernia.   BONES: Degenerative changes.  Chronic L4 biconcave compression deformity.   High-grade L3-L4 neuroforaminal stenosis.  Median sternotomy wires.  No   suspicious osseous lesions.               Paola García, NOELLE  09/07/21 7904

## 2021-09-07 NOTE — DISCHARGE INSTRUCTIONS
As discussed please call your primary care doctor today to inform them of your ER visit and arrange a follow-up appointment within the next 2-3 days.  If you do not have one we have provided you with one. You do not have to see this provider in particular, just call the office and ask for the earliest available appointment with any provider at any location.  Please return immediately for any worsening, new or concerning symptoms such as new/worsening pain, fevers, vomiting, confusion, etc    Please take miralax as per package instruction for your constipation. Please be sure to stay hydrated with plenty of nonsugary, no caffeine fluids and stay active with supervision of your doctor

## 2021-09-20 NOTE — ED ATTESTATION NOTE
I have personally seen and examined the patient.  I reviewed and agree with physician assistant / nurse practitioner’s assessment and plan of care, with the following exceptions: None  My examination, assessment, and plan of care of Michel Fang is as follows:        86-year-old male presents for 2 days of constipation.  Patient tried milk of mag without relief.  Patient has no abdominal pain.  On exam patient is awake alert no acute distress.  His abdomen is soft and nontender.  CT imaging was obtained which shows Recurrent distention of the rectum with impacted stool and prominent sigmoid  gas.  2.  Unchanged incidental and chronic findings as described.  The NP performed fecal disimpaction.  Patient then had a bowel movement and felt improved.  Patient was discharged.     Eugenia Baker MD  09/20/21 8824

## 2022-03-05 ENCOUNTER — HOSPITAL ENCOUNTER (EMERGENCY)
Facility: HOSPITAL | Age: 87
Discharge: HOME | End: 2022-03-05
Attending: EMERGENCY MEDICINE
Payer: MEDICARE

## 2022-03-05 ENCOUNTER — APPOINTMENT (EMERGENCY)
Dept: RADIOLOGY | Facility: HOSPITAL | Age: 87
End: 2022-03-05
Attending: EMERGENCY MEDICINE
Payer: MEDICARE

## 2022-03-05 VITALS
BODY MASS INDEX: 25.77 KG/M2 | DIASTOLIC BLOOD PRESSURE: 80 MMHG | OXYGEN SATURATION: 95 % | RESPIRATION RATE: 16 BRPM | WEIGHT: 180 LBS | SYSTOLIC BLOOD PRESSURE: 194 MMHG | HEIGHT: 70 IN | TEMPERATURE: 95.9 F | HEART RATE: 60 BPM

## 2022-03-05 DIAGNOSIS — R11.2 NAUSEA AND VOMITING, INTRACTABILITY OF VOMITING NOT SPECIFIED, UNSPECIFIED VOMITING TYPE: ICD-10-CM

## 2022-03-05 DIAGNOSIS — I10 HYPERTENSION, UNSPECIFIED TYPE: ICD-10-CM

## 2022-03-05 DIAGNOSIS — U07.1 COVID-19: Primary | ICD-10-CM

## 2022-03-05 LAB
ALBUMIN SERPL-MCNC: 4.1 G/DL (ref 3.4–5)
ALP SERPL-CCNC: 75 IU/L (ref 35–126)
ALT SERPL-CCNC: 21 IU/L (ref 16–63)
ANION GAP SERPL CALC-SCNC: 8 MEQ/L (ref 3–15)
AST SERPL-CCNC: 26 IU/L (ref 15–41)
BACTERIA URNS QL MICRO: ABNORMAL /HPF
BASOPHILS # BLD: 0.04 K/UL (ref 0.01–0.1)
BASOPHILS NFR BLD: 0.5 %
BILIRUB SERPL-MCNC: 1.1 MG/DL (ref 0.3–1.2)
BILIRUB UR QL STRIP.AUTO: NEGATIVE MG/DL
BUN SERPL-MCNC: 20 MG/DL (ref 8–20)
CALCIUM SERPL-MCNC: 8.3 MG/DL (ref 8.9–10.3)
CHLORIDE SERPL-SCNC: 108 MEQ/L (ref 98–109)
CLARITY UR REFRACT.AUTO: CLEAR
CO2 SERPL-SCNC: 21 MEQ/L (ref 22–32)
COLOR UR AUTO: YELLOW
CREAT SERPL-MCNC: 1.1 MG/DL (ref 0.8–1.3)
DIFFERENTIAL METHOD BLD: ABNORMAL
EOSINOPHIL # BLD: 0.06 K/UL (ref 0.04–0.54)
EOSINOPHIL NFR BLD: 0.8 %
ERYTHROCYTE [DISTWIDTH] IN BLOOD BY AUTOMATED COUNT: 14.5 % (ref 11.6–14.4)
GFR SERPL CREATININE-BSD FRML MDRD: >60 ML/MIN/1.73M*2
GLUCOSE SERPL-MCNC: 119 MG/DL (ref 70–99)
GLUCOSE UR STRIP.AUTO-MCNC: NEGATIVE MG/DL
HCT VFR BLDCO AUTO: 43.3 % (ref 40.1–51)
HGB BLD-MCNC: 13.9 G/DL (ref 13.7–17.5)
HGB UR QL STRIP.AUTO: NEGATIVE
HYALINE CASTS #/AREA URNS LPF: ABNORMAL /LPF
IMM GRANULOCYTES # BLD AUTO: 0.02 K/UL (ref 0–0.08)
IMM GRANULOCYTES NFR BLD AUTO: 0.3 %
KETONES UR STRIP.AUTO-MCNC: NEGATIVE MG/DL
LEUKOCYTE ESTERASE UR QL STRIP.AUTO: NEGATIVE
LIPASE SERPL-CCNC: 25 U/L (ref 20–51)
LYMPHOCYTES # BLD: 1.09 K/UL (ref 1.2–3.5)
LYMPHOCYTES NFR BLD: 15 %
MCH RBC QN AUTO: 30.1 PG (ref 28–33.2)
MCHC RBC AUTO-ENTMCNC: 32.1 G/DL (ref 32.2–36.5)
MCV RBC AUTO: 93.7 FL (ref 83–98)
MONOCYTES # BLD: 0.82 K/UL (ref 0.3–1)
MONOCYTES NFR BLD: 11.3 %
NEUTROPHILS # BLD: 5.25 K/UL (ref 1.7–7)
NEUTS SEG NFR BLD: 72.1 %
NITRITE UR QL STRIP.AUTO: NEGATIVE
NRBC BLD-RTO: 0 %
PDW BLD AUTO: 10 FL (ref 9.4–12.4)
PH UR STRIP.AUTO: 6.5 [PH]
PLATELET # BLD AUTO: 150 K/UL (ref 150–350)
POTASSIUM SERPL-SCNC: 4.6 MEQ/L (ref 3.6–5.1)
PROT SERPL-MCNC: 7 G/DL (ref 6–8.2)
PROT UR QL STRIP.AUTO: 1
RBC # BLD AUTO: 4.62 M/UL (ref 4.5–5.8)
RBC #/AREA URNS HPF: ABNORMAL /HPF
SARS-COV-2 RNA RESP QL NAA+PROBE: POSITIVE
SODIUM SERPL-SCNC: 137 MEQ/L (ref 136–144)
SP GR UR REFRACT.AUTO: 1.02
SQUAMOUS URNS QL MICRO: ABNORMAL /HPF
TROPONIN I SERPL HS-MCNC: 8.6 PG/ML
TROPONIN I SERPL HS-MCNC: 8.6 PG/ML
UROBILINOGEN UR STRIP-ACNC: 0.2 EU/DL
WBC # BLD AUTO: 7.28 K/UL (ref 3.8–10.5)
WBC #/AREA URNS HPF: ABNORMAL /HPF

## 2022-03-05 PROCEDURE — 36415 COLL VENOUS BLD VENIPUNCTURE: CPT | Performed by: PHYSICIAN ASSISTANT

## 2022-03-05 PROCEDURE — 93005 ELECTROCARDIOGRAM TRACING: CPT | Performed by: PHYSICIAN ASSISTANT

## 2022-03-05 PROCEDURE — 83690 ASSAY OF LIPASE: CPT | Performed by: PHYSICIAN ASSISTANT

## 2022-03-05 PROCEDURE — U0003 INFECTIOUS AGENT DETECTION BY NUCLEIC ACID (DNA OR RNA); SEVERE ACUTE RESPIRATORY SYNDROME CORONAVIRUS 2 (SARS-COV-2) (CORONAVIRUS DISEASE [COVID-19]), AMPLIFIED PROBE TECHNIQUE, MAKING USE OF HIGH THROUGHPUT TECHNOLOGIES AS DESCRIBED BY CMS-2020-01-R: HCPCS | Performed by: PHYSICIAN ASSISTANT

## 2022-03-05 PROCEDURE — 85025 COMPLETE CBC W/AUTO DIFF WBC: CPT | Performed by: PHYSICIAN ASSISTANT

## 2022-03-05 PROCEDURE — 96372 THER/PROPH/DIAG INJ SC/IM: CPT

## 2022-03-05 PROCEDURE — 80053 COMPREHEN METABOLIC PANEL: CPT | Performed by: PHYSICIAN ASSISTANT

## 2022-03-05 PROCEDURE — 63600000 HC DRUGS/DETAIL CODE: Performed by: PHYSICIAN ASSISTANT

## 2022-03-05 PROCEDURE — 84484 ASSAY OF TROPONIN QUANT: CPT | Mod: 91 | Performed by: PHYSICIAN ASSISTANT

## 2022-03-05 PROCEDURE — 81001 URINALYSIS AUTO W/SCOPE: CPT | Performed by: PHYSICIAN ASSISTANT

## 2022-03-05 PROCEDURE — 84484 ASSAY OF TROPONIN QUANT: CPT | Performed by: PHYSICIAN ASSISTANT

## 2022-03-05 PROCEDURE — 71046 X-RAY EXAM CHEST 2 VIEWS: CPT

## 2022-03-05 PROCEDURE — 3E023GC INTRODUCTION OF OTHER THERAPEUTIC SUBSTANCE INTO MUSCLE, PERCUTANEOUS APPROACH: ICD-10-PCS | Performed by: EMERGENCY MEDICINE

## 2022-03-05 PROCEDURE — 99283 EMERGENCY DEPT VISIT LOW MDM: CPT | Mod: 25

## 2022-03-05 RX ORDER — NIRMATRELVIR AND RITONAVIR 300-100 MG
1 KIT ORAL 2 TIMES DAILY
Qty: 30 EACH | Refills: 0 | Status: SHIPPED | OUTPATIENT
Start: 2022-03-05 | End: 2022-03-10

## 2022-03-05 RX ADMIN — TRIMETHOBENZAMIDE HYDROCHLORIDE 200 MG: 100 INJECTION INTRAMUSCULAR at 15:21

## 2022-03-05 ASSESSMENT — ENCOUNTER SYMPTOMS
ARTHRALGIAS: 0
ABDOMINAL PAIN: 0
FEVER: 0
NAUSEA: 1
HEMATURIA: 0
CHILLS: 0
DIARRHEA: 0
SHORTNESS OF BREATH: 0
DYSURIA: 0
DIZZINESS: 0
HEADACHES: 0
VOMITING: 1

## 2022-03-05 NOTE — DISCHARGE INSTRUCTIONS
COVID-19 Discharge Instructions    You have been diagnosed with COVID-19.  Drink plenty of fluids.  Tylenol or motrin as needed for fever as instructed on the bottle.  Use a pulse oximeter at home to measure your oxygen levels.  If your oxygen level goes below 92% or you feel short of breath at rest or with activity, you should return to the Emergency Department.  Return to the ED with any worsening of symptoms such as lethargy, confusion, chest pain, trouble breathing, or any problems or concerns.  Call your doctor to arrange a follow up visit.     Updated CDC COVID-19 guidance as of January 2022:    Regardless of vaccination status:    - Stay at home for 5 days. Most people with COVID-19 have mild illness and can recover at home without medical care. Do not leave your home, except to get medical care. Do not visit public areas.    - Avoid public transportation, ridesharing, or taxis.     - As much as possible, stay in a specific room and away from other people in your home. If possible, use a separate bathroom. If you need to be around other people in or outside of the home, wear a mask.    - Tell your close contacts that they may have been exposed to COVID-19. An infected person can spread COVID-19 starting 48 hours (or 2 days) before the person has any symptoms or tests positive.    - If you have no symptoms or your symptoms are resolving after 5 days, you can leave your house.    - Continue to wear a mask around others for 5 additional days.    - If you have a fever, continue to stay home until your fever resolves.

## 2022-03-05 NOTE — ED PROVIDER NOTES
Emergency Medicine Note  HPI   HISTORY OF PRESENT ILLNESS     86-year-old male with a history of BPH CHF CAD on aspirin hyperlipidemia hypertension pulmonary sarcoidosis GI bleed presents the ED for nausea vomiting.  Patient states he woke this morning with nasal congestion, throughout the morning started feeling nauseous and has had several episodes of vomiting, states vomit was clear in coloration, eventually the vomit turned into brown flecks, no blood.  He states he called his PCP who told him to come to the ED to be evaluated.  Patient denies chest pain shortness of breath abdominal pain fevers.  Denies black or bloody stools.  Patient had a normal bowel movement this morning.  Patient denies history of peptic ulcer disease,  Patient denies history of abdominal surgeries or bowel obstructions.  Patient denies any abnormal food intake, denies any known sick contacts.             Patient History   PAST HISTORY     Reviewed from Nursing Triage:       Past Medical History:   Diagnosis Date   • Arthritis    • BPH (benign prostatic hyperplasia)    • CHF (congestive heart failure) (CMS/HCC)    • Coronary artery disease    • Disease of thyroid gland    • Heart disease    • Hyperlipidemia    • Hypertension    • Sarcoidosis        Past Surgical History:   Procedure Laterality Date   • ADENOIDECTOMY     • AORTIC VALVE REPLACEMENT     • APPENDECTOMY     • CARDIAC SURGERY     • CORONARY ARTERY BYPASS GRAFT     • EYE SURGERY Bilateral     cataracts    • POPLITEAL VENOUS ANEURYSM REPAIR     • SKIN BIOPSY     • TONSILLECTOMY         Family History   Problem Relation Age of Onset   • Ovarian cancer Biological Mother    • Lymphoma Other         age  17   • Lymphoma Biological Son         age 20's       Social History     Tobacco Use   • Smoking status: Former Smoker     Quit date:      Years since quittin.2   • Smokeless tobacco: Never Used   Substance Use Topics   • Alcohol use: No   • Drug use: No         Review of  Systems   REVIEW OF SYSTEMS     Review of Systems   Constitutional: Negative for chills and fever.   HENT: Positive for congestion.    Eyes: Negative for visual disturbance.   Respiratory: Negative for shortness of breath.    Cardiovascular: Negative for chest pain.   Gastrointestinal: Positive for nausea and vomiting. Negative for abdominal pain and diarrhea.   Genitourinary: Negative for dysuria and hematuria.   Musculoskeletal: Negative for arthralgias.   Neurological: Negative for dizziness and headaches.         VITALS     ED Vitals    Date/Time Temp Pulse Resp BP SpO2 [facility]   03/05/22 1500 -- 60 16 194/80 95 % EKG   03/05/22 1402 35.5 °C (95.9 °F) -- -- -- -- EKG   03/05/22 1344 -- 70 16 194/80 95 % TD        Pulse Ox %: 95 % (03/05/22 1603)  Pulse Ox Interpretation: Normal (03/05/22 1603)  Heart Rate: 60 (03/05/22 1603)  Rhythm Strip Interpretation: Normal Sinus Rhythm (03/05/22 1603)     Physical Exam   PHYSICAL EXAM     Physical Exam  Vitals and nursing note reviewed.   Constitutional:       Appearance: He is well-developed.   HENT:      Head: Normocephalic and atraumatic.   Eyes:      Conjunctiva/sclera: Conjunctivae normal.   Cardiovascular:      Rate and Rhythm: Normal rate and regular rhythm.   Pulmonary:      Effort: Pulmonary effort is normal.      Breath sounds: Normal breath sounds.   Abdominal:      General: There is distension.      Palpations: Abdomen is soft. There is no mass.      Tenderness: There is no abdominal tenderness.   Musculoskeletal:         General: No tenderness or deformity. Normal range of motion.      Cervical back: Normal range of motion.   Skin:     General: Skin is warm and dry.   Neurological:      General: No focal deficit present.      Mental Status: He is alert. Mental status is at baseline.   Psychiatric:         Behavior: Behavior normal.           PROCEDURES     Procedures     DATA     Results     Procedure Component Value Units Date/Time    UA with reflex culture  [[id number]]  (Abnormal) Collected: 03/05/22 1556    Specimen: Urine, Clean Catch Updated: 03/05/22 1616    Narrative:      The following orders were created for panel order UA with reflex culture.  Procedure                               Abnormality         Status                     ---------                               -----------         ------                     UA Reflex to Culture (Ma...[[id number]]  Abnormal            Final result               UA Microscopic[[id number]]               Abnormal            Final result                 Please view results for these tests on the individual orders.    UA Microscopic [[id number]]  (Abnormal) Collected: 03/05/22 1556    Specimen: Urine, Clean Catch Updated: 03/05/22 1616     RBC, Urine 5 TO 9 /HPF      WBC, Urine 0 TO 3 /HPF      Squamous Epithelial None Seen /hpf      Hyaline Cast None Seen /lpf      Bacteria, Urine None Seen /HPF     UA Reflex to Culture (Macroscopic) [[id number]]  (Abnormal) Collected: 03/05/22 1556    Specimen: Urine, Clean Catch Updated: 03/05/22 1606     Color, Urine Yellow     Clarity, Urine Clear     Specific Gravity, Urine 1.020     pH, Urine 6.5     Leukocyte Esterase Negative     Comment: Results can be falsely negative due to high specific gravity, some antibiotics, glucose >3 g/dl, or WBC other than neutrophils.        Nitrite, Urine Negative     Protein, Urine +1     Glucose, Urine Negative mg/dL      Ketones, Urine Negative mg/dL      Urobilinogen, Urine 0.2 EU/dL      Bilirubin, Urine Negative mg/dL      Blood, Urine Negative     Comment: The sensitivity of the occult blood test is equivalent to approximately 4 intact RBC/HPF.       SARS-CoV-2 (COVID-19), PCR Nasopharynx [[id number]]  (Abnormal) Collected: 03/05/22 1358    Specimen: Nasopharyngeal Swab from Nasopharynx Updated: 03/05/22 1524    Narrative:      The following orders were created for panel order SARS-CoV-2 (COVID-19), PCR Nasopharynx.  Procedure                                Abnormality         Status                     ---------                               -----------         ------                     SARS-CoV-2 (COVID-19), P...[[id number]]  Abnormal            Final result                 Please view results for these tests on the individual orders.    SARS-CoV-2 (COVID-19), PCR Nasopharynx [[id number]]  (Abnormal) Collected: 03/05/22 1358    Specimen: Nasopharyngeal Swab from Nasopharynx Updated: 03/05/22 1524     SARS-CoV-2 (COVID-19) Positive    HS Troponin I (with 2 hour reflex) [[id number]]  (Normal) Collected: 03/05/22 1358    Specimen: Blood, Venous Updated: 03/05/22 1438     High Sens Troponin I 8.60 pg/mL     Comprehensive metabolic panel [[id number]]  (Abnormal) Collected: 03/05/22 1358    Specimen: Blood, Venous Updated: 03/05/22 1432     Sodium 137 mEQ/L      Potassium 4.6 mEQ/L      Comment: Results obtained on plasma. Plasma Potassium values may be up to 0.4 mEQ/L less than serum values. The differences may be greater for patients with high platelet or white cell counts.  SLIGHT HEMOLYSIS, RESULT MAY BE INCREASED.        Chloride 108 mEQ/L      CO2 21 mEQ/L      BUN 20 mg/dL      Creatinine 1.1 mg/dL      Glucose 119 mg/dL      Calcium 8.3 mg/dL      AST (SGOT) 26 IU/L      Comment: SLIGHT HEMOLYSIS, RESULT MAY BE INCREASED.        ALT (SGPT) 21 IU/L      Comment: SLIGHT HEMOLYSIS, RESULT MAY BE INCREASED.        Alkaline Phosphatase 75 IU/L      Total Protein 7.0 g/dL      Comment: Test performed on plasma which typically contains approximately 0.4 g/dL more protein than serum.        Albumin 4.1 g/dL      Bilirubin, Total 1.1 mg/dL      Comment: SLIGHT HEMOLYSIS, RESULT MAY BE INCREASED.        eGFR >60.0 mL/min/1.73m*2      Anion Gap 8 mEQ/L     Lipase [[id number]]  (Normal) Collected: 03/05/22 1358    Specimen: Blood, Venous Updated: 03/05/22 1432     Lipase 25 U/L     CBC and differential [[id number]]  (Abnormal) Collected: 03/05/22 1358    Specimen: Blood, Venous  Updated: 03/05/22 1424     WBC 7.28 K/uL      RBC 4.62 M/uL      Hemoglobin 13.9 g/dL      Hematocrit 43.3 %      MCV 93.7 fL      MCH 30.1 pg      MCHC 32.1 g/dL      RDW 14.5 %      Platelets 150 K/uL      Comment: ALL RESULTS HAVE BEEN RECHECKED        MPV 10.0 fL      Differential Type Auto     nRBC 0.0 %      Immature Granulocytes 0.3 %      Neutrophils 72.1 %      Lymphocytes 15.0 %      Monocytes 11.3 %      Eosinophils 0.8 %      Basophils 0.5 %      Immature Granulocytes, Absolute 0.02 K/uL      Neutrophils, Absolute 5.25 K/uL      Lymphocytes, Absolute 1.09 K/uL      Monocytes, Absolute 0.82 K/uL      Eosinophils, Absolute 0.06 K/uL      Basophils, Absolute 0.04 K/uL           Imaging Results          X-RAY CHEST 2 VIEWS (Final result)  Result time 03/05/22 14:26:18    Final result                 Impression:    IMPRESSION:  No active disease is seen in the chest.             Narrative:    CLINICAL HISTORY: Vomiting and URI symptoms.    COMMENT: Two views of the chest are submitted for review, and compared to prior  from 11/10/2019    The heart is normal in size. The mediastinal silhouette is unremarkable. Median  sternotomy wires are in place. TAVR stent in place. The lung fields are clear  bilaterally without evidence for infiltrates, effusion, or pneumothorax. The  osseous structures are within normal limits.                                ECG 12 lead   ED Interpretation   Rhythm: NSR, PVCs  Rate: 68  P waves: [normal interval]  QRS: [normal QRS]  Axis: [normal]  RBBB  ST Segments: [no obvious ST elevation or ischemia]            Scoring tools                                 ED Course & MDM   MDM / ED COURSE / CLINICAL IMPRESSIONS / DISPO     [facility]    ED Course as of 03/05/22 2050   Sat Mar 05, 2022   1403 Patient with a QTC of 508, on his chart also noted to have prolonged QT.  Due to this we will withhold from Zofran, ordered IM Tigan.  Patient's abdomen is soft nontender on exam.  Clinically appears  well.  Will attempt antiemetics obtain lab work and reassess.  Low threshold to image patient secondary to age. [EF]   1432 Hemoglobin: 13.9 [EF]   1525 SARS-CoV-2 (COVID-19)(!!): Positive [EF]   1602 Discussed with Megan in [place] and they carry Paxlovid [BH]   1719 Patient presents with nausea vomiting and nasal congestion, patient has had no nausea or vomiting while here.  Labs reviewed, Covid is positive otherwise, work-up is unremarkable.  Will start patient on antivirals Paxlovid, which we confirmed at [facility] in [place] they have.  Patient given Covid precautions, reasons to return.  Otherwise isolate for 5 to 10 days based on symptoms of the CDC guidelines.  Patient is agreeable this plan and will follow up with his PCP [EF]      ED Course User Index  [BH] Ant Rivera,   [EF] Frankel, Elena C, PA C         Clinical Impressions as of 03/05/22 2050   COVID-19   Nausea and vomiting, intractability of vomiting not specified, unspecified vomiting type   Hypertension, unspecified type     Discharge         Frankel, Elena C, PA C  03/05/22 2050

## 2022-03-06 LAB
ATRIAL RATE: 68
P AXIS: 2
PR INTERVAL: 186
QRS DURATION: 152
QT INTERVAL: 478
QTC CALCULATION(BAZETT): 508
R AXIS: -62
T WAVE AXIS: 21
VENTRICULAR RATE: 68

## 2022-03-06 NOTE — ED ATTESTATION NOTE
I have personally seen and examined the patient.  I reviewed and agree with physician assistant / nurse practitioner’s assessment and plan of care, with the following exceptions: None  My examination, assessment, and plan of care of Michel Fang is as follows:     Exam: Well-appearing, no acute distress, wake alert oriented x3, regular rate and rhythm, normal work of breathing, lungs clear to auscultation, abdomen soft nontender, no guarding or rebound, normal pulses, no lower extremity edema    Assessment and plan: Patient presents with nausea, vomiting, nasal congestion and generalized weakness, labs consistent with mild dehydration, improved with IV fluids and Tigan, found to be Covid positive without significant respiratory symptoms and normal oxygen saturations, patient able to tolerate p.o., fortunately patient is vaccinated for Covid but given his pulmonary sarcoid will prescribe Paxlovid, we called local pharmacies to see what is available, given strict return and follow-up instructions which he understands     Ant Rivera, DO  03/06/22 0911

## 2022-05-10 ENCOUNTER — HOSPITAL ENCOUNTER (EMERGENCY)
Facility: HOSPITAL | Age: 87
Discharge: HOME | End: 2022-05-10
Attending: EMERGENCY MEDICINE
Payer: MEDICARE

## 2022-05-10 ENCOUNTER — APPOINTMENT (EMERGENCY)
Dept: RADIOLOGY | Facility: HOSPITAL | Age: 87
End: 2022-05-10
Attending: EMERGENCY MEDICINE
Payer: MEDICARE

## 2022-05-10 VITALS
RESPIRATION RATE: 16 BRPM | OXYGEN SATURATION: 97 % | TEMPERATURE: 98.5 F | SYSTOLIC BLOOD PRESSURE: 183 MMHG | WEIGHT: 187 LBS | BODY MASS INDEX: 26.83 KG/M2 | HEART RATE: 61 BPM | DIASTOLIC BLOOD PRESSURE: 77 MMHG

## 2022-05-10 DIAGNOSIS — M16.10 HIP ARTHRITIS: Primary | ICD-10-CM

## 2022-05-10 DIAGNOSIS — M54.9 BACK PAIN, UNSPECIFIED BACK LOCATION, UNSPECIFIED BACK PAIN LATERALITY, UNSPECIFIED CHRONICITY: ICD-10-CM

## 2022-05-10 PROCEDURE — 63700000 HC SELF-ADMINISTRABLE DRUG: Performed by: EMERGENCY MEDICINE

## 2022-05-10 PROCEDURE — 99283 EMERGENCY DEPT VISIT LOW MDM: CPT | Mod: 25

## 2022-05-10 PROCEDURE — 72100 X-RAY EXAM L-S SPINE 2/3 VWS: CPT

## 2022-05-10 PROCEDURE — 73502 X-RAY EXAM HIP UNI 2-3 VIEWS: CPT | Mod: RT

## 2022-05-10 RX ORDER — ACETAMINOPHEN 325 MG/1
650 TABLET ORAL ONCE
Status: COMPLETED | OUTPATIENT
Start: 2022-05-10 | End: 2022-05-10

## 2022-05-10 RX ORDER — NAPROXEN 250 MG/1
500 TABLET ORAL ONCE
Status: COMPLETED | OUTPATIENT
Start: 2022-05-10 | End: 2022-05-10

## 2022-05-10 RX ORDER — METHYLPREDNISOLONE 4 MG/1
32 TABLET ORAL SEE ADMIN INSTRUCTIONS
Qty: 21 TABLET | Refills: 0 | Status: SHIPPED | OUTPATIENT
Start: 2022-05-10 | End: 2022-05-17

## 2022-05-10 RX ORDER — LIDOCAINE 560 MG/1
1 PATCH PERCUTANEOUS; TOPICAL; TRANSDERMAL ONCE
Status: DISCONTINUED | OUTPATIENT
Start: 2022-05-10 | End: 2022-05-10 | Stop reason: HOSPADM

## 2022-05-10 RX ADMIN — LIDOCAINE 1 PATCH: 246 PATCH TOPICAL at 12:16

## 2022-05-10 RX ADMIN — ACETAMINOPHEN 650 MG: 325 TABLET ORAL at 14:08

## 2022-05-10 RX ADMIN — NAPROXEN 500 MG: 250 TABLET ORAL at 12:58

## 2022-05-10 ASSESSMENT — ENCOUNTER SYMPTOMS
STIFFNESS: 0
BLOOD IN STOOL: 0
ANAL BLEEDING: 0
WEAKNESS: 0
HIP PAIN: 1
MUSCLE WEAKNESS: 0
DIARRHEA: 0
FEVER: 0
JOINT SWELLING: 0
NAUSEA: 0
VOMITING: 0
EXTREMITY NUMBNESS: 0
BACK PAIN: 0
NUMBNESS: 0
ABDOMINAL PAIN: 0

## 2022-05-10 NOTE — DISCHARGE INSTRUCTIONS
lidocaine patch at the pharmacy and use as directed on packaging  Remove lidocaine patch applied in emergency department after 12 hours

## 2022-05-10 NOTE — ED PROVIDER NOTES
Emergency Medicine Note  HPI   HISTORY OF PRESENT ILLNESS       History provided by:  Patient  Lower Extremity Issue  Location:  Hip  Time since incident:  4 hours  Injury: no    Hip location:  R hip  Pain details:     Quality:  Sharp    Radiates to:  Does not radiate    Severity:  Moderate    Onset quality:  Sudden    Duration:  4 hours    Timing:  Constant    Progression:  Partially resolved  Chronicity:  New  Prior injury to area:  No  Relieved by:  Acetaminophen  Worsened by:  Bearing weight (standing)  Ineffective treatments:  None tried  Associated symptoms: no back pain, no decreased ROM, no fever, no muscle weakness, no numbness, no stiffness, no swelling and no tingling          Patient History   PAST HISTORY     Reviewed from Nursing Triage:         Past Medical History:   Diagnosis Date   • Arthritis    • BPH (benign prostatic hyperplasia)    • CHF (congestive heart failure) (CMS/HCC)    • Coronary artery disease    • Disease of thyroid gland    • Heart disease    • Hyperlipidemia    • Hypertension    • Sarcoidosis        Past Surgical History:   Procedure Laterality Date   • ADENOIDECTOMY     • AORTIC VALVE REPLACEMENT     • APPENDECTOMY     • CARDIAC SURGERY     • CORONARY ARTERY BYPASS GRAFT     • EYE SURGERY Bilateral     cataracts    • POPLITEAL VENOUS ANEURYSM REPAIR     • SKIN BIOPSY     • TONSILLECTOMY         Family History   Problem Relation Age of Onset   • Ovarian cancer Biological Mother    • Lymphoma Other         age  17   • Lymphoma Biological Son         age 20's       Social History     Tobacco Use   • Smoking status: Former Smoker     Quit date:      Years since quittin.3   • Smokeless tobacco: Never Used   Substance Use Topics   • Alcohol use: No   • Drug use: No         Review of Systems   REVIEW OF SYSTEMS     Review of Systems   Constitutional: Negative for fever.   Gastrointestinal: Negative for abdominal pain, anal bleeding, blood in stool, diarrhea, nausea and vomiting.    Musculoskeletal: Negative for back pain, joint swelling and stiffness.   Skin: Negative for rash.   Neurological: Negative for weakness and numbness.        No bowel or bladder incontinence         VITALS     ED Vitals    Date/Time Temp Pulse Resp BP SpO2 [facility]   05/10/22 1137 36.9 °C (98.5 °F) 61 16 183/77 97 % BM        Pulse Ox %: 97 % (05/10/22 1155)  Pulse Ox Interpretation: Normal (05/10/22 1155)           Physical Exam   PHYSICAL EXAM     Physical Exam  Vitals and nursing note reviewed.   Constitutional:       Appearance: Normal appearance. He is normal weight.   HENT:      Head: Normocephalic.   Cardiovascular:      Pulses:           Dorsalis pedis pulses are 2+ on the right side and 2+ on the left side.        Posterior tibial pulses are 2+ on the right side and 2+ on the left side.   Abdominal:      Palpations: Abdomen is soft.      Tenderness: There is no abdominal tenderness. There is no right CVA tenderness or left CVA tenderness.   Musculoskeletal:      Lumbar back: No bony tenderness. Normal range of motion. Negative right straight leg raise test and negative left straight leg raise test.        Back:       Right hip: Bony tenderness (mild lateral hip) present. Normal range of motion. Normal strength.      Left hip: Normal. No tenderness or bony tenderness. Normal range of motion.      Right knee: Normal. No bony tenderness. Normal range of motion. No tenderness.      Right foot: Normal capillary refill. Normal pulse.   Skin:     General: Skin is warm and dry.      Capillary Refill: Capillary refill takes less than 2 seconds.   Neurological:      Mental Status: He is alert and oriented to person, place, and time.      Sensory: Sensation is intact. No sensory deficit.      Motor: No weakness.      Gait: Gait is intact. Gait normal.   Psychiatric:         Mood and Affect: Mood normal.           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             Narrative:    CLINICAL HISTORY: Back pain  COMPARISON: AP view the pelvis and 2 views of the right hip are obtained  revealing no acute fracture. There are moderate degenerative changes in both  hips, worse on the right than on the left. The appearance is stable. The  overlying soft tissues are unremarkable other than atherosclerotic  calcifications and surgical clips.                             X-RAY LUMBAR SPINE 2 OR 3 VIEWS (Final result)  Result time 05/10/22 13:22:58    Final result                 Impression:    IMPRESSION: Superior endplate depression at L4, stable. No new abnormality.             Narrative:    CLINICAL HISTORY: Back pain  COMPARISON: L-spine 12/31/2014. CT 9/7/2021.  COMMENT: 3 views lumbar spine obtained demonstrating mild superior endplate  depression of L4 which is unchanged compared to the prior CT. There are diffuse  moderate degenerative discogenic changes with spurring. Moderate to advanced  degenerative arthropathy is seen in the facet joints. There is no malalignment.  Advanced atherosclerotic calcifications of the aorta are noted. Otherwise the  overlying soft tissues are unremarkable.                              No orders to display       Scoring tools                                 ED Course & MDM   MDM / ED COURSE / CLINICAL IMPRESSIONS / DISPO     MDM    ED Course as of 05/10/22 2005   Tue May 10, 2022   1327 X-RAY LUMBAR SPINE 2 OR 3 VIEWS    IMPRESSION: Superior endplate depression at L4, stable. No new abnormality. 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 [RS]      ED Course User Index  [RS] Lance Julien, PA C         Clinical Impressions as of 05/10/22 2005   Hip arthritis   Back pain, unspecified back location,  unspecified back pain laterality, unspecified chronicity     Discharge         Lance Julien PA C  05/10/22 2005

## 2022-05-11 NOTE — ED ATTESTATION NOTE
"I have personally seen and examined the patient.  I reviewed and agree with physician assistant / nurse practitioner’s assessment and plan of care, with the following exceptions: None  My examination, assessment, and plan of care of Michel Fang is as follows:     Exam: Well-appearing, no acute distress, awake alert oriented x3, tenderness to palpation to right SI joint and right lateral buttocks, good range of motion at the hip without pain, and with active and passive range of motion, neurovascular intact distally, no midline lumbar tenderness to palpation    Assessment and plan: Patient presents with right \"hip pain\" which is more SI/buttock pain, no red flag symptoms, symptomatic treatment and outpatient follow-up     Ant Rivera,   05/11/22 8325    "

## 2022-10-20 ENCOUNTER — APPOINTMENT (EMERGENCY)
Dept: RADIOLOGY | Facility: HOSPITAL | Age: 87
End: 2022-10-20
Attending: EMERGENCY MEDICINE
Payer: MEDICARE

## 2022-10-20 ENCOUNTER — HOSPITAL ENCOUNTER (EMERGENCY)
Facility: HOSPITAL | Age: 87
Discharge: HOME | End: 2022-10-20
Attending: EMERGENCY MEDICINE | Admitting: EMERGENCY MEDICINE
Payer: MEDICARE

## 2022-10-20 VITALS
DIASTOLIC BLOOD PRESSURE: 84 MMHG | OXYGEN SATURATION: 97 % | RESPIRATION RATE: 18 BRPM | HEIGHT: 70 IN | SYSTOLIC BLOOD PRESSURE: 206 MMHG | TEMPERATURE: 98.4 F | BODY MASS INDEX: 25.77 KG/M2 | HEART RATE: 59 BPM | WEIGHT: 180 LBS

## 2022-10-20 DIAGNOSIS — K40.90 UNILATERAL INGUINAL HERNIA WITHOUT OBSTRUCTION OR GANGRENE, RECURRENCE NOT SPECIFIED: Primary | ICD-10-CM

## 2022-10-20 LAB
ALBUMIN SERPL-MCNC: 3.8 G/DL (ref 3.4–5)
ALP SERPL-CCNC: 67 IU/L (ref 35–126)
ALT SERPL-CCNC: 16 IU/L (ref 16–63)
ANION GAP SERPL CALC-SCNC: 7 MEQ/L (ref 3–15)
AST SERPL-CCNC: 19 IU/L (ref 15–41)
BACTERIA URNS QL MICRO: ABNORMAL /HPF
BASOPHILS # BLD: 0.07 K/UL (ref 0.01–0.1)
BASOPHILS NFR BLD: 1.5 %
BILIRUB SERPL-MCNC: 1.1 MG/DL (ref 0.3–1.2)
BILIRUB UR QL STRIP.AUTO: NEGATIVE MG/DL
BUN SERPL-MCNC: 18 MG/DL (ref 8–20)
CALCIUM SERPL-MCNC: 8.5 MG/DL (ref 8.9–10.3)
CHLORIDE SERPL-SCNC: 104 MEQ/L (ref 98–109)
CLARITY UR REFRACT.AUTO: CLEAR
CO2 SERPL-SCNC: 24 MEQ/L (ref 22–32)
COLOR UR AUTO: YELLOW
CREAT SERPL-MCNC: 1.1 MG/DL (ref 0.8–1.3)
DIFFERENTIAL METHOD BLD: ABNORMAL
EOSINOPHIL # BLD: 0.15 K/UL (ref 0.04–0.54)
EOSINOPHIL NFR BLD: 3.2 %
ERYTHROCYTE [DISTWIDTH] IN BLOOD BY AUTOMATED COUNT: 14.5 % (ref 11.6–14.4)
GFR SERPL CREATININE-BSD FRML MDRD: >60 ML/MIN/1.73M*2
GLUCOSE SERPL-MCNC: 107 MG/DL (ref 70–99)
GLUCOSE UR STRIP.AUTO-MCNC: NEGATIVE MG/DL
HCT VFR BLDCO AUTO: 41.5 % (ref 40.1–51)
HGB BLD-MCNC: 13.7 G/DL (ref 13.7–17.5)
HGB UR QL STRIP.AUTO: ABNORMAL
HYALINE CASTS #/AREA URNS LPF: ABNORMAL /LPF
IMM GRANULOCYTES # BLD AUTO: 0.01 K/UL (ref 0–0.08)
IMM GRANULOCYTES NFR BLD AUTO: 0.2 %
KETONES UR STRIP.AUTO-MCNC: NEGATIVE MG/DL
LEUKOCYTE ESTERASE UR QL STRIP.AUTO: NEGATIVE
LYMPHOCYTES # BLD: 1.16 K/UL (ref 1.2–3.5)
LYMPHOCYTES NFR BLD: 24.6 %
MCH RBC QN AUTO: 30.9 PG (ref 28–33.2)
MCHC RBC AUTO-ENTMCNC: 33 G/DL (ref 32.2–36.5)
MCV RBC AUTO: 93.5 FL (ref 83–98)
MONOCYTES # BLD: 0.57 K/UL (ref 0.3–1)
MONOCYTES NFR BLD: 12.1 %
MUCOUS THREADS URNS QL MICRO: ABNORMAL /LPF
NEUTROPHILS # BLD: 2.76 K/UL (ref 1.7–7)
NEUTS SEG NFR BLD: 58.4 %
NITRITE UR QL STRIP.AUTO: NEGATIVE
NRBC BLD-RTO: 0 %
PDW BLD AUTO: 10.1 FL (ref 9.4–12.4)
PH UR STRIP.AUTO: 5.5 [PH]
PLATELET # BLD AUTO: 149 K/UL (ref 150–350)
POTASSIUM SERPL-SCNC: 4.1 MEQ/L (ref 3.6–5.1)
PROT SERPL-MCNC: 6.9 G/DL (ref 6–8.2)
PROT UR QL STRIP.AUTO: NEGATIVE
RBC # BLD AUTO: 4.44 M/UL (ref 4.5–5.8)
RBC #/AREA URNS HPF: ABNORMAL /HPF
SODIUM SERPL-SCNC: 135 MEQ/L (ref 136–144)
SP GR UR REFRACT.AUTO: 1.02
SQUAMOUS URNS QL MICRO: ABNORMAL /HPF
UROBILINOGEN UR STRIP-ACNC: 0.2 EU/DL
WBC # BLD AUTO: 4.72 K/UL (ref 3.8–10.5)
WBC #/AREA URNS HPF: ABNORMAL /HPF

## 2022-10-20 PROCEDURE — 80053 COMPREHEN METABOLIC PANEL: CPT | Performed by: PHYSICIAN ASSISTANT

## 2022-10-20 PROCEDURE — 63600105 HC IODINE BASED CONTRAST: Performed by: PHYSICIAN ASSISTANT

## 2022-10-20 PROCEDURE — 36415 COLL VENOUS BLD VENIPUNCTURE: CPT | Performed by: PHYSICIAN ASSISTANT

## 2022-10-20 PROCEDURE — 81003 URINALYSIS AUTO W/O SCOPE: CPT | Performed by: EMERGENCY MEDICINE

## 2022-10-20 PROCEDURE — 99284 EMERGENCY DEPT VISIT MOD MDM: CPT | Mod: 25

## 2022-10-20 PROCEDURE — 85025 COMPLETE CBC W/AUTO DIFF WBC: CPT | Performed by: PHYSICIAN ASSISTANT

## 2022-10-20 PROCEDURE — G1004 CDSM NDSC: HCPCS

## 2022-10-20 PROCEDURE — 74177 CT ABD & PELVIS W/CONTRAST: CPT | Mod: MG

## 2022-10-20 RX ORDER — FLUTICASONE PROPIONATE 50 MCG
1 SPRAY, SUSPENSION (ML) NASAL DAILY
COMMUNITY

## 2022-10-20 RX ADMIN — IOHEXOL 75 ML: 350 INJECTION, SOLUTION INTRAVENOUS at 11:42

## 2022-10-20 ASSESSMENT — ENCOUNTER SYMPTOMS
FEVER: 0
FACIAL SWELLING: 0
NAUSEA: 0
DIARRHEA: 0
SPEECH DIFFICULTY: 0
SEIZURES: 0
NECK PAIN: 0
ABDOMINAL PAIN: 0
WEAKNESS: 0
AGITATION: 0
COLOR CHANGE: 0
VOMITING: 0
SHORTNESS OF BREATH: 0
SORE THROAT: 0
BACK PAIN: 0
COUGH: 0
ACTIVITY CHANGE: 0
HEMATURIA: 0
HEADACHES: 0
WHEEZING: 0
DIAPHORESIS: 0
DIFFICULTY URINATING: 0

## 2022-10-20 NOTE — ED PROVIDER NOTES
Emergency Medicine Note  HPI   HISTORY OF PRESENT ILLNESS     HPI    Patient is an 87-year-old male with past medical history significant for multiple hernia repairs who presents the emergency department with left lower quadrant abdominal pain that started last night.  States it is a 3 out of 10 but it is annoying and not normal.  Denies any change in bowel or bladder habits.  Denies any nausea or vomiting.  Denies any fever, chills, sweats.  Patient History   PAST HISTORY     Reviewed from Nursing Triage:       Past Medical History:   Diagnosis Date    Arthritis     BPH (benign prostatic hyperplasia)     CHF (congestive heart failure) (CMS/[facility])     Coronary artery disease     Disease of thyroid gland     Heart disease     Hyperlipidemia     Hypertension     Sarcoidosis        Past Surgical History:   Procedure Laterality Date    ADENOIDECTOMY      AORTIC VALVE REPLACEMENT      APPENDECTOMY      CARDIAC SURGERY      CORONARY ARTERY BYPASS GRAFT      EYE SURGERY Bilateral     cataracts     POPLITEAL VENOUS ANEURYSM REPAIR      SKIN BIOPSY      TONSILLECTOMY         Family History   Problem Relation Age of Onset    Ovarian cancer Biological Mother     Lymphoma Other         age  17    Lymphoma Biological Son         age 20's       Social History     Tobacco Use    Smoking status: Former     Types: Cigarettes     Quit date:      Years since quittin.8    Smokeless tobacco: Never   Substance Use Topics    Alcohol use: No    Drug use: No         Review of Systems   REVIEW OF SYSTEMS     Review of Systems   Constitutional: Negative for activity change, diaphoresis and fever.   HENT: Negative for facial swelling and sore throat.    Eyes: Negative for visual disturbance.   Respiratory: Negative for cough, shortness of breath and wheezing.    Cardiovascular: Negative for chest pain and leg swelling.   Gastrointestinal: Negative for abdominal pain, diarrhea, nausea and vomiting.    Genitourinary: Negative for difficulty urinating and hematuria.   Musculoskeletal: Negative for back pain and neck pain.   Skin: Negative for color change and pallor.   Neurological: Negative for seizures, syncope, speech difficulty, weakness and headaches.   Psychiatric/Behavioral: Negative for agitation and behavioral problems.   All other systems reviewed and are negative.        VITALS     ED Vitals    Date/Time Temp Pulse Resp BP SpO2 [facility]   10/20/22 1218 -- 59 18 206/84 97 % KP   10/20/22 1036 36.9 °C (98.4 °F) 61 18 178/74 95 % RP                       Physical Exam   PHYSICAL EXAM     Physical Exam  Vitals and nursing note reviewed.   Constitutional:       Appearance: He is well-developed.   HENT:      Head: Normocephalic and atraumatic.   Eyes:      Conjunctiva/sclera: Conjunctivae normal.   Cardiovascular:      Rate and Rhythm: Normal rate and regular rhythm.   Pulmonary:      Effort: Pulmonary effort is normal.      Breath sounds: Normal breath sounds.   Abdominal:      General: There is no distension.      Palpations: Abdomen is soft. There is no mass.      Tenderness: There is no abdominal tenderness.      Comments: Palpable left inguinal hernia, minimally tender to palpation   Musculoskeletal:         General: No tenderness or deformity. Normal range of motion.      Cervical back: Normal range of motion.   Skin:     General: Skin is warm and dry.   Neurological:      General: No focal deficit present.      Mental Status: He is alert. Mental status is at baseline.   Psychiatric:         Behavior: Behavior normal.           PROCEDURES     Procedures     DATA     Results     Procedure Component Value Units Date/Time    UA with reflex culture [[id number]]  (Abnormal) Collected: 10/20/22 1104    Specimen: Urine, Clean Catch Updated: 10/20/22 1128    Narrative:      The following orders were created for panel order UA with reflex culture.  Procedure                               Abnormality         Status                      ---------                               -----------         ------                     UA Reflex to Culture (Ma...[[id number]]  Abnormal            Final result               UA Microscopic[[id number]]               Abnormal            Final result                 Please view results for these tests on the individual orders.    UA Microscopic [[id number]]  (Abnormal) Collected: 10/20/22 1104    Specimen: Urine, Clean Catch Updated: 10/20/22 1128     RBC, Urine 0 TO 4 /HPF      WBC, Urine 0 TO 3 /HPF      Squamous Epithelial None Seen /hpf      Hyaline Cast None Seen /lpf      Bacteria, Urine None Seen /HPF      MUCUSUA Rare /LPF     UA Reflex to Culture (Macroscopic) [[id number]]  (Abnormal) Collected: 10/20/22 1104    Specimen: Urine, Clean Catch Updated: 10/20/22 1127     Color, Urine Yellow     Clarity, Urine Clear     Specific Gravity, Urine 1.017     pH, Urine 5.5     Leukocyte Esterase Negative     Comment: Results can be falsely negative due to high specific gravity, some antibiotics, glucose >3 g/dl, or WBC other than neutrophils.        Nitrite, Urine Negative     Protein, Urine Negative     Glucose, Urine Negative mg/dL      Ketones, Urine Negative mg/dL      Urobilinogen, Urine 0.2 EU/dL      Bilirubin, Urine Negative mg/dL      Blood, Urine Trace     Comment: The sensitivity of the occult blood test is equivalent to approximately 4 intact RBC/HPF.       Comprehensive metabolic panel [[id number]]  (Abnormal) Collected: 10/20/22 1045    Specimen: Blood, Venous Updated: 10/20/22 1109     Sodium 135 mEQ/L      Potassium 4.1 mEQ/L      Comment: Results obtained on plasma. Plasma Potassium values may be up to 0.4 mEQ/L less than serum values. The differences may be greater for patients with high platelet or white cell counts.        Chloride 104 mEQ/L      CO2 24 mEQ/L      BUN 18 mg/dL      Creatinine 1.1 mg/dL      Glucose 107 mg/dL      Calcium 8.5 mg/dL      AST (SGOT) 19 IU/L      ALT (SGPT)  16 IU/L      Alkaline Phosphatase 67 IU/L      Total Protein 6.9 g/dL      Comment: Test performed on plasma which typically contains approximately 0.4 g/dL more protein than serum.        Albumin 3.8 g/dL      Bilirubin, Total 1.1 mg/dL      eGFR >60.0 mL/min/1.73m*2      Anion Gap 7 mEQ/L     CBC and differential [[id number]]  (Abnormal) Collected: 10/20/22 1045    Specimen: Blood, Venous Updated: 10/20/22 1053     WBC 4.72 K/uL      RBC 4.44 M/uL      Hemoglobin 13.7 g/dL      Hematocrit 41.5 %      MCV 93.5 fL      MCH 30.9 pg      MCHC 33.0 g/dL      RDW 14.5 %      Platelets 149 K/uL      MPV 10.1 fL      Differential Type Auto     nRBC 0.0 %      Immature Granulocytes 0.2 %      Neutrophils 58.4 %      Lymphocytes 24.6 %      Monocytes 12.1 %      Eosinophils 3.2 %      Basophils 1.5 %      Immature Granulocytes, Absolute 0.01 K/uL      Neutrophils, Absolute 2.76 K/uL      Lymphocytes, Absolute 1.16 K/uL      Monocytes, Absolute 0.57 K/uL      Eosinophils, Absolute 0.15 K/uL      Basophils, Absolute 0.07 K/uL     Extra Tube Red [[id number]] Collected: 10/20/22 1045    Specimen: Blood, Venous Updated: 10/20/22 1050    Extra Tube Gold [[id number]] Collected: 10/20/22 1045    Specimen: Blood, Venous Updated: 10/20/22 1050    Extra Tube Lt Blue [[id number]] Collected: 10/20/22 1045    Specimen: Blood, Venous Updated: 10/20/22 1050    Extra Tubes [[id number]] Collected: 10/20/22 1045    Specimen: Blood, Venous Updated: 10/20/22 1050    Narrative:      The following orders were created for panel order Extra Tubes.  Procedure                               Abnormality         Status                     ---------                               -----------         ------                     Extra Tube Lt Blue[[id number]]                               In process                 Extra Tube Red[[id number]]                                   In process                 Extra Tube Lt Green[[id number]]                              In  process                 Extra Tube Gold[[id number]]                                  In process                   Please view results for these tests on the individual orders.    Extra Tube Lt Green [[id number]] Collected: 10/20/22 1045    Specimen: Blood, Venous Updated: 10/20/22 1050          Imaging Results          CT ABDOMEN PELVIS WITH IV CONTRAST (Final result)  Result time 10/20/22 12:00:48    Final result                 Impression:    IMPRESSION: Stable examination as compared to September 2021 exam.  No definite  acute abdominal/pelvic pathology appreciated.    Stable small fat-containing umbilical hernia and small fat-containing left  inguinal hernia without herniated bowel.    Stable hiatal hernia.    Grossly stable fibrotic changes at the visualized lung bases.    Additional stable chronic findings as noted above.             Narrative:    CLINICAL HISTORY: Left lower quadrant pain    COMMENT: Contrast-enhanced CT the abdomen pelvis performed.    CT DOSE:  One or more dose reduction techniques (e.g. automated exposure  control, adjustment of the mA and/or kV according to patient size, use of  iterative reconstruction technique) utilized for this examination    75 cc of Omnipaque 350 intravenous contrast was administered.  Oral contrast was  not administered.    COMPARISON STUDY: CT the abdomen/pelvis dated 9/7/2021.    Lung bases: Visualized portions of the lung bases is a grossly stable right  greater than left basilar fibrosis/scarring.    Liver: There is probably some steatosis.  Subcentimeter posterior right lobe  hypodensities too small to definitively characterize but stable.    Bile ducts: No ductal dilatation appreciated    Gallbladder: Unremarkable, no calcified gallstones    Spleen: Unremarkable    Pancreas: Stable somewhat atrophic appearance.    Adrenal glands: Unremarkable    Kidneys: No hydronephrosis.  Stable small there is a small left lower pole cyst,  stable    Bowel: No abnormal bowel  dilatation or evidence of obstruction.  There is a  stable hernia.  Colonic diverticuli are noted with no gross CT evidence of acute  colitis appreciated.  The appendix is apparently surgically absent.    Abdominal aorta: Extensive aortoiliac atherosclerotic plaque.    Urinary bladder: Nondistended    Prostate and seminal vesicles: Grossly unremarkable.    Abdominal wall: There is a stable relatively small fat-containing umbilical  hernia without herniated bowel.  Stable small fat-containing left inguinal  hernia.  No herniated bowel.                                No orders to display       Scoring tools                                  ED Course & MDM   MDM / ED COURSE / CLINICAL IMPRESSION / DISPO     MDM       Clinical Impression      Unilateral inguinal hernia without obstruction or gangrene, recurrence not specified     _________________     ED Disposition   Discharge                  Irene Pradhan PA C  10/20/22 1389

## 2022-10-20 NOTE — ED ATTESTATION NOTE
I have personally seen and examined the patient.  I reviewed and agree with physician assistant / nurse practitioners assessment and plan of care, with the following exceptions: None  My examination, assessment, and plan of care of Michel Fang is as follows:        87-year-old male with history of hernia repairs in the past presents with a painful mass in his left inguinal area.  Patient saw his PCP and was sent here for evaluation.  He is having no nausea or vomiting.  On exam patient is awake alert in no acute distress.  He has no respiratory distress.  His abdomen is nondistended.  Patient had a tender mass in his right inguinal area which the PA reduced.  CT imaging was then obtained.   Stable examination as compared to September 2021 exam.  No definite  acute abdominal/pelvic pathology appreciated.     Stable small fat-containing umbilical hernia and small fat-containing left  inguinal hernia without herniated bowel.  Lab work was unremarkable.  Patient was discharged for surgical follow-up.     Eugenia Baker MD  10/20/22 [id number]

## 2023-03-14 ENCOUNTER — TRANSCRIBE ORDERS (OUTPATIENT)
Dept: SCHEDULING | Facility: REHABILITATION | Age: 88
End: 2023-03-14

## 2023-03-14 DIAGNOSIS — R42 DIZZINESS AND GIDDINESS: Primary | ICD-10-CM

## 2023-04-18 ENCOUNTER — HOSPITAL ENCOUNTER (OUTPATIENT)
Dept: PHYSICAL THERAPY | Facility: REHABILITATION | Age: 88
Setting detail: THERAPIES SERIES
Discharge: HOME | End: 2023-04-18
Attending: OTOLARYNGOLOGY
Payer: MEDICARE

## 2023-04-18 DIAGNOSIS — R42 DIZZINESS AND GIDDINESS: ICD-10-CM

## 2023-04-18 PROCEDURE — 97163 PT EVAL HIGH COMPLEX 45 MIN: CPT

## 2023-04-18 RX ORDER — MECLIZINE HCL 12.5 MG 12.5 MG/1
12.5 TABLET ORAL 3 TIMES DAILY PRN
COMMUNITY
End: 2024-05-29 | Stop reason: ENTERED-IN-ERROR

## 2023-04-18 NOTE — LETTER
[address]  [facility] [place] [address]  [phone]      Dear DR. Millan,      Thank you for this referral. Please review the attached notes and plan of care for your approval.  Please contact our department with any questions.     Sincerely,     Emely Ryan, PT      Referring Provider: By co-signing this Plan of Care (POC) either electronically or physically you agree to the following:    I have reviewed the the Plan of Care established by the therapist within this document and certify that the services are skilled and medically necessary. I have reviewed the plan and recommend that these services continue to meet the goals stated in this document.       EXTERNAL PROVIDER FAXING BACK:    PHYSICIAN SIGNATURE: __________________________________     DATE: ___________________  TIME: _____________    IMPORTANT:  If returning this Plan of Care by fax, please fax back ONLY the signature page.   _________________________________________________________________________      [facility] Fax: [phone]        PT EVALUATION FOR OUTPATIENT THERAPY    Patient: Oh Fang  MRN: [id number]  : 1935 87 y.o.   Referring Physician: Stephon Millan MD  Date of Visit: 2023      Certification Dates:  23 through 23         Recommended Frequency & Duration:  2 times/week for up to 8 weeks     Diagnosis:   1. Dizziness and giddiness        Chief Complaints:   Chief Complaint   Patient presents with   • Pain   • Vertigo       Precautions: fall  Precautions additional comments:      Past Medical History:   Past Medical History:   Diagnosis Date   • Arthritis    • BPH (benign prostatic hyperplasia)    • CHF (congestive heart failure) (CMS/HCC)    • Coronary artery disease    • Disease of thyroid gland    • Heart disease    • Hyperlipidemia    • Hypertension    • Sarcoidosis        Past Surgical History:   Past Surgical History:   Procedure Laterality Date   • ADENOIDECTOMY     • AORTIC VALVE  REPLACEMENT     • APPENDECTOMY     • CARDIAC SURGERY     • CORONARY ARTERY BYPASS GRAFT     • EYE SURGERY Bilateral     cataracts    • POPLITEAL VENOUS ANEURYSM REPAIR     • SKIN BIOPSY     • TONSILLECTOMY           LEARNING ASSESSMENT    Assessment completed:  Yes    Learner name:  Oh Fang    Learner: Patient    Learning Barriers:  Learning barriers: No Barriers    Preferred Language: English     Needed: No    Education Provided:   Method: Discussion  Readiness: acceptance  Response: Verbalizes understanding      CO-LEARNER ASSESSMENT:    Completed: Yes:   Co-Learner name:  Abner    Learner: Other hailey    Learning Barriers:  Learning barriers: No Barriers    Preferred Language: English     Needed: No    Education Provided:   Method:  Discussion  Readiness:   acceptance  Response:   Verbalizes understanding  Comments:           Welcome letter discussed: Yes Discussed Welcome Letter with patient's son, which includes attendance policy. Provided education regarding cancellation and no-show policy. Education regarding the importance of participation and regular attendance to maximize goal attainment.         OBJECTIVE MEASUREMENTS/DATA:    Time In Session:  Start Time: 1345  Stop Time: 1400  Time Calculation (min): 15 min   Assessment and Plan - 04/18/23 1300        Assessment    Plan of Care reviewed and patient/family in agreement Yes     System Pathology/Pathophysiology Noted vestibular;musculoskeletal;neuromuscular     Functional Limitations in Following Categories (PT Eval) home management;community/leisure     Rehab Potential/Prognosis adequate, monitor progress closely     Problem List dizziness;BPPV;impaired balance;motion sensitivity;impaired postural control;gaze stabilization;visual motion intolerance     Clinical Assessment Mr. Oh Fang is an 86 y/o male w/ PMH that includes arthritis, CHF, CAD, HTN, high cholesterol and hypothyroidism, sarcoidosis, and BPH who presents with a nearly  4 month history of vertigo.  Although it has improved since onset, he notes room spinning dizziness when he gets out of bed in the morning that is accompanied by increased difficulty with walking.  His Dizziness Handicap Inventory score was 28 points, indicating mild to moderate percieved disability on account of his vertigo.  He notes long standing balance issues, and was observed walking with significant gait deviations including widened HARJINDER, decreased marck, unsteady with head turns, and needing increased time to steady himself upon initial standing. His gait speed was measured at .41 meters per second; a gait speed this slow is indicative of increased fall risk.  Frenzel exam was performed and Mr. Fang demonstrate a third degree right beating nystagmus, consistent with a vestibular weakness.  Further work up still pending includes position testing, Dynamic Gait Assessment, Motion Sensitivity Testing, and GOODE balance scale zto further assess static balance.  Today his BP and HR were assessed manually, and his BP was 150/80's and heart rate was 100 bpm.  BPPV position testing was held today due to elevated vitals.  Pt reports he has a schedule appointment with his cardiologist this week.  He was encouraged to take his BP daily at home to monitor the readings.  Mr. Fang would benefit from skilled vestbibular PT to address his ongoing dizziness, and will likely also benefit from interventions focused on improving his balance and gait.     Plan and Recommendations Position Testing pending improved vitals, DGI, MSQ     Planned Services CPT 97619 Canalith repositioning procedure/maneuvers;CPT 54732 Gait training;CPT 26338 Neuromuscular Reeducation;CPT 97220 Therapeutic activities;CPT 97910 Therapeutic exercises;CPT 15960 Hot/Cold Packs therapy                General Information - 04/18/23 1300        Session Details    Document Type initial evaluation     Mode of Treatment individual therapy        General  Information    Onset of Illness/Injury or Date of Surgery 01/01/23     Referring Physician Dr. Stephon Millan     History of present illness/functional impairment Oh Fang is an 86 y/o male with PMH including arthritis, CHF, CAD, HTN, high cholesterol and hypothyroidism, sarcoidosis, and BPH who presents to outpatient physical therapy with complaints of ongoing vertigo with getting up in the morning that started at the beginning of the year and is still present.  He has a history of vertigo in years past.  He saw Dr. Millan in March who is recommending vestibular rehab for his dizziness.  His balance issues are long standing and states it is difficult to discern if it has gotten worse.  Mr. Fang reports he consistently does exercises in the morning where he rotates his head to the left and right side in supine prior to getting out of bed.  He has a follow up with his cardiologist later this week.     Existing Precautions/Restrictions fall                Pain/Vitals - 04/18/23 1342        Pain Assessment    Currently in pain No/Denies        Pre Activity Vital Signs    Pulse 100     /60     BP Location Left upper arm     BP Method Manual     Patient Position Sitting                Falls/Food Screening - 04/18/23 1454        Initial Falls Assessment    One or more falls in the last year No        Food Insecurity    Within the past 12 months, you worried that your food would run out before you got the money to buy more. Never true     Within the past 12 months, the food you bought just didn't last and you didn't have money to get more. Never true                PT - 04/20/23 1454        PT Plan    Frequency of treatment 2 times/week     PT Duration 8 weeks     PT Cert From 04/18/23     PT Cert To 06/17/23     Date PT POC was sent to provider 04/20/23     Signed PT Plan of Care received?  No                   Vestibular     PT Vestibular Evaluation - 04/18/23 1300        Frepat's Exam    Spontaneous  Nystagmus Abnormal     Comments RBN     Gaze Evoked Nystagmus Abnormal     Comments RBN     Head Shaking Test Abnormal     Comments RBN     SVINT (Skull Vibration-Induced Nystagmus Test) Abnormal     SVINT Comments RBN        BPPV    Right Surveyor Hallpike  --   TBA once BP safe    Left Surveyor Hallpike  --   TBA once BP safe    Horizontal Canal/Roll Test --   TBA once BP safe       Motion Sensitivity    Motion Sensitivity Quotient Percentage (%) --   TBA       Balance    Total Score:  Goode Balance --   TBA    Gait Speed (m/sec) 0.41 m/sec     DGI Score (out of 24 total) --   DGI vs FGA TBA       Other Outcome Measures    DHI 28                  Goals     • DIzziness      Short Term Goals Time Frame Result Comment/Progress   Patient will decrease Motion Sensitivity Quotient to TBA % for increased functional tolerance.   4 weeks     Patient will complete GOODE Balance Scale for increased postural control. 4 weeks     Patient will increase FGA vs DGI score to TBA for increased gait stability and balance.   4 weeks     Patient will decrease DVA to TBA for functional improved gaze stability. 4 weeks     Patient will perform home exercise program with supervision.   4 weeks     Patient will demonstrate ability to manage symptoms with <20% cues. 4 weeks     Position testing will be negative for BPPV 4 weeks     Patient will improve score on ABC to TBA for decreased report of symptoms with daily activities. 4 weeks     Patient will improve score on DHI to 12 points for decreased report of symptoms with daily activities. 4 weeks       Long Term Goals Time Frame Result Comment/Progress   Patient will decrease Motion Sensitivity Quotient to 2 % for increased functional tolerance.   8 weeks     Patient will increase GOODE score to 49/56 for increased postural control. 8 weeks     Patient will increase FGA or DGI scores to (FGA 25/30, DGI 22/24) for increased gait stability and balance.   8 weeks     Patient will decrease DVA to 2 lines  for functional improved gaze stability. 8 weeks     Patient will perform home exercise program independently.   8 weeks      Patient will tolerate 10 minutes of mild cardiovascular conditioning at with RPE </=12/20.   8 weeks     Patient will demonstrate independence with managing any residual symptoms. 8 weeks     Patient will improve score on ABC to >/=78% for decreased report of symptoms with daily activities. 8 weeks     Patient will improve score on DHI to </=10 points for decreased report of symptoms with daily activities. 8 weeks     Patient will have no increased symptoms with challenging VOR activities for symptom free high level activities.   8 weeks                       TREATMENT PLAN:    PT Vestibular Exercises Completed Today Current Session Time   POC      Progress notes Due      Physician Follow up      CRT  UNBILLABLE CODE          NEURO RE-ED  TOTAL TIME FOR SESSION    BPPV POSITION TESTING     Disha Hallpike     Horizontal Roll Test     CRT's     VOR CANCELLATION     Standing H/VVOR-C     Ambulation w/ H/VVOR-C     VOR / GAZE STABILITY     Seated H/VVOR     Standing H/VVOR     H/VVOR on compliant surfaces     Ambulation w/H/VVOR     H/VVOR on sway surfaces     Functional VOR     HABITUATION     Ball circles     Repetitive functional movements     MSQ  TBA   BALANCE- STATIC     On floor     Airex foam     Rockerboard     Doyle  TBA   BALANCE- DYNAMIC     Ambulation -head turns/nods/EC     Amb - 180 & 360 degree turns     Retro ambulation EO/EC     Obstacles     FGA  TBA   10MWT  TBA   TUG  TBA   OPTOKINETIC STIMULATION     MULTITASKING/COG TASKS     THER-EX   TOTAL TIME FOR SESSION Not performed   CARDIOVASCULAR      BALKE/BCT Test     Recumbent bike     Treadmill      THER ACT  TOTAL TIME FOR SESSION brief   Symptom report, vitals, meds, pain     Patient Education                 ASSESSMENT:    This 87 y.o. year old male presents to PT with above stated diagnosis. Physical Therapy evaluation reveals  dizziness, BPPV, impaired balance, motion sensitivity, impaired postural control, gaze stabilization, visual motion intolerance resulting in home management, community/leisure limitations. Michel Fang will benefit from skilled PT services to address limitation, work towards rehab and patient goals and maximize PLOF of chosen ADLs.     Planned Services: The patient's treatment will include CPT 80706 Canalith repositioning procedure/maneuvers, CPT 84828 Gait training, CPT 90895 Neuromuscular Reeducation, CPT 49007 Therapeutic activities, CPT 77027 Therapeutic exercises, CPT 05721 Hot/Cold Packs therapy, .

## 2023-04-20 ENCOUNTER — TELEPHONE (OUTPATIENT)
Dept: REGISTRATION | Facility: REHABILITATION | Age: 88
End: 2023-04-20
Payer: MEDICARE

## 2023-04-20 NOTE — OP PT TREATMENT LOG
PT Vestibular Exercises Completed Today Current Session Time   POC      Progress notes Due      Physician Follow up      CRT  UNBILLABLE CODE          NEURO RE-ED  TOTAL TIME FOR SESSION    BPPV POSITION TESTING     Alton Hallpike     Horizontal Roll Test     CRT's     VOR CANCELLATION     Standing H/VVOR-C     Ambulation w/ H/VVOR-C     VOR / GAZE STABILITY     Seated H/VVOR     Standing H/VVOR     H/VVOR on compliant surfaces     Ambulation w/H/VVOR     H/VVOR on sway surfaces     Functional VOR     HABITUATION     Ball circles     Repetitive functional movements     MSQ  TBA   BALANCE- STATIC     On floor     Airex foam     Rockerboard     Doyle  TBA   BALANCE- DYNAMIC     Ambulation -head turns/nods/EC     Amb - 180 & 360 degree turns     Retro ambulation EO/EC     Obstacles     FGA  TBA   10MWT  TBA   TUG  TBA   OPTOKINETIC STIMULATION     MULTITASKING/COG TASKS     THER-EX   TOTAL TIME FOR SESSION Not performed   CARDIOVASCULAR      BALKE/BCT Test     Recumbent bike     Treadmill      THER ACT  TOTAL TIME FOR SESSION brief   Symptom report, vitals, meds, pain     Patient Education

## 2023-04-21 NOTE — PROGRESS NOTES
Referring Provider: By co-signing this Plan of Care (POC) either electronically or physically you agree to the following:    I have reviewed the the Plan of Care established by the therapist within this document and certify that the services are skilled and medically necessary. I have reviewed the plan and recommend that these services continue to meet the goals stated in this document.       EXTERNAL PROVIDER FAXING BACK:    PHYSICIAN SIGNATURE: __________________________________     DATE: ___________________  TIME: _____________    IMPORTANT:  If returning this Plan of Care by fax, please fax back ONLY the signature page.   _________________________________________________________________________      [facility] Fax: [phone]        PT EVALUATION FOR OUTPATIENT THERAPY    Patient: Oh Fang  MRN: [id number]  : 1935 87 y.o.   Referring Physician: Stephon Millan MD  Date of Visit: 2023      Certification Dates:  23 through 23         Recommended Frequency & Duration:  2 times/week for up to 8 weeks     Diagnosis:   1. Dizziness and giddiness        Chief Complaints:   Chief Complaint   Patient presents with   • Pain   • Vertigo       Precautions: fall  Precautions additional comments:      Past Medical History:   Past Medical History:   Diagnosis Date   • Arthritis    • BPH (benign prostatic hyperplasia)    • CHF (congestive heart failure) (CMS/Formerly [facility])    • Coronary artery disease    • Disease of thyroid gland    • Heart disease    • Hyperlipidemia    • Hypertension    • Sarcoidosis        Past Surgical History:   Past Surgical History:   Procedure Laterality Date   • ADENOIDECTOMY     • AORTIC VALVE REPLACEMENT     • APPENDECTOMY     • CARDIAC SURGERY     • CORONARY ARTERY BYPASS GRAFT     • EYE SURGERY Bilateral     cataracts    • POPLITEAL VENOUS ANEURYSM REPAIR     • SKIN BIOPSY     • TONSILLECTOMY           LEARNING ASSESSMENT    Assessment completed:  Yes     Learner name:  Oh Fang    Learner: Patient    Learning Barriers:  Learning barriers: No Barriers    Preferred Language: English     Needed: No    Education Provided:   Method: Discussion  Readiness: acceptance  Response: Verbalizes understanding      CO-LEARNER ASSESSMENT:    Completed: Yes:   Co-Learner name:  Abner    Learner: Nicole hart    Learning Barriers:  Learning barriers: No Barriers    Preferred Language: English     Needed: No    Education Provided:   Method:  Discussion  Readiness:   acceptance  Response:   Verbalizes understanding  Comments:           Welcome letter discussed: Yes Discussed Welcome Letter with patient's son, which includes attendance policy. Provided education regarding cancellation and no-show policy. Education regarding the importance of participation and regular attendance to maximize goal attainment.         OBJECTIVE MEASUREMENTS/DATA:    Time In Session:  Start Time: 1345  Stop Time: 1400  Time Calculation (min): 15 min   Assessment and Plan - 04/18/23 1300        Assessment    Plan of Care reviewed and patient/family in agreement Yes     System Pathology/Pathophysiology Noted vestibular;musculoskeletal;neuromuscular     Functional Limitations in Following Categories (PT Eval) home management;community/leisure     Rehab Potential/Prognosis adequate, monitor progress closely     Problem List dizziness;BPPV;impaired balance;motion sensitivity;impaired postural control;gaze stabilization;visual motion intolerance     Clinical Assessment Mr. Oh Fang is an 86 y/o male w/ PMH that includes arthritis, CHF, CAD, HTN, high cholesterol and hypothyroidism, sarcoidosis, and BPH who presents with a nearly 4 month history of vertigo.  Although it has improved since onset, he notes room spinning dizziness when he gets out of bed in the morning that is accompanied by increased difficulty with walking.  His Dizziness Handicap Inventory score was 28 points, indicating mild  to moderate percieved disability on account of his vertigo.  He notes long standing balance issues, and was observed walking with significant gait deviations including widened HARJINDER, decreased marck, unsteady with head turns, and needing increased time to steady himself upon initial standing. His gait speed was measured at .41 meters per second; a gait speed this slow is indicative of increased fall risk.  Frenzel exam was performed and Mr. Fang demonstrate a third degree right beating nystagmus, consistent with a vestibular weakness.  Further work up still pending includes position testing, Dynamic Gait Assessment, Motion Sensitivity Testing, and GOODE balance scale zto further assess static balance.  Today his BP and HR were assessed manually, and his BP was 150/80's and heart rate was 100 bpm.  BPPV position testing was held today due to elevated vitals.  Pt reports he has a schedule appointment with his cardiologist this week.  He was encouraged to take his BP daily at home to monitor the readings.  Mr. Fang would benefit from skilled vestbibular PT to address his ongoing dizziness, and will likely also benefit from interventions focused on improving his balance and gait.     Plan and Recommendations Position Testing pending improved vitals, DGI, MSQ     Planned Services CPT 11424 Canalith repositioning procedure/maneuvers;CPT 51255 Gait training;CPT 95326 Neuromuscular Reeducation;CPT 87098 Therapeutic activities;CPT 51243 Therapeutic exercises;CPT 21690 Hot/Cold Packs therapy                General Information - 04/18/23 1300        Session Details    Document Type initial evaluation     Mode of Treatment individual therapy        General Information    Onset of Illness/Injury or Date of Surgery 01/01/23     Referring Physician Dr. Stephon Millan     History of present illness/functional impairment Oh Fang is an 86 y/o male with PMH including arthritis, CHF, CAD, HTN, high cholesterol and hypothyroidism,  sarcoidosis, and BPH who presents to outpatient physical therapy with complaints of ongoing vertigo with getting up in the morning that started at the beginning of the year and is still present.  He has a history of vertigo in years past.  He saw Dr. Millan in March who is recommending vestibular rehab for his dizziness.  His balance issues are long standing and states it is difficult to discern if it has gotten worse.  Mr. Fang reports he consistently does exercises in the morning where he rotates his head to the left and right side in supine prior to getting out of bed.  He has a follow up with his cardiologist later this week.     Existing Precautions/Restrictions fall                Pain/Vitals - 04/18/23 1342        Pain Assessment    Currently in pain No/Denies        Pre Activity Vital Signs    Pulse 100     /60     BP Location Left upper arm     BP Method Manual     Patient Position Sitting                Falls/Food Screening - 04/18/23 1454        Initial Falls Assessment    One or more falls in the last year No        Food Insecurity    Within the past 12 months, you worried that your food would run out before you got the money to buy more. Never true     Within the past 12 months, the food you bought just didn't last and you didn't have money to get more. Never true                PT - 04/20/23 1454        PT Plan    Frequency of treatment 2 times/week     PT Duration 8 weeks     PT Cert From 04/18/23     PT Cert To 06/17/23     Date PT POC was sent to provider 04/20/23     Signed PT Plan of Care received?  No                   Vestibular     PT Vestibular Evaluation - 04/18/23 1300        Frenzel's Exam    Spontaneous Nystagmus Abnormal     Comments RBN     Gaze Evoked Nystagmus Abnormal     Comments RBN     Head Shaking Test Abnormal     Comments RBN     SVINT (Skull Vibration-Induced Nystagmus Test) Abnormal     SVINT Comments RBN        BPPV    Right [place]isha Hallpike  --   TBA once BP safe     Left [place] Hallpike  --   TBA once BP safe    Horizontal Canal/Roll Test --   TBA once BP safe       Motion Sensitivity    Motion Sensitivity Quotient Percentage (%) --   TBA       Balance    Total Score:  [place]e Balance --   TBA    Gait Speed (m/sec) 0.41 m/sec     DGI Score (out of 24 total) --   DGI vs FGA TBA       Other Outcome Measures    DHI 28                  Goals     • DIzziness      Short Term Goals Time Frame Result Comment/Progress   Patient will decrease Motion Sensitivity Quotient to TBA % for increased functional tolerance.   4 weeks     Patient will complete GOODE Balance Scale for increased postural control. 4 weeks     Patient will increase FGA vs DGI score to TBA for increased gait stability and balance.   4 weeks     Patient will decrease DVA to TBA for functional improved gaze stability. 4 weeks     Patient will perform home exercise program with supervision.   4 weeks     Patient will demonstrate ability to manage symptoms with <20% cues. 4 weeks     Position testing will be negative for BPPV 4 weeks     Patient will improve score on ABC to TBA for decreased report of symptoms with daily activities. 4 weeks     Patient will improve score on DHI to 12 points for decreased report of symptoms with daily activities. 4 weeks       Long Term Goals Time Frame Result Comment/Progress   Patient will decrease Motion Sensitivity Quotient to 2 % for increased functional tolerance.   8 weeks     Patient will increase GOODE score to 49/56 for increased postural control. 8 weeks     Patient will increase FGA or DGI scores to (FGA 25/30, DGI 22/24) for increased gait stability and balance.   8 weeks     Patient will decrease DVA to 2 lines for functional improved gaze stability. 8 weeks     Patient will perform home exercise program independently.   8 weeks      Patient will tolerate 10 minutes of mild cardiovascular conditioning at with RPE </=12/20.   8 weeks     Patient will demonstrate independence with  managing any residual symptoms. 8 weeks     Patient will improve score on ABC to >/=78% for decreased report of symptoms with daily activities. 8 weeks     Patient will improve score on DHI to </=10 points for decreased report of symptoms with daily activities. 8 weeks     Patient will have no increased symptoms with challenging VOR activities for symptom free high level activities.   8 weeks                       TREATMENT PLAN:    PT Vestibular Exercises Completed Today Current Session Time   POC      Progress notes Due      Physician Follow up      CRT  UNBILLABLE CODE          NEURO RE-ED  TOTAL TIME FOR SESSION    BPPV POSITION TESTING     [place] Hallpike     Horizontal Roll Test     CRT's     VOR CANCELLATION     Standing H/VVOR-C     Ambulation w/ H/VVOR-C     VOR / GAZE STABILITY     Seated H/VVOR     Standing H/VVOR     H/VVOR on compliant surfaces     Ambulation w/H/VVOR     H/VVOR on sway surfaces     Functional VOR     HABITUATION     Ball circles     Repetitive functional movements     MSQ  TBA   BALANCE- STATIC     On floor     Airex foam     Rockerboard     Doyle  TBA   BALANCE- DYNAMIC     Ambulation -head turns/nods/EC     Amb - 180 & 360 degree turns     Retro ambulation EO/EC     Obstacles     FGA  TBA   10MWT  TBA   TUG  TBA   OPTOKINETIC STIMULATION     MULTITASKING/COG TASKS     THER-EX   TOTAL TIME FOR SESSION Not performed   CARDIOVASCULAR      BALKE/BCT Test     Recumbent bike     Treadmill      THER ACT  TOTAL TIME FOR SESSION brief   Symptom report, vitals, meds, pain     Patient Education                 ASSESSMENT:    This 87 y.o. year old male presents to PT with above stated diagnosis. Physical Therapy evaluation reveals dizziness, BPPV, impaired balance, motion sensitivity, impaired postural control, gaze stabilization, visual motion intolerance resulting in home management, community/leisure limitations. Michel RHODES Annalisa will benefit from skilled PT services to address limitation, work  towards rehab and patient goals and maximize PLOF of chosen ADLs.     Planned Services: The patient's treatment will include CPT 08652 Canalith repositioning procedure/maneuvers, CPT 55282 Gait training, CPT 18954 Neuromuscular Reeducation, CPT 87104 Therapeutic activities, CPT 80190 Therapeutic exercises, CPT 42195 Hot/Cold Packs therapy, .

## 2023-04-24 ENCOUNTER — HOSPITAL ENCOUNTER (OUTPATIENT)
Dept: PHYSICAL THERAPY | Facility: REHABILITATION | Age: 88
Setting detail: THERAPIES SERIES
Discharge: HOME | End: 2023-04-24
Attending: OTOLARYNGOLOGY
Payer: MEDICARE

## 2023-04-24 DIAGNOSIS — R42 DIZZINESS AND GIDDINESS: Primary | ICD-10-CM

## 2023-04-24 PROCEDURE — 97112 NEUROMUSCULAR REEDUCATION: CPT | Mod: GP

## 2023-04-24 PROCEDURE — 97530 THERAPEUTIC ACTIVITIES: CPT | Mod: GP

## 2023-04-24 NOTE — PROGRESS NOTES
PT DAILY NOTE FOR OUTPATIENT THERAPY    Patient: Oh Fang MRN: [id number]  : 1935 87 y.o.  Referring Physician: Stephon Millan MD  Date of Visit: 2023    Certification Dates: 23 through 23    Diagnosis:   1. Dizziness and giddiness        Chief Complaints:       Precautions:   Existing Precautions/Restrictions: fall      TODAY'S VISIT    Time In Session:  Start Time: 1300  Stop Time: 1400  Time Calculation (min): 60 min   History/Vitals/Pain/Encounter Info - 23 1302        Injury History/Precautions/Daily Required Info    Document Type daily treatment     Primary Therapist Padmini Ryan, MARIS     Onset of Illness/Injury or Date of Surgery 23     Referring Physician Dr. Stephon Millan     Existing Precautions/Restrictions fall     History of present illness/functional impairment Oh Fang is an 88 y/o male with PMH including arthritis, CHF, CAD, HTN, high cholesterol and hypothyroidism, sarcoidosis, and BPH who presents to outpatient physical therapy with complaints of ongoing vertigo with getting up in the morning that started at the beginning of the year and is still present.  He has a history of vertigo in years past.  He saw Dr. Millan in March who is recommending vestibular rehab for his dizziness.  His balance issues are long standing and states it is difficult to discern if it has gotten worse.  Mr. Fang reports he consistently does exercises in the morning where he rotates his head to the left and right side in supine prior to getting out of bed.  He has a follow up with his cardiologist later this week.     Patient/Family/Caregiver Comments/Observations no falls since his appointment, has not yet heard from his Cardiologist following his appointment last week.  no pain presently and no medication changes. notes his neck has been bothering him since the vertigo started.  Notes his glasses could be bothering his balance.  Notes he can be sitting and watching the  TV adn the vertigo came on, as it did today.     Patient reported fall since last visit No        Pain Assessment    Currently in pain No/Denies        Pre Activity Vital Signs    Pulse 70     /54     BP Location Right upper arm     BP Method Manual     Patient Position Sitting                Daily Treatment Assessment and Plan - 04/24/23 1505        Daily Treatment Assessment and Plan    Progress toward goals Progressing     Daily Outcome Summary BPPV position testing today revealed right horizontal canal cupulolithiasis. He was treated with two right Gufoni maneuvers (once nose up and once nose down). He tolerated it well and was able to stand and walk to the waiting room after on his own efforts without need for physical assist from therapist.   Pt given prolonged positioning (sleeping on right side) to address his cupulolithiaisis at home.     Plan and Recommendations reassess BPPV (start with horizontal canal) and treat as necessary.  Dynamic Gait Index is also pending, along with GOODE.                     OBJECTIVE DATA TAKEN TODAY:    Vestibular     PT Vestibular Evaluation - 04/24/23 1300        BPPV    Right [place] Hallpike  Abnormal     Right [place] Hallpike Comment minimal sx, LBN     Left [place] Hallpike  Abnormal     Left [place] Hallpike Comments minimal sx, RBN     Sit to Supine Abnormal     Sit to Supine comments RBN   Null point on right side    Horizontal Canal/Roll Test Apogeotropic     Horizontal Roll Test to Right  Abnormal     Horizontal Roll Test to Right Comments LBN, mild sx     Horizontal Roll Test to Left  Abnormal     Horizontal Roll Test to Left Comments robust RBN, (+) vertigo     [place] and Lean Test  Abnormal     [place] and Lean Test Comments Flexion LBN, Ext RBN     Interpretation of Results Right Horizontal SCC cupulolithiasis     BPPV Treatment Gufoni Maneuver x 2 (once nose up once nose down)                 Today's Treatment:    PT Vestibular Exercises Completed Today Current Session Time    POC      Progress notes Due      Physician Follow up      CRT  UNBILLABLE CODE          NEURO RE-ED  TOTAL TIME FOR SESSION 40   BPPV POSITION TESTING     [place] Hallpike yes See flow sheet for details   Horizontal Roll Test yes See flow sheet for details   CRT's yes See flow sheet for details   VOR CANCELLATION     Standing H/VVOR-C     Ambulation w/ H/VVOR-C     VOR / GAZE STABILITY     Seated H/VVOR     Standing H/VVOR     H/VVOR on compliant surfaces     Ambulation w/H/VVOR     H/VVOR on sway surfaces     Functional VOR     HABITUATION     Ball circles     Repetitive functional movements     MSQ  TBA   BALANCE- STATIC     On floor     Airex foam     Rockerboard     Doyle  TBA   BALANCE- DYNAMIC     Ambulation -head turns/nods/EC     Amb - 180 & 360 degree turns     Retro ambulation EO/EC     Obstacles     FGA  TBA   10MWT  TBA   TUG  TBA   OPTOKINETIC STIMULATION     MULTITASKING/COG TASKS     THER-EX   TOTAL TIME FOR SESSION Not performed   CARDIOVASCULAR      BALKE/BCT Test     Recumbent bike     Treadmill      THER ACT  TOTAL TIME FOR SESSION 20 minutes   Symptom report, vitals, meds, pain     Patient Education

## 2023-04-24 NOTE — OP PT TREATMENT LOG
PT Vestibular Exercises Completed Today Current Session Time   POC      Progress notes Due      Physician Follow up      CRT  UNBILLABLE CODE          NEURO RE-ED  TOTAL TIME FOR SESSION 40   BPPV POSITION TESTING     [place] Hallpike yes See flow sheet for details   Horizontal Roll Test yes See flow sheet for details   CRT's yes See flow sheet for details   VOR CANCELLATION     Standing H/VVOR-C     Ambulation w/ H/VVOR-C     VOR / GAZE STABILITY     Seated H/VVOR     Standing H/VVOR     H/VVOR on compliant surfaces     Ambulation w/H/VVOR     H/VVOR on sway surfaces     Functional VOR     HABITUATION     Ball circles     Repetitive functional movements     MSQ  TBA   BALANCE- STATIC     On floor     Airex foam     Rockerboard     Doyle  TBA   BALANCE- DYNAMIC     Ambulation -head turns/nods/EC     Amb - 180 & 360 degree turns     Retro ambulation EO/EC     Obstacles     FGA  TBA   10MWT  TBA   TUG  TBA   OPTOKINETIC STIMULATION     MULTITASKING/COG TASKS     THER-EX   TOTAL TIME FOR SESSION Not performed   CARDIOVASCULAR      BALKE/BCT Test     Recumbent bike     Treadmill      THER ACT  TOTAL TIME FOR SESSION 20 minutes   Symptom report, vitals, meds, pain     Patient Education

## 2023-04-28 ENCOUNTER — TELEPHONE (OUTPATIENT)
Dept: REGISTRATION | Facility: REHABILITATION | Age: 88
End: 2023-04-28
Payer: MEDICARE

## 2023-05-01 ENCOUNTER — HOSPITAL ENCOUNTER (OUTPATIENT)
Dept: PHYSICAL THERAPY | Facility: REHABILITATION | Age: 88
Setting detail: THERAPIES SERIES
Discharge: HOME | End: 2023-05-01
Attending: OTOLARYNGOLOGY
Payer: MEDICARE

## 2023-05-01 DIAGNOSIS — R42 DIZZINESS AND GIDDINESS: Primary | ICD-10-CM

## 2023-05-01 PROCEDURE — 97110 THERAPEUTIC EXERCISES: CPT | Mod: GP

## 2023-05-01 PROCEDURE — 97530 THERAPEUTIC ACTIVITIES: CPT | Mod: GP

## 2023-05-01 NOTE — PROGRESS NOTES
Physical Therapy Visit    PT DAILY NOTE FOR OUTPATIENT THERAPY    Patient: Oh Fang MRN: [id number]  : 1935 87 y.o.  Referring Physician: Stephon Millan MD  Date of Visit: 2023    Certification Dates: 23 through 23    Diagnosis:   1. Dizziness and giddiness        Chief Complaints:  dizziness    Precautions:   Existing Precautions/Restrictions: fall      TODAY'S VISIT    Time In Session:  Start Time: 0900  Stop Time: 955  Time Calculation (min): 55 min   History/Vitals/Pain/Encounter Info - 23 0906        Injury History/Precautions/Daily Required Info    Document Type daily treatment     Primary Therapist Padmini Ryan PT     Chief Complaint/Reason for Visit  dizziness     Onset of Illness/Injury or Date of Surgery 23     Referring Physician Dr. Stephon Millan     Existing Precautions/Restrictions fall     History of present illness/functional impairment Oh Fang is an 88 y/o male with PMH including arthritis, CHF, CAD, HTN, high cholesterol and hypothyroidism, sarcoidosis, and BPH who presents to outpatient physical therapy with complaints of ongoing vertigo with getting up in the morning that started at the beginning of the year and is still present.  He has a history of vertigo in years past.  He saw Dr. Millan in March who is recommending vestibular rehab for his dizziness.  His balance issues are long standing and states it is difficult to discern if it has gotten worse.  Mr. Fang reports he consistently does exercises in the morning where he rotates his head to the left and right side in supine prior to getting out of bed.  He has a follow up with his cardiologist later this week.     Patient/Family/Caregiver Comments/Observations Patient''s son present for treatment. They report patient felt much better lter in the day after last treatment. He still feels dizzy, especially when rolling to the right and looking down or when first sitting up.     Patient  reported fall since last visit No        Pain Assessment    Currently in pain No/Denies        Pre Activity Vital Signs    Pulse 68     /62     BP Location Left upper arm     BP Method Manual     Patient Position Sitting                Daily Treatment Assessment and Plan - 05/01/23 0906        Daily Treatment Assessment and Plan    Progress toward goals Progressing     Daily Outcome Summary Positional testing for BPPV was + for L horizontal canalithiasis and was treated with Gufoni maneuvers x 2 to uninvolved side with nose down. Patient reported dizziness with first maneuver with nystagmus, but no dizziness or nystagmus with second maneuver. He ambulated with his walker I out of clinic accompanied by his son.     Plan and Recommendations reassess BPPV (start with horizontal canal) and treat as necessary.  Dynamic Gait Index is also pending, along with GOODE.                     OBJECTIVE DATA TAKEN TODAY:    Vestibular     PT Vestibular Evaluation - 05/01/23 0900        Frenzel's Exam    Spontaneous Nystagmus WNL     Gaze Evoked Nystagmus WNL        BPPV    Horizontal Roll Test to Right  Abnormal     Horizontal Roll Test to Right Comments mild L beating, no dizziness, non fatiguing     Horizontal Roll Test to Left  Abnormal     Horizontal Roll Test to Left Comments robust L beating (geotropic) for 20 seconds with dizziness     Interpretation of Results L horizontal canalithiasis     BPPV Treatment Gufoni maneuver to uninvolved side x 2 with nose down                 Today's Treatment:    PT Vestibular Exercises Completed Today Current Session Time   POC      Progress notes Due      Physician Follow up      CRT  UNBILLABLE CODE          NEURO RE-ED  TOTAL TIME FOR SESSION 40   BPPV POSITION TESTING     [place] Hallpike no See flow sheet for details   Horizontal Roll Test yes See flow sheet for details   CRT's yes See flow sheet for details   VOR CANCELLATION     Standing H/VVOR-C     Ambulation w/ H/VVOR-C     VOR  / GAZE STABILITY     Seated H/VVOR     Standing H/VVOR     H/VVOR on compliant surfaces     Ambulation w/H/VVOR     H/VVOR on sway surfaces     Functional VOR     HABITUATION     Ball circles     Repetitive functional movements     MSQ  TBA   BALANCE- STATIC     On floor     Airex foam     Rockerboard     [place]oyle  TBA   BALANCE- DYNAMIC     Ambulation -head turns/nods/EC     Amb - 180 & 360 degree turns     Retro ambulation EO/EC     Obstacles     FGA  TBA   10MWT  TBA   TUG  TBA   OPTOKINETIC STIMULATION     MULTITASKING/COG TASKS     THER-EX   TOTAL TIME FOR SESSION Not performed   CARDIOVASCULAR      BALKE/BCT Test     Recumbent bike     Treadmill      THER ACT  TOTAL TIME FOR SESSION 15 minutes   Symptom report, vitals, meds, pain yes Rest break and monitoring between maneuvers.   Patient Education yes Patient and his son educated on the pathophysiology of BPPV using model of the inner ear.

## 2023-05-01 NOTE — OP PT TREATMENT LOG
PT Vestibular Exercises Completed Today Current Session Time   POC      Progress notes Due      Physician Follow up      CRT  UNBILLABLE CODE          NEURO RE-ED  TOTAL TIME FOR SESSION 40   BPPV POSITION TESTING     Bergoo Hallpike no See flow sheet for details   Horizontal Roll Test yes See flow sheet for details   CRT's yes See flow sheet for details   VOR CANCELLATION     Standing H/VVOR-C     Ambulation w/ H/VVOR-C     VOR / GAZE STABILITY     Seated H/VVOR     Standing H/VVOR     H/VVOR on compliant surfaces     Ambulation w/H/VVOR     H/VVOR on sway surfaces     Functional VOR     HABITUATION     Ball circles     Repetitive functional movements     MSQ  TBA   BALANCE- STATIC     On floor     Airex foam     Rockerboard     Doyle  TBA   BALANCE- DYNAMIC     Ambulation -head turns/nods/EC     Amb - 180 & 360 degree turns     Retro ambulation EO/EC     Obstacles     FGA  TBA   10MWT  TBA   TUG  TBA   OPTOKINETIC STIMULATION     MULTITASKING/COG TASKS     THER-EX   TOTAL TIME FOR SESSION Not performed   CARDIOVASCULAR      BALKE/BCT Test     Recumbent bike     Treadmill      THER ACT  TOTAL TIME FOR SESSION 15 minutes   Symptom report, vitals, meds, pain yes Rest break and monitoring between maneuvers.   Patient Education yes Patient and his son educated on the pathophysiology of BPPV using model of the inner ear.

## 2023-05-08 ENCOUNTER — HOSPITAL ENCOUNTER (OUTPATIENT)
Dept: PHYSICAL THERAPY | Facility: REHABILITATION | Age: 88
Setting detail: THERAPIES SERIES
Discharge: HOME | End: 2023-05-08
Attending: OTOLARYNGOLOGY
Payer: MEDICARE

## 2023-05-08 DIAGNOSIS — R42 DIZZINESS AND GIDDINESS: Primary | ICD-10-CM

## 2023-05-08 PROCEDURE — 97530 THERAPEUTIC ACTIVITIES: CPT | Mod: GP

## 2023-05-08 PROCEDURE — 97112 NEUROMUSCULAR REEDUCATION: CPT | Mod: GP

## 2023-05-09 ENCOUNTER — HOSPITAL ENCOUNTER (OUTPATIENT)
Dept: PHYSICAL THERAPY | Facility: REHABILITATION | Age: 88
Setting detail: THERAPIES SERIES
Discharge: HOME | End: 2023-05-09
Attending: OTOLARYNGOLOGY
Payer: MEDICARE

## 2023-05-09 DIAGNOSIS — R42 DIZZINESS AND GIDDINESS: Primary | ICD-10-CM

## 2023-05-09 PROCEDURE — 97140 MANUAL THERAPY 1/> REGIONS: CPT | Mod: GP

## 2023-05-09 PROCEDURE — 97112 NEUROMUSCULAR REEDUCATION: CPT | Mod: GP

## 2023-05-09 ASSESSMENT — GAIT ASSESSMENTS
TUG TIME: 19
TUG TIME: 21
TUG TIME: 20
TUG TIME: 20

## 2023-05-09 NOTE — OP PT TREATMENT LOG
PT Vestibular Exercises Completed Today Current Session Time   POC      Progress notes Due      Physician Follow up      CRT  UNBILLABLE CODE          NEURO RE-ED  TOTAL TIME FOR SESSION 30 minutes   BPPV POSITION TESTING     Disha Hallpike yes See flow sheet for details   Horizontal Roll Test yes See flow sheet for details   CRT's no See flow sheet for details   VOR CANCELLATION     Standing H/VVOR-C     Ambulation w/ H/VVOR-C     VOR / GAZE STABILITY     Seated H/VVOR     Standing H/VVOR     H/VVOR on compliant surfaces     Ambulation w/H/VVOR     H/VVOR on sway surfaces     Functional VOR     HABITUATION     Ball circles     Repetitive functional movements     MSQ  TBA   BALANCE- STATIC     On floor     Airex foam     Rockerboard     Doyle  TBA   BALANCE- DYNAMIC     Ambulation -head turns/nods/EC     Amb - 180 & 360 degree turns     Retro ambulation EO/EC     Obstacles     DGI  TBA   10MWT  TBA   TUG  TBA   OPTOKINETIC STIMULATION     MULTITASKING/COG TASKS     THER-EX   TOTAL TIME FOR SESSION Not performed   CARDIOVASCULAR      BALKE/BCT Test     Recumbent bike     Treadmill      THER ACT  TOTAL TIME FOR SESSION 30 minutes   Symptom report, vitals, meds, pain yes Rest break and monitoring between maneuvers.   Subjective outcome measures yes NDI   Objective outcome measures yes ROM, MMT   Patient Education yes Reviewed results of testing today

## 2023-05-09 NOTE — PROGRESS NOTES
Physical Therapy Visit    PT DAILY NOTE FOR OUTPATIENT THERAPY    Patient: Oh Fang MRN: [id number]  : 1935 87 y.o.  Referring Physician: Stephon Millan MD  Date of Visit: 2023    Certification Dates: 23 through 23    Diagnosis:   1. Dizziness and giddiness        Chief Complaints:  dizziness, imbalance, neck pain    Precautions:   Existing Precautions/Restrictions: fall      TODAY'S VISIT    Time In Session:  Start Time: 1100  Stop Time: 1200  Time Calculation (min): 60 min   History/Vitals/Pain/Encounter Info - 23 1108        Injury History/Precautions/Daily Required Info    Document Type daily treatment     Primary Therapist Padmini Ryan PT     Chief Complaint/Reason for Visit  dizziness, imbalance, neck pain     Onset of Illness/Injury or Date of Surgery 23     Referring Physician Dr. Stephon Millan, Dr. Leone     Existing Precautions/Restrictions fall     History of present illness/functional impairment Oh Fang is an 86 y/o male with PMH including arthritis, CHF, CAD, HTN, high cholesterol and hypothyroidism, sarcoidosis, and BPH who presents to outpatient physical therapy with complaints of ongoing vertigo with getting up in the morning that started at the beginning of the year and is still present.  He has a history of vertigo in years past.  He saw Dr. Millan in March who is recommending vestibular rehab for his dizziness.  His balance issues are long standing and states it is difficult to discern if it has gotten worse.  Mr. Fang reports he consistently does exercises in the morning where he rotates his head to the left and right side in supine prior to getting out of bed.  He has a follow up with his cardiologist later this week.     Patient/Family/Caregiver Comments/Observations Neck a little irritated after yesterday . Not too good  with the walking and the balance. Have a hard time with the wind , it could blow me over . Would like to get back to  walking with the cane     Patient reported fall since last visit No        Pain Assessment    Currently in pain No/Denies        Pre Activity Vital Signs    Pulse 68     /72     MAP (mmHg) 104 mmHg                Daily Treatment Assessment and Plan - 05/09/23 1108        Daily Treatment Assessment and Plan    Progress toward goals Progressing     Daily Outcome Summary Pt tolerated conservative cervical treatment well including heat,SOR, contract/relax to increase rotation . Pt also tolerated DGI , 10MWT and TUG well     Plan and Recommendations GOODE , gait and balnace training . Pt reports goal is to return to walking with a cane                     OBJECTIVE DATA TAKEN TODAY:     Outcome Measures    PT Outcome Measures - 05/09/23 1122        Objective Outcome Measures    10 Meter Walk Test 13.4 seconds     Gait Speed (m/sec) 0.75 m/sec     DGI Score (out of 24 total) 16        Timed Up and Go Test    Trial One: Timed Up and Go Test 21     Trial Two: Timed Up and Go Test 20     Trial Three: Timed Up and Go Test 19     Mean of 3 Trials: Timed Up and Go Test 20                 Today's Treatment:    PT Vestibular Exercises Completed Today Current Session Time   POC      Progress notes Due      Physician Follow up      CRT  UNBILLABLE CODE          NEURO RE-ED  TOTAL TIME FOR SESSION 40 minutes   BPPV POSITION TESTING     Disha Hallpike    See flow sheet for details   Horizontal Roll Test   See flow sheet for details   CRT's no See flow sheet for details   VOR CANCELLATION     Standing H/VVOR-C     Ambulation w/ H/VVOR-C     VOR / GAZE STABILITY     Seated H/VVOR     Standing H/VVOR     H/VVOR on compliant surfaces     Ambulation w/H/VVOR     H/VVOR on sway surfaces     Functional VOR     HABITUATION     Ball circles     Repetitive functional movements     MSQ  TBA   BALANCE- STATIC     On floor     Airex foam     Rockerboard     Goode  TBA   BALANCE- DYNAMIC     Ambulation -head turns/nods/EC     Amb - 180 & 360  degree turns     Retro ambulation EO/EC     Obstacles     DGI  16/24: See objective information/vestibular specialty flow sheet/media for details.   10MWT  13.43 seconds =0 .75m/s See objective information/vestibular specialty flow sheet/media for details.   TUG  20 sec See objective information/ flow sheet/media for details.   OPTOKINETIC STIMULATION     MULTITASKING/COG TASKS     THER-EX   TOTAL TIME FOR SESSION Not performed   CARDIOVASCULAR      BALKE/BCT Test     Recumbent bike     Treadmill      Manual   10-min   Suboccipital Release yes     Contract/Relax yes Focused on bilateral rotation                      THER ACT  TOTAL TIME FOR SESSION 10 minutes   Symptom report, vitals, meds, pain yes Rest break and monitoring between maneuvers.   Subjective outcome measures n NDI   Objective outcome measures yes ROM, MMT, DGI, TUG, 10MWT   Patient Education yes Reviewed results of testing today

## 2023-05-09 NOTE — OP PT TREATMENT LOG
PT Vestibular Exercises Completed Today Current Session Time   POC      Progress notes Due      Physician Follow up      CRT  UNBILLABLE CODE          NEURO RE-ED  TOTAL TIME FOR SESSION 40 minutes   BPPV POSITION TESTING     Disha Hallpike    See flow sheet for details   Horizontal Roll Test   See flow sheet for details   CRT's no See flow sheet for details   VOR CANCELLATION     Standing H/VVOR-C     Ambulation w/ H/VVOR-C     VOR / GAZE STABILITY     Seated H/VVOR     Standing H/VVOR     H/VVOR on compliant surfaces     Ambulation w/H/VVOR     H/VVOR on sway surfaces     Functional VOR     HABITUATION     Ball circles     Repetitive functional movements     MSQ  TBA   BALANCE- STATIC     On floor     Airex foam     Rockerboard     Doyle  TBA   BALANCE- DYNAMIC     Ambulation -head turns/nods/EC     Amb - 180 & 360 degree turns     Retro ambulation EO/EC     Obstacles     DGI  16/24: See objective information/vestibular specialty flow sheet/media for details.   10MWT  13.43 seconds =0 .75m/s See objective information/vestibular specialty flow sheet/media for details.   TUG  20 sec See objective information/ flow sheet/media for details.   OPTOKINETIC STIMULATION     MULTITASKING/COG TASKS     THER-EX   TOTAL TIME FOR SESSION Not performed   CARDIOVASCULAR      BALKE/BCT Test     Recumbent bike     Treadmill      Manual   10-min   Suboccipital Release yes     Contract/Relax yes Focused on bilateral rotation                      THER ACT  TOTAL TIME FOR SESSION 10 minutes   Symptom report, vitals, meds, pain yes Rest break and monitoring between maneuvers.   Subjective outcome measures n NDI   Objective outcome measures yes ROM, MMT, DGI, TUG, 10MWT   Patient Education yes Reviewed results of testing today

## 2023-05-09 NOTE — PROGRESS NOTES
Physical Therapy Progress Note    Referring Provider: By co-signing this Plan of Care (POC) either electronically or physically you agree to the following:    I have reviewed the the Plan of Care established by the therapist within this document and certify that the services are skilled and medically necessary. I have reviewed the plan and recommend that these services continue to meet the goals stated in this document.       EXTERNAL PROVIDER FAXING BACK:    PHYSICIAN SIGNATURE: __________________________________     DATE: ___________________    TIME: _________    IMPORTANT:  If returning this Plan of Care by fax, please fax back ONLY the signature page.   _____________________________________________________________________    [facility] Fax: [phone]      PT RE-EVALUATION FOR OUTPATIENT THERAPY    Patient: Michel Fang   MRN: [id number]  : 1935 87 y.o.  Referring Physician: Stephon Millan MD  Date of Visit: 2023      New Certification Dates: 23 through 23    Recommended Frequency & Duration:  2 times/week for up to 8 weeks          Diagnosis:   1. Dizziness and giddiness        Chief Complaints:  No chief complaint on file.      Precautions:   Existing Precautions/Restrictions: fall      TODAY'S VISIT:    Time In Session:  Start Time: 1105  Stop Time: 1205  Time Calculation (min): 60 min   General Information - 23 1109        Session Details    Document Type re-evaluation        General Information    Onset of Illness/Injury or Date of Surgery 23     Referring Physician Dr. Stephon Millan, Dr. Leone     History of present illness/functional impairment Oh Fang is an 88 y/o male with PMH including arthritis, CHF, CAD, HTN, high cholesterol and hypothyroidism, sarcoidosis, and BPH who presents to outpatient physical therapy with complaints of ongoing vertigo with getting up in the morning that started at the beginning of the year and is still present.   He has a history of vertigo in years past.  He saw Dr. Millan in March who is recommending vestibular rehab for his dizziness.  His balance issues are long standing and states it is difficult to discern if it has gotten worse.  Mr. Fang reports he consistently does exercises in the morning where he rotates his head to the left and right side in supine prior to getting out of bed.  He has a follow up with his cardiologist later this week.     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     Existing Precautions/Restrictions fall                  Pain/Vitals - 05/08/23 1109        Pain Assessment    Currently in pain No/Denies        Pre Activity Vital Signs    /74     BP Location Left upper arm     BP Method Manual     Patient Position Sitting        Pain Intervention    Intervention  SERGO MACARIO     Post Intervention Comments no pain                PT - 05/08/23 1109        PT Plan    Frequency of treatment 2 times/week     PT Duration 8 weeks     PT Cert From 04/18/23     PT Cert To 06/17/23     Date PT POC was sent to provider 04/20/23     Signed PT Plan of Care received?  Yes                Assessment and Plan - 05/08/23 8417        Assessment    Plan of Care reviewed and patient/family in agreement Yes     System Pathology/Pathophysiology Noted musculoskeletal;vestibular;neuromuscular     Functional Limitations in Following Categories (PT Eval) home management     Rehab Potential/Prognosis adequate, monitor progress closely     Problem List impaired postural control;impaired balance;decreased ROM;dizziness;BPPV     Clinical Assessment Mr. Fang is currently being treated at [facility] for vertigo, and presents today with a script to add cervical spine interventions to his ongoing treatment.  BPPV testing today was negative for horizontal canal BPPV.  Neck Disability Index  was 0%, and while he denies any neck pain, he does report ongoing stiffness and limitations in his range of motion.  He shows significant restrictions in rotation bilaterally as well as into extension.  His posture include thoracic kyphosis with rounded shoulders and significant forward head position, also increasing forces through his neck.  Additionally he shows balance impairment and devaitions of gait that he would benefit from addressing to improve his mobility and decrease his fall risk.     Plan and Recommendations DGI, 10MWT, TUG, GOODE     Planned Services CPT 48255 Canalith repositioning procedure/maneuvers;CPT 81709 Gait training;CPT 02293 Manual therapy;CPT 81072 Neuromuscular Reeducation;CPT 24046 Therapeutic activities;CPT 39109 Therapeutic exercises;CPT 10657 Hot/Cold Packs therapy                   OBJECTIVE MEASUREMENTS/DATA:     ROM    Range of Motion - 05/08/23 2300        Cervical AROM    Cervical Flexion 50     Cervical Extension 20     Cervical Left SB 15     Cervical Right SB 15     Cervical Left Rotation 38     Cervical Right Rotation 45     Comments:  rests in 10 degrees of extension               MMT    Manual Muscle Tests - 05/08/23 1100        RIGHT: Upper Extremity Manual Muscle Test Assessment    Shoulder Flexion gross movement (4/5) good     Shoulder Extension gross movement (4/5) good     Shoulder Abduction gross movement (4/5) good     Shoulder Internal Rotation gross movement (4+/5) good plus     Shoulder External Rotation gross movement (4+/5) good plus     Elbow Extension gross movement (4+/5) good plus     Forearm Supination gross movement (4+/5) good plus     Wrist Flexion gross movement (5/5) normal     Wrist Extension gross movement (5/5) normal        LEFT: Upper Extremity Manual Muscle Test Assessment    Shoulder Flexion gross movement (4/5) good     Shoulder Extension gross movement (4+/5) good plus     Shoulder Abduction gross movement (4/5) good     Shoulder Internal  Rotation gross movement (4+/5) good plus     Shoulder External Rotation gross movement (4+/5) good plus     Elbow Flexion gross movement (4+/5) good plus     Elbow Extension gross movement (4+/5) good plus     Wrist Flexion gross movement (5/5) normal     Wrist Extension gross movement (5/5) normal               Vestibular     PT Vestibular Evaluation - 05/08/23 1100        BPPV    Right Eldridge Hallpike  Abnormal;Modified with tilt table     Right Disha Hallpike Comment LBN, minimal sx     Left Disha Hallpike  Abnormal;Modified with tilt table     Left Disha Hallpike Comments RBN (varied in intensity, minimal sx     Sit to Supine Abnormal     Sit to Supine comments LBN     Horizontal Canal/Roll Test Apogeotropic     Horizontal Roll Test to Right  Abnormal     Horizontal Roll Test to Right Comments mild LBN no sx     Horizontal Roll Test to Left  Abnormal     Horizontal Roll Test to Left Comments mild RBN varying in intensity     [place] and Lean Test  Abnormal     [place] and Lean Test Comments Flexion LBN, extension LBN     Interpretation of Results negative for BPPV        Other Outcome Measures    Other Outcome Measures Score 0     Other Outcome Measures Comments NDI                 ROM and MMT        5/8/2023   PT Cervical/Lumbar/Other ROM Measurements   AROM: Cervical Flexion 50   AROM: Cervical Extension 20   AROM: Left Cervical SB 15   AROM: Right Cervical SB 15   AROM: Left Cervical Rotation 38   AROM: Right Cervical Rotation 45   AROM: Cervical Comments rests in 10 degrees of extension   PT UE MMT Measurements   Right Shoulder Flexion (4/5) good   Left Shoulder Flexion (4/5) good   Right Shoulder Extension (4/5) good   Left Shoulder Extension (4+/5) good plus   Right Shoulder ABD (4/5) good   Left Shoulder ABD (4/5) good   Right Shoulder IR (4+/5) good plus   Left Shoulder IR (4+/5) good plus   Right Shoulder ER (4+/5) good plus   Left Shoulder ER (4+/5) good plus   Left Elbow Flexion (4+/5) good plus   Right Elbow  Extension (4+/5) good plus   Left Elbow Extension (4+/5) good plus   Right Forearm Supination (4+/5) good plus   Right Wrist Flexion (5/5) normal   Left Wrist Flexion (5/5) normal   Right Wrist Extension (5/5) normal   Left Wrist Extension (5/5) normal     Outcome Measures        4/18/2023    13:00   PT OBJECTIVE Outcome Measures   Gait Speed (m/sec) 0.41 m/sec   DGI --        DGI vs FGA TBA   PT SUBJECTIVE Outcome Measures   DHI 28       Today's Treatment::    PT Vestibular Exercises Completed Today Current Session Time   POC      Progress notes Due      Physician Follow up      CRT  UNBILLABLE CODE          NEURO RE-ED  TOTAL TIME FOR SESSION 30 minutes   BPPV POSITION TESTING     Disha Hallpike yes See flow sheet for details   Horizontal Roll Test yes See flow sheet for details   CRT's no See flow sheet for details   VOR CANCELLATION     Standing H/VVOR-C     Ambulation w/ H/VVOR-C     VOR / GAZE STABILITY     Seated H/VVOR     Standing H/VVOR     H/VVOR on compliant surfaces     Ambulation w/H/VVOR     H/VVOR on sway surfaces     Functional VOR     HABITUATION     Ball circles     Repetitive functional movements     MSQ  TBA   BALANCE- STATIC     On floor     Airex foam     Rockerboard     Doyle  TBA   BALANCE- DYNAMIC     Ambulation -head turns/nods/EC     Amb - 180 & 360 degree turns     Retro ambulation EO/EC     Obstacles     DGI  TBA   10MWT  TBA   TUG  TBA   OPTOKINETIC STIMULATION     MULTITASKING/COG TASKS     THER-EX   TOTAL TIME FOR SESSION Not performed   CARDIOVASCULAR      BALKE/BCT Test     Recumbent bike     Treadmill      THER ACT  TOTAL TIME FOR SESSION 30 minutes   Symptom report, vitals, meds, pain yes Rest break and monitoring between maneuvers.   Subjective outcome measures yes NDI   Objective outcome measures yes ROM, MMT   Patient Education yes Reviewed results of testing today           Goals:   Goals     • PT cervical goals      Short Term Goals Time Frame Result Comment/Progress   C/S  rotation ROM will improve to 50*  bilaterally w/o pain 6 weeks     C/S extension ROM will improve to 40* w/o pain 6 weeks     Deep Cervical flexion endurance will improve to 15 seconds 6 weeks     Patient will perform home exercise program with supervision.   6 weeks     Patient will demonstrate ability to keep proper posture with <40% cues. 6 weeks     C/S side bending ROM will improve to 25* bilaterally w/o pain   6 weeks                     This 87 y.o. year old male presents to PT with above stated diagnosis. Physical Therapy evaluation reveals impaired postural control, impaired balance, decreased ROM, dizziness, BPPV resulting in home management limitations. Michel Fang will benefit from skilled PT services to address limitation, work towards rehab and patient goals and maximize PLOF of chosen ADLs.     Planned Services: The patient's treatment will include CPT 68901 Canalith repositioning procedure/maneuvers, CPT 57346 Gait training, CPT 25064 Manual therapy, CPT 87109 Neuromuscular Reeducation, CPT 63520 Therapeutic activities, CPT 12343 Therapeutic exercises, CPT 46044 Hot/Cold Packs therapy, .

## 2023-05-12 ENCOUNTER — HOSPITAL ENCOUNTER (OUTPATIENT)
Dept: PHYSICAL THERAPY | Facility: REHABILITATION | Age: 88
Setting detail: THERAPIES SERIES
Discharge: HOME | End: 2023-05-12
Attending: OTOLARYNGOLOGY
Payer: MEDICARE

## 2023-05-12 DIAGNOSIS — R42 DIZZINESS AND GIDDINESS: Primary | ICD-10-CM

## 2023-05-12 PROCEDURE — 97112 NEUROMUSCULAR REEDUCATION: CPT | Mod: GP

## 2023-05-12 PROCEDURE — 97530 THERAPEUTIC ACTIVITIES: CPT | Mod: GP

## 2023-05-12 ASSESSMENT — BALANCE ASSESSMENTS: TOTAL SCORE: 31

## 2023-05-12 NOTE — OP PT TREATMENT LOG
PT Vestibular Exercises Completed Today Current Session Time   POC      Progress notes Due      Physician Follow up      CRT  UNBILLABLE CODE          NEURO RE-ED  TOTAL TIME FOR SESSION 38-52 Minutes   BPPV POSITION TESTING     Disha Hallpike    See flow sheet for details   Horizontal Roll Test   See flow sheet for details   CRT's no See flow sheet for details   VOR CANCELLATION     Standing H/VVOR-C     Ambulation w/ H/VVOR-C     VOR / GAZE STABILITY     Seated H/VVOR     Standing H/VVOR     H/VVOR on compliant surfaces     Ambulation w/H/VVOR     H/VVOR on sway surfaces     Functional VOR     HABITUATION     Ball circles     Repetitive functional movements     MSQ  TBA   BALANCE- STATIC     On floor Yes - all - REO 60s  - REO with head turns R/L 60s with mild sway  - FA EC 60s with mild sway   Airex foam     Rockerboard     Doyle yes Completed 5/12/23 - see objective section   BALANCE- DYNAMIC     Ambulation -head turns/nods/EC     Amb - 180 & 360 degree turns     Retro ambulation EO/EC     Obstacles     DGI  16/24: See objective information/vestibular specialty flow sheet/media for details.   10MWT  13.43 seconds =0 .75m/s See objective information/vestibular specialty flow sheet/media for details.   TUG  20 sec See objective information/ flow sheet/media for details.   OPTOKINETIC STIMULATION     MULTITASKING/COG TASKS     THER-EX   TOTAL TIME FOR SESSION Not performed   CARDIOVASCULAR      BALKE/BCT Test     Recumbent bike     Treadmill      Manual   Not performed   Suboccipital Release      Contract/Relax  Focused on bilateral rotation                      THER ACT  TOTAL TIME FOR SESSION 8-22 Minutes   Symptom report, vitals, meds, pain yes Rest break and monitoring between maneuvers.   Subjective outcome measures n NDI   Objective outcome measures n ROM, MMT, DGI, TUG, 10MWT   Patient Education yes Educated pt and his son regarding the results of the Doyle and his falls risk.  Initiated HEP and provided  printout.  Verbally educated pt's son regarding technique to provide close supervision with balance exercises for HEP. Pt and son verbalized understanding.

## 2023-05-12 NOTE — PROGRESS NOTES
Physical Therapy Visit    PT DAILY NOTE FOR OUTPATIENT THERAPY    Patient: Oh Fang MRN: [id number]  : 1935 87 y.o.  Referring Physician: Stephon Millan MD  Date of Visit: 2023    Certification Dates: 23 through 23    Diagnosis:   1. Dizziness and giddiness        Chief Complaints:  dizziness, imbalance, neck pain    Precautions:   Existing Precautions/Restrictions: fall      TODAY'S VISIT    Time In Session:  Start Time: 1105  Stop Time: 1205  Time Calculation (min): 60 min   History/Vitals/Pain/Encounter Info - 23 1112        Injury History/Precautions/Daily Required Info    Document Type daily treatment     Primary Therapist Padmini Ryan PT     Chief Complaint/Reason for Visit  dizziness, imbalance, neck pain     Onset of Illness/Injury or Date of Surgery 23     Referring Physician Dr. Stephon Millan, Dr. Leone     Existing Precautions/Restrictions fall     History of present illness/functional impairment Oh Fang is an 88 y/o male with PMH including arthritis, CHF, CAD, HTN, high cholesterol and hypothyroidism, sarcoidosis, and BPH who presents to outpatient physical therapy with complaints of ongoing vertigo with getting up in the morning that started at the beginning of the year and is still present.  He has a history of vertigo in years past.  He saw Dr. Millan in March who is recommending vestibular rehab for his dizziness.  His balance issues are long standing and states it is difficult to discern if it has gotten worse.  Mr. Fang reports he consistently does exercises in the morning where he rotates his head to the left and right side in supine prior to getting out of bed.  He has a follow up with his cardiologist later this week.     Patient/Family/Caregiver Comments/Observations Pt  reports he gets occasional dizziness in bed, but it's much improved.  Sometimes he will feel mild dizziness if standing and look down.  Not getting dizziness with  turning.     Patient reported fall since last visit No        Pain Assessment    Currently in pain No/Denies        Pre Activity Vital Signs    Pulse 60     /56     BP Location Left upper arm     BP Method Manual     Patient Position Sitting                Daily Treatment Assessment and Plan - 05/12/23 1232        Daily Treatment Assessment and Plan    Progress toward goals Progressing     Daily Outcome Summary Pt completed the Doyle with his score of 31/56 indicating a falls risk.  Initiated balance exercises for HEP and provided printout (see patient instructions section).     Plan and Recommendations Gait and balnace training . Pt reports goal is to return to walking with a cane                     OBJECTIVE DATA TAKEN TODAY:    Outcome Measures    PT Outcome Measures - 05/12/23 1118        Doyle Balance Scale    Sitting to Standing Able to stand independently using hands     Standing Unsupported Able to stand 2 minutes with supervision     Sitting with Back Unsupported but Feet Supported on Floor or on a Stool Able to sit safely and securely for 2 minutes     Standing to Sitting Controls descent by using hands     Transfers Able to transfer with verbal cueing and/or supervision     Standing Unsupported with Eyes Closed Able to stand 10 seconds with supervision     Standing Unsupported with Feet Together Able to place feet together independently and stand 1 minute with supervision     Reach Forward with Outstretched Arm While Standing Can reach forward 12 cm (5 inches)      Object from Floor from a Standing Position Unable to  and needs supervision while trying     Turning to Look Behind Over Left and Right Shoulders While Standing Needs supervision when turning     Turn 360 Degrees Needs close supervision or verbal cueing   L 15.7s; R 11.5s with close sup    Place Alternate Foot on Step or Stool While Standing Unsupported Able to complete greater than 2 steps needs minimal assist   8 taps in  25s with min/steadying A    Standing Unsupported One Foot in Front Able to take small step independently and hold 30 seconds     Standing on One Leg Tries to lift leg unable to hold 3 seconds but remains standing independently     Doyle Balance Score 31                 Today's Treatment:    PT Vestibular Exercises Completed Today Current Session Time   POC      Progress notes Due      Physician Follow up      CRT  UNBILLABLE CODE          NEURO RE-ED  TOTAL TIME FOR SESSION 38-52 Minutes   BPPV POSITION TESTING     [place] Hallpike    See flow sheet for details   Horizontal Roll Test   See flow sheet for details   CRT's no See flow sheet for details   VOR CANCELLATION     Standing H/VVOR-C     Ambulation w/ H/VVOR-C     VOR / GAZE STABILITY     Seated H/VVOR     Standing H/VVOR     H/VVOR on compliant surfaces     Ambulation w/H/VVOR     H/VVOR on sway surfaces     Functional VOR     HABITUATION     Ball circles     Repetitive functional movements     MSQ  TBA   BALANCE- STATIC     On floor Yes - all - REO 60s  - REO with head turns R/L 60s with mild sway  - FA EC 60s with mild sway   Airex foam     Rockerboard     Doyle yes Completed 5/12/23 - see objective section   BALANCE- DYNAMIC     Ambulation -head turns/nods/EC     Amb - 180 & 360 degree turns     Retro ambulation EO/EC     Obstacles     DGI  16/24: See objective information/vestibular specialty flow sheet/media for details.   10MWT  13.43 seconds =0 .75m/s See objective information/vestibular specialty flow sheet/media for details.   TUG  20 sec See objective information/ flow sheet/media for details.   OPTOKINETIC STIMULATION     MULTITASKING/COG TASKS     THER-EX   TOTAL TIME FOR SESSION Not performed   CARDIOVASCULAR      BALKE/BCT Test     Recumbent bike     Treadmill      Manual   Not performed   Suboccipital Release      Contract/Relax  Focused on bilateral rotation                      THER ACT  TOTAL TIME FOR SESSION 8-22 Minutes   Symptom report, vitals,  meds, pain yes Rest break and monitoring between maneuvers.   Subjective outcome measures n NDI   Objective outcome measures n ROM, MMT, DGI, TUG, 10MWT   Patient Education yes Educated pt and his son regarding the results of the Doyle and his falls risk.  Initiated HEP and provided printout.  Verbally educated pt's son regarding technique to provide close supervision with balance exercises for HEP. Pt and son verbalized understanding.

## 2023-05-12 NOTE — PATIENT INSTRUCTIONS
Oh Fang  5/12/23      Feet Together, Varied Arm Positions - Eyes Open        STAND WITH YOUR BACK TO A CORNER AND A STURDY CHAIR BACK OR YOUR WALKER IN FRONT OF YOU.  ALSO HAVE YOUR SON NEXT TO YOU.   With eyes open, feet together, arms down at your sides, look straight ahead at a stationary object. Hold 60 seconds.  Perform 1-2 times per session. Do 1 session per day.        Feet Apart, Head Motion - Eyes Open        STAND WITH YOUR BACK TO A CORNER AND A STURDY CHAIR BACK OR YOUR WALKER IN FRONT OF YOU.  ALSO HAVE YOUR SON NEXT TO YOU.  With eyes open, feet apart, move head slowly right and left for 60 seconds.  Perform 1-2 times per session. Do 1 session per day.                Feet Apart, Varied Arm Positions - Eyes Closed        STAND WITH YOUR BACK TO A CORNER AND A STURDY CHAIR BACK OR YOUR WALKER IN FRONT OF YOU.  ALSO HAVE YOUR SON NEXT TO YOU.  Stand with feet shoulder width apart and arms out. Close eyes and visualize upright position. Hold 60 seconds.  Perform 1-2 times per session. Do 1 session per day.        Weight Shift: Anterior / Posterior (Limits of Stability)        STAND WITH YOUR BACK TO A CORNER AND A STURDY CHAIR BACK OR YOUR WALKER IN FRONT OF YOU.  ALSO HAVE YOUR SON NEXT TO YOU.  Slowly shift weight backward until toes begin to rise off floor. Return to starting position. Shift weight slowly forward until heels begin to rise off floor. Repeat for 10x.    Perform 2 sets per session. Do 1 session per day.       Weight Shift: Lateral (Limits of Stability)        STAND WITH YOUR BACK TO A CORNER AND A STURDY CHAIR BACK OR YOUR WALKER IN FRONT OF YOU.  ALSO HAVE YOUR SON NEXT TO YOU.  Slowly shift weight to right as far as possible, without taking a step. Return to starting position. Shift to opposite side. Repeat 10x.  Perform 2 sets per session. Do 1 sessions per day.

## 2023-05-15 ENCOUNTER — HOSPITAL ENCOUNTER (OUTPATIENT)
Dept: PHYSICAL THERAPY | Facility: REHABILITATION | Age: 88
Setting detail: THERAPIES SERIES
Discharge: HOME | End: 2023-05-15
Attending: OTOLARYNGOLOGY
Payer: MEDICARE

## 2023-05-15 DIAGNOSIS — R42 DIZZINESS AND GIDDINESS: Primary | ICD-10-CM

## 2023-05-15 PROCEDURE — 97530 THERAPEUTIC ACTIVITIES: CPT | Mod: GP

## 2023-05-15 PROCEDURE — 97112 NEUROMUSCULAR REEDUCATION: CPT | Mod: GP

## 2023-05-15 PROCEDURE — 97110 THERAPEUTIC EXERCISES: CPT | Mod: GP

## 2023-05-15 NOTE — OP PT TREATMENT LOG
PT Vestibular Exercises Completed Today Current Session Time   POC      Progress notes Due      Physician Follow up      CRT  UNBILLABLE CODE          NEURO RE-ED  TOTAL TIME FOR SESSION 23-37 Minutes   BPPV POSITION TESTING     [place] Hallpike    See flow sheet for details   Horizontal Roll Test   See flow sheet for details   CRT's no See flow sheet for details   VOR CANCELLATION     Standing H/VVOR-C     Ambulation w/ H/VVOR-C     VOR / GAZE STABILITY     Seated H/VVOR     Standing H/VVOR     H/VVOR on compliant surfaces     Ambulation w/H/VVOR     H/VVOR on sway surfaces     Functional VOR     HABITUATION     Ball circles     Repetitive functional movements     MSQ  TBA   BALANCE- STATIC     On floor Yes - all - REO 60s  - REO with head turns R/L 60s with mild sway  - FA EC 60s with mild sway   Airex foam     Rockerboard     Doyle no Completed 5/12/23 - see objective section   BALANCE- DYNAMIC     Ambulation -head turns/nods/EC yes walking 100 feet w/ RW in clinic with head turns left, center, right   Amb - 180 & 360 degree turns     Retro ambulation EO/EC     Obstacles yes Negotiating around cones placed on floor; 20 feet x 4   DGI  16/24: See objective information/vestibular specialty flow sheet/media for details.   10MWT  13.43 seconds =0 .75m/s See objective information/vestibular specialty flow sheet/media for details.   TUG  20 sec See objective information/ flow sheet/media for details.   OPTOKINETIC STIMULATION     MULTITASKING/COG TASKS     THER-EX   TOTAL TIME FOR SESSION 12 minutes   Parallel bars: Yes  Yes  Yes - side stepping  - lateral toe tapping on gumdrops   - Forward/backward stepping over PVC pipe        Cervical spine strengthening Yes  Yes  NV - AAROM for cervical rotation 10 sec x 5 to each direction  - seated rows with GTB 2 x 10  - standing shoulder extension                  CARDIOVASCULAR      BALKE/BCT Test     Recumbent bike     Treadmill      Manual   Not performed   Suboccipital Release       Contract/Relax  Focused on bilateral rotation          THER ACT  TOTAL TIME FOR SESSION 8-22 Minutes   Symptom report, vitals, meds, pain yes Rest break and monitoring between maneuvers.   trialing rollator yes Ambulation with rolling walker and with rollator in clinic with education.   Subjective outcome measures n NDI   Objective outcome measures n ROM, MMT, DGI, TUG, 10MWT   Patient Education yes Educated patient and his son on the pros/cons of rollator vs walker and recommended he use a rolling walker due to his need for more stability.  Urged him to get tennis balls on bottom of RW to allow for reduced traction with gait.

## 2023-05-16 NOTE — PROGRESS NOTES
Physical Therapy Visit    PT DAILY NOTE FOR OUTPATIENT THERAPY    Patient: Oh Fang MRN: [id number]  : 1935 87 y.o.  Referring Physician: Stephon Millan MD  Date of Visit: 5/15/2023    Certification Dates: 23 through 23    Diagnosis:   1. Dizziness and giddiness        Chief Complaints:  dizziness, imbalance, neck pain    Precautions:   Existing Precautions/Restrictions: fall      TODAY'S VISIT    Time In Session:  Start Time: 1300  Stop Time: 1400  Time Calculation (min): 60 min   History/Vitals/Pain/Encounter Info - 05/15/23 1310        Injury History/Precautions/Daily Required Info    Document Type daily treatment     Primary Therapist Padmini Ryan PT     Chief Complaint/Reason for Visit  dizziness, imbalance, neck pain     Onset of Illness/Injury or Date of Surgery 23     Referring Physician Dr. Stephon Millan, Dr. Leone     Existing Precautions/Restrictions fall     History of present illness/functional impairment Oh Fang is an 88 y/o male with PMH including arthritis, CHF, CAD, HTN, high cholesterol and hypothyroidism, sarcoidosis, and BPH who presents to outpatient physical therapy with complaints of ongoing vertigo with getting up in the morning that started at the beginning of the year and is still present.  He has a history of vertigo in years past.  He saw Dr. Millan in March who is recommending vestibular rehab for his dizziness.  His balance issues are long standing and states it is difficult to discern if it has gotten worse.  Mr. Fang reports he consistently does exercises in the morning where he rotates his head to the left and right side in supine prior to getting out of bed.  He has a follow up with his cardiologist later this week.     Patient/Family/Caregiver Comments/Observations notes he is interested in a rollator to help wiht his mobility     Patient reported fall since last visit No        Pain Assessment    Currently in pain No/Denies         Pain Intervention    Intervention  NMRE, TA, TE     Post Intervention Comments no pain        Pre Activity Vital Signs    Pulse 91     /63                Daily Treatment Assessment and Plan - 05/15/23 2127        Daily Treatment Assessment and Plan    Progress toward goals Progressing     Daily Outcome Summary Today's session focused on education and exercises to aide in static adn dynamic balance.  Reocmmended Mr. Fang use a standard rolling walker over a rollator for increased safety with gait.  He shows reduced stability standing with eyes closed with frequent loss of balance with need for physical assist to recover safely.  He demosntrates mild fixed trunk flexion w/ need for cues to stand w/ head up and avoid increased weight bearing on RW while walking.     Plan and Recommendations sensation assessment in feet, LE'sstrength assessment; static balance on floor and foam; change to standing shoulder rows and shoulder extension as able.                     OBJECTIVE DATA TAKEN TODAY:    None taken    Today's Treatment:    PT Vestibular Exercises Completed Today Current Session Time   POC      Progress notes Due      Physician Follow up      CRT  UNBILLABLE CODE          NEURO RE-ED  TOTAL TIME FOR SESSION 23-37 Minutes   BPPV POSITION TESTING     [place] Hallpike    See flow sheet for details   Horizontal Roll Test   See flow sheet for details   CRT's no See flow sheet for details   VOR CANCELLATION     Standing H/VVOR-C     Ambulation w/ H/VVOR-C     VOR / GAZE STABILITY     Seated H/VVOR     Standing H/VVOR     H/VVOR on compliant surfaces     Ambulation w/H/VVOR     H/VVOR on sway surfaces     Functional VOR     HABITUATION     Ball circles     Repetitive functional movements     MSQ  TBA   BALANCE- STATIC     On floor Yes - all - REO 60s  - REO with head turns R/L 60s with mild sway  - FA EC 60s with mild sway   Airex foam     Rockerboard     Doyle no Completed 5/12/23 - see objective section   BALANCE-  DYNAMIC     Ambulation -head turns/nods/EC yes walking 100 feet w/ RW in clinic with head turns left, center, right   Amb - 180 & 360 degree turns     Retro ambulation EO/EC     Obstacles yes Negotiating around cones placed on floor; 20 feet x 4   DGI  16/24: See objective information/vestibular specialty flow sheet/media for details.   10MWT  13.43 seconds =0 .75m/s See objective information/vestibular specialty flow sheet/media for details.   TUG  20 sec See objective information/ flow sheet/media for details.   OPTOKINETIC STIMULATION     MULTITASKING/COG TASKS     THER-EX   TOTAL TIME FOR SESSION 12 minutes   Parallel bars: Yes  Yes  Yes - side stepping  - lateral toe tapping on gumdrops   - Forward/backward stepping over PVC pipe        Cervical spine strengthening Yes  Yes  NV - AAROM for cervical rotation 10 sec x 5 to each direction  - seated rows with GTB 2 x 10  - standing shoulder extension                  CARDIOVASCULAR      BALKE/BCT Test     Recumbent bike     Treadmill      Manual   Not performed   Suboccipital Release      Contract/Relax  Focused on bilateral rotation          THER ACT  TOTAL TIME FOR SESSION 8-22 Minutes   Symptom report, vitals, meds, pain yes Rest break and monitoring between maneuvers.   trialing rollator yes Ambulation with rolling walker and with rollator in clinic with education.   Subjective outcome measures n NDI   Objective outcome measures n ROM, MMT, DGI, TUG, 10MWT   Patient Education yes Educated patient and his son on the pros/cons of rollator vs walker and recommended he use a rolling walker due to his need for more stability.  Urged him to get tennis balls on bottom of RW to allow for reduced traction with gait.

## 2023-05-17 ENCOUNTER — HOSPITAL ENCOUNTER (OUTPATIENT)
Dept: PHYSICAL THERAPY | Facility: REHABILITATION | Age: 88
Setting detail: THERAPIES SERIES
Discharge: HOME | End: 2023-05-17
Attending: OTOLARYNGOLOGY
Payer: MEDICARE

## 2023-05-17 DIAGNOSIS — M54.2 CERVICALGIA: ICD-10-CM

## 2023-05-17 DIAGNOSIS — R42 DIZZINESS AND GIDDINESS: Primary | ICD-10-CM

## 2023-05-17 PROCEDURE — 97530 THERAPEUTIC ACTIVITIES: CPT | Mod: GP

## 2023-05-17 PROCEDURE — 97112 NEUROMUSCULAR REEDUCATION: CPT | Mod: GP

## 2023-05-17 PROCEDURE — 97110 THERAPEUTIC EXERCISES: CPT | Mod: GP

## 2023-05-18 NOTE — PROGRESS NOTES
Physical Therapy Visit    PT DAILY NOTE FOR OUTPATIENT THERAPY    Patient: Oh Fang MRN: [id number]  : 1935 87 y.o.  Referring Physician: Stephon Millan MD  Date of Visit: 2023    Certification Dates: 23 through 23    Diagnosis:   1. Dizziness and giddiness    2. Cervicalgia        Chief Complaints:  dizziness, imbalance, neck pain    Precautions:   Existing Precautions/Restrictions: fall      TODAY'S VISIT    Time In Session:  Start Time: 1105  Stop Time: 1200  Time Calculation (min): 55 min   History/Vitals/Pain/Encounter Info - 23 1112        Injury History/Precautions/Daily Required Info    Document Type daily treatment     Primary Therapist Padmini Ryan PT     Chief Complaint/Reason for Visit  dizziness, imbalance, neck pain     Onset of Illness/Injury or Date of Surgery 23     Referring Physician Dr. Stephon Millan, Dr. Leone     Existing Precautions/Restrictions fall     History of present illness/functional impairment Oh Fang is an 86 y/o male with PMH including arthritis, CHF, CAD, HTN, high cholesterol and hypothyroidism, sarcoidosis, and BPH who presents to outpatient physical therapy with complaints of ongoing vertigo with getting up in the morning that started at the beginning of the year and is still present.  He has a history of vertigo in years past.  He saw Dr. Millan in March who is recommending vestibular rehab for his dizziness.  His balance issues are long standing and states it is difficult to discern if it has gotten worse.  Mr. Fang reports he consistently does exercises in the morning where he rotates his head to the left and right side in supine prior to getting out of bed.  He has a follow up with his cardiologist later this week.     Patient/Family/Caregiver Comments/Observations Pt reports no dizzienss presently, but had some with rolling left and right this morning when waking up.  Patient requesting to work on neck and vertigo  "today, \" to give his knees a break\".     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  Mr. Fang had (+) dizziness with treatment, but was able to walk out with his rolling walker without need for assistance.  Added neck adn postural strengthening exercises with close supervision and overall fair tolerance.     Plan and Recommendations reassess BPPV next visit; continue with balance exercises (dynamic and static)                     OBJECTIVE DATA TAKEN TODAY:    Vestibular     PT Vestibular Evaluation - 05/17/23 1100        BPPV    Right [place] Hallpike  Abnormal;Modified with tilt table     Right [place] Hallpike Comment questionable few beats to the right, no sx     Left [place] Hallpike  Abnormal;Modified with tilt table     Left [place] Hallpike Comments LBN, no sx     Horizontal Canal/Roll Test Geotropic     Horizontal Roll Test to Right  Abnormal     Horizontal Roll Test to Right Comments RBN robust with (+) sx     Horizontal Roll Test to Left  Abnormal     Horizontal Roll Test to Left Comments LBN, mild symptoms     Interpretation of Results positve for Right horizontal SCC canalithiasis     BPPV Treatment R gufoni (to uninvolved side) x 2 once rotation towards involved side, once away     Post CRT Instructions Yes                 Today's Treatment:    PT Vestibular Exercises Completed Today Current Session Time   POC      Progress notes Due      Physician Follow up      CRT  UNBILLABLE CODE          NEURO RE-ED  TOTAL TIME FOR SESSION 23-37 Minutes   BPPV POSITION TESTING     [place] Hallpike   yes See flow " sheet for details   Horizontal Roll Test  yes See flow sheet for details   CRT's yes See flow sheet for details   VOR CANCELLATION     Standing H/VVOR-C     Ambulation w/ H/VVOR-C     VOR / GAZE STABILITY     Seated H/VVOR     Standing H/VVOR     H/VVOR on compliant surfaces     Ambulation w/H/VVOR     H/VVOR on sway surfaces     Functional VOR     HABITUATION     Ball circles     Repetitive functional movements     MSQ  TBA   BALANCE- STATIC     On floor Yes - all - REO 60s  - REO with head turns R/L 60s with mild sway  - FA EC 60s with mild sway   Airex foam     Rockerboard     Doyle no Completed 5/12/23 - see objective section   BALANCE- DYNAMIC     Ambulation -head turns/nods/EC yes walking 100 feet w/ RW in clinic with head turns left, center, right   Amb - 180 & 360 degree turns     Retro ambulation EO/EC     Obstacles yes Negotiating around cones placed on floor; 20 feet x 4   DGI  16/24: See objective information/vestibular specialty flow sheet/media for details.   10MWT  13.43 seconds =0 .75m/s See objective information/vestibular specialty flow sheet/media for details.   TUG  20 sec See objective information/ flow sheet/media for details.   OPTOKINETIC STIMULATION     MULTITASKING/COG TASKS     THER-EX   TOTAL TIME FOR SESSION 8 minutes   Parallel bars: no - side stepping  - lateral toe tapping on gumdrops   - Forward/backward stepping over PVC pipe        Cervical spine strengthening Yes  Yes  NV  yes  yes - AAROM for cervical rotation 10 sec x 5 to each direction  - seated rows with GTB 2 x 10  - standing shoulder extension  - no money 2 x 10 PTB  - standing shoulder extension with dowel                  CARDIOVASCULAR      BALKE/BCT Test     Recumbent bike     Treadmill      Manual   Not performed   Suboccipital Release      Contract/Relax  Focused on bilateral rotation          THER ACT  TOTAL TIME FOR SESSION 8-22 Minutes   Symptom report, vitals, meds, pain yes Rest break and monitoring between  maneuvers.   trialing rollator yes Ambulation with rolling walker and with rollator in clinic with education.   Subjective outcome measures n NDI   Objective outcome measures n ROM, MMT, DGI, TUG, 10MWT   Patient Education yes Informed patient and son the results of testing today; re-issued the instructions for prolonged positioning for sleeping tonight

## 2023-05-18 NOTE — OP PT TREATMENT LOG
PT Vestibular Exercises Completed Today Current Session Time   POC      Progress notes Due      Physician Follow up      CRT  UNBILLABLE CODE          NEURO RE-ED  TOTAL TIME FOR SESSION 23-37 Minutes   BPPV POSITION TESTING     [place] Hallpike   yes See flow sheet for details   Horizontal Roll Test  yes See flow sheet for details   CRT's yes See flow sheet for details   VOR CANCELLATION     Standing H/VVOR-C     Ambulation w/ H/VVOR-C     VOR / GAZE STABILITY     Seated H/VVOR     Standing H/VVOR     H/VVOR on compliant surfaces     Ambulation w/H/VVOR     H/VVOR on sway surfaces     Functional VOR     HABITUATION     Ball circles     Repetitive functional movements     MSQ  TBA   BALANCE- STATIC     On floor Yes - all - REO 60s  - REO with head turns R/L 60s with mild sway  - FA EC 60s with mild sway   Airex foam     Rockerboard     Doyle no Completed 5/12/23 - see objective section   BALANCE- DYNAMIC     Ambulation -head turns/nods/EC yes walking 100 feet w/ RW in clinic with head turns left, center, right   Amb - 180 & 360 degree turns     Retro ambulation EO/EC     Obstacles yes Negotiating around cones placed on floor; 20 feet x 4   DGI  16/24: See objective information/vestibular specialty flow sheet/media for details.   10MWT  13.43 seconds =0 .75m/s See objective information/vestibular specialty flow sheet/media for details.   TUG  20 sec See objective information/ flow sheet/media for details.   OPTOKINETIC STIMULATION     MULTITASKING/COG TASKS     THER-EX   TOTAL TIME FOR SESSION 8 minutes   Parallel bars: no - side stepping  - lateral toe tapping on gumdrops   - Forward/backward stepping over PVC pipe        Cervical spine strengthening Yes  Yes  NV  yes  yes - AAROM for cervical rotation 10 sec x 5 to each direction  - seated rows with GTB 2 x 10  - standing shoulder extension  - no money 2 x 10 PTB  - standing shoulder extension with dowel                  CARDIOVASCULAR      BALKE/BCT Test     Recumbent  bike     Treadmill      Manual   Not performed   Suboccipital Release      Contract/Relax  Focused on bilateral rotation          THER ACT  TOTAL TIME FOR SESSION 8-22 Minutes   Symptom report, vitals, meds, pain yes Rest break and monitoring between maneuvers.   trialing rollator yes Ambulation with rolling walker and with rollator in clinic with education.   Subjective outcome measures n NDI   Objective outcome measures n ROM, MMT, DGI, TUG, 10MWT   Patient Education yes Informed patient and son the results of testing today; re-issued the instructions for prolonged positioning for sleeping tonight

## 2023-05-22 ENCOUNTER — HOSPITAL ENCOUNTER (OUTPATIENT)
Dept: PHYSICAL THERAPY | Facility: REHABILITATION | Age: 88
Setting detail: THERAPIES SERIES
Discharge: HOME | End: 2023-05-22
Attending: OTOLARYNGOLOGY
Payer: MEDICARE

## 2023-05-22 DIAGNOSIS — R42 DIZZINESS AND GIDDINESS: Primary | ICD-10-CM

## 2023-05-22 DIAGNOSIS — M54.2 CERVICALGIA: ICD-10-CM

## 2023-05-22 PROCEDURE — 97140 MANUAL THERAPY 1/> REGIONS: CPT | Mod: GP

## 2023-05-22 PROCEDURE — 97112 NEUROMUSCULAR REEDUCATION: CPT | Mod: GP

## 2023-05-22 PROCEDURE — 97010 HOT OR COLD PACKS THERAPY: CPT | Mod: GP,KX

## 2023-05-22 PROCEDURE — 97110 THERAPEUTIC EXERCISES: CPT | Mod: GP

## 2023-05-22 NOTE — OP PT TREATMENT LOG
PT Vestibular Exercises Completed Today Current Session Time   POC      Progress notes Due      Physician Follow up      MHP  UNBILLABLE CODE 8 minutes     MHP to c/spine in reclined    NEURO RE-ED  TOTAL TIME FOR SESSION 8-22 Minutes   BPPV POSITION TESTING     Disha Hallpike yes See flow sheet for details   Horizontal Roll Test yes See flow sheet for details   CRT's no See flow sheet for details   VOR CANCELLATION     Standing H/VVOR-C     Ambulation w/ H/VVOR-C     VOR / GAZE STABILITY     Seated H/VVOR     Standing H/VVOR     H/VVOR on compliant surfaces     Ambulation w/H/VVOR     H/VVOR on sway surfaces     Functional VOR     HABITUATION     Ball circles     Repetitive functional movements     MSQ  TBA   BALANCE- STATIC     On floor no - REO 60s  - REO with head turns R/L 60s with mild sway  - FA EC 60s with mild sway   Airex foam     Rockerboard     Doyle no Completed 5/12/23 - see objective section   BALANCE- DYNAMIC     Ambulation -head turns/nods/EC no walking 100 feet w/ RW in clinic with head turns left, center, right   Amb - 180 & 360 degree turns     Retro ambulation EO/EC     Obstacles no Negotiating around cones placed on floor; 20 feet x 4   DGI  16/24: See objective information/vestibular specialty flow sheet/media for details.   10MWT  13.43 seconds =0 .75m/s See objective information/vestibular specialty flow sheet/media for details.   TUG  20 sec See objective information/ flow sheet/media for details.   OPTOKINETIC STIMULATION     MULTITASKING/COG TASKS     THER-EX   TOTAL TIME FOR SESSION 10 minutes   Parallel bars: no - side stepping  - lateral toe tapping on gumdrops   - Forward/backward stepping over PVC pipe        Cervical spine strengthening Yes  Yes  yes  yes  yes - AAROM for cervical rotation 10 sec x 5 to each direction  - seated rows with GTB 1 x 10, standing rows GTB 1 x 10  - standing shoulder extension w/ dowel  - no money 2 x 10 PTB  - standing shoulder extension with dowel                   CARDIOVASCULAR      BALKE/BCT Test     Recumbent bike     Treadmill      Manual   8-22 Minutes   manuals  STM and PROM of cerivcal spine into cervical rotation          THER ACT  TOTAL TIME FOR SESSION Not performed   Symptom report, vitals, meds, pain yes Rest break and monitoring between maneuvers.   trialing rollator  Ambulation with rolling walker and with rollator in clinic with education.   Subjective outcome measures n NDI   Objective outcome measures n ROM, MMT, DGI, TUG, 10MWT   Patient Education yes Informed patient and son the results of testing today; re-issued the instructions for prolonged positioning for sleeping tonight

## 2023-05-23 NOTE — PROGRESS NOTES
Physical Therapy Visit    PT DAILY NOTE FOR OUTPATIENT THERAPY    Patient: Oh Fang MRN: [id number]  : 1935 87 y.o.  Referring Physician: Stephon Millan MD  Date of Visit: 2023    Certification Dates: 23 through 23    Diagnosis:   1. Dizziness and giddiness    2. Cervicalgia        Chief Complaints:  dizziness, imbalance, neck pain    Precautions:   Existing Precautions/Restrictions: fall      TODAY'S VISIT    Time In Session:  Start Time: 1105  Stop Time: 1205  Time Calculation (min): 60 min   History/Vitals/Pain/Encounter Info - 23 1112        Injury History/Precautions/Daily Required Info    Document Type daily treatment     Primary Therapist Padmini Ryan PT     Chief Complaint/Reason for Visit  dizziness, imbalance, neck pain     Onset of Illness/Injury or Date of Surgery 23     Referring Physician Dr. Stephon Millan, Dr. Leone     Existing Precautions/Restrictions fall     History of present illness/functional impairment Oh Fnag is an 86 y/o male with PMH including arthritis, CHF, CAD, HTN, high cholesterol and hypothyroidism, sarcoidosis, and BPH who presents to outpatient physical therapy with complaints of ongoing vertigo with getting up in the morning that started at the beginning of the year and is still present.  He has a history of vertigo in years past.  He saw Dr. Millan in March who is recommending vestibular rehab for his dizziness.  His balance issues are long standing and states it is difficult to discern if it has gotten worse.  Mr. Fagn reports he consistently does exercises in the morning where he rotates his head to the left and right side in supine prior to getting out of bed.  He has a follow up with his cardiologist later this week.     Patient/Family/Caregiver Comments/Observations no falls since last treatments session. has inserts in both shoes, given by podiatrists. notes no pain but has neck stiffness.     Patient reported fall  since last visit No        Pain Assessment    Currently in pain No/Denies        Pain Intervention    Intervention  MT, TE, NMRE     Post Intervention Comments no pain        Pre Activity Vital Signs    Pulse 62     /66     BP Location Left upper arm     BP Method Automatic     Patient Position Sitting                Daily Treatment Assessment and Plan - 05/22/23 2149        Daily Treatment Assessment and Plan    Progress toward goals Progressing     Daily Outcome Summary BPPV testing was completed today and he had negative results in all positions.  Addressed his cervical spine limitations with manual therapy including gentle PROM.  Focused on postural strengthening exercises in sitting.  Instructed patient how to properly don his knee brace adn encouraged him to continue wear it for his walks as he has been adn to discuss with his physician before purchasing another.     Plan and Recommendations resume balance training, static balance on firm and foam surfaces with EO/EC; obstacle course, dynamic gait.                     OBJECTIVE DATA TAKEN TODAY:    Vestibular     PT Vestibular Evaluation - 05/22/23 1100        BPPV    Right [place] Hallpike  WNL;Modified with tilt table     Left [place] Hallpike  WNL;Modified with tilt table     Sit to Supine WNL     Horizontal Roll Test to Right  WNL     Horizontal Roll Test to Left  WNL                 Today's Treatment:    PT Vestibular Exercises Completed Today Current Session Time   POC      Progress notes Due      Physician Follow up      MHP  UNBILLABLE CODE 8 minutes     MHP to c/spine in reclined    NEURO RE-ED  TOTAL TIME FOR SESSION 8-22 Minutes   BPPV POSITION TESTING     Disha Hallpike yes See flow sheet for details   Horizontal Roll Test yes See flow sheet for details   CRT's no See flow sheet for details   VOR CANCELLATION     Standing H/VVOR-C     Ambulation w/ H/VVOR-C     VOR / GAZE STABILITY     Seated H/VVOR     Standing H/VVOR     H/VVOR on compliant surfaces      Ambulation w/H/VVOR     H/VVOR on sway surfaces     Functional VOR     HABITUATION     Ball circles     Repetitive functional movements     MSQ  TBA   BALANCE- STATIC     On floor no - REO 60s  - REO with head turns R/L 60s with mild sway  - FA EC 60s with mild sway   Airex foam     Rockerboard     Doyle no Completed 5/12/23 - see objective section   BALANCE- DYNAMIC     Ambulation -head turns/nods/EC no walking 100 feet w/ RW in clinic with head turns left, center, right   Amb - 180 & 360 degree turns     Retro ambulation EO/EC     Obstacles no Negotiating around cones placed on floor; 20 feet x 4   DGI  16/24: See objective information/vestibular specialty flow sheet/media for details.   10MWT  13.43 seconds =0 .75m/s See objective information/vestibular specialty flow sheet/media for details.   TUG  20 sec See objective information/ flow sheet/media for details.   OPTOKINETIC STIMULATION     MULTITASKING/COG TASKS     THER-EX   TOTAL TIME FOR SESSION 10 minutes   Parallel bars: no - side stepping  - lateral toe tapping on gumdrops   - Forward/backward stepping over PVC pipe        Cervical spine strengthening Yes  Yes  yes  yes  yes - AAROM for cervical rotation 10 sec x 5 to each direction  - seated rows with GTB 1 x 10, standing rows GTB 1 x 10  - standing shoulder extension w/ dowel  - no money 2 x 10 PTB  - standing shoulder extension with dowel                  CARDIOVASCULAR      BALKE/BCT Test     Recumbent bike     Treadmill      Manual   8-22 Minutes   manuals  STM and PROM of cerivcal spine into cervical rotation          THER ACT  TOTAL TIME FOR SESSION Not performed   Symptom report, vitals, meds, pain yes Rest break and monitoring between maneuvers.   trialing rollator  Ambulation with rolling walker and with rollator in clinic with education.   Subjective outcome measures n NDI   Objective outcome measures n ROM, MMT, DGI, TUG, 10MWT   Patient Education yes Informed patient and son the results of  testing today; re-issued the instructions for prolonged positioning for sleeping tonight

## 2023-05-24 ENCOUNTER — TELEPHONE (OUTPATIENT)
Dept: REGISTRATION | Facility: REHABILITATION | Age: 88
End: 2023-05-24
Payer: MEDICARE

## 2023-05-24 NOTE — TELEPHONE ENCOUNTER
Padmini Rebolledo asked us to offer appt for 5/25 at either 1p or 4p. Told patient to call #[phone] if he was interested.

## 2023-06-05 ENCOUNTER — HOSPITAL ENCOUNTER (OUTPATIENT)
Dept: PHYSICAL THERAPY | Facility: REHABILITATION | Age: 88
Setting detail: THERAPIES SERIES
Discharge: HOME | End: 2023-06-05
Attending: OTOLARYNGOLOGY
Payer: MEDICARE

## 2023-06-05 DIAGNOSIS — R42 DIZZINESS AND GIDDINESS: Primary | ICD-10-CM

## 2023-06-05 DIAGNOSIS — M54.2 CERVICALGIA: ICD-10-CM

## 2023-06-05 PROCEDURE — 97112 NEUROMUSCULAR REEDUCATION: CPT | Mod: GP

## 2023-06-05 PROCEDURE — 97110 THERAPEUTIC EXERCISES: CPT | Mod: GP

## 2023-06-05 PROCEDURE — 97530 THERAPEUTIC ACTIVITIES: CPT | Mod: GP

## 2023-06-05 ASSESSMENT — GAIT ASSESSMENTS
TUG TIME: 26.07
TUG TIME: 21.1
TUG TIME: 19.81
TUG TIME: 17.45

## 2023-06-05 NOTE — OP PT TREATMENT LOG
Today's Treatment:     PT Vestibular Exercises Completed Today Current Session Time   POC         Progress notes Due         Physician Follow up         MHP   UNBILLABLE CODE Not performed            NEURO RE-ED   TOTAL TIME FOR SESSION 20 Minutes   BPPV POSITION TESTING       Disha Hallpike  See flow sheet for details   Horizontal Roll Test  See flow sheet for details   CRT's no See flow sheet for details   VOR CANCELLATION       Standing H/VVOR-C       Ambulation w/ H/VVOR-C       VOR / GAZE STABILITY       Seated H/VVOR       Standing H/VVOR       H/VVOR on compliant surfaces       Ambulation w/H/VVOR       H/VVOR on sway surfaces       Functional VOR       HABITUATION       Ball circles       Repetitive functional movements       MSQ   TBA   BALANCE- STATIC       On floor no - REO 60s  - REO with head turns R/L 60s with mild sway  - FA EC 60s with mild sway   Airex foam       Rockerboard       Doyle no Completed 5/12/23 - see objective section   BALANCE- DYNAMIC       Ambulation -head turns/nods/EC no walking 100 feet w/ RW in clinic with head turns left, center, right   Amb - 180 & 360 degree turns       Retro ambulation EO/EC       Obstacles no Negotiating around cones placed on floor; 20 feet x 4   DGI  yes Completed 6/5/23 see flow sheet for details   10MWT  yes Completed 6/5/23 see flow sheet for details    TUG  yes Completed 6/5/23 see flow sheet for details   OPTOKINETIC STIMULATION       MULTITASKING/COG TASKS       THER-EX    TOTAL TIME FOR SESSION 10 minutes   Parallel bars: no - side stepping  - lateral toe tapping on gumdrops   - Forward/backward stepping over PVC pipe           Cervical spine strengthening No    yes    No  Yes  Yes  no - AAROM for cervical rotation 10 sec x 5 to each direction  - seated rows with GTB 1 x 10, standing rows GTB 1 x 10  - standing shoulder extension w/ dowel  - no money 2 x 10 PTB  - Seated b/l shoulder horizontal ABD  - standing shoulder extension with dowel                            CARDIOVASCULAR        BALKE/BCT Test       Recumbent bike       Treadmill        Manual    Not performed today   manuals   STM and PROM of cerivcal spine into cervical rotation               THER ACT   TOTAL TIME FOR SESSION 25 minutes   Symptom report, vitals, meds, pain yes    trialing rollator   Ambulation with rolling walker and with rollator in clinic with education.   Subjective outcome measures yes DHI, ABC   Objective outcome measures yes ROM,   Patient Education yes Informed patient and son the results of testing and progress today; discussed focus of next 1-3 visits and anticipated discharge in next 1-3 visits. Pt verbally agreed.

## 2023-06-06 ENCOUNTER — HOSPITAL ENCOUNTER (OUTPATIENT)
Dept: PHYSICAL THERAPY | Facility: REHABILITATION | Age: 88
Setting detail: THERAPIES SERIES
Discharge: HOME | End: 2023-06-06
Attending: OTOLARYNGOLOGY
Payer: MEDICARE

## 2023-06-06 DIAGNOSIS — M54.2 CERVICALGIA: ICD-10-CM

## 2023-06-06 DIAGNOSIS — R42 DIZZINESS AND GIDDINESS: Primary | ICD-10-CM

## 2023-06-06 PROCEDURE — 97530 THERAPEUTIC ACTIVITIES: CPT | Mod: GP

## 2023-06-06 PROCEDURE — 97112 NEUROMUSCULAR REEDUCATION: CPT | Mod: GP

## 2023-06-06 PROCEDURE — 97110 THERAPEUTIC EXERCISES: CPT | Mod: GP

## 2023-06-06 NOTE — PROGRESS NOTES
Physical Therapy Visit    PT DAILY NOTE FOR OUTPATIENT THERAPY    Patient: Oh Fang MRN: [id number]  : 1935 87 y.o.  Referring Physician: Stephon Millan MD  Date of Visit: 2023    Certification Dates: 23 through 23    Diagnosis:   1. Dizziness and giddiness    2. Cervicalgia        Chief Complaints:  dizziness, imbalance, neck pain    Precautions:   Existing Precautions/Restrictions: fall      TODAY'S VISIT    Time In Session:  Start Time: 1110  Stop Time: 1200  Time Calculation (min): 50 min   History/Vitals/Pain/Encounter Info - 23 1112        Injury History/Precautions/Daily Required Info    Document Type daily treatment     Primary Therapist Padmini Ryan PT     Chief Complaint/Reason for Visit  dizziness, imbalance, neck pain     Onset of Illness/Injury or Date of Surgery 23     Referring Physician Dr. Stephon Millan, Dr. Leone     Existing Precautions/Restrictions fall     History of present illness/functional impairment Oh Fang is an 88 y/o male with PMH including arthritis, CHF, CAD, HTN, high cholesterol and hypothyroidism, sarcoidosis, and BPH who presents to outpatient physical therapy with complaints of ongoing vertigo with getting up in the morning that started at the beginning of the year and is still present.  He has a history of vertigo in years past.  He saw Dr. Millan in March who is recommending vestibular rehab for his dizziness.  His balance issues are long standing and states it is difficult to discern if it has gotten worse.  Mr. Fang reports he consistently does exercises in the morning where he rotates his head to the left and right side in supine prior to getting out of bed.  He has a follow up with his cardiologist later this week.     Patient/Family/Caregiver Comments/Observations no pain presently, no change from yesterday.     Patient reported fall since last visit No        Pain Assessment    Currently in pain No/Denies         Pain Intervention    Intervention  NMRE, TA, TE     Post Intervention Comments 3/10 knee pain        Pre Activity Vital Signs    Pulse 61     /71     BP Location Right upper arm     BP Method Automatic     Patient Position Sitting                Daily Treatment Assessment and Plan - 06/06/23 1112        Daily Treatment Assessment and Plan    Progress toward goals Slower than expected     Daily Outcome Summary Mr. Fang tolerated session fiarly well, but required many seated rest periods to reduce his knee pain.  knee discomfort increased to 3/10 at the end of the session.  GOODE balance assessment was completed today and he had a score of 32/56, indicating increased fall risk.  Discussed with him verbally the importance of him walking only on the ground and staying away from treadmills given his risk for falls.  Urged him to walk at the park or on an indoor walkign track with his rolling walker where he has opportunities to sit and rest as needed.     Plan and Recommendations discharge in 1-2 visits. update HEP NV                     OBJECTIVE DATA TAKEN TODAY:    Vestibular     PT Vestibular Evaluation - 06/06/23 1100        Balance    Total Score:  Goode Balance 32                 Today's Treatment:    Today's Treatment:     PT Vestibular Exercises Completed Today Current Session Time   POC         Progress notes Due         Physician Follow up         MHP   UNBILLABLE CODE Not performed            NEURO RE-ED   TOTAL TIME FOR SESSION 20 Minutes   BPPV POSITION TESTING       [place] Hallpike  See flow sheet for details   Horizontal Roll Test  See flow sheet for details   CRT's no See flow sheet for details   VOR CANCELLATION       Standing H/VVOR-C       Ambulation w/ H/VVOR-C       VOR / GAZE STABILITY       Seated H/VVOR       Standing H/VVOR       H/VVOR on compliant surfaces       Ambulation w/H/VVOR       H/VVOR on sway surfaces       Functional VOR       HABITUATION       Ball circles       Repetitive  functional movements       MSQ   TBA   BALANCE- STATIC       On floor yes - REC 60s  - REC with head turns R/L 60s with mild sway  - FA EC 60s with mild sway   Airex foam  yes    yes - Standing FA EC 30 sec x 2 mild to mod imbalance  - Standing FT EO   Rockerboard       Goode no Completed 5/12/23 - see objective section   BALANCE- DYNAMIC       Ambulation -head turns/nods/EC no walking 100 feet w/ RW in clinic with head turns left, center, right   Amb - 180 & 360 degree turns       Retro ambulation EO/EC       Obstacles yes Lunges over 6 inch christopher to left and right directions   DGI  Completed 6/5/23 see flow sheet for details   10MWT  Completed 6/5/23 see flow sheet for details    TUG  Completed 6/5/23 see flow sheet for details   OPTOKINETIC STIMULATION       MULTITASKING/COG TASKS       THER-EX    TOTAL TIME FOR SESSION 8 minutes   Parallel bars: no - side stepping  - lateral toe tapping on gumdrops   - Forward/backward stepping over PVC pipe           Cervical spine strengthening No    no    No  no  no  no - AAROM for cervical rotation 10 sec x 5 to each direction  - seated rows with GTB 1 x 10, standing rows GTB 1 x 10  - standing shoulder extension w/ dowel  - no money 2 x 10 PTB  - Seated b/l shoulder horizontal ABD  - standing shoulder extension with dowel                           CARDIOVASCULAR        BALKE/BCT Test       Nustep yes 8 minutes seat 11, arms to 12 level 1 SPM 30-40   Treadmill        Manual    Not performed today   manuals   STM and PROM of cerivcal spine into cervical rotation               THER ACT   TOTAL TIME FOR SESSION 20 minutes   Symptom report, vitals, meds, pain yes    trialing rollator   Ambulation with rolling walker and with rollator in clinic with education.   Subjective outcome measures  DHI, ABC   Objective outcome measures yes Goode   Patient Education yes Informed patient the results of GOODE and next week being anticipated last week of therapy.

## 2023-06-06 NOTE — PROGRESS NOTES
Physical Therapy Progress Note    PT PROGRESS NOTE FOR OUTPATIENT THERAPY    Patient: Oh Fang MRN: [id number]  : 1935 87 y.o.  Referring Physician: Stephon Millan MD  Date of Visit: 2023      Certification Dates: 23 through 23    Recommended Frequency & Duration:  2 times/week for up to 8 weeks          Diagnosis:   1. Dizziness and giddiness    2. Cervicalgia        Chief Complaints:  No chief complaint on file.      Precautions:   Existing Precautions/Restrictions: fall      TODAY'S VISIT:    Time In Session:  Start Time: 1100  Stop Time: 1158  Time Calculation (min): 58 min   General Information - 23 1104        Session Details    Document Type progress note        General Information    Onset of Illness/Injury or Date of Surgery 23     Referring Physician Dr. Stephon Millan, Dr. Leone     History of present illness/functional impairment Oh Fang is an 86 y/o male with PMH including arthritis, CHF, CAD, HTN, high cholesterol and hypothyroidism, sarcoidosis, and BPH who presents to outpatient physical therapy with complaints of ongoing vertigo with getting up in the morning that started at the beginning of the year and is still present.  He has a history of vertigo in years past.  He saw Dr. Millan in March who is recommending vestibular rehab for his dizziness.  His balance issues are long standing and states it is difficult to discern if it has gotten worse.  Mr. Fang reports he consistently does exercises in the morning where he rotates his head to the left and right side in supine prior to getting out of bed.  He has a follow up with his cardiologist later this week.     Patient/Family/Caregiver Comments/Observations Pt repors he was out sick for a period of time and now has been feeling better. no new medications and no falls since his last appointment.     Existing Precautions/Restrictions fall                  Pain/Vitals - 23 1104        Pain  Assessment    Currently in pain No/Denies        Pre Activity Vital Signs    /54     BP Location Left upper arm     BP Method Manual     Patient Position Sitting                PT - 06/05/23 1104        PT Plan    Frequency of treatment 2 times/week     PT Duration 8 weeks     PT Cert From 04/18/23     PT Cert To 06/17/23     Date PT POC was sent to provider 04/20/23     Signed PT Plan of Care received?  Yes                   OBJECTIVE MEASUREMENTS/DATA:    ROM    Range of Motion - 06/06/23 0700        Cervical AROM    Cervical Flexion 40     Cervical Extension 30     Cervical Left SB 16     Cervical Right SB 20     Cervical Left Rotation 50     Cervical Right Rotation 45               Outcome Measures    PT Outcome Measures - 06/05/23 1109        Objective Outcome Measures    Gait Speed (m/sec) 0.74 m/sec     DGI Score (out of 24 total) 15        Subjective Outcome Measures    DHI 8     ABC 58.75        Timed Up and Go Test    Trial One: Timed Up and Go Test 26.07     Trial Two: Timed Up and Go Test 19.81     Trial Three: Timed Up and Go Test 17.45     Mean of 3 Trials: Timed Up and Go Test 21.1                 ROM and MMT        5/8/2023 6/6/2023    07:00   PT Cervical/Lumbar/Other ROM Measurements   AROM: Cervical Flexion 50 40   AROM: Cervical Extension 20 30   AROM: Left Cervical SB 15 16   AROM: Right Cervical SB 15 20   AROM: Left Cervical Rotation 38 50   AROM: Right Cervical Rotation 45 45   AROM: Cervical Comments rests in 10 degrees of extension    PT UE MMT Measurements   Right Shoulder Flexion (4/5) good    Left Shoulder Flexion (4/5) good    Right Shoulder Extension (4/5) good    Left Shoulder Extension (4+/5) good plus    Right Shoulder ABD (4/5) good    Left Shoulder ABD (4/5) good    Right Shoulder IR (4+/5) good plus    Left Shoulder IR (4+/5) good plus    Right Shoulder ER (4+/5) good plus    Left Shoulder ER (4+/5) good plus    Left Elbow Flexion (4+/5) good plus    Right Elbow Extension  (4+/5) good plus    Left Elbow Extension (4+/5) good plus    Right Forearm Supination (4+/5) good plus    Right Wrist Flexion (5/5) normal    Left Wrist Flexion (5/5) normal    Right Wrist Extension (5/5) normal    Left Wrist Extension (5/5) normal      Outcome Measures        4/18/2023    13:00 5/9/2023    11:22 5/12/2023    11:18 6/5/2023    11:00 6/5/2023    11:09   PT OBJECTIVE Outcome Measures   10 Meter Walk Test  13.4 seconds      Gait Speed (m/sec) 0.41 m/sec 0.75 m/sec   0.74 m/sec   DGI --        DGI vs FGA TBA 16   15   Doyle Balance Score   31     TUG (Mean)  20   21.1   PT SUBJECTIVE Outcome Measures   DHI 28   8 8   ABC     58.75       Today's Treatment::    Education provided:  See flow sheet for details.    Today's Treatment:     PT Vestibular Exercises Completed Today Current Session Time   POC         Progress notes Due         Physician Follow up         MHP   UNBILLABLE CODE Not performed            NEURO RE-ED   TOTAL TIME FOR SESSION 20 Minutes   BPPV POSITION TESTING       [place] Hallpike  See flow sheet for details   Horizontal Roll Test  See flow sheet for details   CRT's no See flow sheet for details   VOR CANCELLATION       Standing H/VVOR-C       Ambulation w/ H/VVOR-C       VOR / GAZE STABILITY       Seated H/VVOR       Standing H/VVOR       H/VVOR on compliant surfaces       Ambulation w/H/VVOR       H/VVOR on sway surfaces       Functional VOR       HABITUATION       Ball circles       Repetitive functional movements       MSQ   TBA   BALANCE- STATIC       On floor no - REO 60s  - REO with head turns R/L 60s with mild sway  - FA EC 60s with mild sway   Airex foam       Rockerboard       Doyle no Completed 5/12/23 - see objective section   BALANCE- DYNAMIC       Ambulation -head turns/nods/EC no walking 100 feet w/ RW in clinic with head turns left, center, right   Amb - 180 & 360 degree turns       Retro ambulation EO/EC       Obstacles no Negotiating around cones placed on floor; 20 feet x  4   DGI  yes Completed 6/5/23 see flow sheet for details   10MWT  yes Completed 6/5/23 see flow sheet for details    TUG  yes Completed 6/5/23 see flow sheet for details   OPTOKINETIC STIMULATION       MULTITASKING/COG TASKS       THER-EX    TOTAL TIME FOR SESSION 10 minutes   Parallel bars: no - side stepping  - lateral toe tapping on gumdrops   - Forward/backward stepping over PVC pipe           Cervical spine strengthening No    yes    No  Yes  Yes  no - AAROM for cervical rotation 10 sec x 5 to each direction  - seated rows with GTB 1 x 10, standing rows GTB 1 x 10  - standing shoulder extension w/ dowel  - no money 2 x 10 PTB  - Seated b/l shoulder horizontal ABD  - standing shoulder extension with dowel                           CARDIOVASCULAR        BALKE/BCT Test       Recumbent bike       Treadmill        Manual    Not performed today   manuals   STM and PROM of cerivcal spine into cervical rotation               THER ACT   TOTAL TIME FOR SESSION 25 minutes   Symptom report, vitals, meds, pain yes    trialing rollator   Ambulation with rolling walker and with rollator in clinic with education.   Subjective outcome measures yes DHI, ABC   Objective outcome measures yes ROM,   Patient Education yes Informed patient and son the results of testing and progress today; discussed focus of next 1-3 visits and anticipated discharge in next 1-3 visits. Pt verbally agreed.               Goals Addressed                 This Visit's Progress    • DIzziness        Short Term Goals Time Frame Result Comment/Progress   Patient will decrease Motion Sensitivity Quotient to TBA % for increased functional tolerance.   4 weeks ongoing    Patient will complete GOODE Balance Scale for increased postural control. 4 weeks met    Patient will increase DGI score to TBA for increased gait stability and balance.   4 weeks met    Patient will perform home exercise program with supervision.   4 weeks met    Patient will demonstrate  ability to manage symptoms with <20% cues. 4 weeks met    Position testing will be negative for BPPV 4 weeks met    Patient will improve score on ABC to TBA for decreased report of symptoms with daily activities. 4 weeks met    Patient will improve score on DHI to 12 points for decreased report of symptoms with daily activities. 4 weeks met      Long Term Goals Time Frame Result Comment/Progress   Patient will decrease Motion Sensitivity Quotient to 2 % for increased functional tolerance.   8 weeks     Patient will increase GODOE score to 49/56 for increased postural control. 8 weeks ongoing    Patient will increase FGA or DGI scores to (FGA 25/30, DGI 22/24) for increased gait stability and balance.   8 weeks ongoing    Patient will perform home exercise program independently.   8 weeks ongoing     Patient will tolerate 10 minutes of mild cardiovascular conditioning at with RPE </=12/20.   8 weeks ongoing    Patient will demonstrate independence with managing any residual symptoms. 8 weeks ongoing    Patient will improve score on ABC to >/=78% for decreased report of symptoms with daily activities. 8 weeks ongoing    Patient will improve score on DHI to </=10 points for decreased report of symptoms with daily activities. 8 weeks met    Patient will have no increased symptoms with challenging VOR activities for symptom free high level activities.   8 weeks ongoing            • PT cervical goals        Short Term Goals Time Frame Result Comment/Progress   C/S rotation ROM will improve to 50*  bilaterally w/o pain 6 weeks Partially met Rotation left 50*, rotation right 41*   C/S extension ROM will improve to 40* w/o pain 6 weeks ongoing    Patient will perform home exercise program with supervision.   6 weeks met    Patient will demonstrate ability to keep proper posture with <40% cues. 6 weeks ongoing    C/S side bending ROM will improve to 25* bilaterally w/o pain   6 weeks ongoing                    Physical Therapy  Progress Note

## 2023-06-06 NOTE — OP PT TREATMENT LOG
Today's Treatment:     PT Vestibular Exercises Completed Today Current Session Time   POC         Progress notes Due         Physician Follow up         MHP   UNBILLABLE CODE Not performed            NEURO RE-ED   TOTAL TIME FOR SESSION 20 Minutes   BPPV POSITION TESTING       [place] Hallpike  See flow sheet for details   Horizontal Roll Test  See flow sheet for details   CRT's no See flow sheet for details   VOR CANCELLATION       Standing H/VVOR-C       Ambulation w/ H/VVOR-C       VOR / GAZE STABILITY       Seated H/VVOR       Standing H/VVOR       H/VVOR on compliant surfaces       Ambulation w/H/VVOR       H/VVOR on sway surfaces       Functional VOR       HABITUATION       Ball circles       Repetitive functional movements       MSQ   TBA   BALANCE- STATIC       On floor yes - REC 60s  - REC with head turns R/L 60s with mild sway  - FA EC 60s with mild sway   Airex foam  yes    yes - Standing FA EC 30 sec x 2 mild to mod imbalance  - Standing FT EO   Rockerboard       Doyle no Completed 5/12/23 - see objective section   BALANCE- DYNAMIC       Ambulation -head turns/nods/EC no walking 100 feet w/ RW in clinic with head turns left, center, right   Amb - 180 & 360 degree turns       Retro ambulation EO/EC       Obstacles yes Lunges over 6 inch christopher to left and right directions   DGI  Completed 6/5/23 see flow sheet for details   10MWT  Completed 6/5/23 see flow sheet for details    TUG  Completed 6/5/23 see flow sheet for details   OPTOKINETIC STIMULATION       MULTITASKING/COG TASKS       THER-EX    TOTAL TIME FOR SESSION 8 minutes   Parallel bars: no - side stepping  - lateral toe tapping on gumdrops   - Forward/backward stepping over PVC pipe           Cervical spine strengthening No    no    No  no  no  no - AAROM for cervical rotation 10 sec x 5 to each direction  - seated rows with GTB 1 x 10, standing rows GTB 1 x 10  - standing shoulder extension w/ dowel  - no money 2 x 10 PTB  - Seated b/l shoulder  horizontal ABD  - standing shoulder extension with dowel                           CARDIOVASCULAR        BALKE/BCT Test       Nustep yes 8 minutes seat 11, arms to 12 level 1 SPM 30-40   Treadmill        Manual    Not performed today   manuals   STM and PROM of cerivcal spine into cervical rotation               THER ACT   TOTAL TIME FOR SESSION 20 minutes   Symptom report, vitals, meds, pain yes    trialing rollator   Ambulation with rolling walker and with rollator in clinic with education.   Subjective outcome measures  DHI, ABC   Objective outcome measures yes Goode   Patient Education yes Informed patient the results of GOODE and next week being anticipated last week of therapy.

## 2023-06-19 ENCOUNTER — DOCUMENTATION (OUTPATIENT)
Dept: PHYSICAL THERAPY | Facility: REHABILITATION | Age: 88
End: 2023-06-19
Payer: MEDICARE

## 2023-06-19 NOTE — PROGRESS NOTES
Physical Therapy Discharge      PT DISCHARGE NOTE FOR OUTPATIENT THERAPY    Patient: Oh Fang MRN: [id number]  : 1935 87 y.o.  Referring Physician: No ref. provider found  Date of Visit: 2023      Certification Dates: 23 through 23    Total Visit Count: 11    Chief Complaints:  Chief Complaint   Patient presents with   • Discharge From Treatment     No visit discharge. Pt called and stated he wished to self discharge at this time to transition to his independent walking and exercise program.       Precautions:         TODAY'S VISIT:    Time In Session: no visit discahrge            PT - 23 0813        PT Plan    Frequency of treatment 2 times/week     PT Duration 8 weeks     PT Cert From 23     PT Cert To 23     Date PT POC was sent to provider 23     Signed PT Plan of Care received?  Yes                   OBJECTIVE MEASUREMENTS/DATA:    None taken    ROM and MMT        2023    07:00   PT Cervical/Lumbar/Other ROM Measurements   AROM: Cervical Flexion 50 40   AROM: Cervical Extension 20 30   AROM: Left Cervical SB 15 16   AROM: Right Cervical SB 15 20   AROM: Left Cervical Rotation 38 50   AROM: Right Cervical Rotation 45 45   AROM: Cervical Comments rests in 10 degrees of extension    PT UE MMT Measurements   Right Shoulder Flexion (4/5) good    Left Shoulder Flexion (4/5) good    Right Shoulder Extension (4/5) good    Left Shoulder Extension (4+/5) good plus    Right Shoulder ABD (4/5) good    Left Shoulder ABD (4/5) good    Right Shoulder IR (4+/5) good plus    Left Shoulder IR (4+/5) good plus    Right Shoulder ER (4+/5) good plus    Left Shoulder ER (4+/5) good plus    Left Elbow Flexion (4+/5) good plus    Right Elbow Extension (4+/5) good plus    Left Elbow Extension (4+/5) good plus    Right Forearm Supination (4+/5) good plus    Right Wrist Flexion (5/5) normal    Left Wrist Flexion (5/5) normal    Right Wrist Extension (5/5) normal    Left  Wrist Extension (5/5) normal      Outcome Measures        4/18/2023    13:00 5/9/2023    11:22 5/12/2023    11:18 6/5/2023    11:00 6/5/2023    11:09   PT OBJECTIVE Outcome Measures   10 Meter Walk Test  13.4 seconds      Gait Speed (m/sec) 0.41 m/sec 0.75 m/sec   0.74 m/sec   DGI --        DGI vs FGA TBA 16   15   Goode Balance Score   31     TUG (Mean)  20   21.1   PT SUBJECTIVE Outcome Measures   DHI 28   8 8   ABC     58.75       Today's Treatment:    Education provided:  None/NA         Goals Addressed                 This Visit's Progress    • COMPLETED: DIzziness        Short Term Goals Time Frame Result Comment/Progress   Patient will decrease Motion Sensitivity Quotient to TBA % for increased functional tolerance.   4 weeks Not met    Patient will complete GOODE Balance Scale for increased postural control. 4 weeks met    Patient will increase DGI score to TBA for increased gait stability and balance.   4 weeks met    Patient will perform home exercise program with supervision.   4 weeks met    Patient will demonstrate ability to manage symptoms with <20% cues. 4 weeks met    Position testing will be negative for BPPV 4 weeks met    Patient will improve score on ABC to TBA for decreased report of symptoms with daily activities. 4 weeks met    Patient will improve score on DHI to 12 points for decreased report of symptoms with daily activities. 4 weeks met      Long Term Goals Time Frame Result Comment/Progress   Patient will decrease Motion Sensitivity Quotient to 2 % for increased functional tolerance.   8 weeks Not met    Patient will increase GOODE score to 49/56 for increased postural control. 8 weeks Not met    Patient will increase FGA or DGI scores to (FGA 25/30, DGI 22/24) for increased gait stability and balance.   8 weeks Not met    Patient will perform home exercise program independently.   8 weeks Not met     Patient will tolerate 10 minutes of mild cardiovascular conditioning at with RPE </=12/20.    8 weeks Partially met    Patient will demonstrate independence with managing any residual symptoms. 8 weeks Not met    Patient will improve score on ABC to >/=78% for decreased report of symptoms with daily activities. 8 weeks Not met    Patient will improve score on DHI to </=10 points for decreased report of symptoms with daily activities. 8 weeks met    Patient will have no increased symptoms with challenging VOR activities for symptom free high level activities.   8 weeks Not met            • COMPLETED: PT cervical goals        Short Term Goals Time Frame Result Comment/Progress   C/S rotation ROM will improve to 50*  bilaterally w/o pain 6 weeks Partially met Rotation left 50*, rotation right 41*   C/S extension ROM will improve to 40* w/o pain 6 weeks Not met    Patient will perform home exercise program with supervision.   6 weeks met    Patient will demonstrate ability to keep proper posture with <40% cues. 6 weeks Not met    C/S side bending ROM will improve to 25* bilaterally w/o pain   6 weeks Not met                  Discharge information for CARF:    Reason for D/C: Maximum potential met, Self discharge  Was care interrupted for medical reason?: No  Did the patient demonstrate increased independence: Yes  Demonstration of independece:: [place] in HEP, Increased functional activity  Has the patient returned to productive activity?: Yes  Increased production assessment:: Increased community independence  Skin integrity: Unable to assess

## 2023-07-04 ENCOUNTER — HOSPITAL ENCOUNTER (EMERGENCY)
Facility: HOSPITAL | Age: 88
Discharge: HOME | End: 2023-07-04
Attending: EMERGENCY MEDICINE
Payer: MEDICARE

## 2023-07-04 ENCOUNTER — APPOINTMENT (EMERGENCY)
Dept: RADIOLOGY | Facility: HOSPITAL | Age: 88
End: 2023-07-04
Payer: MEDICARE

## 2023-07-04 VITALS
RESPIRATION RATE: 15 BRPM | HEART RATE: 76 BPM | DIASTOLIC BLOOD PRESSURE: 75 MMHG | SYSTOLIC BLOOD PRESSURE: 183 MMHG | WEIGHT: 187 LBS | TEMPERATURE: 99.9 F | OXYGEN SATURATION: 94 % | BODY MASS INDEX: 26.83 KG/M2

## 2023-07-04 DIAGNOSIS — I10 HYPERTENSION, UNSPECIFIED TYPE: ICD-10-CM

## 2023-07-04 DIAGNOSIS — R31.9 URINARY TRACT INFECTION WITH HEMATURIA, SITE UNSPECIFIED: Primary | ICD-10-CM

## 2023-07-04 DIAGNOSIS — N39.0 URINARY TRACT INFECTION WITH HEMATURIA, SITE UNSPECIFIED: Primary | ICD-10-CM

## 2023-07-04 LAB
ALBUMIN SERPL-MCNC: 4 G/DL (ref 3.5–5.7)
ALP SERPL-CCNC: 78 IU/L (ref 34–104)
ALT SERPL-CCNC: 12 IU/L (ref 7–52)
ANION GAP SERPL CALC-SCNC: 8 MEQ/L (ref 3–15)
AST SERPL-CCNC: 15 IU/L (ref 13–39)
ATRIAL RATE: 87
BACTERIA URNS QL MICRO: ABNORMAL /HPF
BASOPHILS # BLD: 0.08 K/UL (ref 0.01–0.1)
BASOPHILS NFR BLD: 0.8 %
BILIRUB SERPL-MCNC: 0.7 MG/DL (ref 0.3–1)
BILIRUB UR QL STRIP.AUTO: NEGATIVE MG/DL
BUN SERPL-MCNC: 15 MG/DL (ref 7–25)
CALCIUM SERPL-MCNC: 8.7 MG/DL (ref 8.6–10.3)
CHLORIDE SERPL-SCNC: 104 MEQ/L (ref 98–107)
CLARITY UR REFRACT.AUTO: CLEAR
CO2 SERPL-SCNC: 26 MEQ/L (ref 21–31)
COLOR UR AUTO: YELLOW
CREAT SERPL-MCNC: 1.2 MG/DL (ref 0.7–1.3)
DIFFERENTIAL METHOD BLD: ABNORMAL
EOSINOPHIL # BLD: 0.12 K/UL (ref 0.04–0.54)
EOSINOPHIL NFR BLD: 1.3 %
ERYTHROCYTE [DISTWIDTH] IN BLOOD BY AUTOMATED COUNT: 14.3 % (ref 11.6–14.4)
FLUAV RNA SPEC QL NAA+PROBE: NEGATIVE
FLUBV RNA SPEC QL NAA+PROBE: NEGATIVE
GFR SERPL CREATININE-BSD FRML MDRD: 57.4 ML/MIN/1.73M*2
GLUCOSE SERPL-MCNC: 112 MG/DL (ref 70–99)
GLUCOSE UR STRIP.AUTO-MCNC: NEGATIVE MG/DL
HCT VFR BLDCO AUTO: 43.3 % (ref 40.1–51)
HGB BLD-MCNC: 13.9 G/DL (ref 13.7–17.5)
HGB UR QL STRIP.AUTO: NEGATIVE
HYALINE CASTS #/AREA URNS LPF: ABNORMAL /LPF
IMM GRANULOCYTES # BLD AUTO: 0.02 K/UL (ref 0–0.08)
IMM GRANULOCYTES NFR BLD AUTO: 0.2 %
KETONES UR STRIP.AUTO-MCNC: NEGATIVE MG/DL
LACTATE SERPL-SCNC: 1.7 MMOL/L (ref 0.4–2)
LACTATE SERPL-SCNC: 2.3 MMOL/L (ref 0.4–2)
LEUKOCYTE ESTERASE UR QL STRIP.AUTO: 3
LIPASE SERPL-CCNC: 11 U/L (ref 11–82)
LYMPHOCYTES # BLD: 1.17 K/UL (ref 1.2–3.5)
LYMPHOCYTES NFR BLD: 12.2 %
MCH RBC QN AUTO: 30 PG (ref 28–33.2)
MCHC RBC AUTO-ENTMCNC: 32.1 G/DL (ref 32.2–36.5)
MCV RBC AUTO: 93.3 FL (ref 83–98)
MONOCYTES # BLD: 0.9 K/UL (ref 0.3–1)
MONOCYTES NFR BLD: 9.4 %
MUCOUS THREADS URNS QL MICRO: ABNORMAL /LPF
NEUTROPHILS # BLD: 7.3 K/UL (ref 1.7–7)
NEUTS SEG NFR BLD: 76.1 %
NITRITE UR QL STRIP.AUTO: NEGATIVE
NRBC BLD-RTO: 0 %
P AXIS: 49
PDW BLD AUTO: 10.3 FL (ref 9.4–12.4)
PH UR STRIP.AUTO: 7.5 [PH]
PLATELET # BLD AUTO: 154 K/UL (ref 150–350)
POTASSIUM SERPL-SCNC: 4.2 MEQ/L (ref 3.5–5.1)
PR INTERVAL: 196
PROT SERPL-MCNC: 7.1 G/DL (ref 6.4–8.9)
PROT UR QL STRIP.AUTO: ABNORMAL
QRS DURATION: 144
QT INTERVAL: 426
QTC CALCULATION(BAZETT): 512
R AXIS: -62
RBC # BLD AUTO: 4.64 M/UL (ref 4.5–5.8)
RBC #/AREA URNS HPF: ABNORMAL /HPF
RSV RNA SPEC QL NAA+PROBE: NEGATIVE
SARS-COV-2 RNA RESP QL NAA+PROBE: NEGATIVE
SODIUM SERPL-SCNC: 138 MEQ/L (ref 136–145)
SP GR UR REFRACT.AUTO: 1.02
SQUAMOUS URNS QL MICRO: ABNORMAL /HPF
T WAVE AXIS: 36
UROBILINOGEN UR STRIP-ACNC: 0.2 EU/DL
VENTRICULAR RATE: 87
WBC # BLD AUTO: 9.59 K/UL (ref 3.8–10.5)
WBC #/AREA URNS HPF: ABNORMAL /HPF

## 2023-07-04 PROCEDURE — 80053 COMPREHEN METABOLIC PANEL: CPT | Performed by: EMERGENCY MEDICINE

## 2023-07-04 PROCEDURE — 87077 CULTURE AEROBIC IDENTIFY: CPT | Performed by: EMERGENCY MEDICINE

## 2023-07-04 PROCEDURE — 83605 ASSAY OF LACTIC ACID: CPT | Performed by: EMERGENCY MEDICINE

## 2023-07-04 PROCEDURE — 71046 X-RAY EXAM CHEST 2 VIEWS: CPT

## 2023-07-04 PROCEDURE — 25800000 HC PHARMACY IV SOLUTIONS

## 2023-07-04 PROCEDURE — 3E0337Z INTRODUCTION OF ELECTROLYTIC AND WATER BALANCE SUBSTANCE INTO PERIPHERAL VEIN, PERCUTANEOUS APPROACH: ICD-10-PCS | Performed by: EMERGENCY MEDICINE

## 2023-07-04 PROCEDURE — 93005 ELECTROCARDIOGRAM TRACING: CPT | Performed by: EMERGENCY MEDICINE

## 2023-07-04 PROCEDURE — 85025 COMPLETE CBC W/AUTO DIFF WBC: CPT | Performed by: EMERGENCY MEDICINE

## 2023-07-04 PROCEDURE — 93010 ELECTROCARDIOGRAM REPORT: CPT | Performed by: INTERNAL MEDICINE

## 2023-07-04 PROCEDURE — 99284 EMERGENCY DEPT VISIT MOD MDM: CPT | Mod: 25

## 2023-07-04 PROCEDURE — 87637 SARSCOV2&INF A&B&RSV AMP PRB: CPT

## 2023-07-04 PROCEDURE — 3E03329 INTRODUCTION OF OTHER ANTI-INFECTIVE INTO PERIPHERAL VEIN, PERCUTANEOUS APPROACH: ICD-10-PCS | Performed by: EMERGENCY MEDICINE

## 2023-07-04 PROCEDURE — 87040 BLOOD CULTURE FOR BACTERIA: CPT | Mod: 91 | Performed by: EMERGENCY MEDICINE

## 2023-07-04 PROCEDURE — 63700000 HC SELF-ADMINISTRABLE DRUG

## 2023-07-04 PROCEDURE — 63600000 HC DRUGS/DETAIL CODE: Mod: JZ

## 2023-07-04 PROCEDURE — 96361 HYDRATE IV INFUSION ADD-ON: CPT

## 2023-07-04 PROCEDURE — 63600105 HC IODINE BASED CONTRAST

## 2023-07-04 PROCEDURE — 83690 ASSAY OF LIPASE: CPT

## 2023-07-04 PROCEDURE — 81001 URINALYSIS AUTO W/SCOPE: CPT | Performed by: EMERGENCY MEDICINE

## 2023-07-04 PROCEDURE — 3E0234Z INTRODUCTION OF SERUM, TOXOID AND VACCINE INTO MUSCLE, PERCUTANEOUS APPROACH: ICD-10-PCS | Performed by: EMERGENCY MEDICINE

## 2023-07-04 PROCEDURE — 96365 THER/PROPH/DIAG IV INF INIT: CPT | Mod: 59

## 2023-07-04 PROCEDURE — 96372 THER/PROPH/DIAG INJ SC/IM: CPT | Mod: 59

## 2023-07-04 PROCEDURE — 36415 COLL VENOUS BLD VENIPUNCTURE: CPT | Performed by: EMERGENCY MEDICINE

## 2023-07-04 PROCEDURE — G1004 CDSM NDSC: HCPCS

## 2023-07-04 RX ORDER — CEFUROXIME AXETIL 500 MG/1
500 TABLET ORAL 2 TIMES DAILY
Qty: 13 TABLET | Refills: 0 | Status: SHIPPED | OUTPATIENT
Start: 2023-07-04 | End: 2023-07-11

## 2023-07-04 RX ORDER — ACETAMINOPHEN 325 MG/1
975 TABLET ORAL ONCE
Status: COMPLETED | OUTPATIENT
Start: 2023-07-04 | End: 2023-07-04

## 2023-07-04 RX ORDER — HYDRALAZINE HYDROCHLORIDE 25 MG/1
25 TABLET, FILM COATED ORAL ONCE
Status: COMPLETED | OUTPATIENT
Start: 2023-07-04 | End: 2023-07-04

## 2023-07-04 RX ADMIN — ACETAMINOPHEN 975 MG: 325 TABLET ORAL at 07:46

## 2023-07-04 RX ADMIN — IOHEXOL 100 ML: 350 INJECTION, SOLUTION INTRAVENOUS at 08:43

## 2023-07-04 RX ADMIN — CEFTRIAXONE SODIUM 1 G: 1 INJECTION, POWDER, FOR SOLUTION INTRAVENOUS at 09:56

## 2023-07-04 RX ADMIN — SODIUM CHLORIDE 500 ML: 9 INJECTION, SOLUTION INTRAVENOUS at 07:47

## 2023-07-04 RX ADMIN — HYDRALAZINE HYDROCHLORIDE 25 MG: 25 TABLET, FILM COATED ORAL at 12:14

## 2023-07-04 RX ADMIN — TRIMETHOBENZAMIDE HYDROCHLORIDE 200 MG: 100 INJECTION INTRAMUSCULAR at 07:47

## 2023-07-04 ASSESSMENT — ENCOUNTER SYMPTOMS
HEMATURIA: 0
DIAPHORESIS: 1
BLOOD IN STOOL: 0
RHINORRHEA: 0
COUGH: 1
LIGHT-HEADEDNESS: 0
FEVER: 1
MYALGIAS: 0
ARTHRALGIAS: 0
BACK PAIN: 0
DIARRHEA: 0
WEAKNESS: 0
CHILLS: 1
NAUSEA: 1
VOMITING: 0
FREQUENCY: 0
SORE THROAT: 0
APPETITE CHANGE: 0
DYSURIA: 1
ABDOMINAL PAIN: 0
SHORTNESS OF BREATH: 0

## 2023-07-04 NOTE — DISCHARGE INSTRUCTIONS
Follow-up with your primary care doctor and urology for further monitoring of symptoms.  Take antibiotics as prescribed.  Return to the ER for evaluation of worsening urinary symptoms, uncontrolled fevers, vomiting, inability to eat or drink, chest pain, shortness of breath or any other concerns.

## 2023-07-04 NOTE — ED ATTESTATION NOTE
I have personally seen and examined Oh Fang. I personally performed the key components of the encounter and provided a substantive portion of the care and medical decision making for this patient.    I reviewed and agree with physician assistant’s assessment and plan of care, with any exceptions as documented below.     My focused history, examination, assessment, and plan of care of Oh Fang is as follows:    Brief History:   87-year-old male with a history of BPH CHF coronary disease thyroid disease hyperlipidemia hypertension sarcoidosis presented with sneezing and coughing along with subjective chills fever and dry heaving for the past few hours.  Has had subjective dysuria without hematuria.  Denies any melena bloody stools.  Focused Physical Exam:   Vitals:    07/04/23 0729   BP: (!) 176/72   Pulse: 82   Resp: 16   Temp: 37.9 °C (100.2 °F)   SpO2: 95%   Awake and alert good eye contact anicteric sclera with normal complexion conjunctiva.  Clear to auscultation throughout.  No CVA tenderness.  Soft nondistended nontender abdomen.  Bilateral lower extremity pitting edema approximately 1-2+ with minimal erythema but not warm to touch and without any tenderness      Assessment / Plan / MDM:  Clinical history and exam concerning for but not limited to:  Viral syndrome  UTI    Infectious work-up       Marcelino Luong MD  07/04/23 2942

## 2023-07-06 LAB
BACTERIA UR CULT: ABNORMAL
BACTERIA UR CULT: ABNORMAL

## 2023-07-06 RX ORDER — SULFAMETHOXAZOLE AND TRIMETHOPRIM 800; 160 MG/1; MG/1
1 TABLET ORAL 2 TIMES DAILY
Qty: 14 TABLET | Refills: 0 | Status: SHIPPED | OUTPATIENT
Start: 2023-07-06 | End: 2023-07-13

## 2023-07-09 LAB
BACTERIA BLD CULT: NORMAL
BACTERIA BLD CULT: NORMAL

## 2023-07-14 ENCOUNTER — HOSPITAL ENCOUNTER (EMERGENCY)
Facility: HOSPITAL | Age: 88
Discharge: HOME HEALTH | End: 2023-07-14
Attending: EMERGENCY MEDICINE
Payer: MEDICARE

## 2023-07-14 ENCOUNTER — APPOINTMENT (EMERGENCY)
Dept: RADIOLOGY | Facility: HOSPITAL | Age: 88
End: 2023-07-14
Payer: MEDICARE

## 2023-07-14 VITALS
BODY MASS INDEX: 27.4 KG/M2 | DIASTOLIC BLOOD PRESSURE: 72 MMHG | TEMPERATURE: 97.9 F | RESPIRATION RATE: 21 BRPM | OXYGEN SATURATION: 97 % | HEART RATE: 71 BPM | HEIGHT: 69 IN | SYSTOLIC BLOOD PRESSURE: 137 MMHG | WEIGHT: 185 LBS

## 2023-07-14 DIAGNOSIS — R11.0 NAUSEA: ICD-10-CM

## 2023-07-14 DIAGNOSIS — R61 DIAPHORESIS: Primary | ICD-10-CM

## 2023-07-14 PROBLEM — I10 HYPERTENSION: Status: ACTIVE | Noted: 2023-07-14

## 2023-07-14 LAB
ANION GAP SERPL CALC-SCNC: 8 MEQ/L (ref 3–15)
ATRIAL RATE: 71
BACTERIA URNS QL MICRO: ABNORMAL /HPF
BASOPHILS # BLD: 0.07 K/UL (ref 0.01–0.1)
BASOPHILS NFR BLD: 1 %
BILIRUB UR QL STRIP.AUTO: NEGATIVE MG/DL
BUN SERPL-MCNC: 21 MG/DL (ref 7–25)
CALCIUM SERPL-MCNC: 8.9 MG/DL (ref 8.6–10.3)
CHLORIDE SERPL-SCNC: 101 MEQ/L (ref 98–107)
CLARITY UR REFRACT.AUTO: CLEAR
CO2 SERPL-SCNC: 24 MEQ/L (ref 21–31)
COLOR UR AUTO: YELLOW
CREAT SERPL-MCNC: 1.4 MG/DL (ref 0.7–1.3)
DIFFERENTIAL METHOD BLD: ABNORMAL
EOSINOPHIL # BLD: 0.06 K/UL (ref 0.04–0.54)
EOSINOPHIL NFR BLD: 0.8 %
ERYTHROCYTE [DISTWIDTH] IN BLOOD BY AUTOMATED COUNT: 14.4 % (ref 11.6–14.4)
GFR SERPL CREATININE-BSD FRML MDRD: 48.1 ML/MIN/1.73M*2
GLUCOSE SERPL-MCNC: 111 MG/DL (ref 70–99)
GLUCOSE UR STRIP.AUTO-MCNC: NEGATIVE MG/DL
HCT VFR BLDCO AUTO: 42 % (ref 40.1–51)
HGB BLD-MCNC: 13.6 G/DL (ref 13.7–17.5)
HGB UR QL STRIP.AUTO: NEGATIVE
HYALINE CASTS #/AREA URNS LPF: ABNORMAL /LPF
IMM GRANULOCYTES # BLD AUTO: 0.01 K/UL (ref 0–0.08)
IMM GRANULOCYTES NFR BLD AUTO: 0.1 %
KETONES UR STRIP.AUTO-MCNC: NEGATIVE MG/DL
LEUKOCYTE ESTERASE UR QL STRIP.AUTO: NEGATIVE
LYMPHOCYTES # BLD: 1.27 K/UL (ref 1.2–3.5)
LYMPHOCYTES NFR BLD: 17.7 %
MCH RBC QN AUTO: 29.4 PG (ref 28–33.2)
MCHC RBC AUTO-ENTMCNC: 32.4 G/DL (ref 32.2–36.5)
MCV RBC AUTO: 90.9 FL (ref 83–98)
MONOCYTES # BLD: 0.53 K/UL (ref 0.3–1)
MONOCYTES NFR BLD: 7.4 %
MUCOUS THREADS URNS QL MICRO: ABNORMAL /LPF
NEUTROPHILS # BLD: 5.22 K/UL (ref 1.7–7)
NEUTS SEG NFR BLD: 73 %
NITRITE UR QL STRIP.AUTO: NEGATIVE
NRBC BLD-RTO: 0 %
P AXIS: 25
PDW BLD AUTO: 10.1 FL (ref 9.4–12.4)
PH UR STRIP.AUTO: 6 [PH]
PLATELET # BLD AUTO: 210 K/UL (ref 150–350)
POTASSIUM SERPL-SCNC: 4.4 MEQ/L (ref 3.5–5.1)
PR INTERVAL: 202
PROT UR QL STRIP.AUTO: ABNORMAL
QRS DURATION: 156
QT INTERVAL: 474
QTC CALCULATION(BAZETT): 515
R AXIS: -59
RBC # BLD AUTO: 4.62 M/UL (ref 4.5–5.8)
RBC #/AREA URNS HPF: ABNORMAL /HPF
SODIUM SERPL-SCNC: 133 MEQ/L (ref 136–145)
SP GR UR REFRACT.AUTO: 1.02
SQUAMOUS URNS QL MICRO: ABNORMAL /HPF
T WAVE AXIS: 12
TROPONIN I SERPL HS-MCNC: 6.6 PG/ML
TROPONIN I SERPL HS-MCNC: 7.4 PG/ML
UROBILINOGEN UR STRIP-ACNC: 0.2 EU/DL
VENTRICULAR RATE: 71
WBC # BLD AUTO: 7.16 K/UL (ref 3.8–10.5)
WBC #/AREA URNS HPF: ABNORMAL /HPF

## 2023-07-14 PROCEDURE — 71045 X-RAY EXAM CHEST 1 VIEW: CPT

## 2023-07-14 PROCEDURE — 85025 COMPLETE CBC W/AUTO DIFF WBC: CPT | Performed by: EMERGENCY MEDICINE

## 2023-07-14 PROCEDURE — 96360 HYDRATION IV INFUSION INIT: CPT

## 2023-07-14 PROCEDURE — 84484 ASSAY OF TROPONIN QUANT: CPT | Mod: 91 | Performed by: EMERGENCY MEDICINE

## 2023-07-14 PROCEDURE — 3E0337Z INTRODUCTION OF ELECTROLYTIC AND WATER BALANCE SUBSTANCE INTO PERIPHERAL VEIN, PERCUTANEOUS APPROACH: ICD-10-PCS | Performed by: EMERGENCY MEDICINE

## 2023-07-14 PROCEDURE — 63700000 HC SELF-ADMINISTRABLE DRUG: Performed by: PHYSICIAN ASSISTANT

## 2023-07-14 PROCEDURE — 36415 COLL VENOUS BLD VENIPUNCTURE: CPT | Performed by: EMERGENCY MEDICINE

## 2023-07-14 PROCEDURE — 25800000 HC PHARMACY IV SOLUTIONS: Performed by: EMERGENCY MEDICINE

## 2023-07-14 PROCEDURE — 81001 URINALYSIS AUTO W/SCOPE: CPT | Performed by: EMERGENCY MEDICINE

## 2023-07-14 PROCEDURE — 84484 ASSAY OF TROPONIN QUANT: CPT | Performed by: EMERGENCY MEDICINE

## 2023-07-14 PROCEDURE — 99284 EMERGENCY DEPT VISIT MOD MDM: CPT | Mod: 25

## 2023-07-14 PROCEDURE — 93005 ELECTROCARDIOGRAM TRACING: CPT | Performed by: EMERGENCY MEDICINE

## 2023-07-14 PROCEDURE — 80048 BASIC METABOLIC PNL TOTAL CA: CPT | Performed by: EMERGENCY MEDICINE

## 2023-07-14 PROCEDURE — 96361 HYDRATE IV INFUSION ADD-ON: CPT

## 2023-07-14 RX ORDER — SODIUM CHLORIDE 9 MG/ML
INJECTION, SOLUTION INTRAVENOUS ONCE
Status: COMPLETED | OUTPATIENT
Start: 2023-07-14 | End: 2023-07-14

## 2023-07-14 RX ORDER — ALUMINUM HYDROXIDE, MAGNESIUM HYDROXIDE, AND SIMETHICONE 1200; 120; 1200 MG/30ML; MG/30ML; MG/30ML
30 SUSPENSION ORAL ONCE
Status: COMPLETED | OUTPATIENT
Start: 2023-07-14 | End: 2023-07-14

## 2023-07-14 RX ORDER — HYDRALAZINE HYDROCHLORIDE 25 MG/1
25 TABLET, FILM COATED ORAL ONCE
Status: COMPLETED | OUTPATIENT
Start: 2023-07-14 | End: 2023-07-14

## 2023-07-14 RX ADMIN — SODIUM CHLORIDE: 9 INJECTION, SOLUTION INTRAVENOUS at 12:38

## 2023-07-14 RX ADMIN — HYDRALAZINE HYDROCHLORIDE 25 MG: 25 TABLET, FILM COATED ORAL at 14:14

## 2023-07-14 RX ADMIN — SODIUM CHLORIDE 250 ML: 9 INJECTION, SOLUTION INTRAVENOUS at 12:38

## 2023-07-14 RX ADMIN — ALUMINUM HYDROXIDE, MAGNESIUM HYDROXIDE, AND DIMETHICONE 30 ML: 200; 20; 200 SUSPENSION ORAL at 17:15

## 2023-07-14 NOTE — ASSESSMENT & PLAN NOTE
He has hx of AS s/p TAVR in 2016.  He had an echocardiogram 4/20/23 that noted moderate AR.  Normal gradients across valve.  Moderate paravalvular regurgitation present.  He follows with Dr. Isaacs.

## 2023-07-14 NOTE — PLAN OF CARE
Care Coordination Discharge Plan Summary    Admission Assessment Summary    General Information  Readmission Within the last 30 days: no previous admission in last 30 days  Does patient have a : No  Patient-Specific Goals (include timeframe): to go home    Living Arrangements  Arrived From: home  Current Living Arrangements: home  People in Home: child(arnol), adult (adult son, Abner)  Home Accessibility: ramp to enter home, stairs within home (Group), railings on inside stairs, wheelchair accessible  Living Arrangement Comments: [facility]    Social Determinants of Health - Screenings  Housing Stability  In the last 12 months, was there a time when you were not able to pay the mortgage or rent on time?: No  In the last 12 months, how many places have you lived?: 1  In the last 12 months, was there a time when you did not have a steady place to sleep or slept in a shelter (including now)?: No  Financial Resource Strain  How hard is it for you to pay for the very basics like food, housing, medical care, and heating?: Not hard at all  Transportation Needs  In the past 12 months, has lack of transportation kept you from medical appointments or from getting medications?: No  In the past 12 months, has lack of transportation kept you from meetings, work, or from getting things needed for daily living?: No    Functional Status Prior to Admission  Assistive Device/Animal Currently Used at Home: cane, straight, wheelchair, walker, standard, grab bar, shower chair  Functional Status Comments: mostly independent with ADLs; recently struggling  IADL Comments: mostly independent with ADLs; recently struggling    Discharge Needs Assessment    Concerns to be Addressed: care coordination/care conferences, discharge planning  Current Discharge Risk: physical impairment  Anticipated Changes Related to Illness: none    Discharge Plan Summary    Patient  Choice  Offered/Gave Vendor List: yes  Patient's Choice of Community Agency(s): pt agreeable to [facility]  Patient and/or patient guardian/advocate was made aware of their right to choose a provider. A list of eligible providers was presented and reviewed with the patient and/or patient guardian/advocate in written and/or verbal form. The list delineates providers in the patient’s desired geographic area who are participating in the Medicare program and/or providers contracted with the patient’s primary insurance. The Medicare list and quality ratings were obtained from the Medicare.gov [medicare.gov] website.    Discharge Plan:  Disposition/Destination: Home Health Care - ML / Home       Connection to Community     Community Resources:      Discharge Transportation:  Is Out of Hospital DNR needed at Discharge: no  Does patient need discharge transport?             Pt returned home, dtr transported  Pt open to C and made referral in [facility] to [facility]      DCP: home with referral for HHC with [facility]    CC following through d/c

## 2023-07-14 NOTE — CONSULTS
Cardiology Consult Note    Subjective     Michel Fang is a 87 y.o. male who was admitted for No admission diagnoses are documented for this encounter..   Refered by: Marcelino Luong MD    HPI:  Mr. Fang is a 88 y/o M, known to Dr. Aline Isaacs (last seen 4/2011), with a remarkable Pmhx of CAD s/p CABG (2000), AS s/p TAVR (2016), moderate AR, HTN, PVD, Hyperlipidemia, Pulmonary sarcoidosis, syncope secondary to dehydration (8/2018), recent UTI on antibiotics that has caused nausea.  He reports he was diaphoretic when he woke up this AM.  No chest pain, dyspnea, palpitations, or dizziness.  He walks 3-4 days/week for 20-45 minutes without chest pain or dyspnea.  His son at bedside states this AM he appeared weak.      His home -150's.  His blood pressure is high in the ED.  His HS troponin is negative.   He denies any recent ischemic evaluation, stress tests or cardiac cath.      Allergies  No known allergies    Prior to Admission medications    Medication Sig Start Date End Date Taking? Authorizing Provider   aspirin 325 mg EC tablet Take 325 mg by mouth daily. 9/18/18   Art Jorgensen MD   atorvastatin (LIPITOR) 40 mg tablet Take 40 mg by mouth daily.      Art Jorgensen MD   fluticasone propionate (FLONASE) 50 mcg/actuation nasal spray Administer 1 spray into each nostril daily.    Provider clarence Cordova MD   hydrALAZINE (APRESOLINE) 25 mg tablet Take 25 mg by mouth 2 (two) times a day. 8/23/21   Art Jorgensen MD   levothyroxine (SYNTHROID) 25 mcg tablet Take 25 mcg by mouth daily.     Art Jorgensen MD   meclizine (ANTIVERT) 12.5 mg tablet Take 12.5 mg by mouth 3 (three) times a day as needed for dizziness.    Balbina Jorgensen MD   pantoprazole (PROTONIX) 40 mg EC tablet Take 40 mg by mouth daily.    Art Jorgensen MD   sulfamethoxazole-trimethoprim (BACTRIM DS) 800-160 mg per tablet Take 1 tablet by mouth 2 (two) times a day for 7 days. smx-tmp DS (BACTRIM)  800-160 mg tabs (1tab q12 D10) 23  Lashell Montiel PA C   tamsulosin (FLOMAX) 0.4 mg capsule Take 0.4 mg by mouth nightly.    Provider, MD Art       No current facility-administered medications for this encounter.     Current Outpatient Medications   Medication Sig Dispense Refill   • aspirin 325 mg EC tablet Take 325 mg by mouth daily.     • atorvastatin (LIPITOR) 40 mg tablet Take 40 mg by mouth daily.       • fluticasone propionate (FLONASE) 50 mcg/actuation nasal spray Administer 1 spray into each nostril daily.     • hydrALAZINE (APRESOLINE) 25 mg tablet Take 25 mg by mouth 2 (two) times a day.     • levothyroxine (SYNTHROID) 25 mcg tablet Take 25 mcg by mouth daily.      • meclizine (ANTIVERT) 12.5 mg tablet Take 12.5 mg by mouth 3 (three) times a day as needed for dizziness.     • pantoprazole (PROTONIX) 40 mg EC tablet Take 40 mg by mouth daily.     • tamsulosin (FLOMAX) 0.4 mg capsule Take 0.4 mg by mouth nightly.         Past Medical History:   Diagnosis Date   • Arthritis    • BPH (benign prostatic hyperplasia)    • CHF (congestive heart failure) (CMS/HCC)    • Coronary artery disease    • Disease of thyroid gland    • Heart disease    • Hyperlipidemia    • Hypertension    • Sarcoidosis        Past Surgical History:   Procedure Laterality Date   • ADENOIDECTOMY     • AORTIC VALVE REPLACEMENT     • APPENDECTOMY     • CARDIAC SURGERY     • CORONARY ARTERY BYPASS GRAFT     • EYE SURGERY Bilateral     cataracts    • POPLITEAL VENOUS ANEURYSM REPAIR     • SKIN BIOPSY     • TONSILLECTOMY         Social History  Social History     Tobacco Use   • Smoking status: Former     Types: Cigarettes     Quit date:      Years since quittin.5   • Smokeless tobacco: Never   Substance Use Topics   • Alcohol use: No   • Drug use: No       Family History   Problem Relation Age of Onset   • Ovarian cancer Biological Mother    • Lymphoma Other         age  17   • Lymphoma Biological Son          "age 20's         Review of Systems: All other systems reviewed and negative except as noted in the HPI.    Physical Exam   Visit Vitals  BP (!) 148/66 (BP Location: Right upper arm, Patient Position: Lying)   Pulse 64   Temp 36.6 °C (97.9 °F) (Tympanic)   Resp (!) 10   Ht 1.753 m (5' 9\")   Wt 83.9 kg (185 lb)   SpO2 97%   BMI 27.32 kg/m²     The patient appears comfortable and is in no apparent distress,    Eyes: Eyelids and sclera normal,   Neck: No jugular venous distention, no thyromegaly, no lymphadenopathy, no carotid bruits,    Lungs: Clear to auscultation, normal respiratory effort,   Heart: Regular rhythm, normal S1 and S2, no murmurs rubs or gallops,   Abdomen: Soft, nontender,    Extremities: No edema, no calf tenderness or swelling, pulses intact,   Skin: No rashes,  Right great toe appears erythematous   Neuro: Alert and oriented without focal deficit,   Psych: Affect normal.      Objective     Labs   Lab Results   Component Value Date    WBC 7.16 07/14/2023    HGB 13.6 (L) 07/14/2023     07/14/2023    ALT 12 07/04/2023    AST 15 07/04/2023     (L) 07/14/2023    K 4.4 07/14/2023     07/14/2023    CREATININE 1.4 (H) 07/14/2023    BUN 21 07/14/2023    CO2 24 07/14/2023    TSH 2.99 11/10/2019    INR 1.2 09/06/2018    HGBA1C 6.1 (H) 06/04/2019    BNP 73 09/06/2018    HSTROPONINI 6.6 07/14/2023         Imaging  I have independently reviewed the pertinent imaging from the last 24 hrs.    ECG   sinus rhythm at 71bpm, RBBB/LAFB    Telemetry  sinus rhythm    Echocardiogram 4/20/23:    •  The left ventricle is normal in size. There is mildly increased left   ventricular wall thickness consistent with mild concentric hypertrophy.   Endocardium is incompletely visualized and segmental wall motion   abnormalities cannot be excluded. Mildly decreased left ventricular   ejection fraction. The left ventricular ejection fraction by visual   estimate is 40% to 45%.   •  LV diastolic function parameters " were technically inadequate and limit   assessment.   •  The right ventricle is not well visualized.   •  The left atrium is mildly dilated.   •  There is moderate mitral valve leaflet thickening. There is at least   trace mitral regurgitation.   •  There is moderate aortic regurgitation. There is a transcatheter   bioprosthetic aortic valve present. There are normal gradients across the   prosthetic valve. There is moderate paravalvular regurgitation present.   Prosthetic aortic valve mean gradient measures 8 mmHg. Dimensionless valve   index is 0.4.   •  Tricuspid valve not well visualized. There is at least trace tricuspid   regurgitation.   •  The proximal segment of the ascending aorta is mildly enlarged. The   proximal segment of the ascending aorta measures 3.6 cm. The proximal   segment of the ascending aorta measures 1.7 cm/m2, indexed for body   surface area.   •  Compared to the prior study of 4/20/2021, the left ventricular systolic   function is probably unchanged, though poor endocardial visualization.     Aortic valve regurgitation appears stable.  Proximal ascending aorta is   overall stable in size, when images compared.         Outside records reviewed..    Assessment     CAD (coronary artery disease)  Assessment & Plan  The ER note states he has some dyspnea, however, the patient denies chest pain or dyspnea.  He has hx of CAD s/p CABG in 2000.  His HS troponin is normal and EKG shows nonspecific changes which are not new compared to previous tracings.  He has not had a recent stress test.  He denies dyspnea multiple times during my interview.  I do not see any evidence of acute ischemia.  He will continue ASA, atorvastatin.      Hypertension  Assessment & Plan  His blood pressure is elevated in the ER.  His only antihypertensive is Hydralazine 25mg BID.  He did take his morning medication.  Ok to give him an extra Hydralazine 25mg now for BP management.      Acute cystitis without  hematuria  Assessment & Plan  Defer to ED medical team.      Aortic stenosis  Assessment & Plan  He has hx of AS s/p TAVR in 2016.  He had an echocardiogram 4/20/23 that noted moderate AR.  Normal gradients across valve.  Moderate paravalvular regurgitation present.  He follows with Dr. Isaacs.            The plan is not final until the patient has been seen by attending cardiologist.  See attestation for final plan.     NEYDA Jack  7/14/2023

## 2023-07-14 NOTE — ED PROVIDER NOTES
Emergency Medicine Note  HPI   HISTORY OF PRESENT ILLNESS     88 yo M, CAD, multiple vessel CABG in , Aortic Stenosis s/p TAVR 2016, HTN, PVD, HLD, pulmonary sarcoidosis, popliteal aneurysm, osteoarthritis,  2019 LEXUS shows EF 55%, moderate paravalvular regurgitation, mild mitral valve regurgitation.    Seen here recently for UTI, was started on keflex and then switched to bactrim s/p cx/s  Pt finished bactrim and endorses urinary frequency, no urinary retention, dysuria or hematuira.     Pt notes that he has had approx 10 days nausea, persistent and did not resolve with his tx for uti.     Pt notes this AM around 0430 he woke up was sittin leopoldo the edge of his bed when he became profusely fatigued and genralized weakness, he had worsened and acute exacerbation of nausea, and profound diaphoresis, and sweat through his shirt. Pt notes that he had gradually felt better as the day has gone one.       Weakness - Generalized          Patient History   PAST HISTORY     Reviewed from Nursing Triage:       Past Medical History:   Diagnosis Date   • Arthritis    • BPH (benign prostatic hyperplasia)    • CHF (congestive heart failure) (CMS/HCC)    • Coronary artery disease    • Disease of thyroid gland    • Heart disease    • Hyperlipidemia    • Hypertension    • Sarcoidosis        Past Surgical History:   Procedure Laterality Date   • ADENOIDECTOMY     • AORTIC VALVE REPLACEMENT     • APPENDECTOMY     • CARDIAC SURGERY     • CORONARY ARTERY BYPASS GRAFT     • EYE SURGERY Bilateral     cataracts    • POPLITEAL VENOUS ANEURYSM REPAIR     • SKIN BIOPSY     • TONSILLECTOMY         Family History   Problem Relation Age of Onset   • Ovarian cancer Biological Mother    • Lymphoma Other         age  17   • Lymphoma Biological Son         age 20's       Social History     Tobacco Use   • Smoking status: Former     Types: Cigarettes     Quit date:      Years since quittin.5   • Smokeless tobacco: Never   Substance Use  Topics   • Alcohol use: No   • Drug use: No         Review of Systems   REVIEW OF SYSTEMS     Review of Systems      VITALS     ED Vitals    Date/Time Temp Pulse Resp BP SpO2 [facility]   07/14/23 1722 -- 71 21 137/72 97 % J(S   07/14/23 1546 -- 68 23 148/65 97 % ARC   07/14/23 1444 -- 64 20 157/68 98 % ARC   07/14/23 1346 -- 62 14 161/69 98 % ARC   07/14/23 1146 -- 64 10 148/66 97 % ARC   07/14/23 1113 36.6 °C (97.9 °F) 71 19 148/66 97 % AMT        Pulse Ox %: 97 % (07/14/23 1207)  Pulse Ox Interpretation: Normal (07/14/23 1207)  Heart Rate: 70 (07/14/23 1207)  Rhythm Strip Interpretation: Normal Sinus Rhythm (07/14/23 1207)     Physical Exam   PHYSICAL EXAM     Physical Exam  Vitals and nursing note reviewed.   Constitutional:       General: He is not in acute distress.     Appearance: He is well-developed. He is not diaphoretic.   HENT:      Head: Normocephalic and atraumatic.      Right Ear: Tympanic membrane normal.      Left Ear: Tympanic membrane normal.      Nose: Nose normal.      Mouth/Throat:      Mouth: Mucous membranes are moist.   Eyes:      Extraocular Movements: Extraocular movements intact.      Conjunctiva/sclera: Conjunctivae normal.      Pupils: Pupils are equal, round, and reactive to light.   Cardiovascular:      Rate and Rhythm: Normal rate and regular rhythm.      Pulses:           Radial pulses are 2+ on the right side and 2+ on the left side.        Dorsalis pedis pulses are 2+ on the right side and 2+ on the left side.   Pulmonary:      Effort: Pulmonary effort is normal. No respiratory distress.      Breath sounds: Normal breath sounds. No decreased breath sounds, wheezing, rhonchi or rales.   Chest:      Chest wall: No tenderness or edema.   Abdominal:      General: Abdomen is flat.      Palpations: Abdomen is soft.   Musculoskeletal:         General: No swelling or tenderness. Normal range of motion.      Cervical back: Normal range of motion and neck supple.   Skin:     General: Skin is warm  and dry.      Capillary Refill: Capillary refill takes less than 2 seconds.   Neurological:      General: No focal deficit present.      Mental Status: He is alert and oriented to person, place, and time. Mental status is at baseline.      Motor: No weakness.           PROCEDURES     Procedures     DATA     Results     Procedure Component Value Units Date/Time    HS Troponin I (with 2 hour reflex) [[id number]]  (Normal) Collected: 07/14/23 1146    Specimen: Blood, Venous Updated: 07/14/23 1257     High Sens Troponin I 6.6 pg/mL     UA with reflex culture [[id number]]  (Abnormal) Collected: 07/14/23 1239    Specimen: Urine, Clean Catch Updated: 07/14/23 1248    Narrative:      The following orders were created for panel order UA with reflex culture.  Procedure                               Abnormality         Status                     ---------                               -----------         ------                     UA Reflex to Culture (Ma...[[id number]]  Abnormal            Final result               UA Microscopic[[id number]]               Abnormal            Final result                 Please view results for these tests on the individual orders.    UA Microscopic [[id number]]  (Abnormal) Collected: 07/14/23 1239    Specimen: Urine, Clean Catch Updated: 07/14/23 1248     RBC, Urine 0 TO 4 /HPF      WBC, Urine 0 TO 3 /HPF      Squamous Epithelial None Seen /hpf      Hyaline Cast None Seen /lpf      Bacteria, Urine None Seen /HPF      Mucus Rare /LPF     UA Reflex to Culture (Macroscopic) [[id number]]  (Abnormal) Collected: 07/14/23 1239    Specimen: Urine, Clean Catch Updated: 07/14/23 1247     Color, Urine Yellow     Clarity, Urine Clear     Specific Gravity, Urine 1.019     pH, Urine 6.0     Leukocyte Esterase Negative     Comment: Results can be falsely negative due to high specific gravity, some antibiotics, glucose >3 g/dl, or WBC other than neutrophils.        Nitrite, Urine Negative     Protein, Urine Trace      Comment: Trace False Positive Protein can be seen with alkaline or highly buffered urines or urine with high specific gravity. Suggest clinical correlation.        Glucose, Urine Negative mg/dL      Ketones, Urine Negative mg/dL      Urobilinogen, Urine 0.2 EU/dL      Bilirubin, Urine Negative mg/dL      Blood, Urine Negative     Comment: The sensitivity of the occult blood test is equivalent to approximately 4 intact RBC/HPF.       Basic metabolic panel [[id number]]  (Abnormal) Collected: 07/14/23 1122    Specimen: Blood, Venous Updated: 07/14/23 1153     Sodium 133 mEQ/L      Potassium 4.4 mEQ/L      Comment: Results obtained on plasma. Plasma Potassium values may be up to 0.4 mEQ/L less than serum values. The differences may be greater for patients with high platelet or white cell counts.        Chloride 101 mEQ/L      CO2 24 mEQ/L      BUN 21 mg/dL      Creatinine 1.4 mg/dL      Glucose 111 mg/dL      Calcium 8.9 mg/dL      eGFR 48.1 mL/min/1.73m*2      Anion Gap 8 mEQ/L     CBC and differential [[id number]]  (Abnormal) Collected: 07/14/23 1122    Specimen: Blood, Venous Updated: 07/14/23 1135     WBC 7.16 K/uL      RBC 4.62 M/uL      Hemoglobin 13.6 g/dL      Hematocrit 42.0 %      MCV 90.9 fL      MCH 29.4 pg      MCHC 32.4 g/dL      RDW 14.4 %      Platelets 210 K/uL      MPV 10.1 fL      Differential Type Auto     nRBC 0.0 %      Immature Granulocytes 0.1 %      Neutrophils 73.0 %      Lymphocytes 17.7 %      Monocytes 7.4 %      Eosinophils 0.8 %      Basophils 1.0 %      Immature Granulocytes, Absolute 0.01 K/uL      Neutrophils, Absolute 5.22 K/uL      Lymphocytes, Absolute 1.27 K/uL      Monocytes, Absolute 0.53 K/uL      Eosinophils, Absolute 0.06 K/uL      Basophils, Absolute 0.07 K/uL           Imaging Results          X-RAY CHEST 1 VIEW (Final result)  Result time 07/14/23 12:08:03    Preliminary Interpretation    No active disease as interpreted by Marcelino gallegos M.D.  Please refer to final result as  interpreted by radiologist for complete detailed description/findings of the study.                    Final result                 Impression:    IMPRESSION:  No acute abnormality.             Narrative:    CLINICAL HISTORY:  atypical chest pain    COMMENT:  Views: Portable frontal    Comparison date: 7/4/2024.    The heart and mediastinal contours are enlarged and sternotomy wires are again  seen. Right lung base chronic interstitial prominence again seen. The lungs are  clear without discrete infiltrate.  There  are no pleural effusions.  The  osseous structures are intact.                                ECG 12 lead          Scoring tools                                  ED Course & MDM   MDM / ED COURSE / CLINICAL IMPRESSION / DISPO     MDM    ED Course as of 07/15/23 0013   Fri Jul 14, 2023   1206 WBC: 7.16 [HL]   1206 Hemoglobin(!): 13.6 [HL]   1206 Creatinine(!): 1.4  Mild increase [HL]   1211 X-RAY CHEST 1 VIEW  The heart and mediastinal contours are enlarged and sternotomy wires are again  seen. Right lung base chronic interstitial prominence again seen. The lungs are  clear without discrete infiltrate.  There  are no pleural effusions.  The  osseous structures are intact.     --  IMPRESSION:  No acute abnormality.        Specimen Collected: 07/14/23 12:03         [HL]   1258 High Sens Troponin I: 6.6 [NC]   1305 Cardiology team paged for consultation in ED [NC]   1307 Discussed with cards PAC, will eval pt in ED.  [NC]   1405 Dr. Herman at bedside for ED cards evaluation, expressed concern for possible vasovagal episode, plan for orthostatic VS and repeat troponin, but otherwise cleared by cardiology for d/c.  [NC]   1427 High Sens Troponin I: 6.6  1st [HL]   1454 High Sens Troponin I: 7.4 [NC]   1507 PT feeling better. Asx on orthostatic VS, negative tilt. Negative trop x2. No ischemci changes on ECG. Right great toe shows no signs of active or acute infection, no acute cystitis on UA. No indication for  additional abx at this time. Discussed with family and pt at bedside.  [NC]   1615 Patient ambulated w/o difficulty in ED, complaint of some mild weakness. No hypoxia. Will discuss with case management to arrange for home care OT/PT eval.  [NC]      ED Course User Index  [HL] Marcelino Luong MD  [NC] Shreyas Walls PA C     Clinical Impression      Diaphoresis   Nausea     _________________     ED Disposition   Discharge                   Shreyas Walls PA C  07/15/23 0013

## 2023-07-14 NOTE — ED ATTESTATION NOTE
I have personally seen and examined Oh Fang. I personally performed the key components of the encounter and provided a substantive portion of the care and medical decision making for this patient.    I reviewed and agree with physician assistant’s assessment and plan of care, with any exceptions as documented below.     My focused history, examination, assessment, and plan of care of Oh Fang is as follows:    Brief History:   87-year-old male history of coronary disease CHF not anticoagulated not on diuretics and just finished a course of antibiotics for UTI presented with nausea for the past 10 days since initiation of antibiotics along with diaphoresis and diffuse generalized malaise and weakness.  Mild shortness of breath without any chest pressure  Focused Physical Exam:   Vitals:    07/14/23 1146   BP: (!) 148/66   Pulse: 64   Resp: (!) 10   Temp:    SpO2: 97%   Awake and alert good eye contact with normal complexion conjunctiva.  Regular rate and rhythm.  Soft nontender nondistended abdomen.  Full range of motion all 4 extremities spontaneously.      Assessment / Plan / MDM:  Clinical history and exam concerning for but not limited to:  ACS equivalent  Occult infection    Monitoring small IV fluid bolus  Monitorings  Chest x-ray labs       Marcelino Luong MD  07/14/23 1211

## 2023-07-14 NOTE — PLAN OF CARE
Care Coordination Admission Assessment Note    General Information:  Readmission Within the last 30 days: no previous admission in last 30 days  Does patient have a : No  Patient-Specific Goals (include timeframe): to go home    Living Arrangements:  Arrived From: home  Current Living Arrangements: home  People in Home: child(arnol), adult (adult son, Abner)  Home Accessibility: ramp to enter home, stairs within home (Group), railings on inside stairs, wheelchair accessible  Living Arrangement Comments: lives in [facility], first floor set up, ramp to enter at backdoor    Housing Stability and Financial Resources (SDOH):  In the last 12 months, was there a time when you were not able to pay the mortgage or rent on time?: No  In the last 12 months, how many places have you lived?: 1  In the last 12 months, was there a time when you did not have a steady place to sleep or slept in a shelter (including now)?: No  How hard is it for you to pay for the very basics like food, housing, medical care, and heating?: Not hard at all    Functional Status Prior to Admission:   Assistive Device/Animal Currently Used at Home: cane, straight, wheelchair, walker, standard, grab bar, shower chair  Functional Status Comments: mostly independent with ADLs; recently struggling  IADL Comments: mostly independent with ADLs; recently struggling     Supports and Services:  Current Outpatient/Agency/Support Group:    Type of Current Home Care Services:    History of home care episode or rehab stay: pt reports recent outpatient PT    Discharge Needs Assessment:   Concerns to be Addressed: care coordination/care conferences, discharge planning  Current Discharge Risk: physical impairment  Anticipated Changes Related to Illness: none    Patient/Family Anticipated Discharge Plan:  Patient/Family Anticipates Transition To: home with family  Patient/Family Anticipated Services at Transition: home health care    Connection to Community        Patient Choice:   Offered/Gave Vendor List: yes  Patient's Choice of Community Agency(s): pt agreeable to [facility]  Patient and/or patient guardian/advocate was made aware of their right to choose a provider. A list of eligible providers was presented and reviewed with the patient and/or patient guardian/advocate in written and/or verbal form. The list delineates providers in the patient’s desired geographic area who are participating in the Medicare program and/or providers contracted with the patient’s primary insurance. The Medicare list and quality ratings were obtained from the Medicare.gov [medicare.gov] website.    Anticipated Discharge Plan:  Met with patient. Provided education and contact information for Care Coordination services.: yes  Anticipated Discharge Disposition: home with home health  Type of Home Care Services: home OT, home PT, nursing      Transportation Needs (SDOH):  Transportation Concerns: no car  Transportation Anticipated: car, drives self, family or friend will provide  Is Out of Hospital DNR needed at discharge?: no    In the past 12 months, has lack of transportation kept you from medical appointments or from getting medications?: No  In the past 12 months, has lack of transportation kept you from meetings, work, or from getting things needed for daily living?: No    Concerns - comments:       Met with pt and dtrKaren, at bedside per consult for discharge planning.   Confirmed PCP and pharmacy  Pt lives at home with adult son, Abner, who provides assistance.   Pt reports increased reliance on wheelchair for mobility; homebound recently   Lives in [facility], but first floor set up, ramp access through back entrance  Pt open to [facility] for nursing, OT/PT  [facility] referral placed for [facility] since after hours  Dtr at bedside to transport at d/c    DCP: home with [facility]    Cc following through d/c

## 2023-07-14 NOTE — DISCHARGE INSTRUCTIONS
Follow up with your primary care provider, call as soon as possible to schedule follow up appointment    Continue home medications as prescribed    Return to emergency department or call 911, if you develop any new or worsening symptoms including but not limited to uncontrollable fever, chills, nausea, vomiting, chest pain, shortness of breath, difficulty breathing, any other concerning symptoms

## 2023-07-14 NOTE — ASSESSMENT & PLAN NOTE
The ER note states he has some dyspnea, however, the patient denies chest pain or dyspnea.  He has hx of CAD s/p CABG in 2000.  His HS troponin is normal and EKG shows nonspecific changes which are not new compared to previous tracings.  He has not had a recent stress test.  He denies dyspnea multiple times during my interview.  I do not see any evidence of acute ischemia.  He will continue ASA, atorvastatin.

## 2023-07-14 NOTE — ASSESSMENT & PLAN NOTE
His blood pressure is elevated in the ER.  His only antihypertensive is Hydralazine 25mg BID.  He did take his morning medication.  Ok to give him an extra Hydralazine 25mg now for BP management.

## 2023-09-18 ENCOUNTER — TRANSCRIBE ORDERS (OUTPATIENT)
Dept: SCHEDULING | Age: 88
End: 2023-09-18

## 2023-09-18 ENCOUNTER — HOSPITAL ENCOUNTER (OUTPATIENT)
Dept: RADIOLOGY | Age: 88
Discharge: HOME | End: 2023-09-18
Attending: FAMILY MEDICINE
Payer: MEDICARE

## 2023-09-18 DIAGNOSIS — M25.511 PAIN IN RIGHT SHOULDER: ICD-10-CM

## 2023-09-18 DIAGNOSIS — M25.511 PAIN IN RIGHT SHOULDER: Primary | ICD-10-CM

## 2023-09-18 PROCEDURE — 73030 X-RAY EXAM OF SHOULDER: CPT | Mod: RT

## 2023-10-31 ENCOUNTER — APPOINTMENT (EMERGENCY)
Dept: RADIOLOGY | Facility: HOSPITAL | Age: 88
DRG: 690 | End: 2023-10-31
Payer: MEDICARE

## 2023-10-31 ENCOUNTER — HOSPITAL ENCOUNTER (INPATIENT)
Facility: HOSPITAL | Age: 88
LOS: 2 days | Discharge: HOME | DRG: 690 | End: 2023-11-02
Attending: EMERGENCY MEDICINE | Admitting: FAMILY MEDICINE
Payer: MEDICARE

## 2023-10-31 DIAGNOSIS — R19.7 VOMITING AND DIARRHEA: ICD-10-CM

## 2023-10-31 DIAGNOSIS — R11.10 VOMITING AND DIARRHEA: ICD-10-CM

## 2023-10-31 DIAGNOSIS — R50.9 FEVER, UNSPECIFIED FEVER CAUSE: ICD-10-CM

## 2023-10-31 DIAGNOSIS — R78.81 BACTEREMIA: Primary | ICD-10-CM

## 2023-10-31 DIAGNOSIS — N39.0 URINARY TRACT INFECTION WITHOUT HEMATURIA, SITE UNSPECIFIED: ICD-10-CM

## 2023-10-31 PROBLEM — R53.1 GENERALIZED WEAKNESS: Status: ACTIVE | Noted: 2023-10-31

## 2023-10-31 PROBLEM — R73.03 PREDIABETES: Status: ACTIVE | Noted: 2023-10-31

## 2023-10-31 PROBLEM — E03.9 HYPOTHYROIDISM: Status: ACTIVE | Noted: 2023-10-31

## 2023-10-31 PROBLEM — R79.89 ELEVATED TROPONIN: Status: ACTIVE | Noted: 2023-10-31

## 2023-10-31 PROBLEM — K21.9 GERD (GASTROESOPHAGEAL REFLUX DISEASE): Status: ACTIVE | Noted: 2023-10-31

## 2023-10-31 PROBLEM — I72.4: Status: ACTIVE | Noted: 2023-10-31

## 2023-10-31 PROBLEM — D69.6 THROMBOCYTOPENIA (CMS/HCC): Status: ACTIVE | Noted: 2023-10-31

## 2023-10-31 LAB
ALBUMIN SERPL-MCNC: 4.3 G/DL (ref 3.5–5.7)
ALP SERPL-CCNC: 73 IU/L (ref 34–125)
ALT SERPL-CCNC: 12 IU/L (ref 7–52)
ANION GAP SERPL CALC-SCNC: 8 MEQ/L (ref 3–15)
AST SERPL-CCNC: 15 IU/L (ref 13–39)
BACTERIA URNS QL MICRO: ABNORMAL /HPF
BASOPHILS # BLD: 0.07 K/UL (ref 0.01–0.1)
BASOPHILS NFR BLD: 0.7 %
BILIRUB SERPL-MCNC: 1 MG/DL (ref 0.3–1.2)
BILIRUB UR QL STRIP.AUTO: NEGATIVE MG/DL
BUN SERPL-MCNC: 19 MG/DL (ref 7–25)
CALCIUM SERPL-MCNC: 9.2 MG/DL (ref 8.6–10.3)
CHLORIDE SERPL-SCNC: 101 MEQ/L (ref 98–107)
CLARITY UR REFRACT.AUTO: CLEAR
CO2 SERPL-SCNC: 26 MEQ/L (ref 21–31)
COLOR UR AUTO: YELLOW
CREAT SERPL-MCNC: 1.3 MG/DL (ref 0.7–1.3)
DIFFERENTIAL METHOD BLD: ABNORMAL
EOSINOPHIL # BLD: 0.03 K/UL (ref 0.04–0.54)
EOSINOPHIL NFR BLD: 0.3 %
ERYTHROCYTE [DISTWIDTH] IN BLOOD BY AUTOMATED COUNT: 14.6 % (ref 11.6–14.4)
FLUAV RNA SPEC QL NAA+PROBE: NEGATIVE
FLUBV RNA SPEC QL NAA+PROBE: NEGATIVE
GFR SERPL CREATININE-BSD FRML MDRD: 52.2 ML/MIN/1.73M*2
GLUCOSE BLD-MCNC: 72 MG/DL (ref 70–99)
GLUCOSE SERPL-MCNC: 134 MG/DL (ref 70–99)
GLUCOSE UR STRIP.AUTO-MCNC: NEGATIVE MG/DL
HCT VFR BLDCO AUTO: 44.6 % (ref 40.1–51)
HGB BLD-MCNC: 14.5 G/DL (ref 13.7–17.5)
HGB UR QL STRIP.AUTO: ABNORMAL
HYALINE CASTS #/AREA URNS LPF: ABNORMAL /LPF
IMM GRANULOCYTES # BLD AUTO: 0.05 K/UL (ref 0–0.08)
IMM GRANULOCYTES NFR BLD AUTO: 0.5 %
KETONES UR STRIP.AUTO-MCNC: NEGATIVE MG/DL
LACTATE SERPL-SCNC: 1.5 MMOL/L (ref 0.4–2)
LACTATE SERPL-SCNC: 2.5 MMOL/L (ref 0.4–2)
LEUKOCYTE ESTERASE UR QL STRIP.AUTO: 1
LIPASE SERPL-CCNC: 9 U/L (ref 11–82)
LYMPHOCYTES # BLD: 0.48 K/UL (ref 1.2–3.5)
LYMPHOCYTES NFR BLD: 5 %
MAGNESIUM SERPL-MCNC: 1.7 MG/DL (ref 1.8–2.5)
MCH RBC QN AUTO: 30 PG (ref 28–33.2)
MCHC RBC AUTO-ENTMCNC: 32.5 G/DL (ref 32.2–36.5)
MCV RBC AUTO: 92.1 FL (ref 83–98)
MONOCYTES # BLD: 0.66 K/UL (ref 0.3–1)
MONOCYTES NFR BLD: 6.9 %
MUCOUS THREADS URNS QL MICRO: ABNORMAL /LPF
NEUTROPHILS # BLD: 8.27 K/UL (ref 1.7–7)
NEUTS SEG NFR BLD: 86.6 %
NITRITE UR QL STRIP.AUTO: POSITIVE
NRBC BLD-RTO: 0 %
PDW BLD AUTO: 10.4 FL (ref 9.4–12.4)
PH UR STRIP.AUTO: 7 [PH]
PLATELET # BLD AUTO: 128 K/UL (ref 150–350)
POCT TEST: NORMAL
POTASSIUM SERPL-SCNC: 4.1 MEQ/L (ref 3.5–5.1)
PROT SERPL-MCNC: 7.6 G/DL (ref 6–8.2)
PROT UR QL STRIP.AUTO: ABNORMAL
RBC # BLD AUTO: 4.84 M/UL (ref 4.5–5.8)
RBC #/AREA URNS HPF: ABNORMAL /HPF
RSV RNA SPEC QL NAA+PROBE: NEGATIVE
SARS-COV-2 RNA RESP QL NAA+PROBE: NEGATIVE
SODIUM SERPL-SCNC: 135 MEQ/L (ref 136–145)
SP GR UR REFRACT.AUTO: 1.01
SQUAMOUS URNS QL MICRO: ABNORMAL /HPF
TROPONIN I SERPL HS-MCNC: 11.7 PG/ML
TROPONIN I SERPL HS-MCNC: 16.8 PG/ML
UROBILINOGEN UR STRIP-ACNC: 0.2 EU/DL
WBC # BLD AUTO: 9.56 K/UL (ref 3.8–10.5)
WBC #/AREA URNS HPF: ABNORMAL /HPF

## 2023-10-31 PROCEDURE — G1004 CDSM NDSC: HCPCS

## 2023-10-31 PROCEDURE — 1123F ACP DISCUSS/DSCN MKR DOCD: CPT | Performed by: FAMILY MEDICINE

## 2023-10-31 PROCEDURE — 84484 ASSAY OF TROPONIN QUANT: CPT | Performed by: EMERGENCY MEDICINE

## 2023-10-31 PROCEDURE — 99223 1ST HOSP IP/OBS HIGH 75: CPT | Performed by: FAMILY MEDICINE

## 2023-10-31 PROCEDURE — 63700000 HC SELF-ADMINISTRABLE DRUG: Performed by: FAMILY MEDICINE

## 2023-10-31 PROCEDURE — 93005 ELECTROCARDIOGRAM TRACING: CPT | Performed by: EMERGENCY MEDICINE

## 2023-10-31 PROCEDURE — 85025 COMPLETE CBC W/AUTO DIFF WBC: CPT | Performed by: EMERGENCY MEDICINE

## 2023-10-31 PROCEDURE — 71045 X-RAY EXAM CHEST 1 VIEW: CPT

## 2023-10-31 PROCEDURE — 83690 ASSAY OF LIPASE: CPT | Performed by: PHYSICIAN ASSISTANT

## 2023-10-31 PROCEDURE — 36415 COLL VENOUS BLD VENIPUNCTURE: CPT | Performed by: EMERGENCY MEDICINE

## 2023-10-31 PROCEDURE — 87040 BLOOD CULTURE FOR BACTERIA: CPT | Performed by: EMERGENCY MEDICINE

## 2023-10-31 PROCEDURE — 87637 SARSCOV2&INF A&B&RSV AMP PRB: CPT | Performed by: EMERGENCY MEDICINE

## 2023-10-31 PROCEDURE — 96361 HYDRATE IV INFUSION ADD-ON: CPT

## 2023-10-31 PROCEDURE — 96365 THER/PROPH/DIAG IV INF INIT: CPT | Mod: 59

## 2023-10-31 PROCEDURE — 83605 ASSAY OF LACTIC ACID: CPT | Performed by: EMERGENCY MEDICINE

## 2023-10-31 PROCEDURE — 63600105 HC IODINE BASED CONTRAST: Mod: JZ | Performed by: PHYSICIAN ASSISTANT

## 2023-10-31 PROCEDURE — 96375 TX/PRO/DX INJ NEW DRUG ADDON: CPT | Mod: 59

## 2023-10-31 PROCEDURE — 87186 SC STD MICRODIL/AGAR DIL: CPT

## 2023-10-31 PROCEDURE — 80053 COMPREHEN METABOLIC PANEL: CPT | Performed by: EMERGENCY MEDICINE

## 2023-10-31 PROCEDURE — 83735 ASSAY OF MAGNESIUM: CPT | Performed by: FAMILY MEDICINE

## 2023-10-31 PROCEDURE — 63700000 HC SELF-ADMINISTRABLE DRUG: Performed by: PHYSICIAN ASSISTANT

## 2023-10-31 PROCEDURE — 84484 ASSAY OF TROPONIN QUANT: CPT | Mod: 91 | Performed by: EMERGENCY MEDICINE

## 2023-10-31 PROCEDURE — 25800000 HC PHARMACY IV SOLUTIONS: Performed by: FAMILY MEDICINE

## 2023-10-31 PROCEDURE — 25800000 HC PHARMACY IV SOLUTIONS: Performed by: PHYSICIAN ASSISTANT

## 2023-10-31 PROCEDURE — 99285 EMERGENCY DEPT VISIT HI MDM: CPT | Mod: 25

## 2023-10-31 PROCEDURE — 81001 URINALYSIS AUTO W/SCOPE: CPT | Performed by: PHYSICIAN ASSISTANT

## 2023-10-31 PROCEDURE — 12000000 HC ROOM AND CARE MED/SURG

## 2023-10-31 PROCEDURE — 63600000 HC DRUGS/DETAIL CODE: Mod: JZ | Performed by: PHYSICIAN ASSISTANT

## 2023-10-31 RX ORDER — ACETAMINOPHEN 325 MG/1
975 TABLET ORAL ONCE
Status: COMPLETED | OUTPATIENT
Start: 2023-10-31 | End: 2023-10-31

## 2023-10-31 RX ORDER — TAMSULOSIN HYDROCHLORIDE 0.4 MG/1
0.4 CAPSULE ORAL NIGHTLY
Status: DISCONTINUED | OUTPATIENT
Start: 2023-10-31 | End: 2023-11-02 | Stop reason: HOSPADM

## 2023-10-31 RX ORDER — IOPAMIDOL 755 MG/ML
100 INJECTION, SOLUTION INTRAVASCULAR
Status: COMPLETED | OUTPATIENT
Start: 2023-10-31 | End: 2023-10-31

## 2023-10-31 RX ORDER — HYDRALAZINE HYDROCHLORIDE 25 MG/1
25 TABLET, FILM COATED ORAL
Status: DISCONTINUED | OUTPATIENT
Start: 2023-11-01 | End: 2023-11-02 | Stop reason: HOSPADM

## 2023-10-31 RX ORDER — PANTOPRAZOLE SODIUM 40 MG/1
40 TABLET, DELAYED RELEASE ORAL
Status: DISCONTINUED | OUTPATIENT
Start: 2023-11-01 | End: 2023-11-02 | Stop reason: HOSPADM

## 2023-10-31 RX ORDER — SODIUM CHLORIDE 9 MG/ML
INJECTION, SOLUTION INTRAVENOUS CONTINUOUS
Status: DISCONTINUED | OUTPATIENT
Start: 2023-10-31 | End: 2023-11-01

## 2023-10-31 RX ORDER — FLUTICASONE PROPIONATE 50 MCG
1 SPRAY, SUSPENSION (ML) NASAL DAILY
Status: DISCONTINUED | OUTPATIENT
Start: 2023-11-01 | End: 2023-11-02 | Stop reason: HOSPADM

## 2023-10-31 RX ORDER — HEPARIN SODIUM 5000 [USP'U]/ML
5000 INJECTION, SOLUTION INTRAVENOUS; SUBCUTANEOUS
Status: DISCONTINUED | OUTPATIENT
Start: 2023-10-31 | End: 2023-11-02 | Stop reason: HOSPADM

## 2023-10-31 RX ORDER — LEVOTHYROXINE SODIUM 25 UG/1
25 TABLET ORAL
Status: DISCONTINUED | OUTPATIENT
Start: 2023-11-01 | End: 2023-11-02 | Stop reason: HOSPADM

## 2023-10-31 RX ORDER — ACETAMINOPHEN 325 MG/1
650 TABLET ORAL EVERY 6 HOURS PRN
Status: DISCONTINUED | OUTPATIENT
Start: 2023-10-31 | End: 2023-11-02 | Stop reason: HOSPADM

## 2023-10-31 RX ORDER — ONDANSETRON HYDROCHLORIDE 2 MG/ML
4 INJECTION, SOLUTION INTRAVENOUS ONCE
Status: COMPLETED | OUTPATIENT
Start: 2023-10-31 | End: 2023-10-31

## 2023-10-31 RX ORDER — DEXTROSE 50 % IN WATER (D50W) INTRAVENOUS SYRINGE
25 AS NEEDED
Status: DISCONTINUED | OUTPATIENT
Start: 2023-10-31 | End: 2023-11-02 | Stop reason: HOSPADM

## 2023-10-31 RX ORDER — IBUPROFEN 200 MG
16-32 TABLET ORAL AS NEEDED
Status: DISCONTINUED | OUTPATIENT
Start: 2023-10-31 | End: 2023-11-02 | Stop reason: HOSPADM

## 2023-10-31 RX ORDER — ASPIRIN 325 MG
325 TABLET, DELAYED RELEASE (ENTERIC COATED) ORAL DAILY
Status: DISCONTINUED | OUTPATIENT
Start: 2023-10-31 | End: 2023-11-02 | Stop reason: HOSPADM

## 2023-10-31 RX ORDER — DEXTROSE 40 %
15-30 GEL (GRAM) ORAL AS NEEDED
Status: DISCONTINUED | OUTPATIENT
Start: 2023-10-31 | End: 2023-11-02 | Stop reason: HOSPADM

## 2023-10-31 RX ORDER — ATORVASTATIN CALCIUM 40 MG/1
40 TABLET, FILM COATED ORAL EVERY EVENING
Status: DISCONTINUED | OUTPATIENT
Start: 2023-10-31 | End: 2023-11-02 | Stop reason: HOSPADM

## 2023-10-31 RX ORDER — SODIUM CHLORIDE 9 MG/ML
INJECTION, SOLUTION INTRAVENOUS CONTINUOUS
Status: DISCONTINUED | OUTPATIENT
Start: 2023-10-31 | End: 2023-10-31

## 2023-10-31 RX ADMIN — TAMSULOSIN HYDROCHLORIDE 0.4 MG: 0.4 CAPSULE ORAL at 20:40

## 2023-10-31 RX ADMIN — CEFTRIAXONE SODIUM 1 G: 1 INJECTION, POWDER, FOR SOLUTION INTRAMUSCULAR; INTRAVENOUS at 11:37

## 2023-10-31 RX ADMIN — ASPIRIN 325 MG: 325 TABLET, COATED ORAL at 18:02

## 2023-10-31 RX ADMIN — ACETAMINOPHEN 975 MG: 325 TABLET ORAL at 09:22

## 2023-10-31 RX ADMIN — ATORVASTATIN CALCIUM 40 MG: 40 TABLET, FILM COATED ORAL at 18:03

## 2023-10-31 RX ADMIN — SODIUM CHLORIDE: 9 INJECTION, SOLUTION INTRAVENOUS at 14:03

## 2023-10-31 RX ADMIN — SODIUM CHLORIDE 1000 ML: 9 INJECTION, SOLUTION INTRAVENOUS at 09:22

## 2023-10-31 RX ADMIN — SODIUM CHLORIDE: 9 INJECTION, SOLUTION INTRAVENOUS at 20:40

## 2023-10-31 RX ADMIN — ONDANSETRON 4 MG: 2 INJECTION INTRAMUSCULAR; INTRAVENOUS at 09:22

## 2023-10-31 RX ADMIN — IOPAMIDOL 100 ML: 755 INJECTION, SOLUTION INTRAVENOUS at 10:40

## 2023-10-31 ASSESSMENT — ENCOUNTER SYMPTOMS
VOMITING: 1
NAUSEA: 1
FATIGUE: 1
DIARRHEA: 1
APPETITE CHANGE: 1
FREQUENCY: 1
CHILLS: 1
FEVER: 1
SHORTNESS OF BREATH: 0
FLANK PAIN: 0
ABDOMINAL PAIN: 0
COUGH: 0

## 2023-10-31 ASSESSMENT — COGNITIVE AND FUNCTIONAL STATUS - GENERAL
STANDING UP FROM CHAIR USING ARMS: 3 - A LITTLE
CLIMB 3 TO 5 STEPS WITH RAILING: 3 - A LITTLE
STANDING UP FROM CHAIR USING ARMS: 3 - A LITTLE
WALKING IN HOSPITAL ROOM: 3 - A LITTLE
CLIMB 3 TO 5 STEPS WITH RAILING: 3 - A LITTLE
MOVING TO AND FROM BED TO CHAIR: 3 - A LITTLE
MOVING TO AND FROM BED TO CHAIR: 3 - A LITTLE
WALKING IN HOSPITAL ROOM: 3 - A LITTLE

## 2023-10-31 NOTE — H&P
Hospital Medicine Service -  History & Physical        CHIEF COMPLAINT   Fever, generalized weakness, lethargy     HISTORY OF PRESENT ILLNESS      Michel Fang is a 88 y.o. male with a past medical history significant for CAD s/p CABG (2000), aortic stenosis s/p TAVR (2016), PVD, bilateral popliteal aneurysms, HTN, HLD, pulmonary sarcoidosis, h/o bacteremia, h/o GIB due to gastritis, umbilical hernia, hypothyroidism, prediabetes (HbA1C 5.8 on 3/27/2023), BPH, arthritis, shingles and h/o COVID-19 infection (3/2022) who presents from from home with fever, lethargy, and generalized weakness. Patient reports developing urinary frequency and dysuria over the past two days. Patient also reports having generalized weakness. Patient says he had difficulty ambulating at home; he typically ambulates with a walker at baseline. Patient had one episode of urinary and bowel incontinence at home - he says this occurred as he was unable to stand to get to the bathroom in time. Patient does not think he had a fever at home. Patient developed some nausea overnight and has had 2-3 episodes of bilious (but non-bloody) emesis. Patient denies current nausea and has not had further emesis since arrival to ER. Patient has not had further instances of urinary or bowel incontinence. Patient had not had any further loose stools.     Patient denies chills, lightheadedness, dizziness, headache, vision changes, sinus tenderness, nasal congestion, rhinorrhea, sore throat, cough, neck stiffness, chest pain, shortness of breath, palpitations, back pain, abdominal pain, hematochezia, melena, saddle anesthesia, lower extremity edema, calf tenderness, numbness, tingling, or focal weakness. Patient offers no further complaints. Patient denies any recent falls or injuries. Patient's daughter Maeve notes that patient appeared to have garbled speech this morning while in ER but this reportedly improved after he drank a cup of water - she feels this was  due to his mouth being dry and did not recur. Patient's daughter did not notice facial droop or further speech difficulty. I recommended checking CT Head but patient and his daughter declined.      In ER, patient is febrile to 101.8 F; remainder of vitals are stable. Patient is not hypoxic. Labwork is remarkable for mild hyponatremia (Na+ 135), elevated lactate (2.5), and mild thrombocytopenia (128 - previously 160 on 3/27/2023). UA appears consistent with infection; it shows positive nitrite, +1 leukocyte esterase, and rare bacteria. COVID-19 nasal swab is negative. Influenza/RSV swab is negative. Blood cultures were obtained in ER. CT Abdomen Pelvis without acute inflammatory or obstructive process in the abdomen or pelvis. In ER, patient received 975 mg Tylenol, 1 g Rocephin, 4 mg IV Zofran, and 1L NS bolus.     I discussed code status with patient and his daughter Maeve. Patient is a full code. Patient's daughter, Maeve, is his emergency contact and surrogate decision maker. She can be reached at [phone].     PAST MEDICAL AND SURGICAL HISTORY      Past Medical History:   Diagnosis Date    Arthritis     BPH (benign prostatic hyperplasia)     CHF (congestive heart failure) (CMS/[facility])     Coronary artery disease     Disease of thyroid gland     Heart disease     Hyperlipidemia     Hypertension     Sarcoidosis        Past Surgical History:   Procedure Laterality Date    ADENOIDECTOMY      AORTIC VALVE REPLACEMENT      APPENDECTOMY      CARDIAC SURGERY      CORONARY ARTERY BYPASS GRAFT      EYE SURGERY Bilateral     cataracts     POPLITEAL VENOUS ANEURYSM REPAIR      SKIN BIOPSY      TONSILLECTOMY         PCP: Johnathan Leone MD    MEDICATIONS      Prior to Admission medications    Medication Sig Start Date End Date Taking? Authorizing Provider   aspirin 325 mg EC tablet Take 325 mg by mouth daily. 9/18/18   Provider, MD Art   atorvastatin (LIPITOR) 40 mg tablet Take 40 mg by mouth  daily.      Art Jorgensen MD   fluticasone propionate (FLONASE) 50 mcg/actuation nasal spray Administer 1 spray into each nostril daily.    Balbina Jorgensen MD   hydrALAZINE (APRESOLINE) 25 mg tablet Take 25 mg by mouth 2 (two) times a day. 21   Art Jorgensen MD   levothyroxine (SYNTHROID) 25 mcg tablet Take 25 mcg by mouth daily.     Art Jorgensen MD   meclizine (ANTIVERT) 12.5 mg tablet Take 12.5 mg by mouth 3 (three) times a day as needed for dizziness.    Balbina Jorgensen MD   pantoprazole (PROTONIX) 40 mg EC tablet Take 40 mg by mouth daily.    Art Jorgensen MD   tamsulosin (FLOMAX) 0.4 mg capsule Take 0.4 mg by mouth nightly.    Art Jorgensen MD       ALLERGIES      No known allergies    FAMILY HISTORY      Family History   Problem Relation Age of Onset    Ovarian cancer Biological Mother     Lymphoma Other         age  17    Lymphoma Biological Son         age 20's       SOCIAL HISTORY      Social History     Socioeconomic History    Marital status:    Tobacco Use    Smoking status: Former     Types: Cigarettes     Quit date:      Years since quittin.8    Smokeless tobacco: Never   Substance and Sexual Activity    Alcohol use: No    Drug use: No    Sexual activity: Defer     Social Determinants of Health     Financial Resource Strain: Low Risk  (2023)    Overall Financial Resource Strain (CARDIA)     Difficulty of Paying Living Expenses: Not hard at all   Food Insecurity: No Food Insecurity (10/31/2023)    Hunger Vital Sign     Worried About Running Out of Food in the Last Year: Never true     Ran Out of Food in the Last Year: Never true   Transportation Needs: No Transportation Needs (2023)    PRAPARE - Transportation     Lack of Transportation (Medical): No     Lack of Transportation (Non-Medical): No   Housing Stability: Low Risk  (2023)    Housing Stability Vital Sign     Unable to Pay for Housing  in the Last Year: No     Number of Places Lived in the Last Year: 1     Unstable Housing in the Last Year: No       REVIEW OF SYSTEMS      All other systems reviewed and negative except as noted in HPI    PHYSICAL EXAMINATION      Temp:  [37.4 °C (99.4 °F)-38.8 °C (101.8 °F)] 37.4 °C (99.4 °F)  Heart Rate:  [62-94] 62  Resp:  [20-22] 22  BP: (115-174)/(43-74) 118/61  Body mass index is 25.8 kg/m².    Physical Exam  Gen: NAD  HENT: NC/AT, PERRL, EOMI, no facial droop  Neck: Supple, nontender  CV: RRR, +S1, S2  Pulm: CTAB, no increased WOB, on room air  Abd: Soft, nontender, no guadring or rebound, + bowel sounds, no CVA tenderness  Back: No midline or paraspinal muscle tenderness  Extr: No LE edema, no calf tenderness  Neuro: AAOx3, CN 2-12 grossly intact, speech is clear, 5/5 strength throughout, no extremity drift, sensation grossly intact, finger-to-nose intact bilaterally  Skin: Warm and dry  Psych: Cooperative    LABS / IMAGING / EKG        Labs  Results from last 7 days   Lab Units 10/31/23  0912   WBC K/uL 9.56   HEMOGLOBIN g/dL 14.5   HEMATOCRIT % 44.6   PLATELETS K/uL 128*     Results from last 7 days   Lab Units 10/31/23  0912   SODIUM mEQ/L 135*   POTASSIUM mEQ/L 4.1   CHLORIDE mEQ/L 101   CO2 mEQ/L 26   BUN mg/dL 19   CREATININE mg/dL 1.3   CALCIUM mg/dL 9.2   ALBUMIN g/dL 4.3   BILIRUBIN TOTAL mg/dL 1.0   ALK PHOS IU/L 73   ALT IU/L 12   AST IU/L 15   GLUCOSE mg/dL 134*     Lactate 2.5 -> 1.5    Troponin 11.7 -> 16.8    UA with +1 leukocyte esterase, positive nitrite, trace protein, rare bacteria and squamous cells    COVID-19 nasal swab is negative  Influenza/RSV swab is negative    Imaging  CT Abdomen Pelvis  IMPRESSION:   1. No acute inflammatory or obstructive process in the abdomen or pelvis.   2. Stable findings as described above.     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     ECG/Telemetry  EKG  HR 97  Sinus rhythm  RBBB - noted on previous EKG  on 7/14/2023  QTc 492 ms    ASSESSMENT AND PLAN           * UTI (urinary tract infection)  Assessment & Plan  Patient is febrile to 101.8F and has low normal blood pressure in ER  UA is concerning for infection with positive nitrite  Follow up urine culture  Follow up blood cultures  CT Abdomen Pelvis without acute inflammatory or obstructive process  Patient received ceftriaxone in ER; will continue  IVF    Elevated troponin  Assessment & Plan  Troponin mildly elevated (11.7 -> 16.8)  Patient denies chest pain  EKG without acute ischemic changes  Suspect likely mildly elevated due to acute myocardial injury in setting of infection  Monitor on telemetry    Bilateral popliteal artery aneurysm (CMS/HCC)  Assessment & Plan  Per review of records, left popliteal aneurysm is thrombosed  Follows with Dr. Medardo Mclain ([facility])   Per office note on 5/10/2023 from vascular surgeon: has palpable femoral pulses and a faintly palpable R popliteal pulse. Unable to palpate distal pulses. PVRs demonstrate overall diminished waveforms and medial calcinosis with left TBI of 0.21 and R TBI of 0.39. Both TBI are slightly reduced since 11/2022. R popliteal aneurysm is stable around 3cm. I favor continued conservative management with repeat PVRs and RLE arterial duplex in 1 year    Pulmonary sarcoidosis (CMS/HCC)  Assessment & Plan  Currently denies any respiratory symptoms   Follows with Dr. Lor Villanueva ([facility])    HTN (hypertension)  Assessment & Plan  Will hold hydralazine due to low-normal blood pressure in ER (SBP 110s during my evaluation)  Consider restarting if blood pressure is stable  Monitor blood pressure    CAD (coronary artery disease)  Assessment & Plan  Currently asymptomatic  S/p CABG (2000)  On ASA and atorvastatin  Follows with Dr. Aline Isaacs ([facility])    Generalized weakness  Assessment & Plan  Likely occurring in setting of acute infection  PT/OT evaluation  Fall precautions      Thrombocytopenia (CMS/HCC)  Assessment & Plan  Patient with mild thrombocytopenia (platelets 128)  Suspect likely occurring in setting of acute infection  Monitor CBC  No signs of active bleeding    Hypothyroidism  Assessment & Plan  Continue levothyroxine    Prediabetes  Assessment & Plan  HbA1C 5.8 on 3/27/2023  Monitor BG with daily labs  NCS diet    Hyperlipidemia  Assessment & Plan  Continue atorvastatin    Benign prostatic hyperplasia  Assessment & Plan  Continue tamsulosin  Monitor intake/output    GERD (gastroesophageal reflux disease)  Assessment & Plan  Continue pantoprazole       VTE Assessment: [place] VTE Score: 2  VTE Prophylaxis: [facility]  Code Status: Full Code  Estimated Discharge Date: 11/2/2023  Disposition Planning: Will admit for further evaluation and treatment     Uzma Queen MD  10/31/2023

## 2023-10-31 NOTE — ED PROVIDER NOTES
Emergency Medicine Note  HPI   HISTORY OF PRESENT ILLNESS     88 year old male with a history of hypertension hyperlipidemia coronary artery disease congestive heart failure presents to the ER for evaluation. Pt generally weak today. He says that he's been urinating on himself multiple times and having diarrrhea. He's also had nausea and vomiting. Not really complaining of any abdominal pain. He arrives with a fever in the ER. He's had decreased PO intake. No sob cp or cough             Patient History   PAST HISTORY     Reviewed from Nursing Triage:  Allergies  Meds       Past Medical History:   Diagnosis Date    Arthritis     BPH (benign prostatic hyperplasia)     CHF (congestive heart failure) (CMS/[facility]ly [facility])     Coronary artery disease     Disease of thyroid gland     Heart disease     Hyperlipidemia     Hypertension     Sarcoidosis        Past Surgical History:   Procedure Laterality Date    ADENOIDECTOMY      AORTIC VALVE REPLACEMENT      APPENDECTOMY      CARDIAC SURGERY      CORONARY ARTERY BYPASS GRAFT      EYE SURGERY Bilateral     cataracts     POPLITEAL VENOUS ANEURYSM REPAIR      SKIN BIOPSY      TONSILLECTOMY         Family History   Problem Relation Age of Onset    Ovarian cancer Biological Mother     Lymphoma Other         age  17    Lymphoma Biological Son         age 20's       Social History     Tobacco Use    Smoking status: Former     Types: Cigarettes     Quit date:      Years since quittin.8    Smokeless tobacco: Never   Substance Use Topics    Alcohol use: No    Drug use: No         Review of Systems   REVIEW OF SYSTEMS     Review of Systems   Constitutional: Positive for appetite change, chills, fatigue and fever.   Respiratory: Negative for cough and shortness of breath.    Cardiovascular: Negative for chest pain.   Gastrointestinal: Positive for diarrhea, nausea and vomiting. Negative for abdominal pain.   Genitourinary: Positive for frequency. Negative for  flank pain.        Incontinence         VITALS     ED Vitals    Date/Time Temp Pulse Resp BP SpO2 [facility]   10/31/23 1540 -- 60 20 134/57 96 % CW   10/31/23 1440 -- 68 20 147/60 94 % CW   10/31/23 1403 37.4 °C (99.4 °F) -- -- -- -- CW   10/31/23 1340 -- 62 22 118/61 95 % CW   10/31/23 1240 -- 72 -- 123/56 94 % CW   10/31/23 1140 -- 74 22 115/43 92 % CW   10/31/23 0859 38.8 °C (101.8 °F) 94 20 174/74 94 % CW        Pulse Ox %: 94 % (10/31/23 0931)  Pulse Ox Interpretation: Normal (10/31/23 0931)  Heart Rate: 94 (10/31/23 0931)  Rhythm Strip Interpretation: Normal Sinus Rhythm (10/31/23 0931)     Physical Exam   PHYSICAL EXAM     Physical Exam  Constitutional:       General: He is not in acute distress.     Appearance: Normal appearance. He is not ill-appearing, toxic-appearing or diaphoretic.   HENT:      Head: Normocephalic and atraumatic.      Mouth/Throat:      Comments: Severely dry MM  Cardiovascular:      Rate and Rhythm: Normal rate and regular rhythm.   Pulmonary:      Effort: Pulmonary effort is normal.      Breath sounds: Normal breath sounds.   Abdominal:      General: There is no distension.      Palpations: Abdomen is soft.      Tenderness: There is abdominal tenderness (diffuse). There is no guarding.   Musculoskeletal:      Cervical back: Neck supple.   Skin:     General: Skin is warm and dry.   Neurological:      Mental Status: He is alert.           PROCEDURES     Procedures     DATA     Results     Procedure Component Value Units Date/Time    SARS-CoV-2 (COVID-19), PCR Nasopharynx [[id number]]  (Normal) Collected: 10/31/23 0944    Specimen: Nasopharyngeal Swab from Nasopharynx Updated: 10/31/23 1034    Narrative:      The following orders were created for panel order SARS-CoV-2 (COVID-19), PCR Nasopharynx.  Procedure                               Abnormality         Status                     ---------                               -----------         ------                     SARS-COV-2 (COVID-19)/  F...[[id number]]  Normal              Final result                 Please view results for these tests on the individual orders.    SARS-COV-2 (COVID-19)/ FLU A/B, AND RSV, PCR Nasopharynx [[id number]]  (Normal) Collected: 10/31/23 0944    Specimen: Nasopharyngeal Swab from Nasopharynx Updated: 10/31/23 1034     SARS-CoV-2 (COVID-19) Negative     Influenza A Negative     Influenza B Negative     Respiratory Syncytial Virus Negative    Narrative:      Testing performed using real-time PCR for detection of COVID-19. EUA approved validation studies performed on site.     CBC and differential [[id number]]  (Abnormal) Collected: 10/31/23 0912    Specimen: Blood, Venous Updated: 10/31/23 1029     WBC 9.56 K/uL      RBC 4.84 M/uL      Hemoglobin 14.5 g/dL      Hematocrit 44.6 %      MCV 92.1 fL      MCH 30.0 pg      MCHC 32.5 g/dL      RDW 14.6 %      Platelets 128 K/uL      Comment: CONFIRMED WITH SMEAR ESTIMATE        MPV 10.4 fL      Differential Type Auto     nRBC 0.0 %      Immature Granulocytes 0.5 %      Neutrophils 86.6 %      Lymphocytes 5.0 %      Monocytes 6.9 %      Eosinophils 0.3 %      Basophils 0.7 %      Immature Granulocytes, Absolute 0.05 K/uL      Neutrophils, Absolute 8.27 K/uL      Lymphocytes, Absolute 0.48 K/uL      Monocytes, Absolute 0.66 K/uL      Eosinophils, Absolute 0.03 K/uL      Basophils, Absolute 0.07 K/uL     Urinalysis with Reflex Culture (ED and Outpatient only) [[id number]]  (Abnormal) Collected: 10/31/23 0944    Specimen: Urine, Clean Catch Updated: 10/31/23 1005    Narrative:      The following orders were created for panel order Urinalysis with Reflex Culture (ED and Outpatient only).  Procedure                               Abnormality         Status                     ---------                               -----------         ------                     UA Reflex to Culture (Ma...[[id number]]  Abnormal            Final result               UA Microscopic[[id number]]                Abnormal            Final result                 Please view results for these tests on the individual orders.    UA Microscopic [[id number]]  (Abnormal) Collected: 10/31/23 0944    Specimen: Urine, Clean Catch Updated: 10/31/23 1005     RBC, Urine 0 TO 4 /HPF      WBC, Urine 0 TO 3 /HPF      Squamous Epithelial Rare /hpf      Hyaline Cast None Seen /lpf      Bacteria, Urine Rare /HPF      Mucus Rare /LPF     UA Reflex to Culture (Macroscopic) [[id number]]  (Abnormal) Collected: 10/31/23 0944    Specimen: Urine, Clean Catch Updated: 10/31/23 0958     Color, Urine Yellow     Clarity, Urine Clear     Specific Gravity, Urine 1.014     pH, Urine 7.0     Leukocyte Esterase +1     Nitrite, Urine Positive     Protein, Urine Trace     Comment: Trace False Positive Protein can be seen with alkaline or highly buffered urines or urine with high specific gravity. Suggest clinical correlation.        Glucose, Urine Negative mg/dL      Ketones, Urine Negative mg/dL      Urobilinogen, Urine 0.2 EU/dL      Bilirubin, Urine Negative mg/dL      Blood, Urine Trace     Comment: The sensitivity of the occult blood test is equivalent to approximately 4 intact RBC/HPF.       Lipase [[id number]]  (Abnormal) Collected: 10/31/23 0912    Specimen: Blood, Venous Updated: 10/31/23 0957     Lipase 9 U/L     HS Troponin (with 2 hour reflex) [[id number]]  (Normal) Collected: 10/31/23 0912    Specimen: Blood, Venous Updated: 10/31/23 0955     High Sens Troponin I 11.7 pg/mL     Blood Culture Blood, Venous [[id number]] Collected: 10/31/23 0944    Specimen: Blood, Venous Updated: 10/31/23 0953    Comprehensive metabolic panel [[id number]]  (Abnormal) Collected: 10/31/23 0912    Specimen: Blood, Venous Updated: 10/31/23 0947     Sodium 135 mEQ/L      Potassium 4.1 mEQ/L      Comment: Results obtained on plasma. Plasma Potassium values may be up to 0.4 mEQ/L less than serum values. The differences may be greater for patients with high platelet or white cell  counts.        Chloride 101 mEQ/L      CO2 26 mEQ/L      BUN 19 mg/dL      Creatinine 1.3 mg/dL      Glucose 134 mg/dL      Calcium 9.2 mg/dL      AST (SGOT) 15 IU/L      ALT (SGPT) 12 IU/L      Alkaline Phosphatase 73 IU/L      Total Protein 7.6 g/dL      Comment: Test performed on plasma which typically contains approximately 0.4 g/dL more protein than serum.        Albumin 4.3 g/dL      Bilirubin, Total 1.0 mg/dL      eGFR 52.2 mL/min/1.73m*2      Anion Gap 8 mEQ/L     Lactate, w/ reflex repeat if > 2.0 [[id number]]  (Abnormal) Collected: 10/31/23 0912    Specimen: Blood, Venous Updated: 10/31/23 0922     Lactate 2.5 mmol/L     Blood Culture Blood, Venous [[id number]] Collected: 10/31/23 0912    Specimen: Blood, Venous Updated: 10/31/23 0920    Extra Tubes [[id number]] Collected: 10/31/23 0912    Specimen: Blood, Venous Updated: 10/31/23 0919    Narrative:      The following orders were created for panel order Extra Tubes.  Procedure                               Abnormality         Status                     ---------                               -----------         ------                     Extra Tube Lt Blue[[id number]]                               In process                   Please view results for these tests on the individual orders.    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    COMMENT:Frontal portable erect view the chest performed and compared with  examinations dating back to July 4, 2023. Right basilar reticular opacities,  possibly due to chronic fibrosis. Sternotomy changes. No evidence of a pleural  effusion, pneumothorax or pneumomediastinum. Atheromatous changes of aorta.             Narrative:    CLINICAL  HISTORY: fever                               CT ABDOMEN PELVIS WITH IV CONTRAST (Final result)  Result time 10/31/23 11:12:51    Final result                 Impression:    IMPRESSION:  1. No acute inflammatory or obstructive process in the abdomen or pelvis.  2. Stable findings as described above.               Narrative:    CLINICAL HISTORY: Nausea and vomiting with abdominal pain.    CT of the Abdomen and Pelvis was performed following the uneventful  administration of 100 cc of Isovue-370 administered IV.    CT DOSE:  One or more dose reduction techniques (e.g. automated exposure  control, adjustment of the mA and/or kV according to patient size, use of  iterative reconstruction technique) utilized for this examination.    COMPARISON STUDIES: 07/04/2023    COMMENT:    LUNG BASES: Once again noted are findings consistent with lower lung pulmonary  fibrosis/interstitial lung disease. The heart is stable in size. Aortic valve  prosthesis in place. Moderate-sized hiatal hernia.    LIVER: Stable cyst in the right hepatic lobe.    GALLBLADDER: Small layering calculi and/or sludge within the dependent portion  of the gallbladder, unchanged.    SPLEEN: Unremarkable    PANCREAS: Unremarkable    ADRENAL GLANDS: Normal in contour and configuration    KIDNEYS: Symmetric enhancement and excretion of contrast. No hydronephrosis.  There is an incidental note made of left renal lesion measuring less than 1.0  cm. This is unchanged. Areas of renal cortical thinning on the left compatible  with scarring.    BOWEL: Diverticulosis without evidence for acute diverticulitis. Normal caliber.    LYMPH NODES: No significant lymphadenopathy    AORTA: Atherosclerotic vascular disease    PELVIC STRUCTURES: Prostatomegaly with impingement on the urinary bladder.    ABDOMINAL WALL: Stable fat-containing umbilical and inguinal hernias.    BONES:  Stable degenerative change of the spine with redemonstration of L4  compression fracture.                                   ECG 12 lead          Scoring tools                                  ED Course & MDM   MDM / ED COURSE / CLINICAL IMPRESSION / DISPO     MDM    ED Course as of 10/31/23 1655   e Oct 31, 2023   0931 IV fluid resuscitation in progress [KAMERON]   0959 Leukocyte Esterase(!): +1 [AC]   0959 Nitrite, Urine(!): Positive [AC]   1230 CT neg for acute process  Pt +febrile w/ UTI, weakness, diarrhea, vomiting.  IV rocephin given  Will admit to [facility]  [AC]      ED Course User Index  [AC] oDtty Herrera PA C  [KAMERON] Yoni Hsieh,      Clinical Impression      Bacteremia   Fever, unspecified fever cause   Urinary tract infection without hematuria, site unspecified   Vomiting and diarrhea     _________________     ED Disposition   Admit / Observation                   Dotty Herrera PA C  10/31/23 1658

## 2023-10-31 NOTE — ASSESSMENT & PLAN NOTE
-Currently asymptomatic  -Continue outpatient follow up with Dr. Lor Villanueva ([facility])

## 2023-10-31 NOTE — ASSESSMENT & PLAN NOTE
-Presenting with generalized weakness, fever and urinary symptoms  -UA with positive nitrite and 1+ leukocyte esterase, concerning for possible UTI, present on admission  -CT A/P non acute on admission  -Received 3 days of IV rocephin  -Prelim urine culture growing GNR. Will discharge on bactrim due to complicated UTI and prior tcultures

## 2023-10-31 NOTE — ASSESSMENT & PLAN NOTE
-History of CABG   -Continue ASA and atorvastatin  -Routine outpatient follow up with Dr. Aline Isaacs ([facility])

## 2023-10-31 NOTE — ED ATTESTATION NOTE
I have personally seen and examined the patient.  I reviewed and agree with physician assistant / nurse practitioners assessment and plan of care.     Exam: Patient is ill-appearing in mild distress with an elevated temperature of 101.8.  Remainder of the vital signs are stable.  Heart is regular without murmur.  Lungs are clear to auscultation bilaterally.  Patient has no abdominal tenderness to palpation.  Although patient is lethargic he is easily arousable with an intact neuro exam.    Plan: Patient presents with fever and lethargy concerning for bacteremia.  We will panculture.  Will check labs, UA and a chest x-ray as well as a nasal swab to further evaluate.  We will treat with IV fluids, broad-spectrum antibiotics and antipyretics.           Yoni Hsieh,   10/31/23 0974

## 2023-11-01 PROBLEM — D64.9 NORMOCYTIC ANEMIA: Status: ACTIVE | Noted: 2023-11-01

## 2023-11-01 LAB
ANION GAP SERPL CALC-SCNC: 7 MEQ/L (ref 3–15)
BASOPHILS # BLD: 0.06 K/UL (ref 0.01–0.1)
BASOPHILS NFR BLD: 0.6 %
BUN SERPL-MCNC: 17 MG/DL (ref 7–25)
CALCIUM SERPL-MCNC: 7.9 MG/DL (ref 8.6–10.3)
CHLORIDE SERPL-SCNC: 107 MEQ/L (ref 98–107)
CO2 SERPL-SCNC: 24 MEQ/L (ref 21–31)
CREAT SERPL-MCNC: 1.1 MG/DL (ref 0.7–1.3)
DIFFERENTIAL METHOD BLD: ABNORMAL
EOSINOPHIL # BLD: 0.05 K/UL (ref 0.04–0.54)
EOSINOPHIL NFR BLD: 0.5 %
ERYTHROCYTE [DISTWIDTH] IN BLOOD BY AUTOMATED COUNT: 15.1 % (ref 11.6–14.4)
GFR SERPL CREATININE-BSD FRML MDRD: >60 ML/MIN/1.73M*2
GLUCOSE SERPL-MCNC: 93 MG/DL (ref 70–99)
HCT VFR BLDCO AUTO: 38.1 % (ref 40.1–51)
HGB BLD-MCNC: 12.2 G/DL (ref 13.7–17.5)
IMM GRANULOCYTES # BLD AUTO: 0.05 K/UL (ref 0–0.08)
IMM GRANULOCYTES NFR BLD AUTO: 0.5 %
LYMPHOCYTES # BLD: 1.14 K/UL (ref 1.2–3.5)
LYMPHOCYTES NFR BLD: 11.4 %
MAGNESIUM SERPL-MCNC: 1.9 MG/DL (ref 1.8–2.5)
MCH RBC QN AUTO: 30 PG (ref 28–33.2)
MCHC RBC AUTO-ENTMCNC: 32 G/DL (ref 32.2–36.5)
MCV RBC AUTO: 93.6 FL (ref 83–98)
MONOCYTES # BLD: 0.88 K/UL (ref 0.3–1)
MONOCYTES NFR BLD: 8.8 %
NEUTROPHILS # BLD: 7.78 K/UL (ref 1.7–7)
NEUTS SEG NFR BLD: 78.2 %
NRBC BLD-RTO: 0 %
PDW BLD AUTO: 10.9 FL (ref 9.4–12.4)
PLATELET # BLD AUTO: 116 K/UL (ref 150–350)
PLATELET # BLD EST: ABNORMAL 10*3/UL
POTASSIUM SERPL-SCNC: 4 MEQ/L (ref 3.5–5.1)
RBC # BLD AUTO: 4.07 M/UL (ref 4.5–5.8)
SODIUM SERPL-SCNC: 138 MEQ/L (ref 136–145)
WBC # BLD AUTO: 9.96 K/UL (ref 3.8–10.5)

## 2023-11-01 PROCEDURE — 97530 THERAPEUTIC ACTIVITIES: CPT | Mod: GP

## 2023-11-01 PROCEDURE — 20600000 HC ROOM AND CARE INTERMEDIATE/TELEMETRY

## 2023-11-01 PROCEDURE — 25800000 HC PHARMACY IV SOLUTIONS: Performed by: FAMILY MEDICINE

## 2023-11-01 PROCEDURE — 80048 BASIC METABOLIC PNL TOTAL CA: CPT | Performed by: FAMILY MEDICINE

## 2023-11-01 PROCEDURE — 97530 THERAPEUTIC ACTIVITIES: CPT | Mod: GO

## 2023-11-01 PROCEDURE — 63600000 HC DRUGS/DETAIL CODE: Mod: JZ | Performed by: FAMILY MEDICINE

## 2023-11-01 PROCEDURE — 99233 SBSQ HOSP IP/OBS HIGH 50: CPT | Mod: FS | Performed by: HOSPITALIST

## 2023-11-01 PROCEDURE — 63700000 HC SELF-ADMINISTRABLE DRUG

## 2023-11-01 PROCEDURE — 83735 ASSAY OF MAGNESIUM: CPT | Performed by: FAMILY MEDICINE

## 2023-11-01 PROCEDURE — 97166 OT EVAL MOD COMPLEX 45 MIN: CPT | Mod: GO

## 2023-11-01 PROCEDURE — 97162 PT EVAL MOD COMPLEX 30 MIN: CPT | Mod: GP

## 2023-11-01 PROCEDURE — 85025 COMPLETE CBC W/AUTO DIFF WBC: CPT | Performed by: FAMILY MEDICINE

## 2023-11-01 PROCEDURE — 36415 COLL VENOUS BLD VENIPUNCTURE: CPT | Performed by: FAMILY MEDICINE

## 2023-11-01 PROCEDURE — 63700000 HC SELF-ADMINISTRABLE DRUG: Performed by: FAMILY MEDICINE

## 2023-11-01 RX ADMIN — TAMSULOSIN HYDROCHLORIDE 0.4 MG: 0.4 CAPSULE ORAL at 21:02

## 2023-11-01 RX ADMIN — HEPARIN SODIUM 5000 UNITS: 5000 INJECTION, SOLUTION INTRAVENOUS; SUBCUTANEOUS at 05:50

## 2023-11-01 RX ADMIN — HYDRALAZINE HYDROCHLORIDE 25 MG: 25 TABLET ORAL at 18:00

## 2023-11-01 RX ADMIN — SODIUM CHLORIDE: 9 INJECTION, SOLUTION INTRAVENOUS at 05:50

## 2023-11-01 RX ADMIN — LEVOTHYROXINE SODIUM 25 MCG: 0.03 TABLET ORAL at 05:50

## 2023-11-01 RX ADMIN — PANTOPRAZOLE SODIUM 40 MG: 40 TABLET, DELAYED RELEASE ORAL at 05:50

## 2023-11-01 RX ADMIN — FLUTICASONE PROPIONATE 1 SPRAY: 50 SPRAY, METERED NASAL at 08:40

## 2023-11-01 RX ADMIN — HEPARIN SODIUM 5000 UNITS: 5000 INJECTION, SOLUTION INTRAVENOUS; SUBCUTANEOUS at 18:00

## 2023-11-01 RX ADMIN — CEFTRIAXONE SODIUM 1 G: 1 INJECTION, POWDER, FOR SOLUTION INTRAMUSCULAR; INTRAVENOUS at 10:56

## 2023-11-01 RX ADMIN — ASPIRIN 325 MG: 325 TABLET, COATED ORAL at 08:39

## 2023-11-01 RX ADMIN — ATORVASTATIN CALCIUM 40 MG: 40 TABLET, FILM COATED ORAL at 18:00

## 2023-11-01 ASSESSMENT — COGNITIVE AND FUNCTIONAL STATUS - GENERAL
MOVING TO AND FROM BED TO CHAIR: 3 - A LITTLE
CLIMB 3 TO 5 STEPS WITH RAILING: 3 - A LITTLE
AFFECT: WFL
DRESSING REGULAR LOWER BODY CLOTHING: 3 - A LITTLE
WALKING IN HOSPITAL ROOM: 3 - A LITTLE
MOVING TO AND FROM BED TO CHAIR: 3 - A LITTLE
HELP NEEDED FOR BATHING: 3 - A LITTLE
STANDING UP FROM CHAIR USING ARMS: 3 - A LITTLE
CLIMB 3 TO 5 STEPS WITH RAILING: 1 - TOTAL
AFFECT: WFL
STANDING UP FROM CHAIR USING ARMS: 3 - A LITTLE
STANDING UP FROM CHAIR USING ARMS: 3 - A LITTLE
WALKING IN HOSPITAL ROOM: 3 - A LITTLE
MOVING TO AND FROM BED TO CHAIR: 3 - A LITTLE
HELP NEEDED FOR PERSONAL GROOMING: 4 - NONE
DRESSING REGULAR UPPER BODY CLOTHING: 3 - A LITTLE
EATING MEALS: 4 - NONE
CLIMB 3 TO 5 STEPS WITH RAILING: 2 - A LOT
TOILETING: 3 - A LITTLE
WALKING IN HOSPITAL ROOM: 3 - A LITTLE

## 2023-11-01 NOTE — PROGRESS NOTES
Occupational Therapy -  Initial Evaluation     Patient: Michel Fang  Location: [facility] 3C 0364  MRN: [id number]  Today's date: 11/1/2023    HISTORY OF PRESENT ILLNESS     Oh is a 88 y.o. male admitted on 10/31/2023 with Bacteremia [R78.81]  Fever, unspecified [R50.9]  Vomiting and diarrhea [R11.10, R19.7]  Fever, unspecified fever cause [R50.9]  Urinary tract infection without hematuria, site unspecified [N39.0]. Principal problem is UTI (urinary tract infection).    Past Medical History  Oh has a past medical history of Arthritis, BPH (benign prostatic hyperplasia), CHF (congestive heart failure) (CMS/HCC), Coronary artery disease, Disease of thyroid gland, Heart disease, Hyperlipidemia, Hypertension, and Sarcoidosis.    History of Present Illness   Michel Fang is a 88 y.o. male with a past medical history significant for CAD s/p CABG (2000), aortic stenosis s/p TAVR (2016), PVD, bilateral popliteal aneurysms, HTN, HLD, pulmonary sarcoidosis, h/o bacteremia, h/o GIB due to gastritis, umbilical hernia, hypothyroidism, prediabetes (HbA1C 5.8 on 3/27/2023), BPH, arthritis, shingles and h/o COVID-19 infection (3/2022) who presents from from home with fever, lethargy, and generalized weakness.    Impression: UTI    CT Abdomen Pelvis without acute inflammatory or obstructive process    PRIOR LEVEL OF FUNCTION AND LIVING ENVIRONMENT     Prior Level of Function    Flowsheet Row Most Recent Value   Dominant Hand right   Ambulation independent   Transferring independent   Toileting independent   Bathing independent   Dressing independent   Eating independent   Prior Level of Function Comment pt ind adls, ind no AD, ind IADLs, drive   Assistive Device Currently Used at Home walker, front-wheeled, walker, 4-wheeled, wheelchair, shower chair, grab bar        Prior Living Environment    Flowsheet Row Most Recent Value   People in Home child(arnol), adult   Current Living Arrangements home   Living Environment  Comment ramp to enter, 3SH c 1st floor set up, lives c son, has stall shower c chair, owns toilet riser          VITALS AND PAIN     OT Vitals    Date/Time Pulse HR Source SpO2 Pt Activity O2 Therapy BP BP Location BP Method Pt Position [facility]   11/01/23 0855 63 Monitor 92 % At rest None (Room air) 148/68 Right upper arm Automatic Sitting JLD      OT Pain    Date/Time Pain Type Rating: Rest Rating: Activity [facility]   11/01/23 0855 Pain Assessment 0 0 JLD           Objective   OBJECTIVE     Start time:  0852  End time:  0915  Session Length: 23 min  Mode of Treatment: occupational therapy    General Observations  Patient received supine, in bed. He was agreeable to therapy.      Precautions: fall        Services  Do You Speak a Language Other Than English at Home?: no      OT Eval and Treat - 11/01/23 0852        Cognition    Orientation Status oriented x 4     Affect/Mental Status WFL     Follows Commands WFL     Cognitive Function WFL        Vision Assessment/Intervention    Vision Assessment WFL        Hearing Assessment    Hearing Status WFL        Sensory Assessment    Sensory Assessment sensation intact, upper extremities        Upper Extremity Assessment    UE Assessment ROM and Strength WFL        Bed Mobility    Bed Mobility Activities supine to sit     [place] distant supervision     Assistive Device bed rails;head of bed elevated        Sit/Stand Transfer    Surface edge of bed     [place] supervision     Assistive Device walker, front-wheeled        Surface-to-Surface Transfers    Transfer Location bed to chair     [place] supervision     Assistive Device walker, front-wheeled        Functional Mobility    Distance in therapy gym     Functional Mobility [place] supervision     Assistive Device walker, front-wheeled        Upper Body Dressing    Tasks don;hospital gown     [place] supervision     Position edge of bed sitting     Adaptive Equipment none        Lower Body  Dressing    Tasks don;socks     [place] supervision     Safety/Cues increased time to complete     Position edge of bed sitting     Adaptive Equipment none        Balance    Static Sitting Balance WFL;sitting, edge of bed;unsupported        Impairments/Safety Issues    Impairments Affecting Function balance;strength;endurance/activity tolerance                               Education Documentation  Self-Care, taught by Jeanne Crawley OT at 11/1/2023  2:30 PM.  Learner: Patient  Readiness: Acceptance  Method: Explanation  Response: Verbalizes Understanding  Comment: role of OT, ADLs, safe transfers        Session Outcome  Patient in chair, upright at end of session, chair alarm on, call light in reach, personal items in reach, all needs met. Nursing notified about patient's response to therapy/activity, patient's performance, and patient's position.    AM-PAC - ADL (Current Function)     Putting on/taking off regular lower body clothing 3 - A Little   Bathing 3 - A Little   Toileting 3 - A Little   Putting on/taking off regular upper body clothing 3 - A Little   Help for taking care of personal grooming 4 - None   Eating meals 4 - None   AM-PAC ADL Score 20      ASSESSMENT AND PLAN     Progress Summary  OT eval, pt sup supine>sit, sup sit<>stand, sup func mob c RW, sup UB and LB dressing, pt limited by decr standing todd/bal, decr strength, decr act todd, decr func endurance, and overall deconditioning, rec contin skilled OT services, rec home c home care upon d/c    Patient/Family Therapy Goal Statement: to go home    OT Plan    Flowsheet Row Most Recent Value   Rehab Potential good, to achieve stated therapy goals at 11/01/2023 0852   Therapy Frequency 3 times/wk at 11/01/2023 0852   Planned Therapy Interventions activity tolerance training, patient/caregiver education/training, transfer/mobility retraining, adaptive equipment training, functional balance retraining, occupation/activity based interventions,  IADL retraining, BADL retraining, ROM/therapeutic exercise, strengthening exercise at 11/01/2023 0852          OT Discharge Recommendations    Flowsheet Row Most Recent Value   OT Recommended Discharge Disposition home with home health at 11/01/2023 0852   Anticipated Equipment Needs if Discharged Home (OT) none at 11/01/2023 0852               OT Goals    Flowsheet Row Most Recent Value   Bed Mobility Goal 1    Activity/Assistive Device bed mobility activities, all at 11/01/2023 0852   [place] modified independence at 11/01/2023 0852   Time Frame by discharge at 11/01/2023 0852   Progress/Outcome goal ongoing at 11/01/2023 0852   Transfer Goal 1    Activity/Assistive Device all transfers at 11/01/2023 0852   [place] modified independence at 11/01/2023 0852   Time Frame by discharge at 11/01/2023 0852   Progress/Outcome goal ongoing at 11/01/2023 0852   Dressing Goal 1    Activity/Adaptive Equipment dressing skills, all at 11/01/2023 0852   [place] modified independence at 11/01/2023 0852   Time Frame by discharge at 11/01/2023 0852   Progress/Outcome goal ongoing at 11/01/2023 0852   Toileting Goal 1    Activity/Assistive Device toileting skills, all at 11/01/2023 0852   [place] modified independence at 11/01/2023 0852   Time Frame by discharge at 11/01/2023 0852   Progress/Outcome goal ongoing at 11/01/2023 0852

## 2023-11-01 NOTE — PLAN OF CARE
Problem: Adult Inpatient Plan of Care  Goal: Plan of Care Review  Outcome: Progressing  Flowsheets (Taken 11/1/2023 8792)  Progress: improving  Outcome Evaluation: PT eval complete  Plan of Care Reviewed With: patient     Problem: Mobility Impairment  Goal: Optimal Mobility  Outcome: Progressing

## 2023-11-01 NOTE — PROGRESS NOTES
Physical Therapy -  Initial Evaluation     Patient: Michel Fang  Location: [facility] 3C 0364  MRN: [id number]  Today's date: 11/1/2023    HISTORY OF PRESENT ILLNESS     Oh is a 88 y.o. male admitted on 10/31/2023 with Bacteremia [R78.81]  Fever, unspecified [R50.9]  Vomiting and diarrhea [R11.10, R19.7]  Fever, unspecified fever cause [R50.9]  Urinary tract infection without hematuria, site unspecified [N39.0]. Principal problem is UTI (urinary tract infection).    Past Medical History  Oh has a past medical history of Arthritis, BPH (benign prostatic hyperplasia), CHF (congestive heart failure) (CMS/HCC), Coronary artery disease, Disease of thyroid gland, Heart disease, Hyperlipidemia, Hypertension, and Sarcoidosis.    History of Present Illness   Michel Fang is a 88 y.o. male with a past medical history significant for CAD s/p CABG (2000), aortic stenosis s/p TAVR (2016), PVD, bilateral popliteal aneurysms, HTN, HLD, pulmonary sarcoidosis, h/o bacteremia, h/o GIB due to gastritis, umbilical hernia, hypothyroidism, prediabetes (HbA1C 5.8 on 3/27/2023), BPH, arthritis, shingles and h/o COVID-19 infection (3/2022) who presents from from home with fever, lethargy, and generalized weakness.    Impression: UTI    CT Abdomen Pelvis without acute inflammatory or obstructive process    PRIOR LEVEL OF FUNCTION AND LIVING ENVIRONMENT     Prior Level of Function    Flowsheet Row Most Recent Value   Dominant Hand right   Ambulation independent   Transferring independent   Toileting independent   Bathing independent   Dressing independent   Eating independent   Prior Level of Function Comment pt ind adls, ind no AD, ind IADLs, drive   Assistive Device Currently Used at Home walker, front-wheeled, walker, 4-wheeled, wheelchair, shower chair, grab bar        Prior Living Environment    Flowsheet Row Most Recent Value   People in Home child(arnol), adult   Current Living Arrangements home   Living Environment  Comment ramp to enter, 3SH c 1st floor set up, lives c son, has stall shower c chair, owns toilet riser        VITALS AND PAIN     PT Vitals    Date/Time Pulse SpO2 Pt Activity O2 Therapy BP Pt Position [facility]   11/01/23 1508 63 96 % At rest None (Room air) 151/66 Sitting CK   11/01/23 1530 68 97 % -- p activity None (Room air) 178/77 Sitting CK      PT Pain    Date/Time Pain Type Rating: Rest Rating: Activity [facility]   11/01/23 1508 Pain Assessment 0 - no pain 0 - no pain CK           Objective   OBJECTIVE     Start time:  1508  End time:  1531  Session Length: 23 min  Mode of Treatment: physical therapy, individual therapy    General Observations  Patient received upright, in chair. He was agreeable to therapy, no issues or concerns identified by nurse prior to session. dtr present    Precautions: fall        Services  Do You Speak a Language Other Than English at Home?: no      PT Eval and Treat - 11/01/23 1508        Cognition    Orientation Status oriented x 4     Affect/Mental Status WFL     Follows Commands WFL     Cognitive Function WFL     Comment, Cognition pleasant, fair insight        Vision Assessment/Intervention    Vision Assessment WFL;corrective lenses for reading        Hearing Assessment    Hearing Status hearing impairment, bilaterally        Sensory Assessment    Sensory Assessment sensation intact, lower extremities        Lower Extremity Assessment    LE Assessment ROM and Strength WFL        Bed Mobility    Comment rec'd OOB        Mobility Belt    Mobility Belt Used for All Out of Bed Activity yes        Sit/Stand Transfer    Surface chair with arm rests     [place] supervision     Assistive Device walker, front-wheeled     Transfer Comments steady to rise, controlled descent c good hand placement        Gait Training    [place], Gait supervision     Assistive Device walker, front-wheeled     Distance in Feet 200 feet     Pattern step-through     Deviations/Abnormal Patterns  step length decreased;stride length decreased;gait speed decreased     Comment (Gait/Stairs) fair gait speed, steady, no LOB        Stairs Training    Comment no stairs necessary for home environment        Balance    Static Sitting Balance WFL;sitting in chair     Dynamic Sitting Balance WFL;sitting in chair     Sit to Stand Dynamic Balance WFL     Static Standing Balance WFL;supported     Dynamic Standing Balance mild impairment;supported        Impairments/Safety Issues    Impairments Affecting Function balance;strength                               Education Documentation  Transfers, taught by Estelle Julien PT at 11/1/2023  4:54 PM.  Learner: Patient  Readiness: Acceptance  Method: Explanation  Response: Verbalizes Understanding  Comment: Pt and dtr edu on role of PT/POC    Gait Training, taught by Estelle Julien PT at 11/1/2023  4:54 PM.  Learner: Patient  Readiness: Acceptance  Method: Explanation  Response: Verbalizes Understanding  Comment: Pt and dtr edu on role of PT/POC        Session Outcome  Patient upright, in chair at end of session, call light in reach, chair alarm on, personal items in reach, all needs met. Nursing notified about patient's performance, patient's position, and patient's response to therapy/activity.    AM-PAC - Mobility (Current Function)     Turning form your back to your side while in flat bed without using bedrails 3 - A Little   Moving from lying on your back to sitting on the side of a flat bed without using bedrails 3 - A Little   Moving to and from a bed to a chair 3 - A Little   Standing up from a chair using your arms 3 - A Little   To walk in a hospital room 3 - A Little   Climbing 3-5 steps with a railing 1 - Total   AM-PAC Mobility Score 16      ASSESSMENT AND PLAN     Progress Summary  Pt is an 88 yom seen for initial eval p admission for UTI. Completed transfers and amb 200' c RW at S level. Mild balance and strength deficits, however per pt and dtr  this is close to pt's baseline. As such pt is safe for return home c assist as needed, not interested in home PT.         PT Plan    Flowsheet Row Most Recent Value   Therapy Frequency evaluation only at 11/01/2023 1508          PT Discharge Recommendations    Flowsheet Row Most Recent Value   PT Recommended Discharge Disposition home with assistance at 11/01/2023 1508   Anticipated Equipment Needs if Discharged Home (PT) none at 11/01/2023 1508

## 2023-11-01 NOTE — PLAN OF CARE
Plan of Care Review  Plan of Care Reviewed With: patient  Progress: no change  Outcome Evaluation: pt with no complaints of pain. Vitals stable on room air. IV fluids running. OOB with assist of 1 and walker. Using urinal. Will ctm.

## 2023-11-01 NOTE — PROGRESS NOTES
Hospital Medicine Service -  Daily Progress Note       SUBJECTIVE   Interval History: Seen at bedside where she was found in stable condition, no acute overnight events noted.  He reports some ongoing urinary frequency, though otherwise feels overall improved compared to admission, no further dysruia.  Feels unable to yet assess weakness, has not gotten out of bed.  States he is tolerating oral intake well, and otherwise denies chest pain, shortness of breath, abdominal pain, nausea or vomiting.    Declines offer to update family/friends.      OBJECTIVE      Vital signs in last 24 hours:  Temp:  [36.6 °C (97.8 °F)-37.4 °C (99.4 °F)] 36.7 °C (98 °F)  Heart Rate:  [53-72] 59  Resp:  [16-22] 18  BP: (118-157)/(56-76) 138/62    Intake/Output Summary (Last 24 hours) at 11/1/2023 1155  Last data filed at 11/1/2023 0838  Gross per 24 hour   Intake 10 ml   Output 100 ml   Net -90 ml       PHYSICAL EXAMINATION      Physical Exam  Vitals and nursing note reviewed.   Constitutional:       Comments: Elderly   HENT:      Head: Normocephalic and atraumatic.      Right Ear: External ear normal.      Left Ear: External ear normal.      Nose: Nose normal.      Mouth/Throat:      Mouth: Mucous membranes are moist.      Pharynx: Oropharynx is clear.   Eyes:      Extraocular Movements: Extraocular movements intact.      Pupils: Pupils are equal, round, and reactive to light.   Cardiovascular:      Rate and Rhythm: Normal rate and regular rhythm.      Pulses: Normal pulses.      Heart sounds: Normal heart sounds. No murmur heard.  Pulmonary:      Effort: Pulmonary effort is normal. No respiratory distress.      Comments: Room air  Abdominal:      General: Abdomen is flat. Bowel sounds are normal. There is no distension.      Palpations: Abdomen is soft.      Tenderness: There is no abdominal tenderness.   Musculoskeletal:      Right lower leg: No edema.      Left lower leg: No edema.   Skin:     General: Skin is warm and dry.    Neurological:      General: No focal deficit present.      Mental Status: He is alert and oriented to person, place, and time.   Psychiatric:         Behavior: Behavior normal.        LINES, CATHETERS, DRAINS, AIRWAYS, AND WOUNDS   Lines, Drains, and Airways:  Wounds (agree with documentation and present on admission):  Peripheral IV (Adult) 10/31/23 Left Antecubital (Active)   Number of days: 1         Comments:      LABS / IMAGING / TELE      Labs  Results from last 7 days   Lab Units 11/01/23 0449 10/31/23  0912   WBC K/uL 9.96 9.56   HEMOGLOBIN g/dL 12.2* 14.5   HEMATOCRIT % 38.1* 44.6   PLATELETS K/uL 116* 128*     Results from last 7 days   Lab Units 11/01/23  0449 10/31/23  0912   SODIUM mEQ/L 138 135*   POTASSIUM mEQ/L 4.0 4.1   CHLORIDE mEQ/L 107 101   CO2 mEQ/L 24 26   BUN mg/dL 17 19   CREATININE mg/dL 1.1 1.3   CALCIUM mg/dL 7.9* 9.2   ALBUMIN g/dL  --  4.3   BILIRUBIN TOTAL mg/dL  --  1.0   ALK PHOS IU/L  --  73   ALT IU/L  --  12   AST IU/L  --  15   GLUCOSE mg/dL 93 134*     Imaging  I have independently reviewed the pertinent imaging from the last 24 hrs.    ECG/Telemetry  I have independently reviewed the telemetry. No events for the last 24 hours.    ASSESSMENT AND PLAN      Normocytic anemia  Assessment & Plan  -Hgb 14.5 -> 12.2 likely dilutional on IV fluids  -No evidence of bleeding  -Monitor CBC    Generalized weakness  Assessment & Plan  -Likely occurring in setting of acute infection  -PT/OT evaluation pending  -Fall precautions     Thrombocytopenia (CMS/HCC)  Assessment & Plan  -Mild, suspect likely occurring in setting of acute infection  -Monitor CBC    Elevated troponin  Assessment & Plan  -Mild, suspect acute myocardial injury in setting of infection  -No further work up necessary     GERD (gastroesophageal reflux disease)  Assessment & Plan  -Continue pantoprazole    Hypothyroidism  Assessment & Plan  -Continue levothyroxine    Prediabetes  Assessment & Plan  -HbA1C 5.8 on 3/27/2023,  monitor BG with daily labs  -NCS diet    Bilateral popliteal artery aneurysm (CMS/HCC)  Assessment & Plan  -Continue outpatient follow up with Dr. Medardo Mclain ([facility])     Hyperlipidemia  Assessment & Plan  -Continue atorvastatin    Pulmonary sarcoidosis (CMS/HCC)  Assessment & Plan  -Currently asymptomatic  -Continue outpatient follow up with Dr. Lor Villanueva ([facility])    HTN (hypertension)  Assessment & Plan  -BP trend is overall elevated since admission, may resume hydralazine    CAD (coronary artery disease)  Assessment & Plan  -Hx CABG 2000  -Continue ASA and atorvastatin  -Routine outpatient follow up with Dr. Aline Isaacs ([facility])    Benign prostatic hyperplasia  Assessment & Plan  -Continue tamsulosin    * UTI (urinary tract infection)  Assessment & Plan  -Presenting with generalized weakness, fever and urinary symptoms  -UA with positive nitrite and 1+ leukocyte esterase, concerning for possible UTI, present on admission  -CT A/P non acute on admission    -Continue rocephin  -Follow urine and blood cultures  -Follow WBC and fever curve       VTE Assessment: [place] VTE Score: 2  VTE Prophylaxis:  Current anticoagulants:  heparin (porcine) 5,000 unit/mL injection 5,000 Units, subcutaneous, q12h (6a, 6p)      Code Status: Full Code      Estimated Discharge Date: 11/2/2023     Disposition Planning: DC likely 1-2 days pending cultures, PT/OT pending.      NEYDA [place]  11/1/2023

## 2023-11-01 NOTE — PLAN OF CARE
Problem: Adult Inpatient Plan of Care  Goal: Plan of Care Review  Outcome: Progressing  Flowsheets (Taken 11/1/2023 5443)  Progress: improving  Outcome Evaluation: VSS, voiding fine.  oob in chair all day and ambulated in dean,  awaiting final urine cx.  hopeful for discharge home tomorrow.  iv abx given as ordered.  no report of pain.  Plan of Care Reviewed With:   patient   child   Plan of Care Review  Plan of Care Reviewed With: patient, child  Progress: improving  Outcome Evaluation: VSS, voiding fine.  oob in chair all day and ambulated in dean,  awaiting final urine cx.  hopeful for discharge home tomorrow.  iv abx given as ordered.  no report of pain.

## 2023-11-01 NOTE — PLAN OF CARE
Care Coordination Admission Assessment Note    General Information:  Readmission Within the last 30 days: no previous admission in last 30 days  Does patient have a :    Patient-Specific Goals (include timeframe):      Living Arrangements:  Arrived From: home  Current Living Arrangements: home  People in Home: child(arnol), adult  Home Accessibility:    Living Arrangement Comments: Pt has first floor bed and bath    Housing Stability and Financial Resources (SDOH):  In the last 12 months, was there a time when you were not able to pay the mortgage or rent on time?: No  In the last 12 months, how many places have you lived?: 1  In the last 12 months, was there a time when you did not have a steady place to sleep or slept in a shelter (including now)?: No  How hard is it for you to pay for the very basics like food, housing, medical care, and heating?: Not hard at all    Functional Status Prior to Admission:   Assistive Device/Animal Currently Used at Home: walker, front-wheeled, walker, 4-wheeled, wheelchair, shower chair, grab bar  Functional Status Comments: independent  IADL Comments: independent     Supports and Services:  Current Outpatient/Agency/Support Group: none  Type of Current Home Care Services:    History of home care episode or rehab stay: [facility] in the past    Discharge Needs Assessment:   Concerns to be Addressed: discharge planning  Current Discharge Risk:    Anticipated Changes Related to Illness: none    Patient/Family Anticipated Discharge Plan:  Patient/Family Anticipates Transition To: home with family  Patient/Family Anticipated Services at Transition: none    Connection to Community  Not applicable      Patient Choice:   Offered/Gave Vendor List:    Patient's Choice of Community Agency(s):         Anticipated Discharge Plan:  Met with patient. Provided education and contact information for Care Coordination services.: yes  Anticipated Discharge Disposition: home without  assistance or services     Transportation Needs (SDOH):  Transportation Concerns: none  Transportation Anticipated: car, drives self, family or friend will provide  Is Out of Hospital DNR needed at discharge?: no    In the past 12 months, has lack of transportation kept you from medical appointments or from getting medications?: No  In the past 12 months, has lack of transportation kept you from meetings, work, or from getting things needed for daily living?: No    Concerns - comments:   Met with pt in room 364.     Independent pta: +.  Pt's son lives with him in a 3SH; pt has first floor bed and bath.     Known to [facility]. He mostly uses a front rw to ambulate. He also has a 4 wheel walker, spc, w/c, shower chair, grab bars.    No prior snf. Confirmed pcp and pharmacy. Do not anticipate dc needs

## 2023-11-02 VITALS
TEMPERATURE: 97.6 F | OXYGEN SATURATION: 95 % | DIASTOLIC BLOOD PRESSURE: 63 MMHG | BODY MASS INDEX: 25.97 KG/M2 | WEIGHT: 181.4 LBS | RESPIRATION RATE: 16 BRPM | HEART RATE: 61 BPM | SYSTOLIC BLOOD PRESSURE: 155 MMHG | HEIGHT: 70 IN

## 2023-11-02 LAB
ANION GAP SERPL CALC-SCNC: 5 MEQ/L (ref 3–15)
ATRIAL RATE: 97
BUN SERPL-MCNC: 18 MG/DL (ref 7–25)
CALCIUM SERPL-MCNC: 8.1 MG/DL (ref 8.6–10.3)
CHLORIDE SERPL-SCNC: 108 MEQ/L (ref 98–107)
CO2 SERPL-SCNC: 24 MEQ/L (ref 21–31)
CREAT SERPL-MCNC: 1.1 MG/DL (ref 0.7–1.3)
ERYTHROCYTE [DISTWIDTH] IN BLOOD BY AUTOMATED COUNT: 14.8 % (ref 11.6–14.4)
GFR SERPL CREATININE-BSD FRML MDRD: >60 ML/MIN/1.73M*2
GLUCOSE SERPL-MCNC: 112 MG/DL (ref 70–99)
HCT VFR BLDCO AUTO: 38.1 % (ref 40.1–51)
HGB BLD-MCNC: 12.2 G/DL (ref 13.7–17.5)
MCH RBC QN AUTO: 29.7 PG (ref 28–33.2)
MCHC RBC AUTO-ENTMCNC: 32 G/DL (ref 32.2–36.5)
MCV RBC AUTO: 92.7 FL (ref 83–98)
P AXIS: 5
PDW BLD AUTO: 10.6 FL (ref 9.4–12.4)
PLATELET # BLD AUTO: 133 K/UL (ref 150–350)
POTASSIUM SERPL-SCNC: 4.2 MEQ/L (ref 3.5–5.1)
PR INTERVAL: 214
QRS DURATION: 148
QT INTERVAL: 388
QTC CALCULATION(BAZETT): 492
R AXIS: -62
RBC # BLD AUTO: 4.11 M/UL (ref 4.5–5.8)
SODIUM SERPL-SCNC: 137 MEQ/L (ref 136–145)
T WAVE AXIS: 26
VENTRICULAR RATE: 97
WBC # BLD AUTO: 6.95 K/UL (ref 3.8–10.5)

## 2023-11-02 PROCEDURE — 36415 COLL VENOUS BLD VENIPUNCTURE: CPT

## 2023-11-02 PROCEDURE — 85027 COMPLETE CBC AUTOMATED: CPT

## 2023-11-02 PROCEDURE — 99239 HOSP IP/OBS DSCHRG MGMT >30: CPT | Performed by: HOSPITALIST

## 2023-11-02 PROCEDURE — 25800000 HC PHARMACY IV SOLUTIONS: Performed by: FAMILY MEDICINE

## 2023-11-02 PROCEDURE — 63700000 HC SELF-ADMINISTRABLE DRUG: Performed by: FAMILY MEDICINE

## 2023-11-02 PROCEDURE — 63600000 HC DRUGS/DETAIL CODE: Performed by: FAMILY MEDICINE

## 2023-11-02 PROCEDURE — 80048 BASIC METABOLIC PNL TOTAL CA: CPT | Performed by: HOSPITALIST

## 2023-11-02 PROCEDURE — 63700000 HC SELF-ADMINISTRABLE DRUG

## 2023-11-02 RX ORDER — CEPHALEXIN 500 MG/1
500 CAPSULE ORAL 2 TIMES DAILY
Qty: 8 CAPSULE | Refills: 0 | Status: SHIPPED | OUTPATIENT
Start: 2023-11-02 | End: 2023-11-02 | Stop reason: HOSPADM

## 2023-11-02 RX ORDER — SULFAMETHOXAZOLE AND TRIMETHOPRIM 800; 160 MG/1; MG/1
1 TABLET ORAL 2 TIMES DAILY
Qty: 10 TABLET | Refills: 0 | Status: SHIPPED | OUTPATIENT
Start: 2023-11-02 | End: 2023-11-07

## 2023-11-02 RX ADMIN — FLUTICASONE PROPIONATE 1 SPRAY: 50 SPRAY, METERED NASAL at 08:32

## 2023-11-02 RX ADMIN — LEVOTHYROXINE SODIUM 25 MCG: 0.03 TABLET ORAL at 06:06

## 2023-11-02 RX ADMIN — ASPIRIN 325 MG: 325 TABLET, COATED ORAL at 08:31

## 2023-11-02 RX ADMIN — HYDRALAZINE HYDROCHLORIDE 25 MG: 25 TABLET ORAL at 06:06

## 2023-11-02 RX ADMIN — HEPARIN SODIUM 5000 UNITS: 5000 INJECTION, SOLUTION INTRAVENOUS; SUBCUTANEOUS at 06:07

## 2023-11-02 RX ADMIN — PANTOPRAZOLE SODIUM 40 MG: 40 TABLET, DELAYED RELEASE ORAL at 06:06

## 2023-11-02 RX ADMIN — CEFTRIAXONE SODIUM 1 G: 1 INJECTION, POWDER, FOR SOLUTION INTRAMUSCULAR; INTRAVENOUS at 10:28

## 2023-11-02 ASSESSMENT — COGNITIVE AND FUNCTIONAL STATUS - GENERAL
MOVING TO AND FROM BED TO CHAIR: 3 - A LITTLE
WALKING IN HOSPITAL ROOM: 3 - A LITTLE
CLIMB 3 TO 5 STEPS WITH RAILING: 2 - A LOT
STANDING UP FROM CHAIR USING ARMS: 3 - A LITTLE

## 2023-11-02 NOTE — PLAN OF CARE
Plan of Care Review  Plan of Care Reviewed With: patient  Progress: improving  Outcome Evaluation: Pt with no complaints of pain. vitals stable, BP elevated but within baseline. OOB with assist of 1. Hopeful for DC. Will ctm.

## 2023-11-02 NOTE — PLAN OF CARE
Care Coordination Discharge Plan Summary    Admission Assessment Summary    General Information  Readmission Within the last 30 days: no previous admission in last 30 days  Does patient have a :    Patient-Specific Goals (include timeframe):      Living Arrangements  Arrived From: home  Current Living Arrangements: home  People in Home: child(arnol), adult  Home Accessibility:    Living Arrangement Comments: Pt has first floor bed and bath    Social Determinants of Health - Screenings  Housing Stability  In the last 12 months, was there a time when you were not able to pay the mortgage or rent on time?: No  In the last 12 months, how many places have you lived?: 1  In the last 12 months, was there a time when you did not have a steady place to sleep or slept in a shelter (including now)?: No  Financial Resource Strain  How hard is it for you to pay for the very basics like food, housing, medical care, and heating?: Not hard at all  Transportation Needs  In the past 12 months, has lack of transportation kept you from medical appointments or from getting medications?: No  In the past 12 months, has lack of transportation kept you from meetings, work, or from getting things needed for daily living?: No    Functional Status Prior to Admission  Assistive Device/Animal Currently Used at Home: walker, front-wheeled, walker, 4-wheeled, wheelchair, shower chair, grab bar  Functional Status Comments: independent  IADL Comments: independent    Discharge Needs Assessment    Concerns to be Addressed: care coordination/care conferences, discharge planning  Current Discharge Risk: physical impairment  Anticipated Changes Related to Illness: none    Discharge Plan Summary    Patient Choice  Offered/Gave Vendor List: no       Concerns / Comments: Pt for d/c home today. Family to transport home. IMM complete. No d/c needs identified at this time. CC to follow closely.    Discharge Plan:  Disposition/Destination: Home   / Home       Connection to Community  Not applicable    Community Resources:      Discharge Transportation:  Is Out of Hospital DNR needed at Discharge: no  Does patient need discharge transport? No         DCP:  Home / no needs  Transport- Family

## 2023-11-02 NOTE — DISCHARGE SUMMARY
Hospital Medicine Service -  Inpatient Discharge Summary        BRIEF OVERVIEW   Patient: Michel Fang (1935)    Admitting Provider: Uzma Queen MD  Attending Provider: Florence Villanueva DO Attending phys phone: [phone]    PCP: Johnathan Leone [phone]    Admission Date: 10/31/2023  Discharge Date: 11/2/2023     DISCHARGE DIAGNOSES      Primary Discharge Diagnosis  UTI (urinary tract infection)    Secondary Discharge Diagnoses  Active Hospital Problems    Diagnosis Date Noted    Normocytic anemia 11/01/2023    Fever, unspecified 10/31/2023    Bilateral popliteal artery aneurysm (CMS/HCC) 10/31/2023    Prediabetes 10/31/2023    Hypothyroidism 10/31/2023    GERD (gastroesophageal reflux disease) 10/31/2023    UTI (urinary tract infection) 10/31/2023    Elevated troponin 10/31/2023    Thrombocytopenia (CMS/HCC) 10/31/2023    Generalized weakness 10/31/2023    CAD (coronary artery disease) 09/05/2018    HTN (hypertension) 09/05/2018    Pulmonary sarcoidosis (CMS/HCC) 09/05/2018    Hyperlipidemia 09/05/2018    Benign prostatic hyperplasia 10/25/2017      Resolved Hospital Problems   No resolved problems to display.       Problem List on Day of Discharge  * UTI (urinary tract infection)  Assessment & Plan  -Presenting with generalized weakness, fever and urinary symptoms  -UA with positive nitrite and 1+ leukocyte esterase, concerning for possible UTI, present on admission  -CT A/P non acute on admission  -Received 3 days of IV rocephin  -Prelim urine culture growing GNR. Will discharge on bactrim due to complicated UTI and prior tcultures    Generalized weakness  Assessment & Plan  -Likely occurring in setting of acute infection  -PT/OT evaluations noted  -Fall precautions     Thrombocytopenia (CMS/HCC)  Assessment & Plan  -Mild, suspect likely occurring in setting of acute infection  -Monitor CBC    Elevated troponin  Assessment & Plan  -Mild, suspect acute myocardial injury  in setting of infection  -No further work up necessary     GERD (gastroesophageal reflux disease)  Assessment & Plan  -Continue pantoprazole    Hypothyroidism  Assessment & Plan  -Continue levothyroxine    Bilateral popliteal artery aneurysm (CMS/HCC)  Assessment & Plan  -Continue outpatient follow up with Dr. Medardo Mclain ([facility])     Hyperlipidemia  Assessment & Plan  -Continue atorvastatin    Pulmonary sarcoidosis (CMS/HCC)  Assessment & Plan  -Currently asymptomatic  -Continue outpatient follow up with Dr. Lor Villanueva ([facility])    HTN (hypertension)  Assessment & Plan  -Continue hydralazine  -Continue to monitor pressures    CAD (coronary artery disease)  Assessment & Plan  -History of CABG   -Continue ASA and atorvastatin  -Routine outpatient follow up with Dr. Aline Isaacs ([facility])    Benign prostatic hyperplasia  Assessment & Plan  -Continue tamsulosin      SUMMARY OF HOSPITALIZATION      Presenting Problem/History of Present Illness  This is a 88 y.o. year-old male admitted on 10/31/2023 with UTI (urinary tract infection).      Hospital Course      88-year-old male with past medical history of BPH, CAD, hypertension, pulmonary sarcoidosis, hyperlipidemia, GERD who presented to [facility] due to progressive generalized weakness as well as increased urinary frequency.  Lab work in the emergency room was concerning for UTI and he was started on antibiotics and admitted to the hospital for further management.  Patient had some improvement in his symptoms but had continued urinary frequency.  He was evaluated by PT/OT who recommended home at discharge.  Preliminary urine culture showed gram-negative rods.  He will be discharged on oral antibiotics to finish a 7-day course of treatment and has been instructed to follow-up with his PCP and urologist for further recommendations.    Exam on Day of Discharge  Physical Exam   General: NAD, calm, pleasant  HEENT:  atraumatic  Cardiovascular: RRR, no murmurs; S1/S2+  Pulmonary: CTAB; no rales, rhonchi or wheezing  Gi: Soft, nontender, nondistended  Ext: no pedal edema  Neuro: Alert and oriented x3; no sensory or motor deficits  Psych: Calm, appropriate answers      Consults During Admission  None    DISCHARGE MEDICATIONS           Medication List      START taking these medications    sulfamethoxazole-trimethoprim 800-160 mg per tablet  Commonly known as: BACTRIM DS  Take 1 tablet by mouth 2 (two) times a day for 5 days.  Dose: 1 tablet        CONTINUE taking these medications    aspirin 325 mg EC tablet  Take 325 mg by mouth daily.  Dose: 325 mg     atorvastatin 40 mg tablet  Commonly known as: LIPITOR  Take 40 mg by mouth every evening.  Dose: 40 mg     fluticasone propionate 50 mcg/actuation nasal spray  Commonly known as: FLONASE  Administer 1 spray into each nostril daily.  Dose: 1 spray     hydrALAZINE 25 mg tablet  Commonly known as: APRESOLINE  Take 25 mg by mouth 2 (two) times a day.  Dose: 25 mg     levothyroxine 25 mcg tablet  Commonly known as: SYNTHROID  Take 25 mcg by mouth daily.  Dose: 25 mcg     meclizine 12.5 mg tablet  Commonly known as: ANTIVERT  Take 12.5 mg by mouth 3 (three) times a day as needed for dizziness.  Dose: 12.5 mg     pantoprazole 40 mg EC tablet  Commonly known as: PROTONIX  Take 40 mg by mouth daily.  Dose: 40 mg     tamsulosin 0.4 mg capsule  Commonly known as: FLOMAX  Take 0.4 mg by mouth nightly.  Dose: 0.4 mg             Instructions for after discharge     Call provider for:  difficulty breathing, headache or visual disturbances      Call provider for:  persistent dizziness or light-headedness      Call provider for:  persistent nausea or vomiting      Call provider for:  rash      Call provider for:  redness, tenderness, or signs of infection (pain, swelling, redness, odor or green/yellow discharge around incision site)      Call provider for:  severe uncontrolled pain      Call  provider for:  temperature >100.4      Follow-up with primary physician (PCP)      Follow up with PCP within 1 week    Post-Discharge Activity: Normal activity as tolerated.      Normal activity as tolerated.             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 COMMENT:Frontal portable erect view the chest performed and compared with examinations dating back to July 4, 2023. Right basilar reticular opacities, possibly due to chronic fibrosis. Sternotomy changes. No evidence of a pleural effusion, pneumothorax or pneumomediastinum. Atheromatous changes of aorta.    CT ABDOMEN PELVIS WITH IV CONTRAST    Result Date: 10/31/2023  IMPRESSION: 1. No acute inflammatory or obstructive process in the abdomen or pelvis. 2. Stable findings as described above.       OUTPATIENT  FOLLOW-UP / REFERRALS / PENDING TESTS        Outpatient Follow-Up Appointments  Encounter Information    This patient does not currently have any appointments scheduled.         Referrals  No orders of the defined types were placed in this encounter.      Test Results Pending at Discharge  Unresulted Labs (From admission, onward)     Start     Ordered    10/31/23 0910  Extra Tubes  Once        Question Answer Comment   Light Blue 1 Label    Red Top No Labels    Light Green Top No Labels    Lavender Top No Labels    Yellow - Urine Tall No Labels    Pink Top No Labels    Gold Top No Labels    Clear  Top (Urine) No Labels    Urine Cup No Labels    Grey Top (Urine) No Labels        10/31/23 0909                Important Issues to Address in Follow-Up  Follow up with PCP and urology  Finish ABX course    DISCHARGE DISPOSITION AND DESTINATION      Disposition: Home   Destination: Home                            Code Status At Discharge: Full Code     Spent more than 35 minutes in patient evaluation, physical evaluation, and  coordination of discharge and care.     Physician Order for Life-Sustaining Treatment Document Status      No documents found

## 2023-11-03 LAB
BACTERIA UR CULT: ABNORMAL
BACTERIA UR CULT: ABNORMAL

## 2023-11-04 LAB
BACTERIA BLD CULT: NORMAL
BACTERIA BLD CULT: NORMAL

## 2023-12-13 ENCOUNTER — APPOINTMENT (EMERGENCY)
Dept: RADIOLOGY | Facility: HOSPITAL | Age: 88
End: 2023-12-13
Attending: EMERGENCY MEDICINE
Payer: MEDICARE

## 2023-12-13 ENCOUNTER — HOSPITAL ENCOUNTER (EMERGENCY)
Facility: HOSPITAL | Age: 88
Discharge: HOME | End: 2023-12-13
Attending: EMERGENCY MEDICINE | Admitting: EMERGENCY MEDICINE
Payer: MEDICARE

## 2023-12-13 VITALS
OXYGEN SATURATION: 98 % | RESPIRATION RATE: 16 BRPM | HEART RATE: 58 BPM | TEMPERATURE: 96.5 F | DIASTOLIC BLOOD PRESSURE: 97 MMHG | SYSTOLIC BLOOD PRESSURE: 190 MMHG

## 2023-12-13 DIAGNOSIS — R13.10 DYSPHAGIA, UNSPECIFIED TYPE: Primary | ICD-10-CM

## 2023-12-13 LAB
ANION GAP SERPL CALC-SCNC: 13 MEQ/L (ref 3–15)
ANISOCYTOSIS BLD QL SMEAR: ABNORMAL
ATRIAL RATE: 74
BASOPHILS # BLD: 0.03 K/UL (ref 0.01–0.1)
BASOPHILS NFR BLD: 0.9 %
BUN SERPL-MCNC: 18 MG/DL (ref 7–25)
BURR CELLS BLD QL SMEAR: ABNORMAL
CALCIUM SERPL-MCNC: 8 MG/DL (ref 8.6–10.3)
CHLORIDE SERPL-SCNC: 106 MEQ/L (ref 98–107)
CO2 SERPL-SCNC: 20 MEQ/L (ref 21–31)
CREAT SERPL-MCNC: 1.1 MG/DL (ref 0.7–1.3)
DIFFERENTIAL METHOD BLD: ABNORMAL
EGFRCR SERPLBLD CKD-EPI 2021: >60 ML/MIN/1.73M*2
EOSINOPHIL # BLD: 0.03 K/UL (ref 0.04–0.54)
EOSINOPHIL NFR BLD: 0.9 %
ERYTHROCYTE [DISTWIDTH] IN BLOOD BY AUTOMATED COUNT: 15.1 % (ref 11.6–14.4)
GLUCOSE SERPL-MCNC: 106 MG/DL (ref 70–99)
HCT VFR BLDCO AUTO: 41.1 % (ref 40.1–51)
HGB BLD-MCNC: 13.4 G/DL (ref 13.7–17.5)
IMM GRANULOCYTES # BLD AUTO: 0.01 K/UL (ref 0–0.08)
IMM GRANULOCYTES NFR BLD AUTO: 0.3 %
LYMPHOCYTES # BLD: 0.9 K/UL (ref 1.2–3.5)
LYMPHOCYTES NFR BLD: 25.7 %
MCH RBC QN AUTO: 30.2 PG (ref 28–33.2)
MCHC RBC AUTO-ENTMCNC: 32.6 G/DL (ref 32.2–36.5)
MCV RBC AUTO: 92.8 FL (ref 83–98)
MONOCYTES # BLD: 0.35 K/UL (ref 0.3–1)
MONOCYTES NFR BLD: 10 %
NEUTROPHILS # BLD: 2.18 K/UL (ref 1.7–7)
NEUTS SEG NFR BLD: 62.2 %
NRBC BLD-RTO: 0 %
OVALOCYTES BLD QL SMEAR: ABNORMAL
P AXIS: 3
PDW BLD AUTO: 10.5 FL (ref 9.4–12.4)
PLAT MORPH BLD: NORMAL
PLATELET # BLD AUTO: 151 K/UL (ref 150–350)
PLATELET # BLD EST: ABNORMAL 10*3/UL
POTASSIUM SERPL-SCNC: 3.7 MEQ/L (ref 3.5–5.1)
PR INTERVAL: 186
QRS DURATION: 152
QT INTERVAL: 466
QTC CALCULATION(BAZETT): 517
R AXIS: -54
RBC # BLD AUTO: 4.43 M/UL (ref 4.5–5.8)
SODIUM SERPL-SCNC: 139 MEQ/L (ref 136–145)
T WAVE AXIS: 15
TROPONIN I SERPL HS-MCNC: 10.8 PG/ML
TROPONIN I SERPL HS-MCNC: 8.8 PG/ML
VENTRICULAR RATE: 74
WBC # BLD AUTO: 3.5 K/UL (ref 3.8–10.5)

## 2023-12-13 PROCEDURE — 71045 X-RAY EXAM CHEST 1 VIEW: CPT

## 2023-12-13 PROCEDURE — 93005 ELECTROCARDIOGRAM TRACING: CPT | Performed by: EMERGENCY MEDICINE

## 2023-12-13 PROCEDURE — 84484 ASSAY OF TROPONIN QUANT: CPT | Performed by: EMERGENCY MEDICINE

## 2023-12-13 PROCEDURE — 80048 BASIC METABOLIC PNL TOTAL CA: CPT | Performed by: EMERGENCY MEDICINE

## 2023-12-13 PROCEDURE — 84484 ASSAY OF TROPONIN QUANT: CPT | Mod: 91 | Performed by: EMERGENCY MEDICINE

## 2023-12-13 PROCEDURE — 36415 COLL VENOUS BLD VENIPUNCTURE: CPT | Performed by: EMERGENCY MEDICINE

## 2023-12-13 PROCEDURE — 99284 EMERGENCY DEPT VISIT MOD MDM: CPT | Mod: 25

## 2023-12-13 PROCEDURE — 85025 COMPLETE CBC W/AUTO DIFF WBC: CPT | Performed by: EMERGENCY MEDICINE

## 2023-12-13 ASSESSMENT — ENCOUNTER SYMPTOMS
SORE THROAT: 0
ABDOMINAL PAIN: 0
SHORTNESS OF BREATH: 0
VOICE CHANGE: 1
DIAPHORESIS: 0
VOMITING: 0
TROUBLE SWALLOWING: 1
NAUSEA: 0

## 2023-12-13 NOTE — DISCHARGE INSTRUCTIONS
Please follow-up with a primary care provider (PCP) regularly.  Please bring all your discharge paperwork with you to your follow up appointment. Your primary care physician will go over all of your results again including any incidental findings.  Your primary care provider can also help you implement the recommendations we gave you today, coordinate care among your specialist, as well as make sure you are up-to-date with wellness exams, immunizations, and preventive screenings.   Your PCP can also help when you are feeling sick, potentially avoiding the need for urgent care or emergency department visits.  For these reasons, it is important that you follow-up with your PCP at least annually or more often based upon your medical conditions.  If you do not have a PCP, please call [phone] ([phone]) for help finding one.

## 2023-12-13 NOTE — ED PROVIDER NOTES
Emergency Medicine Note  HPI   HISTORY OF PRESENT ILLNESS     88 year old male with past medical history of CAD s/p CABG, anemia, PBH, HTN, HLD, GERD and PVD presents to the ED via EMS for throat tightness onset about 2 hours ago. Patient reports going to bed last night after having dinner with no symptoms. This morning, he woke up to urinate and felt his throat begin to tighten up. He notes difficulty swallowing and hoarse voice when waking up. Patient is beginning to feel better at arrival and is able to swallow more easily. He currently denies any sore throat, nausea, vomiting, diaphoresis or SOB. He also denies any chest and abdominal pain.      History provided by:  Patient   used: No          Patient History   PAST HISTORY     Reviewed from Nursing Triage:       Past Medical History:   Diagnosis Date   • Arthritis    • BPH (benign prostatic hyperplasia)    • CHF (congestive heart failure) (CMS/HCC)    • Coronary artery disease    • Disease of thyroid gland    • Heart disease    • Hyperlipidemia    • Hypertension    • Sarcoidosis        Past Surgical History:   Procedure Laterality Date   • ADENOIDECTOMY     • AORTIC VALVE REPLACEMENT     • APPENDECTOMY     • CARDIAC SURGERY     • CORONARY ARTERY BYPASS GRAFT     • EYE SURGERY Bilateral     cataracts    • POPLITEAL VENOUS ANEURYSM REPAIR     • SKIN BIOPSY     • TONSILLECTOMY         Family History   Problem Relation Age of Onset   • Ovarian cancer Biological Mother    • Lymphoma Other         age  17   • Lymphoma Biological Son         age 20's       Social History     Tobacco Use   • Smoking status: Former     Types: Cigarettes     Quit date:      Years since quittin.9   • Smokeless tobacco: Never   Substance Use Topics   • Alcohol use: No   • Drug use: No         Review of Systems   REVIEW OF SYSTEMS     Review of Systems   Constitutional: Negative for diaphoresis.   HENT: Positive for trouble swallowing and voice change. Negative  for sore throat.    Respiratory: Negative for shortness of breath.    Cardiovascular: Negative for chest pain.   Gastrointestinal: Negative for abdominal pain, nausea and vomiting.         VITALS     ED Vitals    None                      Physical Exam   PHYSICAL EXAM     Physical Exam  Vitals and nursing note reviewed.   Constitutional:       General: He is not in acute distress.  HENT:      Head: Normocephalic and atraumatic.      Mouth/Throat:      Mouth: Mucous membranes are moist.      Pharynx: Oropharynx is clear. Uvula swelling present.      Tonsils: No tonsillar exudate or tonsillar abscesses.      Comments: Uvula slightly edemous.  Tonsils and posterior pharynx normal.  Eyes:      Conjunctiva/sclera: Conjunctivae normal.   Cardiovascular:      Rate and Rhythm: Normal rate and regular rhythm.      Pulses: Normal pulses.      Heart sounds: Normal heart sounds. No murmur heard.     No friction rub. No gallop.   Pulmonary:      Effort: Pulmonary effort is normal. No respiratory distress.      Breath sounds: Normal breath sounds. No stridor. No wheezing, rhonchi or rales.   Abdominal:      General: There is no distension.      Palpations: Abdomen is soft.      Tenderness: There is no abdominal tenderness. There is no guarding or rebound.   Musculoskeletal:      Cervical back: Neck supple.      Right lower leg: No edema.      Left lower leg: No edema.   Skin:     General: Skin is warm and dry.      Capillary Refill: Capillary refill takes less than 2 seconds.   Neurological:      Mental Status: He is alert and oriented to person, place, and time.   Psychiatric:         Mood and Affect: Mood normal.         Behavior: Behavior normal.           PROCEDURES     Procedures     DATA     Results     None          Imaging Results    None         No orders to display       Scoring tools                                  ED Course & MDM   MDM / ED COURSE / CLINICAL IMPRESSION / DISPO     MDM    ED Course as of 12/19/23 9877    Wed Dec 13, 2023   0550 Patient's only complaint was tightness in throat he never had chest pain or shortness of breath or diaphoresis or nausea or vomiting.  However he is tolerating p.o. symptoms have resolved at this time without any intervention he tolerated ginger ale.  I do not believe this is a food bolus we will do a cardiac workup for possible anginal equivalent. [RS]   0609 EKG shows a normal sinus rhythm at a rate of 74 bpm with a right bundle branch block unchanged from previous [RS]   0652 High Sens Troponin I: 8.8  First troponin unremarkable.  Second troponin pending if negative will discharge [RS]   0656 Patient is still swallowing normally he has never had any chest pain or associated cardiac symptoms only discomfort in his throat which she describes as difficulty swallowing and a sore throat he is having no difficulty swallowing or sore throat at this time as we are doing cardiac workup although we have a very low suspicion [RS]   1037 2nd trop wnl  Signed out from night team that if 2nd trop is normal pt is okay for dc home  Will dc home   Ent follow as needed for dysphagia symptoms  [KAMERON]      ED Course User Index  [KAMERON] Yoni Garcia, DO  [RS] Elroy Aguilar MD     Clinical Impression      None            By signing my name below, I, Kain Adams, attest that this documentation has been prepared under the direction and in the presence of Elroy Aguilar MD    I, Elroy Aguilar MD, personally performed the services described in this documentation, as documented by the scribe in my presence, and it is both accurate and complete.     Kain Adams  12/13/23 0603       Elroy Aguilar MD  12/19/23 0115

## 2024-01-11 ENCOUNTER — HOSPITAL ENCOUNTER (EMERGENCY)
Facility: HOSPITAL | Age: 89
Discharge: HOME | End: 2024-01-12
Attending: STUDENT IN AN ORGANIZED HEALTH CARE EDUCATION/TRAINING PROGRAM
Payer: MEDICARE

## 2024-01-11 DIAGNOSIS — K59.00 CONSTIPATION, UNSPECIFIED CONSTIPATION TYPE: Primary | ICD-10-CM

## 2024-01-11 PROCEDURE — 99284 EMERGENCY DEPT VISIT MOD MDM: CPT | Mod: 25

## 2024-01-12 ENCOUNTER — APPOINTMENT (EMERGENCY)
Dept: RADIOLOGY | Facility: HOSPITAL | Age: 89
End: 2024-01-12
Payer: MEDICARE

## 2024-01-12 VITALS
HEART RATE: 64 BPM | WEIGHT: 180 LBS | TEMPERATURE: 97.7 F | RESPIRATION RATE: 18 BRPM | BODY MASS INDEX: 26.66 KG/M2 | SYSTOLIC BLOOD PRESSURE: 206 MMHG | DIASTOLIC BLOOD PRESSURE: 86 MMHG | OXYGEN SATURATION: 96 % | HEIGHT: 69 IN

## 2024-01-12 LAB
ANION GAP SERPL CALC-SCNC: 6 MEQ/L (ref 3–15)
BASOPHILS # BLD: 0.08 K/UL (ref 0.01–0.1)
BASOPHILS NFR BLD: 1.6 %
BUN SERPL-MCNC: 21 MG/DL (ref 7–25)
CALCIUM SERPL-MCNC: 8.9 MG/DL (ref 8.6–10.3)
CHLORIDE SERPL-SCNC: 103 MEQ/L (ref 98–107)
CO2 SERPL-SCNC: 27 MEQ/L (ref 21–31)
CREAT SERPL-MCNC: 1.2 MG/DL (ref 0.7–1.3)
DIFFERENTIAL METHOD BLD: ABNORMAL
EGFRCR SERPLBLD CKD-EPI 2021: 58.5 ML/MIN/1.73M*2
EOSINOPHIL # BLD: 0.11 K/UL (ref 0.04–0.54)
EOSINOPHIL NFR BLD: 2.2 %
ERYTHROCYTE [DISTWIDTH] IN BLOOD BY AUTOMATED COUNT: 15.1 % (ref 11.6–14.4)
GLUCOSE SERPL-MCNC: 113 MG/DL (ref 70–99)
HCT VFR BLDCO AUTO: 39.8 % (ref 40.1–51)
HGB BLD-MCNC: 12.8 G/DL (ref 13.7–17.5)
IMM GRANULOCYTES # BLD AUTO: 0.01 K/UL (ref 0–0.08)
IMM GRANULOCYTES NFR BLD AUTO: 0.2 %
LYMPHOCYTES # BLD: 1.3 K/UL (ref 1.2–3.5)
LYMPHOCYTES NFR BLD: 26.1 %
MCH RBC QN AUTO: 29.9 PG (ref 28–33.2)
MCHC RBC AUTO-ENTMCNC: 32.2 G/DL (ref 32.2–36.5)
MCV RBC AUTO: 93 FL (ref 83–98)
MONOCYTES # BLD: 0.68 K/UL (ref 0.3–1)
MONOCYTES NFR BLD: 13.6 %
NEUTROPHILS # BLD: 2.81 K/UL (ref 1.7–7)
NEUTS SEG NFR BLD: 56.3 %
NRBC BLD-RTO: 0 %
PDW BLD AUTO: 10.4 FL (ref 9.4–12.4)
PLATELET # BLD AUTO: 146 K/UL (ref 150–350)
POTASSIUM SERPL-SCNC: 4.4 MEQ/L (ref 3.5–5.1)
RBC # BLD AUTO: 4.28 M/UL (ref 4.5–5.8)
SODIUM SERPL-SCNC: 136 MEQ/L (ref 136–145)
WBC # BLD AUTO: 4.99 K/UL (ref 3.8–10.5)

## 2024-01-12 PROCEDURE — 63600105 HC IODINE BASED CONTRAST: Mod: JZ | Performed by: PHYSICIAN ASSISTANT

## 2024-01-12 PROCEDURE — 82565 ASSAY OF CREATININE: CPT | Performed by: PHYSICIAN ASSISTANT

## 2024-01-12 PROCEDURE — G1004 CDSM NDSC: HCPCS

## 2024-01-12 PROCEDURE — 25800000 HC PHARMACY IV SOLUTIONS: Performed by: PHYSICIAN ASSISTANT

## 2024-01-12 PROCEDURE — 80048 BASIC METABOLIC PNL TOTAL CA: CPT | Performed by: PHYSICIAN ASSISTANT

## 2024-01-12 PROCEDURE — 36415 COLL VENOUS BLD VENIPUNCTURE: CPT | Performed by: PHYSICIAN ASSISTANT

## 2024-01-12 PROCEDURE — 85025 COMPLETE CBC W/AUTO DIFF WBC: CPT | Performed by: PHYSICIAN ASSISTANT

## 2024-01-12 RX ORDER — IOPAMIDOL 755 MG/ML
100 INJECTION, SOLUTION INTRAVASCULAR
Status: COMPLETED | OUTPATIENT
Start: 2024-01-12 | End: 2024-01-12

## 2024-01-12 RX ADMIN — IOPAMIDOL 100 ML: 755 INJECTION, SOLUTION INTRAVENOUS at 01:51

## 2024-01-12 RX ADMIN — SODIUM CHLORIDE 500 ML: 9 INJECTION, SOLUTION INTRAVENOUS at 00:33

## 2024-01-12 ASSESSMENT — ENCOUNTER SYMPTOMS
APPETITE CHANGE: 0
SORE THROAT: 0
FREQUENCY: 0
CONSTIPATION: 1
SHORTNESS OF BREATH: 0
DYSURIA: 0
DIFFICULTY URINATING: 0
BACK PAIN: 0
RECTAL PAIN: 0
HEMATURIA: 0
ABDOMINAL DISTENTION: 1
FEVER: 0
ABDOMINAL PAIN: 0
VOMITING: 0
NAUSEA: 0
DIZZINESS: 0
DIARRHEA: 0
EYE PAIN: 0

## 2024-01-12 NOTE — DISCHARGE INSTRUCTIONS
Increase daily fluid and fiber intake    Start taking Colace daily and increase Miralax daily dose until you have soft daily bowel movements. If this is not sufficient you could also use an over the counter Fleet's enema       Return to the emergency department for worsening of symptoms or if you develop any new concerning symptoms including abdominal pain, abdominal distention, not passing gas or having any bowel movements, vomiting, fever, difficulty urinating.    Follow up with your family doctor within 48 hours for re-evaluation and further treatment and monitoring.

## 2024-01-12 NOTE — ED PROVIDER NOTES
Emergency Medicine Note  HPI   HISTORY OF PRESENT ILLNESS     88-year-old male with history of BPH, CHF, CAD, hyperlipidemia, hypertension, sarcoidosis presents to the ED for evaluation of constipation.  Patient reports last having a bowel movement 4 days ago which was hard in nature after he had taken 1 dose of milk of magnesia.  He reports having normally daily bowel movements, and takes 1 cup of MiraLAX daily for the past few years.  Since yesterday has taken 4 doses of stool softeners with no improvement.  He has not been passing flatus and feels his abdomen is distended.  Denies rectal pain, abdominal pain, nausea, vomiting, diarrhea, fever, urinary symptoms, previous abdominal surgery, history of rectal impaction or previous bowel obstructions.  He reports last having a colonoscopy when he was 81 years old, no history of previous abnormal colonoscopies.  No change in appetite, recent pain medication or new medications.      History provided by:  Patient  Constipation  Associated symptoms: no abdominal pain, no back pain, no diarrhea, no dysuria, no fever, no nausea and no vomiting          Patient History   PAST HISTORY     Reviewed from Nursing Triage:       Past Medical History:   Diagnosis Date   • Arthritis    • BPH (benign prostatic hyperplasia)    • CHF (congestive heart failure) (CMS/[facility])    • Coronary artery disease    • Disease of thyroid gland    • Heart disease    • Hyperlipidemia    • Hypertension    • Sarcoidosis        Past Surgical History:   Procedure Laterality Date   • ADENOIDECTOMY     • AORTIC VALVE REPLACEMENT     • APPENDECTOMY     • CARDIAC SURGERY     • CORONARY ARTERY BYPASS GRAFT     • EYE SURGERY Bilateral     cataracts    • POPLITEAL VENOUS ANEURYSM REPAIR     • SKIN BIOPSY     • TONSILLECTOMY         Family History   Problem Relation Age of Onset   • Ovarian cancer Biological Mother    • Lymphoma Other         age  17   • Lymphoma Biological Son         age 20's       Social  History     Tobacco Use   • Smoking status: Former     Types: Cigarettes     Quit date:      Years since quittin.0   • Smokeless tobacco: Never   Substance Use Topics   • Alcohol use: No   • Drug use: No         Review of Systems   REVIEW OF SYSTEMS     Review of Systems   Constitutional: Negative for appetite change, fever and unexpected weight change.   HENT: Positive for hearing loss. Negative for sore throat.    Eyes: Negative for pain.   Respiratory: Negative for shortness of breath.    Cardiovascular: Negative for chest pain.   Gastrointestinal: Positive for abdominal distention and constipation. Negative for abdominal pain, diarrhea, nausea, rectal pain and vomiting.   Genitourinary: Negative for difficulty urinating, dysuria, frequency, hematuria and urgency.   Musculoskeletal: Negative for back pain.   Skin: Negative for rash.   Neurological: Negative for dizziness and syncope.         VITALS     ED Vitals    Date/Time Temp Pulse Resp BP SpO2 [facility]   24 0304 -- 64 18 206/86 96 % SR   24 0128 -- 70 18 185/79 97 % SR   248 -- 60 20 179/80 96 % LT   24 2258 36.5 °C (97.7 °F) 69 20 193/80 98 % CAW        Pulse Ox %: 96 % (24)  Pulse Ox Interpretation: Normal (24)           Physical Exam   PHYSICAL EXAM     Physical Exam  Vitals and nursing note reviewed.   Constitutional:       General: He is not in acute distress.     Appearance: He is not ill-appearing.   HENT:      Head: Normocephalic.      Ears:      Comments: [place]     Mouth/Throat:      Mouth: Mucous membranes are moist.   Eyes:      Conjunctiva/sclera: Conjunctivae normal.   Cardiovascular:      Rate and Rhythm: Normal rate and regular rhythm.   Pulmonary:      Effort: Pulmonary effort is normal. No respiratory distress.      Breath sounds: Normal breath sounds.   Abdominal:      General: Bowel sounds are normal. There is distension (mild).      Palpations: Abdomen is soft.      Tenderness: There is  no abdominal tenderness. There is no right CVA tenderness, left CVA tenderness, guarding or rebound.      Hernia: A hernia (nontender easily reducible umbilical hernia) is present.   Genitourinary:     Comments: Rectal no stool impaction, charles Meraz RN  Musculoskeletal:      Cervical back: Neck supple.   Skin:     General: Skin is warm and dry.   Neurological:      Mental Status: He is alert.   Psychiatric:         Mood and Affect: Mood normal.           PROCEDURES     Procedures     DATA     Results     Procedure Component Value Units Date/Time    Basic metabolic panel [[id number]]  (Abnormal) Collected: 01/12/24 0034    Specimen: Blood, Venous Updated: 01/12/24 0104     Sodium 136 mEQ/L      Potassium 4.4 mEQ/L      Comment: Results obtained on plasma. Plasma Potassium values may be up to 0.4 mEQ/L less than serum values. The differences may be greater for patients with high platelet or white cell counts.        Chloride 103 mEQ/L      CO2 27 mEQ/L      BUN 21 mg/dL      Creatinine 1.2 mg/dL      Glucose 113 mg/dL      Calcium 8.9 mg/dL      eGFR 58.5 mL/min/1.73m*2      Comment: Calculation based on the Chronic Kidney Disease Epidemiology Collaboration (CKD-EPI) equation refit without adjustment for race.        Anion Gap 6 mEQ/L     CBC and differential [[id number]]  (Abnormal) Collected: 01/12/24 0034    Specimen: Blood, Venous Updated: 01/12/24 0040     WBC 4.99 K/uL      RBC 4.28 M/uL      Hemoglobin 12.8 g/dL      Hematocrit 39.8 %      MCV 93.0 fL      MCH 29.9 pg      MCHC 32.2 g/dL      RDW 15.1 %      Platelets 146 K/uL      MPV 10.4 fL      Differential Type Auto     nRBC 0.0 %      Immature Granulocytes 0.2 %      Neutrophils 56.3 %      Lymphocytes 26.1 %      Monocytes 13.6 %      Eosinophils 2.2 %      Basophils 1.6 %      Immature Granulocytes, Absolute 0.01 K/uL      Neutrophils, Absolute 2.81 K/uL      Lymphocytes, Absolute 1.30 K/uL      Monocytes, Absolute 0.68 K/uL      Eosinophils,  Absolute 0.11 K/uL      Basophils, Absolute 0.08 K/uL           Imaging Results          CT ABDOMEN PELVIS WITH IV CONTRAST (Final result)  Result time 01/12/24 08:27:04    Final result                 Impression:    IMPRESSION:  1. Moderate stool burden throughout the colon. No evidence of colonic  obstructing lesion or small bowel obstruction.  2. Additional stable/incidental findings as described above in the comment  section.                 Narrative:    CLINICAL HISTORY:Constipation for the past 4 days.    COMMENT:Helical CT of the abdomen and pelvis was performed on January 12, 2024  during the administration of 100 cc of Isovue 370 contrast intravenously.  Enteral contrast was not administered which limits the gastrointestinal tract.  Comparison is made previous study performed October 31, 2023.    CT DOSE:  One or more dose reduction techniques (e.g. automated exposure  control, adjustment of the mA and/or kV according to patient size, use of  iterative reconstruction technique) utilized for this examination.    The lung bases are free of pleural effusion. There is reticular interstitial  prominence and patchy areas of honeycombing throughout the lung bases consistent  with interstitial fibrosis. Findings do not appear significantly changed. There  are also mild bronchiectatic changes throughout the right lower lobe. A small to  moderate-sized hiatal hernia is again demonstrated. The heart is top normal in  size. Partially imaged is an aortic valve endoprosthesis. There is no  pericardial effusion. There is moderate coronary artery calcification.    The liver measures 12.3 cm in length and is normal in contour and attenuation. A  9 mm cyst is present within the inferior right lobe of the liver and is stable  in size. No new hepatic lesions are demonstrated and there is no intrahepatic  biliary ductal dilatation. The spleen and pancreas are unremarkable. Small  gallstones are contained within the dependent  lumen of the gallbladder. No  pericholecystic inflammation is demonstrated.    No adrenal mass lesions are demonstrated. The kidneys enhance and excrete  contrast symmetrically and are normal in size and contour. There is a 16 mm cyst  arising from the inferior pole cortex of the left kidney.    The small and large bowel is normal in caliber. There is a moderate stool burden  throughout the colon. There are diverticula scattered throughout the colon.  There is no evidence of diverticulitis. No bowel wall thickening is  demonstrated. There is no pneumatosis or pneumoperitoneum. There is no evidence  of bowel obstruction.    The urinary bladder is partially distended with urine and is normal in contour.  The prostate gland and seminal vesicles are unremarkable.    The abdominal aorta and iliac arteries contain atheromatous calcification  however are patent and normal in caliber. No intra-abdominal intrapelvic  adenopathy or ascites is demonstrated. There is a fat-containing umbilical  hernia. There is evidence of previous right inguinal hernia repair.    There are spondylotic and degenerative changes throughout the lower thoracic and  lumbar spine. There is a stable chronic compression deformity of the superior  endplate of the L4 vertebral body. Degenerative changes are present within both  hips. Sternal wires are in place.    A concordant preliminary report was rendered by Dr. Phoenix on January 12 2024-40 8:00 AM.                    Preliminary Interpretation    Patient Name: Michel Fang  Exam(s): a/p standard CT  MR#: [id number]       History: Bowel obstruction suspected; constipation for 4 days    Preliminary Impression:      Comparison studies dated 10/31/2023. A mild amount of stool seen throughout the colon without evidence of obstruction. Bowel is normal in caliber and wall thickness. Diverticula are seen throughout the colon. No evidence of diverticulitis. Small fat-containing umbilical hernia. No free  air or free fluid is seen. No lymphadenopathy is observed. Surgical clips are seen in the right lower quadrant may be due to herniorrhaphy.    Fibrotic changes are seen at both lung bases along with traction bronchiectasis. No effusion or solid lesion is present. The heart is enlarged. Status post TAVR. Moderate hiatal hernia once again seen.    The solid viscera are unremarkable. Small stones are seen layering posteriorly in the gallbladder. No biliary dilatation is present.    The kidneys take up and excrete contrast symmetrically wit  hout hydronephrosis or perinephric stranding. Ureters are normal in caliber. The urinary bladder is not thick-walled. Prostate gland is borderline enlarged.    The osseous structures are unchanged. The abdominal aorta is normal in caliber with dense calcification.      Interpreted by: Jackson Phoenix MD, Jan 12, 2024 02:48 AM                                  No orders to display       Scoring tools                                  ED Course & MDM   MDM / ED COURSE / CLINICAL IMPRESSION / DISPO     Medical Decision Making  ddx considered but not limited to constipation, rectal impaction, SBO, dehydration, electrolyte abnormality    Amount and/or Complexity of Data Reviewed  External Data Reviewed: labs, radiology and notes.  Labs: ordered. Decision-making details documented in ED Course.  Radiology: ordered and independent interpretation performed.      Risk  Prescription drug management.          ED Course as of 01/13/24 2015 Fri Jan 12, 2024   0112 Hemoglobin(!): 12.8  Chronic mild anemia similar to previous [TC]   0112 WBC: 4.99 [TC]   0112 No significant electrolyte abnormality [TC]   0220 Case signed out to night team, CT results and dispo pending [TC]      ED Course User Index  [TC] Kat De La Cruz PA C     Clinical Impression      Constipation, unspecified constipation type     _________________     ED Disposition   Discharge                   Kat De La Cruz PA C  01/13/24  2019

## 2024-01-13 ASSESSMENT — ENCOUNTER SYMPTOMS: UNEXPECTED WEIGHT CHANGE: 0

## 2024-05-29 ENCOUNTER — HOSPITAL ENCOUNTER (INPATIENT)
Facility: HOSPITAL | Age: 89
LOS: 3 days | Discharge: HOME | DRG: 194 | End: 2024-06-01
Attending: EMERGENCY MEDICINE | Admitting: INTERNAL MEDICINE
Payer: MEDICARE

## 2024-05-29 ENCOUNTER — APPOINTMENT (EMERGENCY)
Dept: RADIOLOGY | Facility: HOSPITAL | Age: 89
DRG: 194 | End: 2024-05-29
Payer: MEDICARE

## 2024-05-29 DIAGNOSIS — J18.9 MULTIFOCAL PNEUMONIA: ICD-10-CM

## 2024-05-29 DIAGNOSIS — R07.9 CHEST PAIN, UNSPECIFIED TYPE: Primary | ICD-10-CM

## 2024-05-29 DIAGNOSIS — R68.84 JAW PAIN: ICD-10-CM

## 2024-05-29 PROBLEM — R94.31 PROLONGED Q-T INTERVAL ON ECG: Status: RESOLVED | Noted: 2019-05-13 | Resolved: 2024-05-29

## 2024-05-29 PROBLEM — E87.1 HYPONATREMIA: Status: RESOLVED | Noted: 2019-05-11 | Resolved: 2024-05-29

## 2024-05-29 PROBLEM — N30.00 ACUTE CYSTITIS WITHOUT HEMATURIA: Status: RESOLVED | Noted: 2018-09-05 | Resolved: 2024-05-29

## 2024-05-29 PROBLEM — R11.0 NAUSEA: Status: RESOLVED | Noted: 2019-05-13 | Resolved: 2024-05-29

## 2024-05-29 PROBLEM — I25.10 CAD (CORONARY ARTERY DISEASE): Status: ACTIVE | Noted: 2024-05-29

## 2024-05-29 PROBLEM — R27.0 ATAXIA: Status: RESOLVED | Noted: 2019-12-05 | Resolved: 2024-05-29

## 2024-05-29 PROBLEM — E87.20 LACTIC ACIDOSIS: Status: RESOLVED | Noted: 2018-09-05 | Resolved: 2024-05-29

## 2024-05-29 PROBLEM — N39.0 UTI (URINARY TRACT INFECTION): Status: RESOLVED | Noted: 2023-10-31 | Resolved: 2024-05-29

## 2024-05-29 PROBLEM — I50.22 CHRONIC HFREF (HEART FAILURE WITH REDUCED EJECTION FRACTION) (CMS/HCC): Status: ACTIVE | Noted: 2024-05-29

## 2024-05-29 PROBLEM — I35.0 AORTIC STENOSIS: Status: RESOLVED | Noted: 2017-10-25 | Resolved: 2024-05-29

## 2024-05-29 PROBLEM — R79.89 ELEVATED TROPONIN: Status: RESOLVED | Noted: 2023-10-31 | Resolved: 2024-05-29

## 2024-05-29 PROBLEM — E87.1 HYPONATREMIA: Status: ACTIVE | Noted: 2024-05-29

## 2024-05-29 PROBLEM — R09.02 HYPOXIA: Status: RESOLVED | Noted: 2018-09-06 | Resolved: 2024-05-29

## 2024-05-29 PROBLEM — I10 HTN (HYPERTENSION): Status: RESOLVED | Noted: 2018-09-05 | Resolved: 2024-05-29

## 2024-05-29 PROBLEM — K92.2 GASTROINTESTINAL HEMORRHAGE: Status: RESOLVED | Noted: 2018-09-06 | Resolved: 2024-05-29

## 2024-05-29 PROBLEM — I25.10 CAD (CORONARY ARTERY DISEASE): Status: RESOLVED | Noted: 2018-09-05 | Resolved: 2024-05-29

## 2024-05-29 PROBLEM — R53.1 GENERALIZED WEAKNESS: Status: RESOLVED | Noted: 2023-10-31 | Resolved: 2024-05-29

## 2024-05-29 PROBLEM — R50.9 FEVER, UNSPECIFIED: Status: RESOLVED | Noted: 2023-10-31 | Resolved: 2024-05-29

## 2024-05-29 LAB
ALBUMIN SERPL-MCNC: 3.4 G/DL (ref 3.5–5.7)
ALP SERPL-CCNC: 77 IU/L (ref 34–125)
ALT SERPL-CCNC: 10 IU/L (ref 7–52)
ANION GAP SERPL CALC-SCNC: 10 MEQ/L (ref 3–15)
AST SERPL-CCNC: 15 IU/L (ref 13–39)
ATRIAL RATE: 60
BACTERIA URNS QL MICRO: ABNORMAL /HPF
BASOPHILS # BLD: 0.06 K/UL (ref 0.01–0.1)
BASOPHILS NFR BLD: 1.1 %
BILIRUB DIRECT SERPL-MCNC: 0.2 MG/DL
BILIRUB SERPL-MCNC: 0.9 MG/DL (ref 0.3–1.2)
BILIRUB UR QL STRIP.AUTO: NEGATIVE MG/DL
BUN SERPL-MCNC: 21 MG/DL (ref 7–25)
CALCIUM SERPL-MCNC: 8.9 MG/DL (ref 8.6–10.3)
CHLORIDE SERPL-SCNC: 104 MEQ/L (ref 98–107)
CLARITY UR REFRACT.AUTO: CLEAR
CO2 SERPL-SCNC: 21 MEQ/L (ref 21–31)
COLOR UR AUTO: YELLOW
CREAT SERPL-MCNC: 1.2 MG/DL (ref 0.7–1.3)
DIFFERENTIAL METHOD BLD: ABNORMAL
EGFRCR SERPLBLD CKD-EPI 2021: 58.5 ML/MIN/1.73M*2
EOSINOPHIL # BLD: 0.24 K/UL (ref 0.04–0.54)
EOSINOPHIL NFR BLD: 4.4 %
ERYTHROCYTE [DISTWIDTH] IN BLOOD BY AUTOMATED COUNT: 14.1 % (ref 11.6–14.4)
FLUAV RNA SPEC QL NAA+PROBE: NEGATIVE
FLUBV RNA SPEC QL NAA+PROBE: NEGATIVE
GLUCOSE SERPL-MCNC: 118 MG/DL (ref 70–99)
GLUCOSE UR STRIP.AUTO-MCNC: NEGATIVE MG/DL
HCT VFR BLD AUTO: 40.2 % (ref 40.1–51)
HGB BLD-MCNC: 13.2 G/DL (ref 13.7–17.5)
HGB UR QL STRIP.AUTO: NEGATIVE
HYALINE CASTS #/AREA URNS LPF: ABNORMAL /LPF
IMM GRANULOCYTES # BLD AUTO: 0.01 K/UL (ref 0–0.08)
IMM GRANULOCYTES NFR BLD AUTO: 0.2 %
KETONES UR STRIP.AUTO-MCNC: NEGATIVE MG/DL
LACTATE SERPL-SCNC: 1.6 MMOL/L (ref 0.4–2)
LEUKOCYTE ESTERASE UR QL STRIP.AUTO: NEGATIVE
LYMPHOCYTES # BLD: 0.65 K/UL (ref 1.2–3.5)
LYMPHOCYTES NFR BLD: 11.9 %
MAGNESIUM SERPL-MCNC: 1.9 MG/DL (ref 1.8–2.5)
MCH RBC QN AUTO: 29.9 PG (ref 28–33.2)
MCHC RBC AUTO-ENTMCNC: 32.8 G/DL (ref 32.2–36.5)
MCV RBC AUTO: 91.2 FL (ref 83–98)
MONOCYTES # BLD: 0.99 K/UL (ref 0.3–1)
MONOCYTES NFR BLD: 18.2 %
MUCOUS THREADS URNS QL MICRO: ABNORMAL /LPF
NEUTROPHILS # BLD: 3.5 K/UL (ref 1.7–7)
NEUTS SEG NFR BLD: 64.2 %
NITRITE UR QL STRIP.AUTO: NEGATIVE
NRBC BLD-RTO: 0 %
P AXIS: 17
PDW BLD AUTO: 9.9 FL (ref 9.4–12.4)
PH UR STRIP.AUTO: 6 [PH]
PLATELET # BLD AUTO: 155 K/UL (ref 150–350)
POTASSIUM SERPL-SCNC: 3.9 MEQ/L (ref 3.5–5.1)
PR INTERVAL: 198
PROT SERPL-MCNC: 6.3 G/DL (ref 6–8.2)
PROT UR QL STRIP.AUTO: ABNORMAL
QRS DURATION: 150
QT INTERVAL: 468
QTC CALCULATION(BAZETT): 468
R AXIS: -52
RBC # BLD AUTO: 4.41 M/UL (ref 4.5–5.8)
RBC #/AREA URNS HPF: ABNORMAL /HPF
RSV RNA SPEC QL NAA+PROBE: NEGATIVE
SARS-COV-2 RNA RESP QL NAA+PROBE: NEGATIVE
SODIUM SERPL-SCNC: 135 MEQ/L (ref 136–145)
SP GR UR REFRACT.AUTO: 1.02
SQUAMOUS URNS QL MICRO: ABNORMAL /HPF
T WAVE AXIS: -10
TROPONIN I SERPL HS-MCNC: 10.9 PG/ML
TROPONIN I SERPL HS-MCNC: 11.5 PG/ML
UROBILINOGEN UR STRIP-ACNC: 2 EU/DL
VENTRICULAR RATE: 60
WBC # BLD AUTO: 5.45 K/UL (ref 3.8–10.5)
WBC #/AREA URNS HPF: ABNORMAL /HPF

## 2024-05-29 PROCEDURE — 93005 ELECTROCARDIOGRAM TRACING: CPT | Performed by: PHYSICIAN ASSISTANT

## 2024-05-29 PROCEDURE — 1123F ACP DISCUSS/DSCN MKR DOCD: CPT | Performed by: INTERNAL MEDICINE

## 2024-05-29 PROCEDURE — 87449 NOS EACH ORGANISM AG IA: CPT | Performed by: NURSE PRACTITIONER

## 2024-05-29 PROCEDURE — 84484 ASSAY OF TROPONIN QUANT: CPT | Mod: 91 | Performed by: PHYSICIAN ASSISTANT

## 2024-05-29 PROCEDURE — 83605 ASSAY OF LACTIC ACID: CPT | Performed by: PHYSICIAN ASSISTANT

## 2024-05-29 PROCEDURE — 80048 BASIC METABOLIC PNL TOTAL CA: CPT | Performed by: PHYSICIAN ASSISTANT

## 2024-05-29 PROCEDURE — 25800000 HC PHARMACY IV SOLUTIONS: Performed by: PHYSICIAN ASSISTANT

## 2024-05-29 PROCEDURE — 99223 1ST HOSP IP/OBS HIGH 75: CPT | Performed by: INTERNAL MEDICINE

## 2024-05-29 PROCEDURE — 87040 BLOOD CULTURE FOR BACTERIA: CPT | Mod: 91 | Performed by: PHYSICIAN ASSISTANT

## 2024-05-29 PROCEDURE — 63600000 HC DRUGS/DETAIL CODE: Mod: JZ | Performed by: PHYSICIAN ASSISTANT

## 2024-05-29 PROCEDURE — 99284 EMERGENCY DEPT VISIT MOD MDM: CPT | Mod: 25

## 2024-05-29 PROCEDURE — 63600000 HC DRUGS/DETAIL CODE: Mod: JZ | Performed by: NURSE PRACTITIONER

## 2024-05-29 PROCEDURE — 71275 CT ANGIOGRAPHY CHEST: CPT

## 2024-05-29 PROCEDURE — 63700000 HC SELF-ADMINISTRABLE DRUG: Performed by: NURSE PRACTITIONER

## 2024-05-29 PROCEDURE — 36415 COLL VENOUS BLD VENIPUNCTURE: CPT | Performed by: PHYSICIAN ASSISTANT

## 2024-05-29 PROCEDURE — 84484 ASSAY OF TROPONIN QUANT: CPT | Performed by: PHYSICIAN ASSISTANT

## 2024-05-29 PROCEDURE — 81003 URINALYSIS AUTO W/O SCOPE: CPT | Performed by: PHYSICIAN ASSISTANT

## 2024-05-29 PROCEDURE — 83735 ASSAY OF MAGNESIUM: CPT | Performed by: NURSE PRACTITIONER

## 2024-05-29 PROCEDURE — 85025 COMPLETE CBC W/AUTO DIFF WBC: CPT | Performed by: PHYSICIAN ASSISTANT

## 2024-05-29 PROCEDURE — 63600105 HC IODINE BASED CONTRAST: Mod: JZ | Performed by: PHYSICIAN ASSISTANT

## 2024-05-29 PROCEDURE — 87637 SARSCOV2&INF A&B&RSV AMP PRB: CPT | Performed by: PHYSICIAN ASSISTANT

## 2024-05-29 PROCEDURE — 21400000 HC ROOM AND CARE CCU/INTERMEDIATE

## 2024-05-29 PROCEDURE — 25000000 HC PHARMACY GENERAL: Performed by: NURSE PRACTITIONER

## 2024-05-29 PROCEDURE — 80076 HEPATIC FUNCTION PANEL: CPT | Performed by: NURSE PRACTITIONER

## 2024-05-29 RX ORDER — IOPAMIDOL 755 MG/ML
100 INJECTION, SOLUTION INTRAVASCULAR
Status: COMPLETED | OUTPATIENT
Start: 2024-05-29 | End: 2024-05-29

## 2024-05-29 RX ORDER — HYDRALAZINE HYDROCHLORIDE 25 MG/1
25 TABLET, FILM COATED ORAL
Status: DISCONTINUED | OUTPATIENT
Start: 2024-05-29 | End: 2024-06-01 | Stop reason: HOSPADM

## 2024-05-29 RX ORDER — DEXTROSE 50 % IN WATER (D50W) INTRAVENOUS SYRINGE
25 AS NEEDED
Status: DISCONTINUED | OUTPATIENT
Start: 2024-05-29 | End: 2024-06-01 | Stop reason: HOSPADM

## 2024-05-29 RX ORDER — PANTOPRAZOLE SODIUM 40 MG/1
40 TABLET, DELAYED RELEASE ORAL DAILY
Status: DISCONTINUED | OUTPATIENT
Start: 2024-05-29 | End: 2024-06-01 | Stop reason: HOSPADM

## 2024-05-29 RX ORDER — CARVEDILOL 3.12 MG/1
3.12 TABLET ORAL 2 TIMES DAILY WITH MEALS
COMMUNITY
End: 2025-01-10 | Stop reason: HOSPADM

## 2024-05-29 RX ORDER — LEVOTHYROXINE SODIUM 25 UG/1
25 TABLET ORAL
Status: DISCONTINUED | OUTPATIENT
Start: 2024-05-30 | End: 2024-06-01 | Stop reason: HOSPADM

## 2024-05-29 RX ORDER — METRONIDAZOLE 500 MG/100ML
500 INJECTION, SOLUTION INTRAVENOUS ONCE
Status: COMPLETED | OUTPATIENT
Start: 2024-05-29 | End: 2024-05-29

## 2024-05-29 RX ORDER — ACETAMINOPHEN 325 MG/1
650 TABLET ORAL EVERY 4 HOURS PRN
Status: DISCONTINUED | OUTPATIENT
Start: 2024-05-29 | End: 2024-06-01 | Stop reason: HOSPADM

## 2024-05-29 RX ORDER — TAMSULOSIN HYDROCHLORIDE 0.4 MG/1
0.4 CAPSULE ORAL NIGHTLY
Status: DISCONTINUED | OUTPATIENT
Start: 2024-05-29 | End: 2024-06-01 | Stop reason: HOSPADM

## 2024-05-29 RX ORDER — ENOXAPARIN SODIUM 100 MG/ML
40 INJECTION SUBCUTANEOUS
Status: DISCONTINUED | OUTPATIENT
Start: 2024-05-29 | End: 2024-06-01 | Stop reason: HOSPADM

## 2024-05-29 RX ORDER — DEXTROSE 40 %
15-30 GEL (GRAM) ORAL AS NEEDED
Status: DISCONTINUED | OUTPATIENT
Start: 2024-05-29 | End: 2024-06-01 | Stop reason: HOSPADM

## 2024-05-29 RX ORDER — CARVEDILOL 3.12 MG/1
3.12 TABLET ORAL 2 TIMES DAILY WITH MEALS
Status: DISCONTINUED | OUTPATIENT
Start: 2024-05-29 | End: 2024-06-01 | Stop reason: HOSPADM

## 2024-05-29 RX ORDER — IPRATROPIUM BROMIDE AND ALBUTEROL SULFATE 2.5; .5 MG/3ML; MG/3ML
3 SOLUTION RESPIRATORY (INHALATION)
Status: DISCONTINUED | OUTPATIENT
Start: 2024-05-29 | End: 2024-06-01 | Stop reason: HOSPADM

## 2024-05-29 RX ORDER — ASPIRIN 325 MG
325 TABLET, DELAYED RELEASE (ENTERIC COATED) ORAL DAILY
Status: DISCONTINUED | OUTPATIENT
Start: 2024-05-29 | End: 2024-06-01 | Stop reason: HOSPADM

## 2024-05-29 RX ORDER — SACUBITRIL AND VALSARTAN 24; 26 MG/1; MG/1
1 TABLET, FILM COATED ORAL 2 TIMES DAILY
COMMUNITY

## 2024-05-29 RX ORDER — FLUTICASONE PROPIONATE 50 MCG
1 SPRAY, SUSPENSION (ML) NASAL DAILY
Status: DISCONTINUED | OUTPATIENT
Start: 2024-05-29 | End: 2024-06-01 | Stop reason: HOSPADM

## 2024-05-29 RX ORDER — IBUPROFEN 200 MG
16-32 TABLET ORAL AS NEEDED
Status: DISCONTINUED | OUTPATIENT
Start: 2024-05-29 | End: 2024-06-01 | Stop reason: HOSPADM

## 2024-05-29 RX ORDER — ATORVASTATIN CALCIUM 20 MG/1
20 TABLET, FILM COATED ORAL EVERY EVENING
Status: DISCONTINUED | OUTPATIENT
Start: 2024-05-29 | End: 2024-06-01 | Stop reason: HOSPADM

## 2024-05-29 RX ORDER — SACUBITRIL AND VALSARTAN 24; 26 MG/1; MG/1
1 TABLET, FILM COATED ORAL 2 TIMES DAILY
Status: DISCONTINUED | OUTPATIENT
Start: 2024-05-29 | End: 2024-06-01 | Stop reason: HOSPADM

## 2024-05-29 RX ADMIN — ENOXAPARIN SODIUM 40 MG: 40 INJECTION SUBCUTANEOUS at 17:02

## 2024-05-29 RX ADMIN — CEFTRIAXONE SODIUM 1 G: 1 INJECTION, POWDER, FOR SOLUTION INTRAMUSCULAR; INTRAVENOUS at 16:54

## 2024-05-29 RX ADMIN — TAMSULOSIN HYDROCHLORIDE 0.4 MG: 0.4 CAPSULE ORAL at 22:24

## 2024-05-29 RX ADMIN — IPRATROPIUM BROMIDE AND ALBUTEROL SULFATE 3 ML: .5; 3 SOLUTION RESPIRATORY (INHALATION) at 19:53

## 2024-05-29 RX ADMIN — PANTOPRAZOLE SODIUM 40 MG: 40 TABLET, DELAYED RELEASE ORAL at 16:56

## 2024-05-29 RX ADMIN — IPRATROPIUM BROMIDE AND ALBUTEROL SULFATE 3 ML: .5; 3 SOLUTION RESPIRATORY (INHALATION) at 16:57

## 2024-05-29 RX ADMIN — ATORVASTATIN CALCIUM 20 MG: 20 TABLET, FILM COATED ORAL at 16:55

## 2024-05-29 RX ADMIN — SACUBITRIL AND VALSARTAN 1 TABLET: 24; 26 TABLET, FILM COATED ORAL at 19:53

## 2024-05-29 RX ADMIN — ASPIRIN 325 MG: 325 TABLET, COATED ORAL at 16:56

## 2024-05-29 RX ADMIN — FLUTICASONE PROPIONATE 1 SPRAY: 50 SPRAY, METERED NASAL at 16:56

## 2024-05-29 RX ADMIN — HYDRALAZINE HYDROCHLORIDE 25 MG: 25 TABLET ORAL at 16:56

## 2024-05-29 RX ADMIN — IOPAMIDOL 100 ML: 755 INJECTION, SOLUTION INTRAVENOUS at 12:31

## 2024-05-29 RX ADMIN — METRONIDAZOLE 500 MG: 500 INJECTION, SOLUTION INTRAVENOUS at 15:11

## 2024-05-29 RX ADMIN — AZITHROMYCIN MONOHYDRATE 500 MG: 500 INJECTION, POWDER, LYOPHILIZED, FOR SOLUTION INTRAVENOUS at 15:12

## 2024-05-29 ASSESSMENT — ENCOUNTER SYMPTOMS
ABDOMINAL PAIN: 0
CHILLS: 0
SHORTNESS OF BREATH: 0
DIZZINESS: 1
VOMITING: 1
NAUSEA: 1
FEVER: 1

## 2024-05-29 NOTE — H&P
"   Hospital Medicine Service -  History & Physical        CHIEF COMPLAINT     Chief Complaint   Patient presents with    Chest Pain        HISTORY OF PRESENT ILLNESS      88 y.o. male with a past medical history of CAD s/p CABG, HFrEF, AS s/p AVR, HTN, HLD, GERD, UGIB, pulmonary sarcoidosis, BPH, among other conditions. Lives at home with his two sons. Patient presents to the ED today with chief complaint of chest pain. He is somewhat of a poor historian. Supposedly he began feeling unwell ~2-3 days ago, reporting that he did not have energy and that when he went to stand up he felt like he was off balance. He intermittently felt as if he may faint, but he did not fall or pass out. Then today he started getting this \"irritation\" in his chest which he said is hard to describe; reports that it is in the same location as discomfort would be if you had reflux, but states it feels different than reflux. He also started having a cough today. Because of the chest discomfort he came to the ED for evaluation. He reports he felt somewhat chilled yesterday so he took some tylenol, his temperature was 98.9F. This morning he also had some nausea & the dry heaves but he did not vomit. He denies any headache, vision changes, palpitations, shortness of breath, abdominal pain, dysuria, change in stool habits. Last BM was 2 days ago, but he admits he has not been eating very much as his appetite has been low due to his illness.     While in the ED patient was febrile w/ T max 100.6F. He was otherwise hypertensive yet stable, 94% on RA. Initial lab work was remarkable for sodium 135, glucose 118, hemoglobin 13.2. HS troponin negative x2. EKG showed NSR w/ bifascicular block, Qtc 468, no acute ischemic changes, similar to prior tracings. He was flu/covid/rsv negative. UA non infectious. CTA chest PE study was negative for PE but did show small right pleural effusion, multifocal GGO and consolidative opacities suspicious for pneumonia " on a background of chronic fibrotic ILD with UIP pattern, several new or enlarged irregular appearing solid nodules which may be inflammatory; radiology suggesting 3 month follow up CT. Despite fever patient was not meeting sepsis criteria on admission; WBC normal at 5.65, lactate normal at 1.6, blood cultures are pending. Patient was given IV ceftriaxone, metronidazole and azithromycin in the ED. [facility] was brid[facility] for further evaluation & treatment.     Advanced care planning discussed with the patient who confirms CODE STATUS= FULL CODE. Michel Bundyue designates his daughter Maeve as primary contact & medical surrogate decision maker. Daughter Karen was updated at the bedside who will pass information along to Maeve.     PAST MEDICAL AND SURGICAL HISTORY      Past Medical History:   Diagnosis Date    Arthritis     BPH (benign prostatic hyperplasia)     CHF (congestive heart failure) (CMS/HCC)     Coronary artery disease     Disease of thyroid gland     Heart disease     Hyperlipidemia     Hypertension     Sarcoidosis        Past Surgical History:   Procedure Laterality Date    ADENOIDECTOMY      AORTIC VALVE REPLACEMENT      APPENDECTOMY      CARDIAC SURGERY      CORONARY ARTERY BYPASS GRAFT      EYE SURGERY Bilateral     cataracts     POPLITEAL VENOUS ANEURYSM REPAIR      SKIN BIOPSY      TONSILLECTOMY         MEDICATIONS      Prior to Admission medications    Medication Sig Start Date End Date Taking? Authorizing Provider   aspirin 325 mg EC tablet Take 325 mg by mouth daily. 9/18/18   Art Jorgensen MD   atorvastatin (LIPITOR) 40 mg tablet Take 40 mg by mouth every evening.    Art Jorgensen MD   fluticasone propionate (FLONASE) 50 mcg/actuation nasal spray Administer 1 spray into each nostril daily.    Provider [facility] MD Art   hydrALAZINE (APRESOLINE) 25 mg tablet Take 25 mg by mouth 2 (two) times a day. 8/23/21   Art Jorgensen MD   levothyroxine (SYNTHROID) 25 mcg tablet  Take 25 mcg by mouth daily.     Art Jorgensen MD   meclizine (ANTIVERT) 12.5 mg tablet Take 12.5 mg by mouth 3 (three) times a day as needed for dizziness.    Balbina Jorgensen MD   pantoprazole (PROTONIX) 40 mg EC tablet Take 40 mg by mouth daily.    Art Jorgensen MD   tamsulosin (FLOMAX) 0.4 mg capsule Take 0.4 mg by mouth nightly.    Art Jorgensen MD       ALLERGIES      Amlodipine    FAMILY HISTORY      Family History   Problem Relation Age of Onset    Ovarian cancer Biological Mother     Lymphoma Other         age  17    Lymphoma Biological Son         age 20's       SOCIAL HISTORY      , lives at home with sons  Uses walker for ambulation  Former smoker; quit 60 years ago  Denies ETOH drug use or marijuana use    REVIEW OF SYSTEMS      ROS negative except those mentioned in HPI.    PHYSICAL EXAMINATION      Temp:  [37.3 °C (99.2 °F)-38.1 °C (100.6 °F)] 37.3 °C (99.2 °F)  Heart Rate:  [58-87] 58  Resp:  [16-22] 22  BP: (122-181)/(67-74) 159/67  Body mass index is 26.58 kg/m².    Physical Exam  Constitutional:       General: He is not in acute distress.     Appearance: Normal appearance. He is not ill-appearing.   HENT:      Head: Normocephalic and atraumatic.      Mouth/Throat:      Mouth: Mucous membranes are moist.      Pharynx: Oropharynx is clear.   Eyes:      General: No scleral icterus.     Extraocular Movements: Extraocular movements intact.      Pupils: Pupils are equal, round, and reactive to light.   Cardiovascular:      Rate and Rhythm: Normal rate and regular rhythm.      Pulses: Normal pulses.      Heart sounds: No murmur heard.  Pulmonary:      Breath sounds: Rhonchi present. No wheezing or rales.   Abdominal:      General: Bowel sounds are normal. There is no distension.      Palpations: Abdomen is soft.      Tenderness: There is no abdominal tenderness. There is no guarding.   Musculoskeletal:         General: Normal range of motion.      Right lower leg:  No edema.      Left lower leg: No edema.   Skin:     General: Skin is warm and dry.      Capillary Refill: Capillary refill takes less than 2 seconds.      Coloration: Skin is not jaundiced.      Findings: No bruising or lesion.   Neurological:      General: No focal deficit present.      Mental Status: He is alert and oriented to person, place, and time. Mental status is at baseline.      Cranial Nerves: No cranial nerve deficit.      Sensory: No sensory deficit.      Motor: No weakness.   Psychiatric:         Mood and Affect: Mood normal.         Behavior: Behavior normal.           LABS / IMAGING / EKG        Labs  CBC Results         05/29/24 01/12/24 12/13/23     1027 0034 0556    WBC 5.45 4.99 3.50    RBC 4.41 4.28 4.43    HGB 13.2 12.8 13.4    HCT 40.2 39.8 41.1    MCV 91.2 93.0 92.8    MCH 29.9 29.9 30.2    MCHC 32.8 32.2 32.6     146 151          CMP Results         05/29/24 01/12/24 12/13/23     1027 0034 0556     136 139    K 3.9 4.4 3.7    Cl 104 103 106    CO2 21 27 20    Glucose 118 113 106    BUN 21 21 18    Creatinine 1.2 1.2 1.1    Calcium 8.9 8.9 8.0    Anion Gap 10 6 13    EGFR 58.5 58.5 >60.0           Comment for K at 1027 on 05/29/24: Results obtained on plasma. Plasma Potassium values may be up to 0.4 mEQ/L less than serum values. The differences may be greater for patients with high platelet or white cell counts.    Comment for K at 0034 on 01/12/24: Results obtained on plasma. Plasma Potassium values may be up to 0.4 mEQ/L less than serum values. The differences may be greater for patients with high platelet or white cell counts.    Comment for K at 0556 on 12/13/23: Results obtained on plasma. Plasma Potassium values may be up to 0.4 mEQ/L less than serum values. The differences may be greater for patients with high platelet or white cell counts.    Comment for EGFR at 1027 on 05/29/24: Calculation based on the Chronic Kidney Disease Epidemiology Collaboration (CKD-EPI)  equation refit without adjustment for race.    Comment for EGFR at 0034 on 01/12/24: Calculation based on the Chronic Kidney Disease Epidemiology Collaboration (CKD-EPI) equation refit without adjustment for race.    Comment for EGFR at 0556 on 12/13/23: Calculation based on the Chronic Kidney Disease Epidemiology Collaboration (CKD-EPI) equation refit without adjustment for race.            Troponin I Results         05/29/24 05/29/24 12/13/23     1258 1027 0800    HS Troponin I 10.9 11.5 10.8          Microbiology Results       Procedure Component Value Units Date/Time    SARS-COV-2 (COVID-19)/ FLU A/B, AND RSV, PCR Nasopharynx [[id number]]  (Normal) Collected: 05/29/24 1029    Specimen: Nasopharyngeal Swab from Nasopharynx Updated: 05/29/24 1114     SARS-CoV-2 (COVID-19) Negative     Influenza A Negative     Influenza B Negative     Respiratory Syncytial Virus Negative    Narrative:      Testing performed using real-time PCR for detection of COVID-19. EUA approved validation studies performed on site.           UA Results         05/29/24     1258    Color Yellow    Clarity Clear    Glucose Negative    Bilirubin Negative    Ketones Negative    Sp Grav 1.020    Blood Negative    Ph 6.0    Protein Trace    Urobilinogen 2.0    Nitrite Negative    Leuk Est Negative    WBC 0 TO 3    RBC 0 TO 4    Bacteria None Seen           Comment for Blood at 1258 on 05/29/24: The sensitivity of the occult blood test is equivalent to approximately 4 intact RBC/HPF.    Comment for Protein at 1258 on 05/29/24: Trace False Positive Protein can be seen with alkaline or highly buffered urines or urine with high specific gravity. Suggest clinical correlation.    Comment for Leuk Est at 1258 on 05/29/24: Results can be falsely negative due to high specific gravity, some antibiotics, glucose >3 g/dl, or WBC other than neutrophils.            Imaging  CT ANGIOGRAPHY CHEST PULMONARY EMBOLISM WITH IV CONTRAST    Result Date:  5/29/2024  Narrative: EXAM: CT ANGIOGRAPHY CHEST PULMONARY EMBOLISM W IV CONTRAST CLINICAL HISTORY: Pulmonary embolism (PE) suspected, high prob COMPARISON: CT 5/12/2019, x-ray 12/13/2023. TECHNIQUE: CT chest was performed using thin section reconstructions. Contrast injection rate and acquisition timing were optimized for opacification of the pulmonary arteries to evaluate for pulmonary embolism. 3-D RECONSTRUCTION: Additional 3-D/MIP reconstructions were performed and reviewed. CT DOSE:  One or more dose reduction techniques (e.g. automated exposure control, adjustment of the mA and/or kV according to patient size, use of iterative reconstruction technique) utilized for this examination. CONTRAST: 100mL of iopamidoL (ISOVUE-370) 370 mg iodine /mL (76 %) injection 100 mL COMMENT: PULMONARY ARTERIES: Image Quality is: Moderate noting the following limitations: Motion and streak/mixing artifact. No pulmonary embolism is identified to the proximal subsegmental level. AIRWAYS, LUNGS AND PLEURA: Patent central airways. Small right lesion. Coarse basilar predominant reticulation and bronchiolectasis with honeycomb cyst formation in keeping with UIP pattern fibrotic disease. Multifocal groundglass and nodular consolidative opacities suspicious for infectious/inflammatory process. Largest discrete nodules which are new or larger than the prior study including a 0.8 cm nodule at the right apex on series 3 image #59 and 0.8 cm right upper lobe nodule on series 3 image #78. These may be inflammatory but should be assessed on follow-up after treatment. Scattered areas of subsegmental atelectasis also present. No pneumothorax. CARDIOMEDIASTINUM: Mild cardiomegaly. Severe multivessel native coronary atherosclerosis with postoperative changes from CABG and aortic valve replacement. No pericardial effusion. AORTA, GREAT VESSELS: Within normal limits LYMPH NODES: No thoracic lymphadenopathy IMAGED THYROID: Heterogeneous with  several subcentimeter nodules, not well evaluated here. IMAGED ESOPHAGUS: Moderate hiatal hernia with mild thickening of the distal esophagus which may be related to inflammation from reflux. UPPER ABDOMEN: Cholelithiasis. Pancreatic parenchymal volume loss without duct dilation. Mild renal cortical scarring. Subcentimeter low-attenuation lesion in segment V of the liver on series 4 image 265, too small to characterize. CHEST WALL: Surgical changes from anterior median sternotomy. Reflux into multiple chest wall collaterals. BONES: Osteopenia. Multilevel thoracolumbar degenerative disc disease.     Impression: IMPRESSION: No acute pulmonary embolism. Small right pleural effusion and multifocal groundglass and nodular consolidative airspace opacities suspicious for pneumonia on a background of chronic fibrotic ILD with UIP pattern. Follow-up CT after treatment recommended to ensure resolution; 3 months would be reasonable. Several new or enlarged irregular-appearing solid nodules which may be inflammatory but can be reassessed on follow-up CT as above. 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 Moderate hiatal hernia.       ECG/Telemetry  reviewed by me- see HPI    ASSESSMENT AND PLAN           * Multifocal pneumonia  Assessment & Plan  -Presents with 3 days of lack of energy, chills, and chest discomfort (described to be in same location as heartburn but different). Admits to cough & nausea w/o vomiting x 1 day. HS troponin negative x2. EKG non ischemic. CT chest showing multifocal GGO and consolidative opacities suspicious for pneumonia on a background of chronic fibrotic ILD with UIP pattern, several new or enlarged irregular appearing solid nodules which may be inflammatory. WBC WNL, febrile in ED. Flu/covid/rsv negative.  -Possibly viral pneumonia, but with pulmonary comorbidities & fever will treat as bacterial PNA. Low clinical concern for aspiration PNA.    Plan:  -Will admit to  medsurg  -Would continue treatment for CAP w/ azithromycin & ceftriaxone  -SLP consulted to rule out aspiration/dysphagia as he dose report seldom coughing while taking pills  -Check sputum culture if patient expectorates  -Check legionella urine  -DuoNebs ATC, BID mucinex  -Monitor CBC, fever curve  -Follow up blood cultures  -PT/OT for generalized weakness  -Will need follow up CT chest in 3 months to document resolution & to look at new nodules; patient & daughter Karen were notified of this.     Near syncope  Assessment & Plan  -Reports having lack of energy, unsteadiness when he stands up, and a feeling as if he was going to pass out    -Check orthostatic vital signs; if positive may need to adjust home cardiac meds (recently started on carvedilol & Entresto in addition to his hydralazine)  -Fall precaution bundle  -PT/OT evals    Hyponatremia  Assessment & Plan  -Mild w/ Na 135  -monitor bmp    Chronic HFrEF (heart failure with reduced ejection fraction) (CMS/[facility])  Assessment & Plan  -TTE 2023 showing EF 45%. Without clinical evidence to suggest acute exacerbation at time of admission  -Continue home carvedilol, Entresto, aspirin  -monitor daily weights, I/O  -OP follow up with cardiologist Dr. Isaacs    CAD (coronary artery disease)  Assessment & Plan  -H/O CAD s/p CABG in 2000  -Denies current cardiac complaints  -Continue ASA, statin beta blocker  -OP follow up with cardiologist Dr. Isaacs    Normocytic anemia  Assessment & Plan  -Chronic, hgb 13.2 which is close to baseline  -Monitor CBC    GERD (gastroesophageal reflux disease)  Assessment & Plan  -H/O GERD & UGIB  -moderate hiatal hernia noted on CT on admission  -Continue pantoprazole    Hypothyroidism  Assessment & Plan  -Continue levothyroxine    Hypertension  Assessment & Plan  -Continue carvedilol, hydralazine, Entresto  -Monitor BP    Aortic stenosis  Assessment & Plan  -H/O AS s/p TAVR in 2016  -Cardiologist=Dr. Mireles  Ferny    Hyperlipidemia  Assessment & Plan  -Continue statin    Pulmonary sarcoidosis (CMS/HCC)  Assessment & Plan  -CT notes background of fibrotic ILD w/ UIP pattern  -OP follow up    Benign prostatic hyperplasia  Assessment & Plan  -Continue tamsulosin  -Monitor voids        VTE Assessment: Padua VTE Score: 5  VTE Prophylaxis Plan: Lovenox     Palliative Care Screen: 0    Code Status: Full Code       NOELLE Lweis  5/29/2024

## 2024-05-29 NOTE — ASSESSMENT & PLAN NOTE
-H/O GERD & UGIB  Asymptomatic  -moderate hiatal hernia noted on CT on admission  -Continue pantoprazole

## 2024-05-29 NOTE — ASSESSMENT & PLAN NOTE
-Presents with 3 days of lack of energy, chills, and chest discomfort (described to be in same location as heartburn but different). Admits to cough & nausea w/o vomiting x 1 day. HS troponin negative x2. EKG non ischemic. CT chest showing multifocal GGO and consolidative opacities suspicious for pneumonia on a background of chronic fibrotic ILD with UIP pattern, several new or enlarged irregular appearing solid nodules which may be inflammatory. WBC WNL, febrile in ED. Flu/covid/rsv negative.  -Possibly viral pneumonia, but with pulmonary comorbidities & fever will treat as bacterial PNA. Low clinical concern for aspiration PNA.  Patient is improving      -Would continue treatment for CAP w/ azithromycin & ceftriaxone  -SLP consulted to rule out aspiration/dysphagia as he dose report seldom coughing while taking pills  -Check sputum culture if patient expectorates  -legionella urine antigen negative  -DuoNebs ATC, BID mucinex  -Monitor CBC, fever curve  -Follow up blood cultures (NGTD @ 48 hrs)  -PT/OT for generalized weakness  -Will need follow up CT chest in 3 months to document resolution & to look at new nodules; patient & daughter Karen were notified of this.

## 2024-05-29 NOTE — ASSESSMENT & PLAN NOTE
-TTE 2023 showing EF 45%. Without clinical evidence to suggest acute exacerbation at time of admission  -Continue home carvedilol, Entresto, aspirin  -monitor daily weights, I/O  -OP follow up with cardiologist Dr. Isaacs   -The patient complained of right jaw pain without any associated symptoms.  EKG done and it showed inverted T waves in lateral leads.  Troponin ordered and cardiology consulted -> likely poor lead positioning. Discussed with RN and patient

## 2024-05-29 NOTE — ASSESSMENT & PLAN NOTE
-H/O CAD s/p CABG in 2000  -The patient complained of right jaw pain.  EKG done and it showed newly inverted T waves in lateral leads.  Troponin ordered and pending cardiology consulted for further input  -Continue ASA, statin beta blocker  -OP follow up with cardiologist Dr. Isaacs

## 2024-05-29 NOTE — ED ATTESTATION NOTE
I have personally seen and examined the patient.  I reviewed and agree with physician assistant / nurse practitioner’s assessment and plan of care, with the following exceptions: None    I personally performed the key components of the encounter and provided a substantive portion of the care and medical decision making    My examination, assessment, and plan of care of Michel Fang is as follows:     PT co about 3 days of feeling some chest discomfort, weakness, lightheaded, near syncope. Doesn't really feel sob. Has mild cough.  Feel well lying down, worse when getting up.  Exam; awake, alert. NAD. Heart reg, lungs clear, abd soft, nt.     Clinton Mckeon MD  05/29/24 [id number]

## 2024-05-29 NOTE — ED PROVIDER NOTES
Emergency Medicine Note  HPI   HISTORY OF PRESENT ILLNESS     88 year old male with a history of hypertension, hyperlipidemia, coronary artery disease, congestive heart failure presents to the ER with a chest pain  Pt reports that he's been having discomfort in his chest for the last 3 days. Worse today. He says it is worse with walking and standing. Better when laying down. He says that he's also been feeling lightheaded at times. He says that he's sometimes SOB. He says that he's had similar pain with reflux but this is different. Today he was nauseas and dry heaving. He denies any abdominal pain           Patient History   PAST HISTORY     Reviewed from Nursing Triage:       Past Medical History:   Diagnosis Date    Arthritis     BPH (benign prostatic hyperplasia)     CHF (congestive heart failure) (CMS/[facility])     Coronary artery disease     Disease of thyroid gland     Heart disease     Hyperlipidemia     Hypertension     Sarcoidosis        Past Surgical History:   Procedure Laterality Date    ADENOIDECTOMY      AORTIC VALVE REPLACEMENT      APPENDECTOMY      CARDIAC SURGERY      CORONARY ARTERY BYPASS GRAFT      EYE SURGERY Bilateral     cataracts     POPLITEAL VENOUS ANEURYSM REPAIR      SKIN BIOPSY      TONSILLECTOMY         Family History   Problem Relation Age of Onset    Ovarian cancer Biological Mother     Lymphoma Other         age  17    Lymphoma Biological Son         age 20's       Social History     Tobacco Use    Smoking status: Former     Types: Cigarettes     Quit date:      Years since quittin.4    Smokeless tobacco: Never   Substance Use Topics    Alcohol use: No    Drug use: No         Review of Systems   REVIEW OF SYSTEMS     Review of Systems   Constitutional:  Positive for fever (incidentally found in the ER). Negative for chills.   Respiratory:  Negative for shortness of breath.    Cardiovascular:  Positive for chest pain.   Gastrointestinal:  Positive for nausea and vomiting.  Negative for abdominal pain.   Neurological:  Positive for dizziness. Negative for syncope.         VITALS     ED Vitals      Date/Time Temp Pulse Resp BP SpO2 [facility]   05/29/24 1045 -- 61 16 122/74 93 % TS   05/29/24 1024 38.1 °C (100.6 °F) 87 20 181/72 92 % LTC          Pulse Ox %: 93 % (05/29/24 1136)  Pulse Ox Interpretation: Low (05/29/24 1136)  Heart Rate: 60 (05/29/24 1136)  Rhythm Strip Interpretation: Normal Sinus Rhythm (05/29/24 1136)     Physical Exam   PHYSICAL EXAM     Physical Exam  Constitutional:       General: He is not in acute distress.     Appearance: He is well-developed. He is not ill-appearing, toxic-appearing or diaphoretic.   Cardiovascular:      Rate and Rhythm: Normal rate and regular rhythm.   Pulmonary:      Effort: Pulmonary effort is normal.      Breath sounds: No decreased breath sounds, wheezing, rhonchi or rales.   Abdominal:      Palpations: Abdomen is soft.      Tenderness: There is no abdominal tenderness. There is no guarding or rebound.   Musculoskeletal:      Right lower leg: No tenderness. No edema.      Left lower leg: No tenderness. No edema.   Skin:     General: Skin is warm and dry.   Neurological:      General: No focal deficit present.      Mental Status: He is alert and oriented to person, place, and time.           PROCEDURES     Procedures     DATA     Results       Procedure Component Value Units Date/Time    Urinalysis with Reflex Culture (ED and Outpatient only) [[id number]]  (Abnormal) Collected: 05/29/24 1258    Specimen: Urine, Clean Catch Updated: 05/29/24 1315    Narrative:      The following orders were created for panel order Urinalysis with Reflex Culture (ED and Outpatient only).  Procedure                               Abnormality         Status                     ---------                               -----------         ------                     UA Reflex to Culture (Ma...[[id number]]  Abnormal            Final result               UA  Microscopic[[id number]]               Abnormal            Final result                 Please view results for these tests on the individual orders.    UA Microscopic [[id number]]  (Abnormal) Collected: 05/29/24 1258    Specimen: Urine, Clean Catch Updated: 05/29/24 1315     RBC, Urine 0 TO 4 /HPF      WBC, Urine 0 TO 3 /HPF      Squamous Epithelial None Seen /hpf      Hyaline Cast None Seen /lpf      Bacteria, Urine None Seen /HPF      Mucus Rare /LPF     UA Reflex to Culture (Macroscopic) [[id number]]  (Abnormal) Collected: 05/29/24 1258    Specimen: Urine, Clean Catch Updated: 05/29/24 1312     Color, Urine Yellow     Clarity, Urine Clear     Specific Gravity, Urine 1.020     pH, Urine 6.0     Leukocyte Esterase Negative     Comment: Results can be falsely negative due to high specific gravity, some antibiotics, glucose >3 g/dl, or WBC other than neutrophils.        Nitrite, Urine Negative     Protein, Urine Trace     Comment: Trace False Positive Protein can be seen with alkaline or highly buffered urines or urine with high specific gravity. Suggest clinical correlation.        Glucose, Urine Negative mg/dL      Ketones, Urine Negative mg/dL      Urobilinogen, Urine 2.0 EU/dL      Bilirubin, Urine Negative mg/dL      Blood, Urine Negative     Comment: The sensitivity of the occult blood test is equivalent to approximately 4 intact RBC/HPF.       SARS-COV-2 (COVID-19)/ FLU A/B, AND RSV, PCR Nasopharynx [[id number]]  (Normal) Collected: 05/29/24 1029    Specimen: Nasopharyngeal Swab from Nasopharynx Updated: 05/29/24 1114     SARS-CoV-2 (COVID-19) Negative     Influenza A Negative     Influenza B Negative     Respiratory Syncytial Virus Negative    Narrative:      Testing performed using real-time PCR for detection of COVID-19. EUA approved validation studies performed on site.     HS Troponin (with 2 hour reflex) [[id number]]  (Normal) Collected: 05/29/24 1027    Specimen: Blood, Venous Updated: 05/29/24 1107     High  Sens Troponin I 11.5 pg/mL     Basic metabolic panel [[id number]]  (Abnormal) Collected: 05/29/24 1027    Specimen: Blood, Venous Updated: 05/29/24 1100     Sodium 135 mEQ/L      Potassium 3.9 mEQ/L      Comment: Results obtained on plasma. Plasma Potassium values may be up to 0.4 mEQ/L less than serum values. The differences may be greater for patients with high platelet or white cell counts.        Chloride 104 mEQ/L      CO2 21 mEQ/L      BUN 21 mg/dL      Creatinine 1.2 mg/dL      Glucose 118 mg/dL      Calcium 8.9 mg/dL      eGFR 58.5 mL/min/1.73m*2      Comment: Calculation based on the Chronic Kidney Disease Epidemiology Collaboration (CKD-EPI) equation refit without adjustment for race.        Anion Gap 10 mEQ/L     [place] Draw Panel [[id number]] Collected: 05/29/24 1044    Specimen: Blood, Venous Updated: 05/29/24 1052    Narrative:      The following orders were created for panel order [place] Draw Panel.  Procedure                               Abnormality         Status                     ---------                               -----------         ------                     RAINBOW LAVENDER[[id number]]                                 In process                   Please view results for these tests on the individual orders.    RAINBOW LAVENDER [[id number]] Collected: 05/29/24 1044    Specimen: Blood, Venous Updated: 05/29/24 1052    Blood Culture Blood, Venous [[id number]] Collected: 05/29/24 1043    Specimen: Blood, Venous Updated: 05/29/24 1052    CBC and Differential [[id number]]  (Abnormal) Collected: 05/29/24 1027    Specimen: Blood, Venous Updated: 05/29/24 1038     WBC 5.45 K/uL      RBC 4.41 M/uL      Hemoglobin 13.2 g/dL      Hematocrit 40.2 %      MCV 91.2 fL      MCH 29.9 pg      MCHC 32.8 g/dL      RDW 14.1 %      Platelets 155 K/uL      MPV 9.9 fL      Differential Type Auto     nRBC 0.0 %      Immature Granulocytes 0.2 %      Neutrophils 64.2 %      Lymphocytes 11.9 %      Monocytes 18.2 %       Eosinophils 4.4 %      Basophils 1.1 %      Immature Granulocytes, Absolute 0.01 K/uL      Neutrophils, Absolute 3.50 K/uL      Lymphocytes, Absolute 0.65 K/uL      Monocytes, Absolute 0.99 K/uL      Eosinophils, Absolute 0.24 K/uL      Basophils, Absolute 0.06 K/uL     Lactate, w/ reflex repeat if > 2.0 [[id number]]  (Normal) Collected: 05/29/24 1027    Specimen: Blood, Venous Updated: 05/29/24 1037     Lactate 1.6 mmol/L     Blood Culture Blood, Venous [[id number]] Collected: 05/29/24 1027    Specimen: Blood, Venous Updated: 05/29/24 1035            Imaging Results              CT ANGIOGRAPHY CHEST PULMONARY EMBOLISM WITH IV CONTRAST (Final result)  Result time 05/29/24 13:10:21      Final result                   Impression:    IMPRESSION:  No acute pulmonary embolism. Small right pleural effusion and multifocal  groundglass and nodular consolidative airspace opacities suspicious for  pneumonia on a background of chronic fibrotic ILD with UIP pattern. Follow-up CT  after treatment recommended to ensure resolution; 3 months would be reasonable.    Several new or enlarged irregular-appearing solid nodules which may be  inflammatory but can be reassessed on follow-up CT as above.    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    Moderate hiatal hernia.                 Narrative:    EXAM: CT ANGIOGRAPHY CHEST PULMONARY EMBOLISM W IV CONTRAST    CLINICAL HISTORY: Pulmonary embolism (PE) suspected, high prob    COMPARISON: CT 5/12/2019, x-ray 12/13/2023.    TECHNIQUE: CT chest was performed using thin section reconstructions. Contrast  injection rate and acquisition timing were optimized for opacification of the  pulmonary arteries to evaluate for pulmonary embolism. 3-D RECONSTRUCTION:  Additional 3-D/MIP reconstructions were performed and reviewed. CT DOSE:  One or  more dose reduction techniques (e.g. automated exposure control, adjustment of  the mA and/or kV according to patient size, use of  iterative reconstruction  technique) utilized for this examination.    CONTRAST: 100mL of iopamidoL (ISOVUE-370) 370 mg iodine /mL (76 %) injection 100  mL    COMMENT:    PULMONARY ARTERIES: Image Quality is: Moderate noting the following limitations:  Motion and streak/mixing artifact. No pulmonary embolism is identified to the  proximal subsegmental level.    AIRWAYS, LUNGS AND PLEURA: Patent central airways. Small right lesion. Coarse  basilar predominant reticulation and bronchiolectasis with honeycomb cyst  formation in keeping with UIP pattern fibrotic disease. Multifocal groundglass  and nodular consolidative opacities suspicious for infectious/inflammatory  process. Largest discrete nodules which are new or larger than the prior study  including a 0.8 cm nodule at the right apex on series 3 image #59 and 0.8 cm  right upper lobe nodule on series 3 image #78. These may be inflammatory but  should be assessed on follow-up after treatment. Scattered areas of subsegmental  atelectasis also present. No pneumothorax.    CARDIOMEDIASTINUM: Mild cardiomegaly. Severe multivessel native coronary  atherosclerosis with postoperative changes from CABG and aortic valve  replacement. No pericardial effusion.    AORTA, GREAT VESSELS: Within normal limits    LYMPH NODES: No thoracic lymphadenopathy    IMAGED THYROID: Heterogeneous with several subcentimeter nodules, not well  evaluated here.    IMAGED ESOPHAGUS: Moderate hiatal hernia with mild thickening of the distal  esophagus which may be related to inflammation from reflux.    UPPER ABDOMEN: Cholelithiasis. Pancreatic parenchymal volume loss without duct  dilation. Mild renal cortical scarring. Subcentimeter low-attenuation lesion in  segment V of the liver on series 4 image 265, too small to characterize.    CHEST WALL: Surgical changes from anterior median sternotomy. Reflux into  multiple chest wall collaterals.    BONES: Osteopenia. Multilevel thoracolumbar  degenerative disc disease.                                         X-RAY CHEST 1 VIEW (No Result on File)                     ECG 12 lead          Scoring tools                                  ED Course & MDM   MDM / ED COURSE / CLINICAL IMPRESSION / DISPO     Medical Decision Making  Pt presents w/ chest pain, dizziness. _febrile   CT showing multifocal infiltrates - will start on rocephin, azithro and flagyl.   R[facility] to [facility]     Problems Addressed:  Chest pain, unspecified type: acute illness or injury  Multifocal pneumonia: acute illness or injury    Amount and/or Complexity of Data Reviewed  Labs: ordered.  Radiology: ordered.  ECG/medicine tests: ordered.    Risk  Prescription drug management.  Decision regarding hospitalization.           Clinical Impression      Chest pain, unspecified type   Multifocal pneumonia     _________________       ED Disposition   Admit / Observation                       Dotty Herrera PA C  05/29/24 1515

## 2024-05-29 NOTE — ASSESSMENT & PLAN NOTE
-Reports having lack of energy, unsteadiness when he stands up, and a feeling as if he was going to pass out  -Feeling better orthostatic vitals not reported yet  -Check orthostatic vital signs; if positive may need to adjust home cardiac meds (recently started on carvedilol & Entresto in addition to his hydralazine)  -Fall precaution bundle  -PT/OT heather

## 2024-05-30 LAB
ANION GAP SERPL CALC-SCNC: 11 MEQ/L (ref 3–15)
BUN SERPL-MCNC: 17 MG/DL (ref 7–25)
CALCIUM SERPL-MCNC: 8.7 MG/DL (ref 8.6–10.3)
CHLORIDE SERPL-SCNC: 104 MEQ/L (ref 98–107)
CO2 SERPL-SCNC: 21 MEQ/L (ref 21–31)
CREAT SERPL-MCNC: 1.1 MG/DL (ref 0.7–1.3)
EGFRCR SERPLBLD CKD-EPI 2021: >60 ML/MIN/1.73M*2
ERYTHROCYTE [DISTWIDTH] IN BLOOD BY AUTOMATED COUNT: 13.9 % (ref 11.6–14.4)
GLUCOSE SERPL-MCNC: 104 MG/DL (ref 70–99)
HCT VFR BLD AUTO: 40.4 % (ref 40.1–51)
HGB BLD-MCNC: 13.1 G/DL (ref 13.7–17.5)
L PNEUMO1 AG UR QL: NEGATIVE
MAGNESIUM SERPL-MCNC: 2 MG/DL (ref 1.8–2.5)
MCH RBC QN AUTO: 29.6 PG (ref 28–33.2)
MCHC RBC AUTO-ENTMCNC: 32.4 G/DL (ref 32.2–36.5)
MCV RBC AUTO: 91.4 FL (ref 83–98)
PDW BLD AUTO: 10.7 FL (ref 9.4–12.4)
PLATELET # BLD AUTO: 175 K/UL (ref 150–350)
POTASSIUM SERPL-SCNC: 4.5 MEQ/L (ref 3.5–5.1)
RBC # BLD AUTO: 4.42 M/UL (ref 4.5–5.8)
SODIUM SERPL-SCNC: 136 MEQ/L (ref 136–145)
WBC # BLD AUTO: 6.13 K/UL (ref 3.8–10.5)

## 2024-05-30 PROCEDURE — 36415 COLL VENOUS BLD VENIPUNCTURE: CPT | Performed by: NURSE PRACTITIONER

## 2024-05-30 PROCEDURE — 92610 EVALUATE SWALLOWING FUNCTION: CPT | Mod: GN

## 2024-05-30 PROCEDURE — 85027 COMPLETE CBC AUTOMATED: CPT | Performed by: NURSE PRACTITIONER

## 2024-05-30 PROCEDURE — 82310 ASSAY OF CALCIUM: CPT | Performed by: NURSE PRACTITIONER

## 2024-05-30 PROCEDURE — 63600000 HC DRUGS/DETAIL CODE: Mod: JZ | Performed by: NURSE PRACTITIONER

## 2024-05-30 PROCEDURE — 25000000 HC PHARMACY GENERAL: Performed by: NURSE PRACTITIONER

## 2024-05-30 PROCEDURE — 25800000 HC PHARMACY IV SOLUTIONS: Performed by: NURSE PRACTITIONER

## 2024-05-30 PROCEDURE — 97166 OT EVAL MOD COMPLEX 45 MIN: CPT | Mod: GO

## 2024-05-30 PROCEDURE — 12000000 HC ROOM AND CARE MED/SURG

## 2024-05-30 PROCEDURE — 99232 SBSQ HOSP IP/OBS MODERATE 35: CPT | Performed by: HOSPITALIST

## 2024-05-30 PROCEDURE — 83735 ASSAY OF MAGNESIUM: CPT | Performed by: NURSE PRACTITIONER

## 2024-05-30 PROCEDURE — 97162 PT EVAL MOD COMPLEX 30 MIN: CPT | Mod: GP

## 2024-05-30 PROCEDURE — 63700000 HC SELF-ADMINISTRABLE DRUG: Performed by: NURSE PRACTITIONER

## 2024-05-30 RX ADMIN — FLUTICASONE PROPIONATE 1 SPRAY: 50 SPRAY, METERED NASAL at 08:45

## 2024-05-30 RX ADMIN — CEFTRIAXONE SODIUM 1 G: 1 INJECTION, POWDER, FOR SOLUTION INTRAMUSCULAR; INTRAVENOUS at 14:11

## 2024-05-30 RX ADMIN — AZITHROMYCIN MONOHYDRATE 500 MG: 500 INJECTION, POWDER, LYOPHILIZED, FOR SOLUTION INTRAVENOUS at 15:42

## 2024-05-30 RX ADMIN — IPRATROPIUM BROMIDE AND ALBUTEROL SULFATE 3 ML: .5; 3 SOLUTION RESPIRATORY (INHALATION) at 19:51

## 2024-05-30 RX ADMIN — IPRATROPIUM BROMIDE AND ALBUTEROL SULFATE 3 ML: .5; 3 SOLUTION RESPIRATORY (INHALATION) at 08:46

## 2024-05-30 RX ADMIN — ENOXAPARIN SODIUM 40 MG: 40 INJECTION SUBCUTANEOUS at 17:26

## 2024-05-30 RX ADMIN — PANTOPRAZOLE SODIUM 40 MG: 40 TABLET, DELAYED RELEASE ORAL at 08:45

## 2024-05-30 RX ADMIN — LEVOTHYROXINE SODIUM 25 MCG: 0.03 TABLET ORAL at 06:41

## 2024-05-30 RX ADMIN — SACUBITRIL AND VALSARTAN 1 TABLET: 24; 26 TABLET, FILM COATED ORAL at 19:51

## 2024-05-30 RX ADMIN — ATORVASTATIN CALCIUM 20 MG: 20 TABLET, FILM COATED ORAL at 17:26

## 2024-05-30 RX ADMIN — CARVEDILOL 3.12 MG: 3.12 TABLET, FILM COATED ORAL at 08:45

## 2024-05-30 RX ADMIN — IPRATROPIUM BROMIDE AND ALBUTEROL SULFATE 3 ML: .5; 3 SOLUTION RESPIRATORY (INHALATION) at 17:04

## 2024-05-30 RX ADMIN — IPRATROPIUM BROMIDE AND ALBUTEROL SULFATE 3 ML: .5; 3 SOLUTION RESPIRATORY (INHALATION) at 11:28

## 2024-05-30 RX ADMIN — SACUBITRIL AND VALSARTAN 1 TABLET: 24; 26 TABLET, FILM COATED ORAL at 08:45

## 2024-05-30 RX ADMIN — CARVEDILOL 3.12 MG: 3.12 TABLET, FILM COATED ORAL at 17:26

## 2024-05-30 RX ADMIN — ASPIRIN 325 MG: 325 TABLET, COATED ORAL at 08:45

## 2024-05-30 RX ADMIN — HYDRALAZINE HYDROCHLORIDE 25 MG: 25 TABLET ORAL at 06:42

## 2024-05-30 RX ADMIN — HYDRALAZINE HYDROCHLORIDE 25 MG: 25 TABLET ORAL at 17:25

## 2024-05-30 ASSESSMENT — COGNITIVE AND FUNCTIONAL STATUS - GENERAL
REMEMBERING TO TAKE MEDICATION: 4 - NONE
UNDERSTANDING 10 TO 15 MIN SPEECH: 4 - NONE
HELP NEEDED FOR PERSONAL GROOMING: 3 - A LITTLE
FOLLOWS FAMILIAR CONVERSATION: 4 - NONE
MOVING TO AND FROM BED TO CHAIR: 3 - A LITTLE
REMEMBERING WHERE THINGS ARE: 4 - NONE
TOILETING: 3 - A LITTLE
EATING MEALS: 4 - NONE
CLIMB 3 TO 5 STEPS WITH RAILING: 2 - A LOT
CLIMB 3 TO 5 STEPS WITH RAILING: 3 - A LITTLE
TAKING CARE OF COMPLICATED TASKS: 4 - NONE
HELP NEEDED FOR BATHING: 3 - A LITTLE
STANDING UP FROM CHAIR USING ARMS: 3 - A LITTLE
MOVING TO AND FROM BED TO CHAIR: 3 - A LITTLE
AFFECT: WFL
DRESSING REGULAR LOWER BODY CLOTHING: 4 - NONE
CLIMB 3 TO 5 STEPS WITH RAILING: 2 - A LOT
AFFECT: WFL
DRESSING REGULAR UPPER BODY CLOTHING: 3 - A LITTLE
STANDING UP FROM CHAIR USING ARMS: 2 - A LOT
AFFECT: WFL
MOVING TO AND FROM BED TO CHAIR: 4 - NONE
REMEMBERING 5 ERRANDS WITH NO LIST: 4 - NONE
STANDING UP FROM CHAIR USING ARMS: 2 - A LOT
WALKING IN HOSPITAL ROOM: 3 - A LITTLE
WALKING IN HOSPITAL ROOM: 2 - A LOT
WALKING IN HOSPITAL ROOM: 3 - A LITTLE

## 2024-05-30 NOTE — PROGRESS NOTES
Physical Therapy -  Initial Evaluation     Patient: Michel Fang  Location: [facility] 6A 0615  MRN: [id number]  Today's date: 5/30/2024    HISTORY OF PRESENT ILLNESS     Oh is a 88 y.o. male admitted on 5/29/2024 with Multifocal pneumonia [J18.9]  Chest pain, unspecified type [R07.9]. Principal problem is Multifocal pneumonia.    Past Medical History  Oh has a past medical history of Arthritis, BPH (benign prostatic hyperplasia), CHF (congestive heart failure) (CMS/[facility]), Coronary artery disease, Disease of thyroid gland, Heart disease, Hyperlipidemia, Hypertension, and Sarcoidosis.    History of Present Illness   Adm with multifocal PNA  PRIOR LEVEL OF FUNCTION AND LIVING ENVIRONMENT     Prior Level of Function      Flowsheet Row Most Recent Value   Dominant Hand right   Ambulation assistive equipment   Transferring assistive equipment   Toileting independent   Bathing assistive equipment   Dressing independent   Eating independent   IADLs independent   Driving/Transportation    Prior Level of Function Comment pt is mod indep with use of RW,  + drives   Assistive Device Currently Used at Home grab bar, raised toilet, walker, front-wheeled, shower chair          Prior Living Environment      Flowsheet Row Most Recent Value   Living Environment Comment lives with son in 3SH with ramp to enter,  has 1st floor setup,  stall shower with chair and grab bar, toilet riser          VITALS AND PAIN     PT Vitals      Date/Time Pulse SpO2 Pt Activity O2 Therapy BP BP Location BP Method Pt Position [facility]   05/30/24 0911 66 94 % At rest None (Room air) 116/87 Right upper arm Automatic Lying ECH   05/30/24 0925 73 92 % Other (Comment) after amb None (Room air) 121/59 Right upper arm Automatic Sitting ECH          PT Pain      Date/Time Pain Type Rating: Rest Rating: Activity [facility]   05/30/24 0911 Pain Assessment 0 0 ECH             Objective   OBJECTIVE     Start time:  0910  End  time:  0928  Session Length: 18 min  Mode of Treatment: physical therapy, individual therapy    General Observations  Patient received supine, in bed. He was agreeable to therapy, no issues or concerns identified by nurse prior to session.      Precautions: fall              PT Eval and Treat - 05/30/24 0910          Cognition    Orientation Status oriented x 4     Affect/Mental Status WFL     Follows Commands WFL     Cognitive Function WFL        Vision Assessment/Intervention    Vision Assessment WFL;corrective lenses for reading        Hearing Assessment    Hearing Status hearing aid, bilateral        Sensory Assessment    Sensory Assessment sensation intact, lower extremities        Lower Extremity Assessment    General Observations BLEs grossly 4/5 throughout        Bed Mobility    Bed Mobility Activities right;rolling;supine to sit;sit to supine     [place] close supervision;minimum assist (75% or more patient effort);1 person assist     Safety/Cues increased time to complete;verbal cues;technique     Assistive Device bed rails     Comment OOB to right; pt able to get self to EOB on own with increased time; requires min assist to get back into bed        Mobility Belt    Mobility Belt Used During Session yes        Sit/Stand Transfer    Surface edge of bed     [place] moderate assist (50-74% patient effort);minimum assist (75% or more patient effort);1 person assist     Safety/Cues increased time to complete;verbal cues;hand placement;preparatory posture;technique;proper use of assistive device     Assistive Device walker, front-wheeled     Transfer Comments pt requires mod assist to stand from EOB to RW with cues for correct hand placement for safe transfer; pt requires min assist to return to sit to EOB with decreased eccentric control        Gait Training    [place], Gait minimum assist (75% or more patient effort);1 person assist     Safety/Cues increased time to complete     Assistive  Device walker, front-wheeled     Distance in Feet 65 feet     Pattern step-through     Deviations/Abnormal Patterns marck decreased;step length decreased     Comment gait slow and slightly unsteady with use of RW requiring min assist; pt fatigues easily; pulse ox 92% on RA after amb        Stairs Training    [place], Stairs not tested     Comment not applicable        Balance    Static Sitting Balance WFL;sitting, edge of bed;unsupported     Sit to Stand Dynamic Balance moderate impairment;supported     Static Standing Balance mild impairment;supported;standing     Dynamic Standing Balance mild impairment;supported;standing     Balance Interventions standing;occupation based/functional task     Comment, Balance with RW        Impairments/Safety Issues    Impairments Affecting Function balance;strength;endurance/activity tolerance     Functional Endurance pt fatigues easily - below baseline                                    Education Documentation  Joint Mobility/Strength, taught by Rin Acuna, PT at 5/30/2024  9:32 AM.  Learner: Patient  Readiness: Acceptance  Method: Explanation  Response: Verbalizes Understanding  Comment: PT POC, safety with mobility, safe use of RW, transfer techniques, activity progression, use of call bell, use of IS        Session Outcome  Patient upright, in bed at end of session, bed alarm on, all needs met, call light in reach, personal items in reach. Nursing notified about patient's performance, patient's position, and patient's response to therapy/activity.    AM-PAC™ - Mobility (Current Function)     Turning form your back to your side while in flat bed without using bedrails 4 - None   Moving from lying on your back to sitting on the side of a flat bed without using bedrails 3 - A Little   Moving to and from a bed to a chair 3 - A Little   Standing up from a chair using your arms 2 - A Lot   To walk in a hospital room 3 - A Little   Climbing 3-5 steps with a railing 2 -  A Lot   AM-PAC™ Mobility Score 17      ASSESSMENT AND PLAN     Progress Summary  pt presents at a decline from baseline functional status. pt able to get to EOB with close sup and increased time; requires mod assist to stand from EOB to RW; min assist for amb with RW; min assist to return to bed. pt will benefit from PT while in-house to address balance, strength, mobility and activity tolerance impairments in order to maximize functional independence. Rec home with family assist and home PT upon d/c. If pt's family unable to provide assist at home pt may benefit from short inpt rehab stay upon d/c.    Patient/Family Therapy Goals Statement: to return to PLOF    PT Plan      Flowsheet Row Most Recent Value   Rehab Potential good, to achieve stated therapy goals at 05/30/2024 0910   Therapy Frequency 5 times/wk at 05/30/2024 0910   Planned Therapy Interventions balance training, transfer training, bed mobility training, gait training, home exercise program, strengthening, patient/family education at 05/30/2024 0910            PT Discharge Recommendations      Flowsheet Row Most Recent Value   PT Recommended Discharge Disposition home with assistance, home with home health, skilled nursing facility  [pending pt's progress with therapy and amount of assist family able to provide at home] at 05/30/2024 0910   Anticipated Equipment Needs if Discharged Home (PT) none at 05/30/2024 0910                 PT Goals      Flowsheet Row Most Recent Value   Bed Mobility Goal 1    Activity/Assistive Device rolling to left, rolling to right, sit to supine, supine to sit at 05/30/2024 0910   [place] independent at 05/30/2024 0910   Time Frame by discharge at 05/30/2024 0910   Progress/Outcome goal ongoing at 05/30/2024 0910   Transfer Goal 1    Activity/Assistive Device sit-to-stand/stand-to-sit, bed-to-chair/chair-to-bed, walker, front-wheeled at 05/30/2024 0910   [place] modified independence at 05/30/2024 0910   Time  Frame by discharge at 05/30/2024 0910   Progress/Outcome goal ongoing at 05/30/2024 0910   Gait Training Goal 1    Activity/Assistive Device gait (walking locomotion), assistive device use, walker, front-wheeled at 05/30/2024 0910   [place]ual modified independence at 05/30/2024 0910   Distance 150 at 05/30/2024 0910   Time Frame by discharge at 05/30/2024 0910   Progress/Outcome goal ongoing at 05/30/2024 0910

## 2024-05-30 NOTE — PROGRESS NOTES
Occupational Therapy -  Initial Evaluation     Patient: Michel Fang  Location: [facility] 6A 0615  MRN: [id number]  Today's date: 5/30/2024    HISTORY OF PRESENT ILLNESS     Oh is a 88 y.o. male admitted on 5/29/2024 with Multifocal pneumonia [J18.9]  Chest pain, unspecified type [R07.9]. Principal problem is Multifocal pneumonia.    Past Medical History  Oh has a past medical history of Arthritis, BPH (benign prostatic hyperplasia), CHF (congestive heart failure) (CMS/HCC), Coronary artery disease, Disease of thyroid gland, Heart disease, Hyperlipidemia, Hypertension, and Sarcoidosis.    History of Present Illness  Adm with multifocal PNA.    Patient presented with 3 days of lack of energy, chills, and chest discomfort (described to be in same location as heartburn but different). Admits to cough & nausea w/o vomiting x 1 day. CT chest showing multifocal GGO and consolidative opacities suspicious for pneumonia on a background of chronic fibrotic ILD with UIP pattern, several new or enlarged irregular appearing solid nodules which may be inflammatory. WBC WNL, febrile in ED.     Possibly viral pneumonia, but with pulmonary comorbidities & fever will treat as bacterial PNA. Low clinical concern for aspiration PNA.    -SLP consulted to rule out aspiration/dysphagia as he dose report seldom coughing while taking pills.    IMPRESSION: No acute pulmonary embolism. Small right pleural effusion and multifocal ground glass and nodular consolidative airspace opacities suspicious for pneumonia on a background of chronic fibrotic ILD with UIP pattern. Follow-up CT after treatment recommended to ensure resolution; 3 months would be reasonable. Several new or enlarged irregular-appearing solid nodules which may be inflammatory but can be reassessed on follow-up CT as above. 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  Moderate hiatal hernia.  PRIOR LEVEL OF FUNCTION AND LIVING ENVIRONMENT      Prior Level of Function      Flowsheet Row Most Recent Value   Dominant Hand right   Ambulation assistive equipment   Transferring assistive equipment   Toileting independent   Bathing assistive equipment   Dressing independent   Eating independent   IADLs independent   Driving/Transportation    Communication understands/communicates without difficulty   Swallowing swallows foods/liquids without difficulty   Baseline Diet/Method of Nutritional Intake thin liquids, regular   Past History of Dysphagia patient denies dysphagia and no prior ecounters seen in chart.   Prior Level of Function Comment pt is mod indep with use of RW,  + drives   Assistive Device Currently Used at Home grab bar, raised toilet, walker, front-wheeled, shower chair          Prior Living Environment      Flowsheet Row Most Recent Value   Living Environment Comment lives with son in 3SH with ramp to enter,  has 1st floor setup,  stall shower with chair and grab bar, toilet riser          Occupational Profile      Flowsheet Row Most Recent Value   Environmental Supports and Barriers Patient reports she was independent with ADLs/IADLs and functional mobility with RW.          VITALS AND PAIN     OT Vitals      Date/Time Pulse HR Source SpO2 Pt Activity O2 Therapy BP BP Location BP Method Pt Position [facility]   05/30/24 1353 68 Monitor 94 % At rest None (Room air) 124/58 Right upper arm Automatic Sitting CPM   05/30/24 1404 68 Monitor 93 % Other (Comment) End of session None (Room air) 124/60 -- -- -- CPM          OT Pain      Date/Time Pain Type Rating: Rest Rating: Activity [facility]   05/30/24 1353 Pain Assessment 0 0 CPM             Objective   OBJECTIVE     Start time:  1353  End time:  1404  Session Length: 11 min  Mode of Treatment: individual therapy, occupational therapy    General Observations  Patient received in chair, upright. He was agreeable to therapy, no issues or concerns identified by nurse prior to session. Patient  agreeable to session, NAD. Patient received with PIV access.    Precautions: fall       Limitations/Impairments: hearing   Services  Do You Speak a Language Other Than English at Home?: no      OT Eval and Treat - 05/30/24 1353          Cognition    Orientation Status oriented x 3     Affect/Mental Status WFL     Follows Commands WFL     Cognitive Function WFL        Vision Assessment/Intervention    Vision Assessment corrective lenses for reading        Hearing Assessment    Hearing Status hearing aid, bilateral        Sensory Assessment    Sensory Assessment sensation intact, upper extremities        Upper Extremity Assessment    UE Assessment ROM and Strength WFL        Bed Mobility    Bed Mobility Activities right;sit to supine     [place] supervision     Safety/Cues increased time to complete     Assistive Device none     Comment Supervision with increased time for bed mobility        Mobility Belt    Mobility Belt Used During Session yes        Sit/Stand Transfer    Surface chair with arm rests     [place] supervision     Safety/Cues verbal cues;increased time to complete     Assistive Device walker, front-wheeled     Transfer Comments Supervision with cues for hand placement        Toilet Transfer    Transfer Technique sit/stand     [place] supervision     Safety/Cues increased time to complete;verbal cues     Assistive Device walker, front-wheeled     Comment Supervision with cues for positioning and hand placement        Functional Mobility    Distance in room/bathroom     Functional Mobility [place] supervision     Safety/Cues increased time to complete;verbal cues;proper use of assistive device     Assistive Device walker, front-wheeled     Functional Mobility Comments Supervision with RW        Lower Body Dressing    Tasks doff;don;socks     [place] modified independence     Safety/Cues increased time to complete;compensatory techniques     Position supported sitting      Adaptive Equipment none     Comment Mod I to doff/don socks via compensatory strategies (figure-4 technique)        Balance    Static Sitting Balance WFL;sitting, edge of bed;unsupported     Dynamic Sitting Balance unsupported;sitting, edge of bed     Sit to Stand Dynamic Balance mild impairment     Static Standing Balance supported;standing;WFL     Dynamic Standing Balance mild impairment;supported;standing     Comment, Balance Supervision with RW        Impairments/Safety Issues    Impairments Affecting Function balance;strength;endurance/activity tolerance     Functional Endurance Fair+                                    Education Documentation  Unresolved/Worsening Symptoms, taught by Isa Lucero OT at 5/30/2024  3:25 PM.  Learner: Patient  Readiness: Acceptance  Method: Explanation  Response: Verbalizes Understanding, Demonstrated Understanding  Comment: Patient educated on role of OT, POC, safety with mobility, compensatory strategies for ADLs/IADLs, fall prevention, and home set-up. Patient verbalized understanding.    Self-Care, taught by Isa Lucero OT at 5/30/2024  3:25 PM.  Learner: Patient  Readiness: Acceptance  Method: Explanation  Response: Verbalizes Understanding, Demonstrated Understanding  Comment: Patient educated on role of OT, POC, safety with mobility, compensatory strategies for ADLs/IADLs, fall prevention, and home set-up. Patient verbalized understanding.    Signs/Symptoms, taught by Isa Lucero OT at 5/30/2024  3:25 PM.  Learner: Patient  Readiness: Acceptance  Method: Explanation  Response: Verbalizes Understanding, Demonstrated Understanding  Comment: Patient educated on role of OT, POC, safety with mobility, compensatory strategies for ADLs/IADLs, fall prevention, and home set-up. Patient verbalized understanding.    Risk Factors, taught by Isa Lucero OT at 5/30/2024  3:25 PM.  Learner: Patient  Readiness:  Acceptance  Method: Explanation  Response: Verbalizes Understanding, Demonstrated Understanding  Comment: Patient educated on role of OT, POC, safety with mobility, compensatory strategies for ADLs/IADLs, fall prevention, and home set-up. Patient verbalized understanding.        Session Outcome  Patient supine, in bed at end of session, all needs met, call light in reach, personal items in reach. Nursing notified about patient's performance, patient's position, and patient's response to therapy/activity. (RN present)    AM-PAC™ - ADL (Current Function)     Putting on/taking off regular lower body clothing 4 - None   Bathing 3 - A Little   Toileting 3 - A Little   Putting on/taking off regular upper body clothing 3 - A Little   Help for taking care of personal grooming 3 - A Little   Eating meals 4 - None   AM-PAC™ ADL Score 20      ASSESSMENT AND PLAN     Progress Summary  Oh Fang is an 88 y.o. male who presents with lack of energy, chills, and chest discomfort with cough and nausea. Prior to admission, patient was independent with ADLs/IADLs and functional mobility with RW. Patient presents with decreased endurance and decreased activity tolerance that limit independence with ADLs. Patient required supervision for bed mobility, functional transfers, and mobility with RW. Patient educated on fall prevention, compensatory strategies for ADLs, and safety with mobility. Patient verbalized understanding. OT recommends discharge home with assistance and home OT. OT will continue to follow per POC.    Patient/Family Therapy Goal Statement: None stated    OT Plan      Flowsheet Row Most Recent Value   Rehab Potential good, to achieve stated therapy goals at 05/30/2024 1353   Therapy Frequency 3 times/wk at 05/30/2024 1353   Planned Therapy Interventions activity tolerance training, IADL retraining, occupation/activity based interventions, strengthening exercise, transfer/mobility retraining, BADL retraining, functional  balance retraining, ROM/therapeutic exercise at 05/30/2024 1353            OT Discharge Recommendations      Flowsheet Row Most Recent Value   OT Recommended Discharge Disposition home with assistance, home with home health at 05/30/2024 1353   Anticipated Equipment Needs if Discharged Home (OT) none at 05/30/2024 1353                 OT Goals      Flowsheet Row Most Recent Value   Bed Mobility Goal 1    Activity/Assistive Device bed mobility activities, all at 05/30/2024 1353   [place] modified independence at 05/30/2024 1353   Time Frame by discharge at 05/30/2024 1353   Progress/Outcome goal ongoing at 05/30/2024 1353   Transfer Goal 1    Activity/Assistive Device toilet at 05/30/2024 1353   [place] modified independence at 05/30/2024 1353   Time Frame by discharge at 05/30/2024 1353   Progress/Outcome goal ongoing at 05/30/2024 1353   Dressing Goal 1    Activity/Adaptive Equipment dressing skills, all at 05/30/2024 1353   [place] modified independence at 05/30/2024 1353   Time Frame by discharge at 05/30/2024 1353   Progress/Outcome goal ongoing at 05/30/2024 1353   Grooming Goal 1    Activity/Assistive Device grooming skills, all at 05/30/2024 1353   [place] modified independence at 05/30/2024 1353   Time Frame by discharge at 05/30/2024 1353   Progress/Outcome goal ongoing at 05/30/2024 1353

## 2024-05-30 NOTE — HOSPITAL COURSE
Oh is a 88 y.o. male admitted on 5/29/2024 with Multifocal pneumonia [J18.9]  Chest pain, unspecified type [R07.9]. Principal problem is Multifocal pneumonia.    Past Medical History  Oh has a past medical history of Arthritis, BPH (benign prostatic hyperplasia), CHF (congestive heart failure) (CMS/HCC), Coronary artery disease, Disease of thyroid gland, Heart disease, Hyperlipidemia, Hypertension, and Sarcoidosis.    History of Present Illness  Adm with multifocal PNA.    Patient presented with 3 days of lack of energy, chills, and chest discomfort (described to be in same location as heartburn but different). Admits to cough & nausea w/o vomiting x 1 day. CT chest showing multifocal GGO and consolidative opacities suspicious for pneumonia on a background of chronic fibrotic ILD with UIP pattern, several new or enlarged irregular appearing solid nodules which may be inflammatory. WBC WNL, febrile in ED.     Possibly viral pneumonia, but with pulmonary comorbidities & fever will treat as bacterial PNA. Low clinical concern for aspiration PNA.    -SLP consulted to rule out aspiration/dysphagia as he dose report seldom coughing while taking pills.    IMPRESSION: No acute pulmonary embolism. Small right pleural effusion and multifocal ground glass and nodular consolidative airspace opacities suspicious for pneumonia on a background of chronic fibrotic ILD with UIP pattern. Follow-up CT after treatment recommended to ensure resolution; 3 months would be reasonable. Several new or enlarged irregular-appearing solid nodules which may be inflammatory but can be reassessed on follow-up CT as above. 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  Moderate hiatal hernia.

## 2024-05-30 NOTE — PLAN OF CARE
Problem: Adult Inpatient Plan of Care  Goal: Plan of Care Review  Outcome: Progressing  Flowsheets (Taken 5/30/2024 7770)  Progress: improving  Outcome Evaluation: OT initial evaluation completed  Plan of Care Reviewed With: patient     Problem: Self-Care Deficit  Goal: Improved Ability to Complete Activities of Daily Living  Outcome: Progressing

## 2024-05-30 NOTE — PROGRESS NOTES
Patient:  Michel Fang  Location:  [facility] 6A 0615  MRN:  [id number]  Today's date:  5/30/2024    SLP Diagnosis  Patient presents with grossly functional oral stage and no overt ss/x to suggest aspiration; will continue to monitor bedside.    Swallowing Recommendations   SLP Swallowing Recommendations - 05/30/24 1045       Diet Consistency Recommendations regular;thin liquids     Medication Administration whole with liquid     Supervision Level for Intake patient independent     Feeding/Delivery Recommendations stop meal if showing signs of aspiration or fatigue (e.g., coughing, wet voice);small bites/sips     Posture Recommendations fully upright in chair/chair mode of bed   edge of bed for meals; needs ADONIS precautions.    Instrumental Assessment Recommendations reassess via non-instrumental clinical swallow evaluation     Oral Hygiene twice daily teeth brushing;standard toothbrush/toothpaste     Prognosis for Return to Safe Oral Intake good     Comment, Swallowing Recommendations ADONIS precautions; SLP to follow case.                     Summary/Handoff  SLP completed Dysphagia Eval per consult received.  Patient admitted with x3 days of feeling unwell with associated chest discomfort, cough and N/V x1 day.  Imaging c/f viral pneumonia; low clinical concern for aspiration pneumonia at time.  Patient with pulmonary comorbidities as well.  Patient AA/ Ox4 and exhibited ability to sit self up EOB with minimal assistance.  Patient denies dysphagia ss/x at baseline; tolerates Reg/ Thin diet.  Once sitting upright; provided trials of regular solids (ie regular textured fruit) and thin liquids which he tolerated both with grossly functional and prompt oral stage without overt ss/x of aspiration.  No change to VQ; no added congestion.  Patient appears appropriate to remain on Reg/ Thin liquids for now and implement reflux precautions.  SLP to follow case to ensure tolerance i/s/o pneumonia.    Active Diet  Orders (From admission, onward)       Start     Ordered    05/29/24 1531  Adult Diet Regular; Cardiac (Low Sodium/Low Fat); RD/LDN may adjust order  Diet effective now        Question Answer Comment   Diet Texture Regular    Other Restriction(s): Cardiac (Low Sodium/Low Fat)    Delegation of Authority. Diet orders written by PA/CRNPs may not be adjusted by RD/LDNs. RD/LDN may adjust order        05/29/24 1530                    Oh is a 88 y.o. male admitted on 5/29/2024 with Multifocal pneumonia [J18.9]  Chest pain, unspecified type [R07.9]. Principal problem is Multifocal pneumonia.    Past Medical History  Oh has a past medical history of Arthritis, BPH (benign prostatic hyperplasia), CHF (congestive heart failure) (CMS/HCC), Coronary artery disease, Disease of thyroid gland, Heart disease, Hyperlipidemia, Hypertension, and Sarcoidosis.    History of Present Illness  Adm with multifocal PNA.    Patient presented with 3 days of lack of energy, chills, and chest discomfort (described to be in same location as heartburn but different). Admits to cough & nausea w/o vomiting x 1 day. CT chest showing multifocal GGO and consolidative opacities suspicious for pneumonia on a background of chronic fibrotic ILD with UIP pattern, several new or enlarged irregular appearing solid nodules which may be inflammatory. WBC WNL, febrile in ED.     Possibly viral pneumonia, but with pulmonary comorbidities & fever will treat as bacterial PNA. Low clinical concern for aspiration PNA.    -SLP consulted to rule out aspiration/dysphagia as he dose report seldom coughing while taking pills.    IMPRESSION: No acute pulmonary embolism. Small right pleural effusion and multifocal ground glass and nodular consolidative airspace opacities suspicious for pneumonia on a background of chronic fibrotic ILD with UIP pattern. Follow-up CT after treatment recommended to ensure resolution; 3 months would be reasonable. Several new or enlarged  irregular-appearing solid nodules which may be inflammatory but can be reassessed on follow-up CT as above. 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  Moderate hiatal hernia.        Prior Living Environment      Flowsheet Row Most Recent Value   Living Environment Comment lives with son in 3SH with ramp to enter,  has 1st floor setup,  stall shower with chair and grab bar, toilet riser          Prior Level of Function      Flowsheet Row Most Recent Value   Dominant Hand right   Ambulation assistive equipment   Transferring assistive equipment   Toileting independent   Bathing assistive equipment   Dressing independent   Eating independent   IADLs independent   Driving/Transportation    Communication understands/communicates without difficulty   Swallowing swallows foods/liquids without difficulty   Baseline Diet/Method of Nutritional Intake thin liquids, regular   Past History of Dysphagia patient denies dysphagia and no prior ecounters seen in chart.   Prior Level of Function Comment pt is mod indep with use of RW,  + drives   Assistive Device Currently Used at Home grab bar, raised toilet, walker, front-wheeled, shower chair             SLP Evaluation and Treatment - 05/30/24 1045          SLP Time Calculation    Start Time 1045     Stop Time 1100     Time Calculation (min) 15 min        General Information    Document Type Initial Evaluation     Mode of Treatment individual therapy;speech language pathology     Position at Start of Session reclined;in bed     Status at Start of Session agreeable to therapy;no issues or concerns identified by nurse prior to session     General Observations of Patient patient sat self up edge of bed; stable and no acute complaints.        Precautions/Limitations/Impairments    Existing Precautions/Restrictions fall         Services    Do You Speak a Language Other Than English at Home? no        Cognition/Psychosocial    Affect/Mental  Status WFL     Orientation Status oriented x 4     Follows Commands WNL        Dentition (Oral Motor)    Dentition (Oral Motor) natural dentition;adequate dentition        Facial Symmetry (Oral Motor)    Facial Symmetry (Oral Motor) WNL        Lip Function (Oral Motor)    Lip Range of Motion (Oral Motor) WNL        Tongue Function (Oral Motor)    Tongue ROM (Oral Motor) WNL        Cough/Swallow/Gag Reflex (Oral Motor)    Volitional Throat Clear/Cough (Oral Motor) WNL     Volitional Swallow (Oral Motor) WNL        Vocal Quality/Secretion Management (Oral Motor)    Vocal Quality (Oral Motor) WFL     Secretion Management (Oral Motor) WNL        GRBAS    Grade 0-->none     Roughness 0-->none     Breathiness 0-->none     Asthenia 0-->none     Strain 0-->none        Motor Speech    Speech Intelligibility (Motor Speech) WFL;conversational level        Functional Communication Measures    FCM: Swallowing 6-->Level 6        General Swallowing Observations    Current Diet/Method of Nutritional Intake thin liquids;regular     Signs/Symptoms of Aspiration (Current Diet) pneumonia     Respiratory Support (General Swallowing Observations) none     Comment, Secretions/Suctioning unremarkable; appropriate secretion management.     Comment, General Swallowing Observations patient reportedly todd breakfast well; denies N/V since being in hospital.        Food and Liquid Trials (NIS)    Patient Positioning HOB elevated (specify degrees)     Oral Intake/Feeding Performance independent/appropriate self-feeding skills     Liquid Consistencies Evaluated thin liquids     Thin Liquids intact;straw sips     Comment, Thin Liquids no overt ss/x of aspiration using straw; no change to VQ-     Food Consistencies Evaluated regular     Regular intact;patient controlled amounts     Oral Preparatory Phase of Swallow WFL     Pharyngeal Phase of Swallow no clinical symptoms     Comment, Pharyngeal Phase we cannot entirely exclude without imaging  however there is low suspicion for aspiration related PNA per phsycian assessment.     Esophageal Phase of Swallow no clinical symptoms     SpO2 Level stable; room air        AM-PAC™ - Cognition (Current Function)    Following/understanding a 10-15 minute speech or presentation? 4 - None     Understanding familiar people during ordinary conversations? 4 - None     Remembering to take medications at the appropriate time? 4 - None     Remembering where things were placed or put away? 4 - None     Remembering a list of 3 or 4 errands without writing it down? 4 - None     Taking care of complicated tasks? 4 - None     AM-PAC™ Cognition Score 24        SLP Goals    Swallowing Goal Selection oral nutrition/hydration, SLP goal (free text)        Session Outcome    Position at End of Session upright;in bed     Nursing Notified patient's response to therapy/activity        Plan    Rehab Potential good, to achieve stated therapy goals     Therapy Frequency 3 times/wk     Planned Therapy Interventions dysphagia therapy                   SLP Discharge Recommendations      Flowsheet Row Most Recent Value   Patient/Family Therapy Goal Statement to get up and walk at 05/30/2024 1045                 Education Documentation  No documentation found.        SLP Goals      Flowsheet Row Most Recent Value   Oral Nutrition/Hydration Goal    Oral Nutrition/Hydration Goal Patient will consume least restrictive PO diet without overt ss/x of aspiration. at 05/30/2024 1045   Time Frame long-term goal (LTG), by discharge at 05/30/2024 1045

## 2024-05-30 NOTE — PLAN OF CARE
Problem: Adult Inpatient Plan of Care  Goal: Plan of Care Review  Outcome: Progressing  Flowsheets (Taken 5/30/2024 1211)  Progress: improving  Outcome Evaluation:   SLP completed Eval, Continue with Reg/ Thin diet for now, +reflux precautions.  ST to follow.  Plan of Care Reviewed With: patient

## 2024-05-30 NOTE — PLAN OF CARE
Problem: Adult Inpatient Plan of Care  Goal: Plan of Care Review  Outcome: Progressing  Flowsheets (Taken 5/30/2024 2709)  Outcome Evaluation:   PT eval completed   pt able to get to EOB with close sup and increased time   mod assist to stand from EOB to RW   min assist for amb with RW   min assist to return to bed. pt will benefit from PT while in-house to maximize functional independence   rec home with family assist and home PT upon d/c.  Plan of Care Reviewed With: patient

## 2024-05-30 NOTE — PROGRESS NOTES
Hospital Medicine Service -  Daily Progress Note       SUBJECTIVE   He endorses feeling better.  Intermittent bouts of cough productive of clear sputum.  Denies any chest pain, shortness of breath, palpitation dizziness and lightheadedness     OBJECTIVE      Vital signs in last 24 hours:  Temp:  [36.7 °C (98 °F)-37.5 °C (99.5 °F)] 36.9 °C (98.4 °F)  Heart Rate:  [58-73] 73  Resp:  [18-22] 18  BP: (104-159)/(49-87) 121/59    Intake/Output Summary (Last 24 hours) at 5/30/2024 1113  Last data filed at 5/30/2024 0045  Gross per 24 hour   Intake --   Output 275 ml   Net -275 ml       PHYSICAL EXAMINATION      Physical Exam  GEN: well-developed and well-nourished; not in acute distress  HEENT: normocephalic; atraumatic  EYES: EOMI PERRLA conjunctiva clear no discharge  NECK: no JVD; neck supple  CARDIO: regular rate and rhythm; no murmurs or rubs  RESP: clear to auscultation bilaterally; no rales, rhonchi, or wheezes  ABD: soft, non-distended, non-tender, normal bowel sounds  EXT: no cyanosis or edema  SKIN: No Rash exposed skin  MUSCULOSKELETAL: no injury or deformity  NEURO: alert and oriented x 3; nonfocal  BEHAVIOR/EMOTIONAL: appropriate; cooperative            LINES, CATHETERS, DRAINS, AIRWAYS, AND WOUNDS     Lines, Drains, and Airways:  Wounds (agree with documentation and present on admission):  Peripheral IV (Adult) 05/29/24 Left Antecubital (Active)   Number of days: 1         Comments:      LABS / IMAGING / TELE      Results from last 7 days   Lab Units 05/30/24  0353 05/29/24  1027   WBC K/uL 6.13 5.45   HEMOGLOBIN g/dL 13.1* 13.2*   HEMATOCRIT % 40.4 40.2   PLATELETS K/uL 175 155      Results from last 7 days   Lab Units 05/30/24  0353 05/29/24  1027   SODIUM mEQ/L 136 135*   POTASSIUM mEQ/L 4.5 3.9   CHLORIDE mEQ/L 104 104   CO2 mEQ/L 21 21   BUN mg/dL 17 21   CREATININE mg/dL 1.1 1.2   GLUCOSE mg/dL 104* 118*   CALCIUM mg/dL 8.7 8.9          Imaging  rvd    ASSESSMENT AND PLAN       Hyponatremia  Assessment & Plan  -Mild w/ Na 135  -monitor bmp    Chronic HFrEF (heart failure with reduced ejection fraction) (CMS/formerly [facility])  Assessment & Plan  -TTE 2023 showing EF 45%. Without clinical evidence to suggest acute exacerbation at time of admission  -Continue home carvedilol, Entresto, aspirin  -monitor daily weights, I/O  -OP follow up with cardiologist Dr. Isaacs    CAD (coronary artery disease)  Assessment & Plan  -H/O CAD s/p CABG in 2000  -Denies current cardiac complaints  -Continue ASA, statin beta blocker  -OP follow up with cardiologist Dr. Isaacs    Normocytic anemia  Assessment & Plan  -Chronic, hgb 13.2 which is close to baseline  -Monitor CBC    GERD (gastroesophageal reflux disease)  Assessment & Plan  -H/O GERD & UGIB  Asymptomatic  -moderate hiatal hernia noted on CT on admission  -Continue pantoprazole    Hypothyroidism  Assessment & Plan  -Continue levothyroxine    Hypertension  Assessment & Plan  -Continue carvedilol, hydralazine, Entresto  -Monitor BP   -Check orthostatic vitals    Near syncope  Assessment & Plan  -Reports having lack of energy, unsteadiness when he stands up, and a feeling as if he was going to pass out  -Feeling better orthostatic vitals not reported yet  -Check orthostatic vital signs; if positive may need to adjust home cardiac meds (recently started on carvedilol & Entresto in addition to his hydralazine)  -Fall precaution bundle  -PT/OT evals    Aortic stenosis  Assessment & Plan  -H/O AS s/p TAVR in 2016  -Cardiologist=Dr. Aline Isaacs    Hyperlipidemia  Assessment & Plan  -Continue statin    Pulmonary sarcoidosis (CMS/formerly [facility])  Assessment & Plan  -CT notes background of fibrotic ILD w/ UIP pattern  -OP follow up    Benign prostatic hyperplasia  Assessment & Plan  -Continue tamsulosin  -Monitor voids    * Multifocal pneumonia  Assessment & Plan  -Presents with 3 days of lack of energy, chills, and chest discomfort (described to be in same location as heartburn but  different). Admits to cough & nausea w/o vomiting x 1 day. HS troponin negative x2. EKG non ischemic. CT chest showing multifocal GGO and consolidative opacities suspicious for pneumonia on a background of chronic fibrotic ILD with UIP pattern, several new or enlarged irregular appearing solid nodules which may be inflammatory. WBC WNL, febrile in ED. Flu/covid/rsv negative.  -Possibly viral pneumonia, but with pulmonary comorbidities & fever will treat as bacterial PNA. Low clinical concern for aspiration PNA.      -Would continue treatment for CAP w/ azithromycin & ceftriaxone  -SLP consulted to rule out aspiration/dysphagia as he dose report seldom coughing while taking pills  -Check sputum culture if patient expectorates  -Check legionella urine  -DuoNebs ATC, BID mucinex  -Monitor CBC, fever curve  -Follow up blood cultures  -PT/OT for generalized weakness  -Will need follow up CT chest in 3 months to document resolution & to look at new nodules; patient & daughter Karen were notified of this.          VTE Assessment: [place] VTE Score: 5  VTE Prophylaxis:  Current anticoagulants:  enoxaparin (LOVENOX) syringe 40 mg, subcutaneous, Daily (6p)      Code Status: Full Code  Palliative Care Screening Score: 0   Estimated Discharge Date: 5/31/2024   Disposition Planning:      Ree Agee MD  5/30/2024

## 2024-05-31 PROBLEM — R07.9 CHEST PAIN: Status: ACTIVE | Noted: 2024-05-31

## 2024-05-31 LAB
ATRIAL RATE: 60
PR INTERVAL: 206
QRS DURATION: 162
QT INTERVAL: 500
QTC CALCULATION(BAZETT): 500
R AXIS: 237
T WAVE AXIS: 179
TROPONIN I SERPL HS-MCNC: 9.4 PG/ML
TROPONIN I SERPL HS-MCNC: 9.7 PG/ML
VENTRICULAR RATE: 60

## 2024-05-31 PROCEDURE — 63700000 HC SELF-ADMINISTRABLE DRUG: Performed by: NURSE PRACTITIONER

## 2024-05-31 PROCEDURE — 63600000 HC DRUGS/DETAIL CODE: Mod: JZ | Performed by: NURSE PRACTITIONER

## 2024-05-31 PROCEDURE — 84484 ASSAY OF TROPONIN QUANT: CPT | Performed by: HOSPITALIST

## 2024-05-31 PROCEDURE — 92526 ORAL FUNCTION THERAPY: CPT | Mod: GN

## 2024-05-31 PROCEDURE — 25000000 HC PHARMACY GENERAL: Performed by: NURSE PRACTITIONER

## 2024-05-31 PROCEDURE — 25800000 HC PHARMACY IV SOLUTIONS: Performed by: NURSE PRACTITIONER

## 2024-05-31 PROCEDURE — 36415 COLL VENOUS BLD VENIPUNCTURE: CPT | Performed by: HOSPITALIST

## 2024-05-31 PROCEDURE — 97535 SELF CARE MNGMENT TRAINING: CPT | Mod: GO,CO

## 2024-05-31 PROCEDURE — 99232 SBSQ HOSP IP/OBS MODERATE 35: CPT | Performed by: HOSPITALIST

## 2024-05-31 PROCEDURE — 12000000 HC ROOM AND CARE MED/SURG

## 2024-05-31 PROCEDURE — 63700000 HC SELF-ADMINISTRABLE DRUG: Performed by: HOSPITALIST

## 2024-05-31 PROCEDURE — 93005 ELECTROCARDIOGRAM TRACING: CPT | Performed by: HOSPITALIST

## 2024-05-31 RX ORDER — NITROGLYCERIN 0.4 MG/1
0.4 TABLET SUBLINGUAL EVERY 5 MIN PRN
Status: DISCONTINUED | OUTPATIENT
Start: 2024-05-31 | End: 2024-06-01 | Stop reason: HOSPADM

## 2024-05-31 RX ADMIN — PANTOPRAZOLE SODIUM 40 MG: 40 TABLET, DELAYED RELEASE ORAL at 08:46

## 2024-05-31 RX ADMIN — CARVEDILOL 3.12 MG: 3.12 TABLET, FILM COATED ORAL at 17:48

## 2024-05-31 RX ADMIN — HYDRALAZINE HYDROCHLORIDE 25 MG: 25 TABLET ORAL at 17:48

## 2024-05-31 RX ADMIN — NITROGLYCERIN 0.4 MG: 0.4 TABLET SUBLINGUAL at 08:42

## 2024-05-31 RX ADMIN — IPRATROPIUM BROMIDE AND ALBUTEROL SULFATE 3 ML: .5; 3 SOLUTION RESPIRATORY (INHALATION) at 13:48

## 2024-05-31 RX ADMIN — IPRATROPIUM BROMIDE AND ALBUTEROL SULFATE 3 ML: .5; 3 SOLUTION RESPIRATORY (INHALATION) at 17:48

## 2024-05-31 RX ADMIN — ENOXAPARIN SODIUM 40 MG: 40 INJECTION SUBCUTANEOUS at 17:48

## 2024-05-31 RX ADMIN — TAMSULOSIN HYDROCHLORIDE 0.4 MG: 0.4 CAPSULE ORAL at 02:07

## 2024-05-31 RX ADMIN — FLUTICASONE PROPIONATE 1 SPRAY: 50 SPRAY, METERED NASAL at 08:46

## 2024-05-31 RX ADMIN — CEFTRIAXONE SODIUM 1 G: 1 INJECTION, POWDER, FOR SOLUTION INTRAMUSCULAR; INTRAVENOUS at 15:34

## 2024-05-31 RX ADMIN — SACUBITRIL AND VALSARTAN 1 TABLET: 24; 26 TABLET, FILM COATED ORAL at 08:39

## 2024-05-31 RX ADMIN — HYDRALAZINE HYDROCHLORIDE 25 MG: 25 TABLET ORAL at 06:12

## 2024-05-31 RX ADMIN — LEVOTHYROXINE SODIUM 25 MCG: 0.03 TABLET ORAL at 06:11

## 2024-05-31 RX ADMIN — TAMSULOSIN HYDROCHLORIDE 0.4 MG: 0.4 CAPSULE ORAL at 21:38

## 2024-05-31 RX ADMIN — ASPIRIN 325 MG: 325 TABLET, COATED ORAL at 08:39

## 2024-05-31 RX ADMIN — AZITHROMYCIN MONOHYDRATE 500 MG: 500 INJECTION, POWDER, LYOPHILIZED, FOR SOLUTION INTRAVENOUS at 18:11

## 2024-05-31 RX ADMIN — IPRATROPIUM BROMIDE AND ALBUTEROL SULFATE 3 ML: .5; 3 SOLUTION RESPIRATORY (INHALATION) at 08:39

## 2024-05-31 RX ADMIN — ATORVASTATIN CALCIUM 20 MG: 20 TABLET, FILM COATED ORAL at 17:48

## 2024-05-31 RX ADMIN — IPRATROPIUM BROMIDE AND ALBUTEROL SULFATE 3 ML: .5; 3 SOLUTION RESPIRATORY (INHALATION) at 20:13

## 2024-05-31 RX ADMIN — SACUBITRIL AND VALSARTAN 1 TABLET: 24; 26 TABLET, FILM COATED ORAL at 20:13

## 2024-05-31 ASSESSMENT — COGNITIVE AND FUNCTIONAL STATUS - GENERAL
WALKING IN HOSPITAL ROOM: 3 - A LITTLE
MOVING TO AND FROM BED TO CHAIR: 3 - A LITTLE
EATING MEALS: 4 - NONE
STANDING UP FROM CHAIR USING ARMS: 3 - A LITTLE
FOLLOWS FAMILIAR CONVERSATION: 4 - NONE
UNDERSTANDING 10 TO 15 MIN SPEECH: 4 - NONE
REMEMBERING 5 ERRANDS WITH NO LIST: 4 - NONE
CLIMB 3 TO 5 STEPS WITH RAILING: 3 - A LITTLE
DRESSING REGULAR LOWER BODY CLOTHING: 2 - A LOT
HELP NEEDED FOR PERSONAL GROOMING: 3 - A LITTLE
AFFECT: WFL
MOVING TO AND FROM BED TO CHAIR: 3 - A LITTLE
STANDING UP FROM CHAIR USING ARMS: 3 - A LITTLE
REMEMBERING WHERE THINGS ARE: 4 - NONE
CLIMB 3 TO 5 STEPS WITH RAILING: 3 - A LITTLE
TAKING CARE OF COMPLICATED TASKS: 4 - NONE
WALKING IN HOSPITAL ROOM: 3 - A LITTLE
DRESSING REGULAR UPPER BODY CLOTHING: 3 - A LITTLE
TOILETING: 3 - A LITTLE
AFFECT: WFL
HELP NEEDED FOR BATHING: 3 - A LITTLE
REMEMBERING TO TAKE MEDICATION: 4 - NONE

## 2024-05-31 NOTE — ASSESSMENT & PLAN NOTE
He has a history of CABG 2000. Coronaries were patent by cardiac cath 2016. Continue medical management with ASA/statin/carvedilol.

## 2024-05-31 NOTE — PROGRESS NOTES
Hospital Medicine Service -  Daily Progress Note       SUBJECTIVE   Int he reports 2 out of 10 right jaw pain denies any dyspnea, diaphoresis dizziness, lightheadedness and palpitations.  History of CABG but he is not aware of any angina or heart attacks     OBJECTIVE      Vital signs in last 24 hours:  Temp:  [36.8 °C (98.3 °F)-37.7 °C (99.8 °F)] 37.7 °C (99.8 °F)  Heart Rate:  [56-90] 56  Resp:  [16-18] 18  BP: (110-131)/(55-61) 122/59    Intake/Output Summary (Last 24 hours) at 5/31/2024 1012  Last data filed at 5/31/2024 0600  Gross per 24 hour   Intake --   Output 250 ml   Net -250 ml       PHYSICAL EXAMINATION      Physical Exam GEN: well-developed and well-nourished; not in acute distress  HEENT: normocephalic; atraumatic  EYES: EOMI PERRLA conjunctiva clear no discharge  NECK: no JVD; no bruits  CARDIO: regular rate and rhythm; no murmurs or rubs  RESP: clear to auscultation bilaterally; no rales, rhonchi, or wheezes  ABD: soft, non-distended, non-tender, normal bowel sounds  EXT: no cyanosis or edema  SKIN: No Rash exposed skin  MUSCULOSKELETAL: no injury or deformity  NEURO: alert and oriented x 3; nonfocal  BEHAVIOR/EMOTIONAL: appropriate; cooperative  LYMPH NODES: Right posterior  cervical lymphadenopathy          LINES, CATHETERS, DRAINS, AIRWAYS, AND WOUNDS   Lines, Drains, and Airways:  Wounds (agree with documentation and present on admission):  Peripheral IV (Adult) 05/29/24 Left Antecubital (Active)   Number of days: 2         Comments:    LABS / IMAGING / TELE      Labs  Results from last 7 days   Lab Units 05/30/24  0353 05/29/24  1027   WBC K/uL 6.13 5.45   HEMOGLOBIN g/dL 13.1* 13.2*   HEMATOCRIT % 40.4 40.2   PLATELETS K/uL 175 155      Results from last 7 days   Lab Units 05/30/24  0353 05/29/24  1027   SODIUM mEQ/L 136 135*   POTASSIUM mEQ/L 4.5 3.9   CHLORIDE mEQ/L 104 104   CO2 mEQ/L 21 21   BUN mg/dL 17 21   CREATININE mg/dL 1.1 1.2   GLUCOSE mg/dL 104* 118*   CALCIUM mg/dL 8.7 8.9         Imaging  No new imaging    ECG/Telemetry  Personally reviewed normal sinus rhythm with right bundle branch block and newly inverted T waves in lateral leads as compared to old EKG    ASSESSMENT AND PLAN      Hyponatremia  Assessment & Plan  -Mild w/ Na 135  -monitor bmp    Chronic HFrEF (heart failure with reduced ejection fraction) (CMS/[facility])  Assessment & Plan  -TTE 2023 showing EF 45%. Without clinical evidence to suggest acute exacerbation at time of admission  -Continue home carvedilol, Entresto, aspirin  -monitor daily weights, I/O  -OP follow up with cardiologist Dr. Isaacs   -The patient complained of right jaw pain without any associated symptoms.  EKG done and it showed inverted T waves in lateral leads.  Troponin ordered and cardiology consulted.  Discussed with RN and patient    CAD (coronary artery disease)  Assessment & Plan  -H/O CAD s/p CABG in 2000  -The patient complained of right jaw pain.  EKG done and it showed newly inverted T waves in lateral leads.  Troponin ordered and pending cardiology consulted for further input  -Continue ASA, statin beta blocker  -OP follow up with cardiologist Dr. Isaacs    Normocytic anemia  Assessment & Plan  -Chronic, hgb 13.2 which is close to baseline  -Monitor CBC    GERD (gastroesophageal reflux disease)  Assessment & Plan  -H/O GERD & UGIB  Asymptomatic  -moderate hiatal hernia noted on CT on admission  -Continue pantoprazole    Hypothyroidism  Assessment & Plan  -Continue levothyroxine    Hypertension  Assessment & Plan  -Continue carvedilol, hydralazine, Entresto  -Monitor BP   -Check orthostatic vitals    Near syncope  Assessment & Plan  -Reports having lack of energy, unsteadiness when he stands up, and a feeling as if he was going to pass out  -Feeling better orthostatic vitals not reported yet  -Check orthostatic vital signs; if positive may need to adjust home cardiac meds (recently started on carvedilol & Entresto in addition to his  hydralazine)  -Fall precaution bundle  -PT/OT evals    Aortic stenosis  Assessment & Plan  -H/O AS s/p TAVR in 2016  -Cardiologist=Dr. Aline Isaacs    Hyperlipidemia  Assessment & Plan  -Continue statin    Pulmonary sarcoidosis (CMS/HCC)  Assessment & Plan  -CT notes background of fibrotic ILD w/ UIP pattern  -OP follow up    Benign prostatic hyperplasia  Assessment & Plan  -Continue tamsulosin  -Monitor voids    * Multifocal pneumonia  Assessment & Plan  -Presents with 3 days of lack of energy, chills, and chest discomfort (described to be in same location as heartburn but different). Admits to cough & nausea w/o vomiting x 1 day. HS troponin negative x2. EKG non ischemic. CT chest showing multifocal GGO and consolidative opacities suspicious for pneumonia on a background of chronic fibrotic ILD with UIP pattern, several new or enlarged irregular appearing solid nodules which may be inflammatory. WBC WNL, febrile in ED. Flu/covid/rsv negative.  -Possibly viral pneumonia, but with pulmonary comorbidities & fever will treat as bacterial PNA. Low clinical concern for aspiration PNA.      -Would continue treatment for CAP w/ azithromycin & ceftriaxone  -SLP consulted to rule out aspiration/dysphagia as he dose report seldom coughing while taking pills  -Check sputum culture if patient expectorates  -Check legionella urine  -DuoNebs ATC, BID mucinex  -Monitor CBC, fever curve  -Follow up blood cultures  -PT/OT for generalized weakness  -Will need follow up CT chest in 3 months to document resolution & to look at new nodules; patient & daughter Karen were notified of this.          VTE Assessment: [place] VTE Score: 5  VTE Prophylaxis:  Current anticoagulants:  enoxaparin (LOVENOX) syringe 40 mg, subcutaneous, Daily (6p)      Code Status: Full Code  Palliative Care Screening Score: 0   Estimated Discharge Date: 5/31/2024     Disposition Planning: Next 24-hour     Ree Agee MD  5/31/2024

## 2024-05-31 NOTE — ASSESSMENT & PLAN NOTE
His pain is atypical lasting 1-3 minutes only. ECG changes are likely due to lead placement as the entire axis changes in leads I and aVL. Troponin is normal. No need for further troponin testing or telemetry as his symptoms are likely noncardiac. He should follow up with Dr. Isaacs in the office for consideration of ischemic testing.

## 2024-05-31 NOTE — PROGRESS NOTES
[facility] for [facility] from Tiffany ARSHAD. I spoke with Pt at bedside, Pt agreeable to home SN, PT, and MSW. Referral completed for 6/2/24 home care contact if Pt stable for discharge 6/1/24. Jocelyn Swanson RN

## 2024-05-31 NOTE — PROGRESS NOTES
Occupational Therapy -  Daily Treatment/Progress Note     Patient: Michel aFng  Location: [facility] 3B 0322  MRN: [id number]  Today's date: 5/31/2024    HISTORY OF PRESENT ILLNESS     Oh is a 88 y.o. male admitted on 5/29/2024 with Multifocal pneumonia [J18.9]  Chest pain, unspecified type [R07.9]. Principal problem is Multifocal pneumonia.    Past Medical History  Oh has a past medical history of Arthritis, BPH (benign prostatic hyperplasia), CHF (congestive heart failure) (CMS/HCC), Coronary artery disease, Disease of thyroid gland, Heart disease, Hyperlipidemia, Hypertension, and Sarcoidosis.    History of Present Illness  Adm with multifocal PNA.    Patient presented with 3 days of lack of energy, chills, and chest discomfort (described to be in same location as heartburn but different). Admits to cough & nausea w/o vomiting x 1 day. CT chest showing multifocal GGO and consolidative opacities suspicious for pneumonia on a background of chronic fibrotic ILD with UIP pattern, several new or enlarged irregular appearing solid nodules which may be inflammatory. WBC WNL, febrile in ED.     Possibly viral pneumonia, but with pulmonary comorbidities & fever will treat as bacterial PNA. Low clinical concern for aspiration PNA.    -SLP consulted to rule out aspiration/dysphagia as he dose report seldom coughing while taking pills.    IMPRESSION: No acute pulmonary embolism. Small right pleural effusion and multifocal ground glass and nodular consolidative airspace opacities suspicious for pneumonia on a background of chronic fibrotic ILD with UIP pattern. Follow-up CT after treatment recommended to ensure resolution; 3 months would be reasonable. Several new or enlarged irregular-appearing solid nodules which may be inflammatory but can be reassessed on follow-up CT as above. 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  Moderate hiatal hernia.  PRIOR LEVEL OF FUNCTION AND LIVING  ENVIRONMENT     Prior Level of Function      Flowsheet Row Most Recent Value   Dominant Hand right   Ambulation assistive equipment   Transferring assistive equipment   Toileting independent   Bathing assistive equipment   Dressing independent   Eating independent   IADLs independent   Driving/Transportation    Communication understands/communicates without difficulty   Swallowing swallows foods/liquids without difficulty   Baseline Diet/Method of Nutritional Intake thin liquids, regular   Past History of Dysphagia patient denies dysphagia and no prior ecounters seen in chart.   Prior Level of Function Comment pt is mod indep with use of RW,  + drives   Assistive Device Currently Used at Home grab bar, raised toilet, walker, front-wheeled, shower chair, wheelchair          Prior Living Environment      Flowsheet Row Most Recent Value   Home Accessibility ramp to enter home   Living Environment Comment lives with son in 3SH with ramp to enter,  has 1st floor setup,  stall shower with chair and grab bar, toilet riser          Occupational Profile      Flowsheet Row Most Recent Value   Environmental Supports and Barriers Patient reports she was independent with ADLs/IADLs and functional mobility with RW.          VITALS AND PAIN     OT Vitals      Date/Time Pulse SpO2 Pt Activity O2 Therapy BP BP Location BP Method Pt Position Observations [facility]   05/31/24 1515 95 97 % At rest None (Room air) 110/59 Right upper arm Automatic Sitting post act /65  and O2 97% JS          OT Pain      Date/Time Pain Type Rating: Rest Rating: Activity [facility]   05/31/24 1515 Pain Assessment 0 0 JS             Objective   OBJECTIVE     Start time:  1515  End time:  1539  Session Length: 24 min  Mode of Treatment: individual therapy, occupational therapy    General Observations  Patient received upright, in bed. He was agreeable to therapy, no issues or concerns identified by nurse prior to session.      Precautions:  fall       Limitations/Impairments: swallowing      OT Eval and Treat - 05/31/24 1515          Cognition    Orientation Status oriented x 4     Affect/Mental Status WFL     Follows Commands WFL     Cognitive Function WFL        Bed Mobility    Comment OOB upon arrival        Mobility Belt    Mobility Belt Used During Session yes        Sit/Stand Transfer    Surface chair with arm rests     [place] close supervision     Safety/Cues increased time to complete;hand placement;technique     Assistive Device walker, front-wheeled        Toilet Transfer    Transfer Technique sit/stand     [place] close supervision     Safety/Cues increased time to complete;proper use of assistive device;sequencing;technique     Assistive Device grab bars/safety frame     Comment pt reports having a higher toilet at home        Functional Mobility    Distance in room/bathroom;hallway     Functional Mobility [place] supervision     Assistive Device walker, front-wheeled     Functional Mobility Comments S at RW approx 60 ft to increase act todd with ADLs        Lower Body Dressing    Tasks doff;don     [place] maximum assist (25-49% patient effort)     Position supported sitting     Adaptive Equipment none     Comment pt reporting variable ROM in BL LEs - not a candidate for TKA. Education on use of LBAE with education and pt receptive rec demo next session        Grooming    Tasks washes, rinses and dries hands     [place] supervision     Position unsupported standing;sink side        Toileting    Tasks adjust/manage clothing;perform bladder hygiene     [place] supervision     Adaptive Equipment grab bar/safety frame        Balance    Static Sitting Balance WFL     Dynamic Sitting Balance WFL     Sit to Stand Dynamic Balance mild impairment     Static Standing Balance WFL;supported     Dynamic Standing Balance mild impairment     Balance Interventions occupation based/functional task     Comment, Balance S at RW         Impairments/Safety Issues    Impairments Affecting Function balance;strength;endurance/activity tolerance     Functional Endurance below baseline as pt reports walking 45 mins a day +coughing with activity                                       Session Outcome  Patient upright, in chair at end of session, all needs met, call light in reach, personal items in reach. Nursing notified about patient's performance, patient's position, and patient's response to therapy/activity.    AM-PAC™ - ADL (Current Function)     Putting on/taking off regular lower body clothing 2 - A Lot   Bathing 3 - A Little   Toileting 3 - A Little   Putting on/taking off regular upper body clothing 3 - A Little   Help for taking care of personal grooming 3 - A Little   Eating meals 4 - None   AM-PAC™ ADL Score 18      ASSESSMENT AND PLAN     Progress Summary  OT tx session complete. Pt clS STS txsfr from lower height of surface to RW. S in room and hallway with use of RW. Pt did have elevation in BP with activity increased coughing notifed nurse. education on use of LBAE as pt reporting LBD becoming difficult for him to complete. toileting with S rec home with assist and home health    Patient/Family Therapy Goal Statement: None stated    OT Plan      Flowsheet Row Most Recent Value   Rehab Potential good, to achieve stated therapy goals at 05/31/2024 1515   Therapy Frequency 3 times/wk at 05/31/2024 1515   Planned Therapy Interventions activity tolerance training, IADL retraining, occupation/activity based interventions, strengthening exercise, transfer/mobility retraining, BADL retraining, functional balance retraining, ROM/therapeutic exercise at 05/30/2024 1353            OT Discharge Recommendations      Flowsheet Row Most Recent Value   OT Recommended Discharge Disposition home with home health, home with assistance at 05/31/2024 1515   Anticipated Equipment Needs if Discharged Home (OT) none at 05/31/2024 1515                 OT  Goals      Flowsheet Row Most Recent Value   Bed Mobility Goal 1    Activity/Assistive Device bed mobility activities, all at 05/30/2024 1353   [place] modified independence at 05/30/2024 1353   Time Frame by discharge at 05/30/2024 1353   Progress/Outcome goal ongoing at 05/30/2024 1353   Transfer Goal 1    Activity/Assistive Device toilet at 05/30/2024 1353   [place] modified independence at 05/30/2024 1353   Time Frame by discharge at 05/30/2024 1353   Progress/Outcome goal ongoing at 05/30/2024 1353   Dressing Goal 1    Activity/Adaptive Equipment dressing skills, all at 05/30/2024 1353   [place] modified independence at 05/30/2024 1353   Time Frame by discharge at 05/30/2024 1353   Progress/Outcome goal ongoing at 05/30/2024 1353   Grooming Goal 1    Activity/Assistive Device grooming skills, all at 05/30/2024 1353   [place] modified independence at 05/30/2024 1353   Time Frame by discharge at 05/30/2024 1353   Progress/Outcome goal ongoing at 05/30/2024 1353

## 2024-05-31 NOTE — PLAN OF CARE
Plan of Care Review  Plan of Care Reviewed With: patient  Progress: improving    VSS> pt med compliant. Up in Chair til 23:30.

## 2024-05-31 NOTE — CONSULTS
Cardiology Consult Note    Subjective     Michel Fang is a 88 y.o. male , known to Dr. Aline Isaacs of [facility], with a past medical history of CAD, CABG 2000, Htn, PVD with chronic claudication, HLD, AS with TAVR 2016 - now with moderate paravalvular leak, pulmonary sarcoidosis, popliteal aneurysm, DJD, GIB from gastritis, mild cardiomyopathy with EF=40-45% by TTE 2023 was admitted on 5/29 with chest pain, fatigue, gait imbalance and near syncope. He was having irritation in his chest prior to admission. He had a cough and was admitted with multifocal pneumonia.     This morning, he reported 1/10 right sided jaw pain that lasted for about 2 minutes. He was given 1 SL NTG tablet with relief of the pain. He has had two similar episodes at home while at rest. Both of those episodes were mild and lasted 1 minute before self-resolving. He goes for walks 3-4 times per week for 45 minutes. He uses a walker due to gait imbalance but is able to walk for 45 minutes on flat ground without chest pain or shortness of breath. He denies orthopnea, PND or leg swelling. He has not had a recent stress test.       Allergies  Amlodipine    Prior to Admission medications    Medication Sig Start Date End Date Taking? Authorizing Provider   aspirin 325 mg EC tablet Take 325 mg by mouth daily. 9/18/18  Yes Art Jorgensen MD   atorvastatin (LIPITOR) 20 mg tablet Take 20 mg by mouth every evening.   Yes Art Jorgensen MD   amina cit/D3/K/mag ox/stron/bor (THERACAL ORAL) Take 1 tablet by mouth daily.   Yes Balbina Jorgensen MD   carvediloL (COREG) 3.125 mg tablet Take 3.125 mg by mouth 2 (two) times a day with meals.   Yes Balbina Joregnsen MD   fluticasone propionate (FLONASE) 50 mcg/actuation nasal spray Administer 1 spray into each nostril daily.   Yes Balbina Jorgensen MD   hydrALAZINE (APRESOLINE) 25 mg tablet Take 25 mg by mouth 2 (two) times a day. 8/23/21  Yes Art Jorgensen MD    levothyroxine (SYNTHROID) 25 mcg tablet Take 25 mcg by mouth daily.    Yes Provider, MD Art   pantoprazole (PROTONIX) 40 mg EC tablet Take 40 mg by mouth daily.   Yes Provider, MD Art   sacubitriL-valsartan (ENTRESTO) 24-26 mg per tablet Take 1 tablet by mouth 2 (two) times a day.   Yes ProviderBalbina MD   saw/py/net/pumpk/beta/ly/Zn/Cu (PROSTATE CONTROL ORAL) Take 1 tablet by mouth once daily.   Yes Provider, clarence Cordova MD   tamsulosin (FLOMAX) 0.4 mg capsule Take 0.4 mg by mouth nightly.   Yes Provider, MD Art       Current Facility-Administered Medications   Medication Dose Route Frequency Provider Last Rate Last Admin    acetaminophen (TYLENOL) tablet 650 mg  650 mg oral q4h PRN Paola Adhikari CRNP        aspirin EC tablet 325 mg  325 mg oral Daily Paola Adhikari CRNP   325 mg at 05/31/24 0839    atorvastatin (LIPITOR) tablet 20 mg  20 mg oral q PM Paola Adhikari CRNP   20 mg at 05/30/24 1726    azithromycin (ZITHROMAX) IVPB 500 mg in 250 mL NSS vial in bag  500 mg intravenous q24h INT Paola Adhikari CRNP   Stopped at 05/30/24 1650    carvediloL (COREG) tablet 3.125 mg  3.125 mg oral BID with meals Paola Adhikari CRNP   3.125 mg at 05/30/24 1726    cefTRIAXone (ROCEPHIN) IVPB 1 g in 100 mL NSS vial in bag  1 g intravenous q24h INT Paola Adhikari CRNP   Stopped at 05/30/24 1515    glucose chewable tablet 16-32 g of dextrose  16-32 g of dextrose oral PRN Paola Adhikari CRNP        Or    dextrose 40 % oral gel 15-30 g of dextrose  15-30 g of dextrose oral PRN Paola Adhikari CRNP        Or    glucagon (GLUCAGEN) injection 1 mg  1 mg intramuscular PRN Paola Adhikari CRNP        Or    dextrose 50 % in water (D50) injection 12.5 g  25 mL intravenous PRN Paola Adhikari CRNP        enoxaparin (LOVENOX) syringe 40 mg  40 mg subcutaneous Daily (6p) Paola Adhikari CRNP   40 mg at 05/30/24 1720     fluticasone propionate (FLONASE) 50 mcg/actuation nasal spray 1 spray  1 spray Each Nostril Daily Paola Adhikari CRNP   1 spray at 24 0846    hydrALAZINE (APRESOLINE) tablet 25 mg  25 mg oral BID (6a, 6p) Paola Adhikari CRNP   25 mg at 24 0612    ipratropium-albuteroL (DUO-NEB) 0.5-2.5 mg/3 mL nebulizer solution 3 mL  3 mL nebulization QID (8a, 12p, 4p, 8p) Paola Adhikari CRNP   3 mL at 24 0839    levothyroxine (SYNTHROID) tablet 25 mcg  25 mcg oral Daily (6:30a) Paola Adhikari CRNP   25 mcg at 24 0611    nitroglycerin (NITROSTAT) SL tablet 0.4 mg  0.4 mg sublingual q5 min PRN Ree Agee MD   0.4 mg at 24 0842    pantoprazole (PROTONIX) tablet,delayed release (DR/EC) 40 mg  40 mg oral Daily Paola Adhikari CRNP   40 mg at 24 0846    sacubitriL-valsartan (ENTRESTO) 24-26 mg per tablet 1 tablet  1 tablet oral BID Paola Adhikari CRNP   1 tablet at 24 0839    tamsulosin (FLOMAX) 24 hr ER capsule 0.4 mg  0.4 mg oral Nightly Paola Adhikari CRNP   0.4 mg at 24 0207       Past Medical History:   Diagnosis Date    Arthritis     BPH (benign prostatic hyperplasia)     CHF (congestive heart failure) (CMS/HCC)     Coronary artery disease     Disease of thyroid gland     Heart disease     Hyperlipidemia     Hypertension     Sarcoidosis        Past Surgical History:   Procedure Laterality Date    ADENOIDECTOMY      AORTIC VALVE REPLACEMENT      APPENDECTOMY      CARDIAC SURGERY      CORONARY ARTERY BYPASS GRAFT      EYE SURGERY Bilateral     cataracts     POPLITEAL VENOUS ANEURYSM REPAIR      SKIN BIOPSY      TONSILLECTOMY         Social History  Social History     Tobacco Use    Smoking status: Former     Types: Cigarettes     Quit date:      Years since quittin.4    Smokeless tobacco: Never   Substance Use Topics    Alcohol use: No    Drug use: No       Family History   Problem Relation Age of Onset    Ovarian cancer  "Biological Mother     Lymphoma Other         age  17    Lymphoma Biological Son         age 20's         Review of Systems: All other systems reviewed and negative except as noted in the HPI.    Physical Exam     Visit Vitals  BP (!) 122/59 (BP Location: Right upper arm, Patient Position: Lying)   Pulse (!) 56   Temp 37.7 °C (99.8 °F) (Oral)   Resp 18   Ht 1.753 m (5' 9\")   Wt 81.6 kg (180 lb)   SpO2 94%   BMI 26.58 kg/m²       The patient appears comfortable and is in no apparent distress,    Eyes: Eyelids and sclera normal,   Neck: No jugular venous distention, no thyromegaly, no lymphadenopathy, no carotid bruits,    Lungs: Clear to auscultation, normal respiratory effort,   Heart: Regular rhythm, normal S1 and S2, Grade II/VI DSM RSB   Abdomen: Soft, nontender, NABS x 4  Extremities: No edema, no calf tenderness or swelling, pulses intact,   Skin: No rashes,   Neuro: Alert and oriented without focal deficit,   Psych: Affect normal.      Objective     Labs   Lab Results   Component Value Date    WBC 6.13 05/30/2024    HGB 13.1 (L) 05/30/2024     05/30/2024    ALT 10 05/29/2024    AST 15 05/29/2024     05/30/2024    K 4.5 05/30/2024     05/30/2024    CREATININE 1.1 05/30/2024    BUN 17 05/30/2024    CO2 21 05/30/2024    TSH 2.99 11/10/2019    INR 1.2 09/06/2018    HGBA1C 6.1 (H) 06/04/2019    BNP 73 09/06/2018    HSTROPONINI 9.4 05/31/2024         Imaging  I have independently reviewed the pertinent imaging from the last 24 hrs.    Echocardiogram 4/2023  In summary, poor endocardial visualization with mildly reduced left   ventricular systolic function (EF 40-45%), cannot exclude regional wall   motion abnormality, poorly visualized right ventricle, s/p TAVR are with   stable mean gradient, moderate paravalvular regurgitation, and mildly   enlarged proximal ascending aorta (3.6 cm).     Cardiac cath 2016  Sever native CAD  Patent LIMA to LAD  Patent radial to RCA  Patent SVG to " diagonal  Moderate to severe aortic stenosis  Normal LV function, EF=55%  Normal hemodynamics    ECG   sinus rhythm, isolated PVC's, IVCD    Telemetry  Not on telemetry     Outside records reviewed..    Assessment     Chest pain  Assessment & Plan  His pain is atypical lasting 1-3 minutes only. ECG changes are likely due to lead placement as the entire axis changes in leads I and aVL. Troponin is normal. No need for further troponin testing or telemetry as his symptoms are likely noncardiac. He should follow up with Dr. Isaacs in the office for consideration of ischemic testing.     CAD (coronary artery disease)  Assessment & Plan  He has a history of CABG 2000. Coronaries were patent by cardiac cath 2016. Continue medical management with ASA/statin/carvedilol.     Aortic stenosis  Assessment & Plan  He has a moderate paravalvular leak by TTE 4/2023. He appears euvolemic on exam.     Hyperlipidemia  Assessment & Plan  Continue statin.     Prediabetes  Assessment & Plan  Management as per [facility].     * Multifocal pneumonia  Assessment & Plan  Management as per [facility].             NEYDA Mai  5/31/2024    Plan not final until patient seen by attending cardiologist. See attestation for final plan.

## 2024-05-31 NOTE — PLAN OF CARE
Care Coordination Discharge Plan Note     Discharge Needs Assessment  Concerns to be Addressed: discharge planning  Current Discharge Risk:      Anticipated Discharge Plan  Anticipated Discharge Disposition: home with home health  Type of Home Care Services: nursing, home PT, other (comments) (SW)      Patient Choice  Offered/Gave Vendor List: yes  Patient's Choice of Community Agency(s): [facility]    Patient and/or patient guardian/advocate was made aware of their right to choose a provider. A list of eligible providers was presented and reviewed with the patient and/or patient guardian/advocate in written and/or verbal form. The list delineates providers in the patient’s desired geographic area who are participating in the Medicare program and/or providers contracted with the patient’s primary insurance. The Medicare list and quality ratings were obtained from the Medicare.gov [medicare.gov] website.    ---------------------------------------------------------------------------------------------------------------------    Interdisciplinary Discharge Plan Review:  Participants:advanced practice provider, nursing, social work/services    Concerns Comments: Not stable for d.c today. [facility] team [facility]    Discharge Plan:   Disposition/Destination: Home Health Care - [facility] / Home  Discharge Facility:    Community Resources:      Discharge Transportation:  Is Out of Hospital DNR needed at Discharge: no  Does patient need discharge transport? No      Dispo- home w/ Family and [facility]

## 2024-05-31 NOTE — PROGRESS NOTES
Patient:  Michel Fang  Location:  [facility] 6A 0615  MRN:  [id number]  Today's date:  5/31/2024    SLP Diagnosis  Patient presents with grossly functional swallow; low suspicion for aspiration related PNA.    Swallowing Recommendations   SLP Swallowing Recommendations - 05/31/24 1200       Diet Consistency Recommendations regular;thin liquids     Medication Administration whole with liquid     Supervision Level for Intake patient independent     Posture Recommendations fully upright in chair/chair mode of bed     Instrumental Assessment Recommendations --   ST to sign off on case-    Oral Hygiene standard toothbrush/toothpaste     Comment, Swallowing Recommendations patient not showing overt ss/x c/f prandial aspiration, low suspicion for aspiration pna clinically.  No further ST intervention warranted.                     Summary/Handoff  SLP followed up with patient bedside for ongoing swallow assessment i/s/o pneumonia.  Patient remains AA/ Ox4, stable on room air and denies dysphagia ss/x or exacerbation of baseline cough with PO.  Recommend continuation of patient's baseline diet; Reg/ Thin liquids based off bedside assessment.  Continue with reflux precautions given ADONIS.  SLP to sign off with no further acute dysphagia needs warranted.  Patient/ RN in agreement.    Active Diet Orders (From admission, onward)       Start     Ordered    05/29/24 1531  Adult Diet Regular; Cardiac (Low Sodium/Low Fat); RD/LDN may adjust order  Diet effective now        Question Answer Comment   Diet Texture Regular    Other Restriction(s): Cardiac (Low Sodium/Low Fat)    Delegation of Authority. Diet orders written by PA/CRNPs may not be adjusted by RD/LDNs. RD/LDN may adjust order        05/29/24 1530                    Oh is a 88 y.o. male admitted on 5/29/2024 with Multifocal pneumonia [J18.9]  Chest pain, unspecified type [R07.9]. Principal problem is Multifocal pneumonia.    Past Medical History  Oh has a past  medical history of Arthritis, BPH (benign prostatic hyperplasia), CHF (congestive heart failure) (CMS/HCC), Coronary artery disease, Disease of thyroid gland, Heart disease, Hyperlipidemia, Hypertension, and Sarcoidosis.    History of Present Illness  Adm with multifocal PNA.    Patient presented with 3 days of lack of energy, chills, and chest discomfort (described to be in same location as heartburn but different). Admits to cough & nausea w/o vomiting x 1 day. CT chest showing multifocal GGO and consolidative opacities suspicious for pneumonia on a background of chronic fibrotic ILD with UIP pattern, several new or enlarged irregular appearing solid nodules which may be inflammatory. WBC WNL, febrile in ED.     Possibly viral pneumonia, but with pulmonary comorbidities & fever will treat as bacterial PNA. Low clinical concern for aspiration PNA.    -SLP consulted to rule out aspiration/dysphagia as he dose report seldom coughing while taking pills.    IMPRESSION: No acute pulmonary embolism. Small right pleural effusion and multifocal ground glass and nodular consolidative airspace opacities suspicious for pneumonia on a background of chronic fibrotic ILD with UIP pattern. Follow-up CT after treatment recommended to ensure resolution; 3 months would be reasonable. Several new or enlarged irregular-appearing solid nodules which may be inflammatory but can be reassessed on follow-up CT as above. 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  Moderate hiatal hernia.        Prior Living Environment      Flowsheet Row Most Recent Value   Home Accessibility ramp to enter home   Living Environment Comment lives with son in 3SH with ramp to enter,  has 1st floor setup,  stall shower with chair and grab bar, toilet riser          Prior Level of Function      Flowsheet Row Most Recent Value   Dominant Hand right   Ambulation assistive equipment   Transferring assistive equipment   Toileting independent    Bathing assistive equipment   Dressing independent   Eating independent   IADLs independent   Driving/Transportation    Communication understands/communicates without difficulty   Swallowing swallows foods/liquids without difficulty   Baseline Diet/Method of Nutritional Intake thin liquids, regular   Past History of Dysphagia patient denies dysphagia and no prior ecounters seen in chart.   Prior Level of Function Comment pt is mod indep with use of RW,  + drives   Assistive Device Currently Used at Home grab bar, raised toilet, walker, front-wheeled, shower chair, wheelchair             SLP Evaluation and Treatment - 05/31/24 1200          SLP Time Calculation    Start Time 1120     Stop Time 1130     Time Calculation (min) 10 min        General Information    Document Type Daily Treatment/Progress Note     Mode of Treatment individual therapy;speech language pathology     Position at Start of Session upright;in bed     Status at Start of Session agreeable to therapy;no issues or concerns identified by nurse prior to session     General Observations of Patient patient awake; baseline cough i/s/o PNA.        Precautions/Limitations/Impairments    Existing Precautions/Restrictions fall     Limitations/Impairments swallowing         Services    Do You Speak a Language Other Than English at Home? no        Cognition/Psychosocial    Affect/Mental Status WFL     Orientation Status oriented x 4     Follows Commands WFL;follows multi-step commands        Dentition (Oral Motor)    Dentition (Oral Motor) natural dentition;adequate dentition        Vocal Quality/Secretion Management (Oral Motor)    Vocal Quality (Oral Motor) WNL     Secretion Management (Oral Motor) WNL     Comment, Vocal Quality/Secretion Management (Oral Motor) dry coughing at baseline given PNA; clear and strong VQ during conversation and post trials.        GRBAS    Grade 0-->none     Roughness 0-->none     Breathiness  0-->none     Asthenia 0-->none     Strain 0-->none        Functional Communication Measures    FCM: Swallowing 7-->Level 7        General Swallowing Observations    Current Diet/Method of Nutritional Intake thin liquids;regular     Signs/Symptoms of Aspiration (Current Diet) pneumonia     Respiratory Support (General Swallowing Observations) none     Comment, Secretions/Suctioning unremarkable     Comment, General Swallowing Observations RN reports no concerns with patient's diet.        Food and Liquid Trials (NIS)    Patient Positioning HOB elevated (specify degrees)     Oral Intake/Feeding Performance independent/appropriate self-feeding skills     Liquid Consistencies Evaluated thin liquids     Thin Liquids intact;sips from cup     Comment, Thin Liquids no immediate overt ss/x of aspiration with cup sips; clear vocal quality; no change to baseline cough noted in absence of PO.     Oral Preparatory Phase of Swallow WFL     Pharyngeal Phase of Swallow no clinical symptoms     Esophageal Phase of Swallow no clinical symptoms     SpO2 Level stable on room air; no respiratory distress.        Swallowing Intervention    Dysphagia/Swallowing Interventions monitor tolerance of;current diet without evidence of aspiration        AM-PAC™ - Cognition (Current Function)    Following/understanding a 10-15 minute speech or presentation? 4 - None     Understanding familiar people during ordinary conversations? 4 - None     Remembering to take medications at the appropriate time? 4 - None     Remembering where things were placed or put away? 4 - None     Remembering a list of 3 or 4 errands without writing it down? 4 - None     Taking care of complicated tasks? 4 - None     AM-PAC™ Cognition Score 24        Session Outcome    Position at End of Session upright;in bed     Nursing Notified patient's response to therapy/activity                   SLP Discharge Recommendations      Flowsheet Row Most Recent Value   Patient/Family  Therapy Goal Statement to get up and walk at 05/30/2024 1045                 Education Documentation  No documentation found.        SLP Goals      Flowsheet Row Most Recent Value   Oral Nutrition/Hydration Goal    Oral Nutrition/Hydration Goal Patient will consume least restrictive PO diet without overt ss/x of aspiration. at 05/30/2024 1045   Time Frame long-term goal (LTG), by discharge at 05/30/2024 1045   Progress/Outcome goal met at 05/31/2024 1200

## 2024-05-31 NOTE — PLAN OF CARE
Care Coordination Admission Assessment Note    General Information:  Readmission Within the last 30 days: no previous admission in last 30 days  Does patient have a : No  Patient-Specific Goals (include timeframe): Home when medically stable    Living Arrangements:  Arrived From: home  Current Living Arrangements:    People in Home:    Home Accessibility: ramp to enter home  Living Arrangement Comments: twin home, ramp to enter from back, 1st floor set up,    Housing Stability and Utility Access (SDOH):  In the last 12 months, was there a time when you were not able to pay the mortgage or rent on time?: No  In the last 12 months, how many places have you lived?: 1  In the last 12 months, was there a time when you did not have a steady place to sleep or slept in a shelter (including now)?: No  In the past 12 months has the electric, gas, oil, or water company threatened to shut off services in your home?: No    Functional Status Prior to Admission:   Assistive Device/Animal Currently Used at Home: grab bar, raised toilet, walker, front-wheeled, shower chair, wheelchair  Functional Status Comments: drives, independent, walks using Roller walker, per dtr is usually very active  IADL Comments: IADL's     Supports and Services:  Current Outpatient/Agency/Support Group: homecare agency  Type of Current Home Care Services: nursing, home PT, Other (Comment) (Social work to assist w/ reconnecting pt to [facility] services)  History of home care episode or rehab stay: [facility], no h/o [facility] or SNF    Discharge Needs Assessment:   Concerns to be Addressed: discharge planning  Current Discharge Risk:    Anticipated Changes Related to Illness: none    Patient/Family Anticipated Discharge Plan:  Patient/Family Anticipates Transition To: home with family  Patient/Family Anticipated Services at Transition: home health care    Connection to Community  Not applicable    Patient Choice:   Offered/Gave Vendor List: yes  Patient's  Choice of Community Agency(s): [facility]  Patient and/or patient guardian/advocate was made aware of their right to choose a provider. A list of eligible providers was presented and reviewed with the patient and/or patient guardian/advocate in written and/or verbal form. The list delineates providers in the patient’s desired geographic area who are participating in the Medicare program and/or providers contracted with the patient’s primary insurance. The Medicare list and quality ratings were obtained from the Medicare.gov [medicare.gov] website.    Anticipated Discharge Plan:  Met with patient. Provided education and contact information for Care Coordination services.: yes  Anticipated Discharge Disposition: home with home health  Type of Home Care Services: nursing, home PT, other (comments) (SW)    Transportation Needs (SDOH):  Transportation Concerns: none  Transportation Anticipated: family or friend will provide  Is Out of Hospital DNR needed at discharge?: no    In the past 12 months, has lack of transportation kept you from medical appointments or from getting medications?: No  In the past 12 months, has lack of transportation kept you from meetings, work, or from getting things needed for daily living?: No    Concerns - comments: [facility]CS met w/ pt to complete CMA, pt gave permission to discuss d/c planning w/ dtr who is also pt's healthcare agent, emergency contact and a NP- Maeve Walls.  CCS called and s/w her, advised her of possible d/c Friday pending testing done.  Discussed home care and both pt and dtr in agreement that this is needed.  Pt is a little deconditioned and just getting out bed today, per nursing he did okay walking and they will continue to keep him moving.  IMM reviewed w/ pt.  Currently pt does not need OP speech but they may see him again prior to d/c.  [facility] is following.    DCP:  Home w/ 2 sons and [facility] (referral made)  IMM reviewed w/ pt  Son or dtr to drive at d/c

## 2024-06-01 VITALS
RESPIRATION RATE: 16 BRPM | HEIGHT: 69 IN | BODY MASS INDEX: 26.9 KG/M2 | WEIGHT: 181.6 LBS | SYSTOLIC BLOOD PRESSURE: 113 MMHG | DIASTOLIC BLOOD PRESSURE: 50 MMHG | TEMPERATURE: 98.1 F | HEART RATE: 87 BPM | OXYGEN SATURATION: 96 %

## 2024-06-01 PROCEDURE — 99238 HOSP IP/OBS DSCHRG MGMT 30/<: CPT | Performed by: HOSPITALIST

## 2024-06-01 PROCEDURE — 63700000 HC SELF-ADMINISTRABLE DRUG: Performed by: NURSE PRACTITIONER

## 2024-06-01 PROCEDURE — 63700000 HC SELF-ADMINISTRABLE DRUG

## 2024-06-01 PROCEDURE — 25000000 HC PHARMACY GENERAL: Performed by: NURSE PRACTITIONER

## 2024-06-01 RX ORDER — CEFUROXIME AXETIL 500 MG/1
500 TABLET ORAL 2 TIMES DAILY
Qty: 6 TABLET | Refills: 0 | Status: SHIPPED | OUTPATIENT
Start: 2024-06-01 | End: 2024-06-04

## 2024-06-01 RX ORDER — BENZONATATE 100 MG/1
100 CAPSULE ORAL 3 TIMES DAILY PRN
Status: DISCONTINUED | OUTPATIENT
Start: 2024-06-01 | End: 2024-06-01 | Stop reason: HOSPADM

## 2024-06-01 RX ADMIN — FLUTICASONE PROPIONATE 1 SPRAY: 50 SPRAY, METERED NASAL at 11:32

## 2024-06-01 RX ADMIN — PANTOPRAZOLE SODIUM 40 MG: 40 TABLET, DELAYED RELEASE ORAL at 09:50

## 2024-06-01 RX ADMIN — IPRATROPIUM BROMIDE AND ALBUTEROL SULFATE 3 ML: .5; 3 SOLUTION RESPIRATORY (INHALATION) at 09:49

## 2024-06-01 RX ADMIN — ASPIRIN 325 MG: 325 TABLET, COATED ORAL at 09:49

## 2024-06-01 RX ADMIN — BENZONATATE 100 MG: 100 CAPSULE ORAL at 11:31

## 2024-06-01 RX ADMIN — HYDRALAZINE HYDROCHLORIDE 25 MG: 25 TABLET ORAL at 05:38

## 2024-06-01 RX ADMIN — SACUBITRIL AND VALSARTAN 1 TABLET: 24; 26 TABLET, FILM COATED ORAL at 09:50

## 2024-06-01 RX ADMIN — CARVEDILOL 3.12 MG: 3.12 TABLET, FILM COATED ORAL at 09:49

## 2024-06-01 RX ADMIN — IPRATROPIUM BROMIDE AND ALBUTEROL SULFATE 3 ML: .5; 3 SOLUTION RESPIRATORY (INHALATION) at 13:47

## 2024-06-01 RX ADMIN — LEVOTHYROXINE SODIUM 25 MCG: 0.03 TABLET ORAL at 05:38

## 2024-06-01 NOTE — DISCHARGE SUMMARY
Internal Medicine  Inpatient Discharge Summary        BRIEF OVERVIEW   Admitting Provider: Symone Infante MD  Attending Provider: Velma Thakkar MD Attending phys phone: [phone]    PCP: Johnathan Leone [phone]    Admission Date: 5/29/2024  Discharge Date: 6/1/2024     DISCHARGE DIAGNOSES      Primary Discharge Diagnosis  Multifocal pneumonia    Secondary Discharge Diagnoses  Active Hospital Problems    Diagnosis Date Noted    Multifocal pneumonia 05/29/2024     Priority: High    Near syncope 11/10/2019     Priority: Medium    Chest pain 05/31/2024    CAD (coronary artery disease) 05/29/2024    Chronic HFrEF (heart failure with reduced ejection fraction) (CMS/HCC) 05/29/2024    Hyponatremia 05/29/2024    Normocytic anemia 11/01/2023    GERD (gastroesophageal reflux disease) 10/31/2023    Hypothyroidism 10/31/2023    Prediabetes 10/31/2023    Hypertension 07/14/2023    Aortic stenosis 05/12/2019    Hyperlipidemia 09/05/2018    Pulmonary sarcoidosis (CMS/HCC) 09/05/2018    Benign prostatic hyperplasia 10/25/2017      Resolved Hospital Problems   No resolved problems to display.       Active Problem List on Day of Discharge  Patient Active Problem List   Diagnosis    Orthostatic syncope    Benign prostatic hyperplasia    Pulmonary sarcoidosis (CMS/HCC)    Hyperlipidemia    Anemia    Streptococcal bacteremia    Syncope    Aortic stenosis    History of GI bleed    Near syncope    Hypertension    Bilateral popliteal artery aneurysm (CMS/HCC)    Prediabetes    Hypothyroidism    GERD (gastroesophageal reflux disease)    Thrombocytopenia (CMS/HCC)    Normocytic anemia    Multifocal pneumonia    CAD (coronary artery disease)    Chronic HFrEF (heart failure with reduced ejection fraction) (CMS/HCC)    Hyponatremia    Chest pain     SUMMARY OF HOSPITALIZATION      Presenting Problem/History of Present Illness  This is a 88 y.o. year-old male admitted on 5/29/2024 with Multifocal pneumonia  "[J18.9]  Chest pain, unspecified type [R07.9].    88 y.o. male with a past medical history of CAD s/p CABG, HFrEF, AS s/p AVR, HTN, HLD, GERD, UGIB, pulmonary sarcoidosis, BPH, among other conditions. Lives at home with his two sons. Patient presents to the ED today with chief complaint of chest pain. He is somewhat of a poor historian. Supposedly he began feeling unwell ~2-3 days ago, reporting that he did not have energy and that when he went to stand up he felt like he was off balance. He intermittently felt as if he may faint, but he did not fall or pass out. Then today he started getting this \"irritation\" in his chest which he said is hard to describe; reports that it is in the same location as discomfort would be if you had reflux, but states it feels different than reflux. He also started having a cough today. Because of the chest discomfort he came to the ED for evaluation. He reports he felt somewhat chilled yesterday so he took some tylenol, his temperature was 98.9F. This morning he also had some nausea & the dry heaves but he did not vomit. He denies any headache, vision changes, palpitations, shortness of breath, abdominal pain, dysuria, change in stool habits. Last BM was 2 days ago, but he admits he has not been eating very much as his appetite has been low due to his illness.      While in the ED patient was febrile w/ T max 100.6F. He was otherwise hypertensive yet stable, 94% on RA. Initial lab work was remarkable for sodium 135, glucose 118, hemoglobin 13.2. HS troponin negative x2. EKG showed NSR w/ bifascicular block, Qtc 468, no acute ischemic changes, similar to prior tracings. He was flu/covid/rsv negative. UA non infectious. CTA chest PE study was negative for PE but did show small right pleural effusion, multifocal GGO and consolidative opacities suspicious for pneumonia on a background of chronic fibrotic ILD with UIP pattern, several new or enlarged irregular appearing solid nodules which " may be inflammatory; radiology suggesting 3 month follow up CT. Despite fever patient was not meeting sepsis criteria on admission; WBC normal at 5.65, lactate normal at 1.6, blood cultures are pending. Patient was given IV ceftriaxone, metronidazole and azithromycin in the ED. He was bridged to [facility] for further evaluation & treatment.      Advanced care planning discussed with the patient who confirms CODE STATUS= FULL CODE. Michel Fang designates his daughter Maeve as primary contact & medical surrogate decision maker. Daughter Karen was updated at the bedside who will pass information along to Maeve.     Hospital Course  #Multifocal PNA  Likely viral in nature. Patient will complete course of Ceftin as outpatient for 3 additional days to cover for CAP, azithromycin course completed while admitted. Legionella antigen negative. Bcx NGTD @ 48 hrs. Vitals stable. Satting well on RA.    #Near syncope  No further occurrences while admitted. Reported lack of energy and unsteadiness prior to admission. Patient recently started on carvedilol and Entresto. Cards evaluated this admission, recommended for outpatient follow up with Dr. Isaacs.    #Chronic HFrEF  TTE 2023 showing EF 45%. Without clinical evidence to suggest acute exacerbation at time of admission. The patient complained of right jaw pain without any associated symptoms.  EKG done and it showed inverted T waves in lateral leads.  Troponin ordered and cardiology consulted -> likely poor lead positioning. Follow up as OP with Dr. Isaacs.    Exam on Day of Discharge  Physical Exam  Physical Exam GEN: well-developed and well-nourished; not in acute distress  HEENT: normocephalic; atraumatic  EYES: EOMI PERRLA conjunctiva clear no discharge  NECK: no JVD; no bruits  CARDIO: regular rate and rhythm; no murmurs or rubs  RESP: clear to auscultation bilaterally; no rales, rhonchi, or wheezes  ABD: soft, non-distended, non-tender, normal bowel sounds  EXT: no  cyanosis or edema  SKIN: No Rash exposed skin  MUSCULOSKELETAL: no injury or deformity  NEURO: alert and oriented x 3; nonfocal  BEHAVIOR/EMOTIONAL: appropriate; cooperative  LYMPH NODES: Right posterior  cervical lymphadenopathy    Consults During Admission  IP CONSULT TO CASE MANAGEMENT  IP CONSULT TO CARDIOLOGY    DISCHARGE MEDICATIONS      Medication List        START taking these medications      cefuroxime 500 mg tablet  Commonly known as: CEFTIN  Take 1 tablet (500 mg total) by mouth 2 (two) times a day for 3 days.  Dose: 500 mg            CONTINUE taking these medications      aspirin 325 mg EC tablet  Take 325 mg by mouth daily.  Dose: 325 mg     atorvastatin 20 mg tablet  Commonly known as: LIPITOR  Take 20 mg by mouth every evening.  Dose: 20 mg     carvediloL 3.125 mg tablet  Commonly known as: COREG  Take 3.125 mg by mouth 2 (two) times a day with meals.  Dose: 3.125 mg     fluticasone propionate 50 mcg/actuation nasal spray  Commonly known as: FLONASE  Administer 1 spray into each nostril daily.  Dose: 1 spray     hydrALAZINE 25 mg tablet  Commonly known as: APRESOLINE  Take 25 mg by mouth 2 (two) times a day.  Dose: 25 mg     levothyroxine 25 mcg tablet  Commonly known as: SYNTHROID  Take 25 mcg by mouth daily.  Dose: 25 mcg     pantoprazole 40 mg EC tablet  Commonly known as: PROTONIX  Take 40 mg by mouth daily.  Dose: 40 mg     PROSTATE CONTROL ORAL  Take 1 tablet by mouth once daily.  Dose: 1 tablet     sacubitriL-valsartan 24-26 mg per tablet  Commonly known as: ENTRESTO  Take 1 tablet by mouth 2 (two) times a day.  Dose: 1 tablet     tamsulosin 0.4 mg capsule  Commonly known as: FLOMAX  Take 0.4 mg by mouth nightly.  Dose: 0.4 mg     THERACAL ORAL  Take 1 tablet by mouth daily.  Dose: 1 tablet               New, changed, or stopped medications from this admission:       Medication List        START taking these medications      cefuroxime 500 mg tablet  Commonly known as: CEFTIN  Take 1 tablet  (500 mg total) by mouth 2 (two) times a day for 3 days.  Dose: 500 mg            CONTINUE taking these medications      aspirin 325 mg EC tablet  Take 325 mg by mouth daily.  Dose: 325 mg     atorvastatin 20 mg tablet  Commonly known as: LIPITOR  Take 20 mg by mouth every evening.  Dose: 20 mg     carvediloL 3.125 mg tablet  Commonly known as: COREG  Take 3.125 mg by mouth 2 (two) times a day with meals.  Dose: 3.125 mg     fluticasone propionate 50 mcg/actuation nasal spray  Commonly known as: FLONASE  Administer 1 spray into each nostril daily.  Dose: 1 spray     hydrALAZINE 25 mg tablet  Commonly known as: APRESOLINE  Take 25 mg by mouth 2 (two) times a day.  Dose: 25 mg     levothyroxine 25 mcg tablet  Commonly known as: SYNTHROID  Take 25 mcg by mouth daily.  Dose: 25 mcg     pantoprazole 40 mg EC tablet  Commonly known as: PROTONIX  Take 40 mg by mouth daily.  Dose: 40 mg     PROSTATE CONTROL ORAL  Take 1 tablet by mouth once daily.  Dose: 1 tablet     sacubitriL-valsartan 24-26 mg per tablet  Commonly known as: ENTRESTO  Take 1 tablet by mouth 2 (two) times a day.  Dose: 1 tablet     tamsulosin 0.4 mg capsule  Commonly known as: FLOMAX  Take 0.4 mg by mouth nightly.  Dose: 0.4 mg     THERACAL ORAL  Take 1 tablet by mouth daily.  Dose: 1 tablet              Non-Medication orders at discharge:   Instructions for after discharge       Follow-up with Provider:      Post-hospital follow up    Aline Isaacs,    [phone]    CARDIOLOGY [place] [address] [place] [address]       Follow-up with primary physician (PCP)      Post-hospital follow up  Will need follow up CT chest in 3 months to document resolution & to look at new nodules; patient & daughter Karen were notified of this.                      PROCEDURES / LABS / IMAGING      Operative Procedures  None    Other Procedures  None    Pertinent Labs  Lab Results   Component Value Date    WBC 6.13 05/30/2024    HGB 13.1 (L) 05/30/2024    HCT  40.4 05/30/2024    MCV 91.4 05/30/2024     05/30/2024     Lab Results   Component Value Date    GLUCOSE 104 (H) 05/30/2024    CALCIUM 8.7 05/30/2024     05/30/2024    K 4.5 05/30/2024    CO2 21 05/30/2024     05/30/2024    BUN 17 05/30/2024    CREATININE 1.1 05/30/2024         Pertinent Imaging  CT ANGIOGRAPHY CHEST PULMONARY EMBOLISM WITH IV CONTRAST    Result Date: 5/29/2024  IMPRESSION: No acute pulmonary embolism. Small right pleural effusion and multifocal groundglass and nodular consolidative airspace opacities suspicious for pneumonia on a background of chronic fibrotic ILD with UIP pattern. Follow-up CT after treatment recommended to ensure resolution; 3 months would be reasonable. Several new or enlarged irregular-appearing solid nodules which may be inflammatory but can be reassessed on follow-up CT as above. 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 Moderate hiatal hernia.      OUTPATIENT  FOLLOW-UP / REFERRALS / PENDING TESTS      Outpatient Follow-Up Appointments  Encounter Information    This patient does not currently have any appointments scheduled.         Referrals  No orders of the defined types were placed in this encounter.      Test Results Pending at Discharge  Pending Inpatient Labs       Order Current Status    [facility] Draw Panel In process    Blood Culture Blood, Venous Preliminary result    Blood Culture Blood, Venous Preliminary result            Important Issues to Address in Follow-Up  See above  DISCHARGE DISPOSITION      Disposition: Home     Code Status At Discharge: Full Code    Physician Order for Life-Sustaining Treatment Document Status        No documents found                     ATTENDING DOCUMENTATION  ALSO SEE ATTENDING ATTESTATION SECTION OF NOTE

## 2024-06-01 NOTE — PROGRESS NOTES
Internal Medicine  Daily Progress Note       SUBJECTIVE   This is a 88 y.o. year-old male admitted on 5/29/2024 with Multifocal pneumonia [J18.9]  Chest pain, unspecified type [R07.9].    Principal dx: Multifocal PNA, likely viral    Interval History: Patient with no acute events overnight. Patient feels well this morning. States his breathing is much improved. Denies any fevers, chills, chest pain. Endorses 4 hour episode of coughing last night that self-resolved.     OBJECTIVE   Vital signs in last 24 hours:  Temp:  [36.8 °C (98.2 °F)-37.1 °C (98.7 °F)] 36.8 °C (98.2 °F)  Heart Rate:  [56-95] 68  Resp:  [18] 18  BP: (110-135)/(58-72) 115/72  SpO2:  [95 %-97 %] 95 %  Oxygen Therapy: None (Room air)    Weight & I/Os:  Weights (last 5 days)       Date/Time Weight    06/01/24 0259 82.4 kg (181 lb 9.6 oz)    05/29/24 1024 81.6 kg (180 lb)            Intake/Output Summary (Last 24 hours) at 6/1/2024 0659  Last data filed at 5/31/2024 1830  24 Hour Net Input/Output from 7AM Yesterday   Intake --   Output 100 ml   Net -100 ml        PHYSICAL EXAMINATION   Physical Exam  Physical Exam GEN: well-developed and well-nourished; not in acute distress  HEENT: normocephalic; atraumatic  EYES: EOMI PERRLA conjunctiva clear no discharge  NECK: no JVD; no bruits  CARDIO: regular rate and rhythm; no murmurs or rubs  RESP: clear to auscultation bilaterally; no rales, rhonchi, or wheezes  ABD: soft, non-distended, non-tender, normal bowel sounds  EXT: no cyanosis or edema  SKIN: No Rash exposed skin  MUSCULOSKELETAL: no injury or deformity  NEURO: alert and oriented x 3; nonfocal  BEHAVIOR/EMOTIONAL: appropriate; cooperative  LYMPH NODES: Right posterior  cervical lymphadenopathy     LINES, CATHETERS, DRAINS, AIRWAYS, & WOUNDS   Lines, drains, airways, & wounds:  Peripheral IV (Adult) 05/31/24 Right;Posterior Wrist (Active)   Number of days: 1       Peripheral IV (Adult) 05/31/24 Anterior;Distal;Right;Upper Arm (Active)   Number of  days: 1        LABS, IMAGING, & TELE   Labs:  Reviewed    Results from last 7 days   Lab Units 05/30/24  0353 05/29/24  1027   WBC K/uL 6.13 5.45   HEMOGLOBIN g/dL 13.1* 13.2*   HEMATOCRIT % 40.4 40.2   PLATELETS K/uL 175 155       Results from last 7 days   Lab Units 05/30/24  0353 05/29/24  1258 05/29/24  1027   SODIUM mEQ/L 136  --  135*   POTASSIUM mEQ/L 4.5  --  3.9   CHLORIDE mEQ/L 104  --  104   CO2 mEQ/L 21  --  21   BUN mg/dL 17  --  21   CREATININE mg/dL 1.1  --  1.2   CALCIUM mg/dL 8.7  --  8.9   ALBUMIN g/dL  --  3.4*  --    BILIRUBIN TOTAL mg/dL  --  0.9  --    ALK PHOS IU/L  --  77  --    ALT IU/L  --  10  --    AST IU/L  --  15  --    GLUCOSE mg/dL 104*  --  118*     Imaging personally reviewed (does not include unread studies):  No results found.  Telemetry/EKG:  Reviewed     ASSESSMENT & PLAN   * Multifocal pneumonia  Assessment & Plan  -Presents with 3 days of lack of energy, chills, and chest discomfort (described to be in same location as heartburn but different). Admits to cough & nausea w/o vomiting x 1 day. HS troponin negative x2. EKG non ischemic. CT chest showing multifocal GGO and consolidative opacities suspicious for pneumonia on a background of chronic fibrotic ILD with UIP pattern, several new or enlarged irregular appearing solid nodules which may be inflammatory. WBC WNL, febrile in ED. Flu/covid/rsv negative.  -Possibly viral pneumonia, but with pulmonary comorbidities & fever will treat as bacterial PNA. Low clinical concern for aspiration PNA.  Patient is improving      -Would continue treatment for CAP w/ azithromycin & ceftriaxone  -SLP consulted to rule out aspiration/dysphagia as he dose report seldom coughing while taking pills  -Check sputum culture if patient expectorates  -legionella urine antigen negative  -DuoNebs ATC, BID mucinex  -Monitor CBC, fever curve  -Follow up blood cultures (NGTD @ 48 hrs)  -PT/OT for generalized weakness  -Will need follow up CT chest in 3  months to document resolution & to look at new nodules; patient & daughter Karen were notified of this.     Near syncope  Assessment & Plan  -Reports having lack of energy, unsteadiness when he stands up, and a feeling as if he was going to pass out  -Feeling better orthostatic vitals not reported yet  -Check orthostatic vital signs; if positive may need to adjust home cardiac meds (recently started on carvedilol & Entresto in addition to his hydralazine)  -Fall precaution bundle  -PT/OT evals    Hyponatremia  Assessment & Plan  -Mild w/ Na 135  -monitor bmp    Chronic HFrEF (heart failure with reduced ejection fraction) (CMS/[facility])  Assessment & Plan  -TTE 2023 showing EF 45%. Without clinical evidence to suggest acute exacerbation at time of admission  -Continue home carvedilol, Entresto, aspirin  -monitor daily weights, I/O  -OP follow up with cardiologist Dr. Isaacs   -The patient complained of right jaw pain without any associated symptoms.  EKG done and it showed inverted T waves in lateral leads.  Troponin ordered and cardiology consulted -> likely poor lead positioning. Discussed with RN and patient    CAD (coronary artery disease)  Assessment & Plan  -H/O CAD s/p CABG in 2000  -The patient complained of right jaw pain.  EKG done and it showed newly inverted T waves in lateral leads.  Troponin ordered and pending cardiology consulted for further input  -Continue ASA, statin beta blocker  -OP follow up with cardiologist Dr. Isaacs    Normocytic anemia  Assessment & Plan  -Chronic, hgb 13.2 which is close to baseline  -Monitor CBC    GERD (gastroesophageal reflux disease)  Assessment & Plan  -H/O GERD & UGIB  Asymptomatic  -moderate hiatal hernia noted on CT on admission  -Continue pantoprazole    Hypothyroidism  Assessment & Plan  -Continue levothyroxine    Hypertension  Assessment & Plan  -Continue carvedilol, hydralazine, Entresto  -Monitor BP   -Check orthostatic vitals    Aortic stenosis  Assessment &  Plan  -H/O AS s/p TAVR in 2016  -Cardiologist=Dr. Aline Isaacs    Hyperlipidemia  Assessment & Plan  -Continue statin    Pulmonary sarcoidosis (CMS/HCC)  Assessment & Plan  -CT notes background of fibrotic ILD w/ UIP pattern  -OP follow up    Benign prostatic hyperplasia  Assessment & Plan  -Continue tamsulosin  -Monitor voids         VTE Assessment: Padua VTE Score: 5  VTE Prophylaxis: Current anticoagulants:  enoxaparin (LOVENOX) syringe 40 mg, subcutaneous, Daily (6p)      Code Status: Full Code  Estimated discharge date:   6/2/2024     ATTENDING DOCUMENTATION  ALSO SEE ATTENDING ATTESTATION SECTION OF NOTE

## 2024-06-01 NOTE — NURSING NOTE
Pt discharge to home. IV d/c'd. Pt and daughter verbally acknowledged understanding of discharge instructions.

## 2024-06-01 NOTE — DISCHARGE INSTRUCTIONS
Dear Mr. Fang,    You came to the [facility] on 5/29 for evaluation of chest pain. You were admitted to the hospital for further management.     During your time in the hospital it was found that you had a pneumonia, suspected to likely be caused by a viral infection. You were treated with antibiotics to cover bacterial causes as well and will need to complete 3 more days of Ceftin to complete this course as outlined in the attached paperwork. Please see the important follow up below.    Please bring this discharge paperwork to all of your follow up appointments.    Please see your After Visit Summary for changes to your medications.     Follow Up  When you leave the hospital it is important that you follow up with the following providers:  - Your Primary Care provider, to discuss your recent hospitalization   - Dr. Isaacs, your cardiologist, for outpatient follow up     If you experience any of the following, please notify your doctor or come to the Emergency Room:  - Worsening of your symptoms     It was a pleasure treating you at [facility] and we wish you all the best!    Please follow-up with your family doctor for follow up CT scan chest in 3 months

## 2024-06-01 NOTE — PLAN OF CARE
Problem: Adult Inpatient Plan of Care  Goal: Plan of Care Review  Outcome: Progressing  Flowsheets (Taken 6/1/2024 8143)  Progress: improving  Outcome Evaluation: Pt  continues on IV Rocephin and nebs for PNA, lungs w/ rhonchi, occasionally productive cough.  Plan of Care Reviewed With: patient  Goal: Patient-Specific Goal (Individualized)  Outcome: Progressing  Goal: Absence of Hospital-Acquired Illness or Injury  Outcome: Progressing  Goal: Optimal Comfort and Wellbeing  Outcome: Progressing  Goal: Readiness for Transition of Care  Outcome: Progressing     Problem: Fall Injury Risk  Goal: Absence of Fall and Fall-Related Injury  Outcome: Progressing     Problem: Mobility Impairment  Goal: Optimal Mobility  Outcome: Progressing     Problem: Swallowing Impairment  Goal: Optimal Eating and Swallowing Without Aspiration  Outcome: Progressing     Problem: Self-Care Deficit  Goal: Improved Ability to Complete Activities of Daily Living  Outcome: Progressing   Plan of Care Review  Plan of Care Reviewed With: patient  Progress: improving  Outcome Evaluation: Pt  continues on IV Rocephin and nebs for PNA, lungs w/ rhonchi, occasionally productive cough.

## 2024-06-01 NOTE — PLAN OF CARE
Care Coordination Discharge Plan Summary    Admission Assessment Summary    General Information  Readmission Within the last 30 days: no previous admission in last 30 days  Does patient have a : No  Patient-Specific Goals (include timeframe): Home when medically stable    Living Arrangements  Arrived From: home  Current Living Arrangements:    People in Home:    Home Accessibility: ramp to enter home  Living Arrangement Comments: twin home, ramp to enter from back, 1st floor set up,    Social Determinants of Health - Screenings  Housing Stability  In the last 12 months, was there a time when you were not able to pay the mortgage or rent on time?: No  In the last 12 months, how many places have you lived?: 1  In the last 12 months, was there a time when you did not have a steady place to sleep or slept in a shelter (including now)?: No  Utility Access  In the past 12 months has the electric, gas, oil, or water company threatened to shut off services in your home?: No  Transportation Needs  In the past 12 months, has lack of transportation kept you from medical appointments or from getting medications?: No  In the past 12 months, has lack of transportation kept you from meetings, work, or from getting things needed for daily living?: No    Functional Status Prior to Admission  Assistive Device/Animal Currently Used at Home: grab bar, raised toilet, walker, front-wheeled, shower chair, wheelchair  Functional Status Comments: drives, independent, walks using Roller walker, per dtr is usually very active  IADL Comments: IADL's    Discharge Needs Assessment    Concerns to be Addressed: discharge planning  Current Discharge Risk:    Anticipated Changes Related to Illness: none    Discharge Plan Summary    Patient Choice  Offered/Gave Vendor List: yes  Patient's Choice of Community Agency(s): [facility]  Patient and/or patient guardian/advocate was made aware of their right to choose a provider. A list of  eligible providers was presented and reviewed with the patient and/or patient guardian/advocate in written and/or verbal form. The list delineates providers in the patient’s desired geographic area who are participating in the Medicare program and/or providers contracted with the patient’s primary insurance. The Medicare list and quality ratings were obtained from the Medicare.gov [medicare.gov] website.    Discharge Plan:  Disposition/Destination: Home  / Home       Connection to Community  Not applicable  Community Resources:      Discharge Transportation:  Is Out of Hospital DNR needed at Discharge: no  Does patient need discharge transport? No        Pt for d/c home today w/ [facility]. SW notified [facility] central intake of planned d/c today. They confirmed awareness of pt, and that they will reach out to start care. No further needs identified at this time.    [facility]  [facility]

## 2024-06-02 LAB
BACTERIA BLD CULT: NORMAL
BACTERIA BLD CULT: NORMAL

## 2024-06-18 ENCOUNTER — TRANSCRIBE ORDERS (OUTPATIENT)
Dept: SCHEDULING | Age: 89
End: 2024-06-18

## 2024-06-18 DIAGNOSIS — D86.0 SARCOIDOSIS OF LUNG (CMS/HCC): Primary | ICD-10-CM

## 2024-08-23 ENCOUNTER — HOSPITAL ENCOUNTER (OUTPATIENT)
Dept: PULMONOLOGY | Facility: HOSPITAL | Age: 89
Discharge: HOME | End: 2024-08-23
Attending: INTERNAL MEDICINE
Payer: MEDICARE

## 2024-08-23 PROCEDURE — 94726 PLETHYSMOGRAPHY LUNG VOLUMES: CPT

## 2024-08-23 PROCEDURE — 94729 DIFFUSING CAPACITY: CPT

## 2024-08-23 PROCEDURE — 63700000 HC SELF-ADMINISTRABLE DRUG

## 2024-08-23 PROCEDURE — 94060 EVALUATION OF WHEEZING: CPT

## 2024-08-23 RX ORDER — ALBUTEROL SULFATE 90 UG/1
4 INHALANT RESPIRATORY (INHALATION) ONCE
Status: COMPLETED | OUTPATIENT
Start: 2024-08-23 | End: 2024-08-23

## 2024-08-23 RX ADMIN — ALBUTEROL SULFATE 4 PUFF: 90 AEROSOL, METERED RESPIRATORY (INHALATION) at 13:36

## 2025-01-08 ENCOUNTER — HOSPITAL ENCOUNTER (INPATIENT)
Facility: HOSPITAL | Age: 89
LOS: 1 days | Discharge: HOME | DRG: 312 | End: 2025-01-10
Attending: EMERGENCY MEDICINE | Admitting: HOSPITALIST
Payer: MEDICARE

## 2025-01-08 DIAGNOSIS — R55 SYNCOPE AND COLLAPSE: ICD-10-CM

## 2025-01-08 DIAGNOSIS — R55 SYNCOPE, UNSPECIFIED SYNCOPE TYPE: Primary | ICD-10-CM

## 2025-01-08 PROBLEM — I50.22 CHRONIC HFREF (HEART FAILURE WITH REDUCED EJECTION FRACTION) (CMS/HCC): Chronic | Status: ACTIVE | Noted: 2024-05-29

## 2025-01-08 PROBLEM — D86.0 PULMONARY SARCOIDOSIS (CMS/HCC): Chronic | Status: ACTIVE | Noted: 2018-09-05

## 2025-01-08 PROBLEM — K21.9 GERD (GASTROESOPHAGEAL REFLUX DISEASE): Chronic | Status: ACTIVE | Noted: 2023-10-31

## 2025-01-08 PROBLEM — E78.5 HYPERLIPIDEMIA: Chronic | Status: ACTIVE | Noted: 2018-09-05

## 2025-01-08 PROBLEM — I10 HYPERTENSION: Chronic | Status: ACTIVE | Noted: 2023-07-14

## 2025-01-08 LAB
ALBUMIN SERPL-MCNC: 3.8 G/DL (ref 3.5–5.7)
ALP SERPL-CCNC: 70 IU/L (ref 34–125)
ALT SERPL-CCNC: 12 IU/L (ref 7–52)
ANION GAP SERPL CALC-SCNC: 7 MEQ/L (ref 3–15)
AST SERPL-CCNC: 16 IU/L (ref 13–39)
BASOPHILS # BLD: 0.09 K/UL (ref 0.01–0.1)
BASOPHILS NFR BLD: 1.6 %
BILIRUB SERPL-MCNC: 0.6 MG/DL (ref 0.3–1.2)
BUN SERPL-MCNC: 28 MG/DL (ref 7–25)
CALCIUM SERPL-MCNC: 8.6 MG/DL (ref 8.6–10.3)
CHLORIDE SERPL-SCNC: 105 MEQ/L (ref 98–107)
CO2 SERPL-SCNC: 26 MEQ/L (ref 21–31)
CREAT SERPL-MCNC: 1.3 MG/DL (ref 0.7–1.3)
DIFFERENTIAL METHOD BLD: ABNORMAL
EGFRCR SERPLBLD CKD-EPI 2021: 52.8 ML/MIN/1.73M*2
EOSINOPHIL # BLD: 0.17 K/UL (ref 0.04–0.54)
EOSINOPHIL NFR BLD: 2.9 %
ERYTHROCYTE [DISTWIDTH] IN BLOOD BY AUTOMATED COUNT: 13.5 % (ref 11.6–14.4)
GLUCOSE SERPL-MCNC: 122 MG/DL (ref 70–99)
HCT VFR BLD AUTO: 39 % (ref 40.1–51)
HGB BLD-MCNC: 12.9 G/DL (ref 13.7–17.5)
IMM GRANULOCYTES # BLD AUTO: 0.01 K/UL (ref 0–0.08)
IMM GRANULOCYTES NFR BLD AUTO: 0.2 %
LYMPHOCYTES # BLD: 1.35 K/UL (ref 1.2–3.5)
LYMPHOCYTES NFR BLD: 23.3 %
MCH RBC QN AUTO: 31.4 PG (ref 28–33.2)
MCHC RBC AUTO-ENTMCNC: 33.1 G/DL (ref 32.2–36.5)
MCV RBC AUTO: 94.9 FL (ref 83–98)
MONOCYTES # BLD: 0.72 K/UL (ref 0.3–1)
MONOCYTES NFR BLD: 12.4 %
NEUTROPHILS # BLD: 3.46 K/UL (ref 1.7–7)
NEUTS SEG NFR BLD: 59.6 %
NRBC BLD-RTO: 0 %
PLATELET # BLD AUTO: 136 K/UL (ref 150–350)
PMV BLD AUTO: 10.4 FL (ref 9.4–12.4)
POTASSIUM SERPL-SCNC: 4.7 MEQ/L (ref 3.5–5.1)
PROT SERPL-MCNC: 6.9 G/DL (ref 6–8.2)
RBC # BLD AUTO: 4.11 M/UL (ref 4.5–5.8)
SODIUM SERPL-SCNC: 138 MEQ/L (ref 136–145)
TROPONIN I SERPL HS-MCNC: 4.3 PG/ML
TROPONIN I SERPL HS-MCNC: 7.9 PG/ML
WBC # BLD AUTO: 5.8 K/UL (ref 3.8–10.5)

## 2025-01-08 PROCEDURE — 63700000 HC SELF-ADMINISTRABLE DRUG: Performed by: HOSPITALIST

## 2025-01-08 PROCEDURE — 85025 COMPLETE CBC W/AUTO DIFF WBC: CPT

## 2025-01-08 PROCEDURE — 80053 COMPREHEN METABOLIC PANEL: CPT | Performed by: EMERGENCY MEDICINE

## 2025-01-08 PROCEDURE — 84484 ASSAY OF TROPONIN QUANT: CPT | Mod: 91 | Performed by: EMERGENCY MEDICINE

## 2025-01-08 PROCEDURE — 36415 COLL VENOUS BLD VENIPUNCTURE: CPT

## 2025-01-08 PROCEDURE — G0378 HOSPITAL OBSERVATION PER HR: HCPCS

## 2025-01-08 PROCEDURE — 85025 COMPLETE CBC W/AUTO DIFF WBC: CPT | Performed by: EMERGENCY MEDICINE

## 2025-01-08 PROCEDURE — 25800000 HC PHARMACY IV SOLUTIONS

## 2025-01-08 PROCEDURE — 93005 ELECTROCARDIOGRAM TRACING: CPT | Performed by: EMERGENCY MEDICINE

## 2025-01-08 PROCEDURE — 80053 COMPREHEN METABOLIC PANEL: CPT

## 2025-01-08 PROCEDURE — 99222 1ST HOSP IP/OBS MODERATE 55: CPT | Performed by: HOSPITALIST

## 2025-01-08 PROCEDURE — 99285 EMERGENCY DEPT VISIT HI MDM: CPT | Mod: 25

## 2025-01-08 PROCEDURE — 96360 HYDRATION IV INFUSION INIT: CPT

## 2025-01-08 PROCEDURE — 84484 ASSAY OF TROPONIN QUANT: CPT | Performed by: EMERGENCY MEDICINE

## 2025-01-08 PROCEDURE — 84484 ASSAY OF TROPONIN QUANT: CPT

## 2025-01-08 PROCEDURE — 93005 ELECTROCARDIOGRAM TRACING: CPT

## 2025-01-08 RX ORDER — FLUTICASONE PROPIONATE 50 MCG
1 SPRAY, SUSPENSION (ML) NASAL DAILY
Status: DISCONTINUED | OUTPATIENT
Start: 2025-01-08 | End: 2025-01-10 | Stop reason: HOSPADM

## 2025-01-08 RX ORDER — IBUPROFEN 200 MG
16-32 TABLET ORAL AS NEEDED
Status: DISCONTINUED | OUTPATIENT
Start: 2025-01-08 | End: 2025-01-10 | Stop reason: HOSPADM

## 2025-01-08 RX ORDER — ACETAMINOPHEN 325 MG/1
650 TABLET ORAL EVERY 6 HOURS PRN
Status: DISCONTINUED | OUTPATIENT
Start: 2025-01-08 | End: 2025-01-10 | Stop reason: HOSPADM

## 2025-01-08 RX ORDER — SACUBITRIL AND VALSARTAN 24; 26 MG/1; MG/1
1 TABLET, FILM COATED ORAL 2 TIMES DAILY
Status: DISCONTINUED | OUTPATIENT
Start: 2025-01-08 | End: 2025-01-10

## 2025-01-08 RX ORDER — POLYETHYLENE GLYCOL 3350 17 G/17G
17 POWDER, FOR SOLUTION ORAL DAILY
Status: DISCONTINUED | OUTPATIENT
Start: 2025-01-08 | End: 2025-01-08

## 2025-01-08 RX ORDER — ALBUTEROL SULFATE 0.83 MG/ML
2.5 SOLUTION RESPIRATORY (INHALATION) EVERY 4 HOURS PRN
Status: DISCONTINUED | OUTPATIENT
Start: 2025-01-08 | End: 2025-01-10 | Stop reason: HOSPADM

## 2025-01-08 RX ORDER — CARVEDILOL 3.12 MG/1
3.12 TABLET ORAL 2 TIMES DAILY WITH MEALS
Status: DISCONTINUED | OUTPATIENT
Start: 2025-01-08 | End: 2025-01-10

## 2025-01-08 RX ORDER — DEXTROSE 40 %
15-30 GEL (GRAM) ORAL AS NEEDED
Status: DISCONTINUED | OUTPATIENT
Start: 2025-01-08 | End: 2025-01-10 | Stop reason: HOSPADM

## 2025-01-08 RX ORDER — POTASSIUM CHLORIDE 20 MEQ/1
40 TABLET, EXTENDED RELEASE ORAL AS NEEDED
Status: DISCONTINUED | OUTPATIENT
Start: 2025-01-08 | End: 2025-01-10 | Stop reason: HOSPADM

## 2025-01-08 RX ORDER — POLYETHYLENE GLYCOL 3350 17 G/17G
17 POWDER, FOR SOLUTION ORAL DAILY PRN
Status: DISCONTINUED | OUTPATIENT
Start: 2025-01-08 | End: 2025-01-10 | Stop reason: HOSPADM

## 2025-01-08 RX ORDER — ASPIRIN 325 MG
325 TABLET, DELAYED RELEASE (ENTERIC COATED) ORAL ONCE
Status: DISCONTINUED | OUTPATIENT
Start: 2025-01-09 | End: 2025-01-08

## 2025-01-08 RX ORDER — PANTOPRAZOLE SODIUM 40 MG/1
40 TABLET, DELAYED RELEASE ORAL DAILY
Status: DISCONTINUED | OUTPATIENT
Start: 2025-01-08 | End: 2025-01-10 | Stop reason: HOSPADM

## 2025-01-08 RX ORDER — ATORVASTATIN CALCIUM 20 MG/1
20 TABLET, FILM COATED ORAL EVERY EVENING
Status: DISCONTINUED | OUTPATIENT
Start: 2025-01-08 | End: 2025-01-10 | Stop reason: HOSPADM

## 2025-01-08 RX ORDER — GUAIFENESIN 600 MG/1
1200 TABLET, EXTENDED RELEASE ORAL 2 TIMES DAILY
Status: DISCONTINUED | OUTPATIENT
Start: 2025-01-08 | End: 2025-01-08

## 2025-01-08 RX ORDER — HYDRALAZINE HYDROCHLORIDE 20 MG/ML
10 INJECTION INTRAMUSCULAR; INTRAVENOUS EVERY 4 HOURS PRN
Status: DISCONTINUED | OUTPATIENT
Start: 2025-01-08 | End: 2025-01-10 | Stop reason: HOSPADM

## 2025-01-08 RX ORDER — PROCHLORPERAZINE EDISYLATE 5 MG/ML
5 INJECTION INTRAMUSCULAR; INTRAVENOUS EVERY 6 HOURS PRN
Status: DISCONTINUED | OUTPATIENT
Start: 2025-01-08 | End: 2025-01-10 | Stop reason: HOSPADM

## 2025-01-08 RX ORDER — LEVOTHYROXINE SODIUM 25 UG/1
25 TABLET ORAL
Status: DISCONTINUED | OUTPATIENT
Start: 2025-01-08 | End: 2025-01-10 | Stop reason: HOSPADM

## 2025-01-08 RX ORDER — TAMSULOSIN HYDROCHLORIDE 0.4 MG/1
0.4 CAPSULE ORAL NIGHTLY
Status: DISCONTINUED | OUTPATIENT
Start: 2025-01-08 | End: 2025-01-10 | Stop reason: HOSPADM

## 2025-01-08 RX ORDER — ASPIRIN 325 MG
325 TABLET, DELAYED RELEASE (ENTERIC COATED) ORAL DAILY
Status: DISCONTINUED | OUTPATIENT
Start: 2025-01-09 | End: 2025-01-10 | Stop reason: HOSPADM

## 2025-01-08 RX ORDER — DEXTROSE 50 % IN WATER (D50W) INTRAVENOUS SYRINGE
25 AS NEEDED
Status: DISCONTINUED | OUTPATIENT
Start: 2025-01-08 | End: 2025-01-10 | Stop reason: HOSPADM

## 2025-01-08 RX ADMIN — ATORVASTATIN CALCIUM 20 MG: 20 TABLET, FILM COATED ORAL at 19:38

## 2025-01-08 RX ADMIN — TAMSULOSIN HYDROCHLORIDE 0.4 MG: 0.4 CAPSULE ORAL at 21:09

## 2025-01-08 RX ADMIN — SODIUM CHLORIDE 500 ML: 9 INJECTION, SOLUTION INTRAVENOUS at 13:55

## 2025-01-08 RX ADMIN — SACUBITRIL AND VALSARTAN 1 TABLET: 24; 26 TABLET, FILM COATED ORAL at 21:09

## 2025-01-08 RX ADMIN — PANTOPRAZOLE SODIUM 40 MG: 40 TABLET, DELAYED RELEASE ORAL at 19:38

## 2025-01-08 ASSESSMENT — COGNITIVE AND FUNCTIONAL STATUS - GENERAL
HELP NEEDED FOR BATHING: 4 - NONE
HELP NEEDED FOR PERSONAL GROOMING: 3 - A LITTLE
EATING MEALS: 4 - NONE
MOVING TO AND FROM BED TO CHAIR: 3 - A LITTLE
CLIMB 3 TO 5 STEPS WITH RAILING: 2 - A LOT
DRESSING REGULAR LOWER BODY CLOTHING: 4 - NONE
TOILETING: 3 - A LITTLE
DRESSING REGULAR UPPER BODY CLOTHING: 3 - A LITTLE
STANDING UP FROM CHAIR USING ARMS: 3 - A LITTLE
WALKING IN HOSPITAL ROOM: 3 - A LITTLE

## 2025-01-08 NOTE — ASSESSMENT & PLAN NOTE
Acute   BP soft and bradycardic on initial evaluation ED  Encourage oral hydration  Continue cardiac telemetry  Home CHF/hypertension regimen ordered with specific hold parameters to avoid further hypotension/bradycardia  Cardiology consulted  Orthostatic vital signs ordered

## 2025-01-08 NOTE — H&P
Hospital Medicine Service -  History & Physical        CHIEF COMPLAINT     Chief Complaint   Patient presents with    Syncope        HISTORY OF PRESENT ILLNESS      89 y.o. male with a past medical history of CHF, CAD  s/p CABG in 2000, AAS s/p TAVR in 2015, hypertension, hyperlipidemia, BPH, hypothyroidism, sarcoidosis presented to the emergency department after a syncopal episode during outpatient cardiac rehab.  Patient states he was doing all his exercises at cardiac rehab as usual when he began to feel lightheaded and then had a syncopal episode chair.  Patient denies any head trauma or LOC.  Patient denies any sweats, fevers, chills, recent illness, chest pain or shortness of breath, nausea, vomiting.    ED workup:  BP initially soft at 97/52 however now elevated at 150/65, persistently bradycardic between 50-56, no notable tachypnea or hypoxia, patient is afebrile.  Labs notable for creatinine 1.3 (baseline 1.1), BUN 28, GFR 52.8, no leukocytosis, H&H 12.9/39.0, platelets 136, troponins within normal limits.    Patient is seen regularly by his cardiologist is [facility] with an echo on 7/15/2024 which showed an EF of 53% with grade 1 diastolic dysfunction, mildly reduced systolic function, trace MR/TR, bioprosthetic aortic valve.    PAST MEDICAL AND SURGICAL HISTORY      Past Medical History:   Diagnosis Date    Arthritis     BPH (benign prostatic hyperplasia)     CHF (congestive heart failure) (CMS/[facility])     Coronary artery disease     Disease of thyroid gland     Heart disease     Hyperlipidemia     Hypertension     Sarcoidosis        Past Surgical History   Procedure Laterality Date    Adenoidectomy      Aortic valve replacement      Appendectomy      Cardiac surgery      Coronary artery bypass graft      Eye surgery Bilateral     cataracts     Popliteal venous aneurysm repair      Skin biopsy      Tonsillectomy      UPPER GASTROINTESTINAL ENDOSCOPY WITH BIOPSY N/A 9/7/2018    Performed by  Niles Jane MD at [facility] GI       MEDICATIONS      Prior to Admission medications    Medication Sig Start Date End Date Taking? Authorizing Provider   albuterol HFA 90 mcg/actuation inhaler INHALE 2 PUFFS BY MOUTH EVERY 4 HOURS AS NEEDED FOR WHEEZE OR FOR SHORTNESS OF BREATH 7/2/24   Balbina Jorgensen MD   amoxicillin (AMOXIL) 500 mg capsule 4 capsules Orally 1 hr prir to dental prcedure and and I think the first I's for 30 days    Balbina Jorgensen MD   aspirin 325 mg EC tablet Take 325 mg by mouth daily. 9/18/18   Art Jorgensen MD   atorvastatin (LIPITOR) 20 mg tablet Take 20 mg by mouth every evening.    Art Jorgensen MD   carvediloL (COREG) 3.125 mg tablet Take 3.125 mg by mouth 2 (two) times a day with meals.    Balbina Jorgensen MD   cranberry fruit extract (THERACRAN ORAL) Take by mouth.    Balbina Jorgensen MD   fluticasone propionate (FLONASE) 50 mcg/actuation nasal spray Administer 1 spray into each nostril daily.    Balbina Jorgensen MD   guaiFENesin (MUCINEX) 600 mg 12 hr tablet Take 1,200 mg by mouth 2 (two) times a day.    Balbina Jorgensen MD   levothyroxine (SYNTHROID) 25 mcg tablet Take 25 mcg by mouth daily.     Art Jorgensen MD   pantoprazole (PROTONIX) 40 mg EC tablet Take 40 mg by mouth daily.    Art Jorgensen MD   polyethylene glycol (MIRALAX) 17 gram packet Take 17 g by mouth daily.    Balbina Jorgensen MD   predniSONE 2 mg tablet,delayed release (DR/EC) Take by mouth.    Balbina Jorgensen MD   sacubitriL-valsartan (ENTRESTO) 24-26 mg per tablet Take 1 tablet by mouth 2 (two) times a day.    Balbina Jorgensen MD   saw/py/net/pumpk/beta/ly/Zn/Cu (PROSTATE CONTROL ORAL) Take 1 tablet by mouth once daily.    Balbina Jorgensen MD   tamsulosin (FLOMAX) 0.4 mg capsule Take 0.4 mg by mouth nightly.    Art Jorgensen MD       ALLERGIES      Amlodipine    FAMILY HISTORY      Family  History   Problem Relation Name Age of Onset    Ovarian cancer Biological Mother      Lymphoma Other grandson         age  17    Lymphoma Biological Son          age 20's       SOCIAL HISTORY      Social History     Socioeconomic History    Marital status:    Tobacco Use    Smoking status: Former     Current packs/day: 0.00     Types: Cigarettes     Quit date:      Years since quittin.0    Smokeless tobacco: Never   Substance and Sexual Activity    Alcohol use: No    Drug use: No    Sexual activity: Defer     Social Drivers of Health     Financial Resource Strain: Low Risk  (2023)    Overall Financial Resource Strain (CARDIA)     Difficulty of Paying Living Expenses: Not hard at all   Food Insecurity: No Food Insecurity (2025)    Hunger Vital Sign     Worried About Running Out of Food in the Last Year: Never true     Ran Out of Food in the Last Year: Never true   Transportation Needs: No Transportation Needs (2024)    PRAPARE - Transportation     Lack of Transportation (Medical): No     Lack of Transportation (Non-Medical): No   Housing Stability: Low Risk  (2024)    Housing Stability Vital Sign     Unable to Pay for Housing in the Last Year: No     Number of Places Lived in the Last Year: 1     Unstable Housing in the Last Year: No       REVIEW OF SYSTEMS        Review of Systems all negative excepted noted above    PHYSICAL EXAMINATION      Temp:  [36.2 °C (97.2 °F)] 36.2 °C (97.2 °F)  Heart Rate:  [50-56] 52  Resp:  [12-20] 12  BP: ()/(48-65) 150/65  There is no height or weight on file to calculate BMI.    General exam : appears age stated, well nourished, not in distress  HEENT: atraumatic, normocephalic, EOMI, no pallor, no icterus, no lesions, oropharynx pink, mucous membranes moist   Neck: supple,  no JVD   CVS : bradycardic, normal rhythm, +murmur   Resp:normal accessory muscle usage, clear to auscultation Bilaterally  Abdomen : soft, Nt, BS +, no organomegaly    Extremities : no edema, no cyanosis   Neuro: AAO x3, moving all four extremities   Psych: normal mood.cooperative      LABS / IMAGING / EKG        Labs  CBC Results         01/08/25 05/30/24 05/29/24     1324 0353 1027    WBC 5.80 6.13 5.45    RBC 4.11 4.42 4.41    HGB 12.9 13.1 13.2    HCT 39.0 40.4 40.2    MCV 94.9 91.4 91.2    MCH 31.4 29.6 29.9    MCHC 33.1 32.4 32.8     175 155           Comment for PLT at 1324 on 01/08/25: SPECIMEN QUALITY CHECKEDALL RESULTS HAVE BEEN RECHECKED          CMP Results         01/08/25 05/30/24 05/29/24     1324 0353 1258     136 --    K 4.7 4.5 --    Cl 105 104 --    CO2 26 21 --    Glucose 122 104 --    BUN 28 17 --    Creatinine 1.3 1.1 --    Calcium 8.6 8.7 --    Anion Gap 7 11 --    AST 16 -- 15    ALT 12 -- 10    Albumin 3.8 -- 3.4    EGFR 52.8 >60.0 --           Comment for K at 1324 on 01/08/25: Results obtained on plasma. Plasma Potassium values may be up to 0.4 mEQ/L less than serum values. The differences may be greater for patients with high platelet or white cell counts.    Comment for EGFR at 1324 on 01/08/25: Calculation based on the Chronic Kidney Disease Epidemiology Collaboration (CKD-EPI) equation refit without adjustment for race.    Comment for EGFR at 0353 on 05/30/24: Calculation based on the Chronic Kidney Disease Epidemiology Collaboration (CKD-EPI) equation refit without adjustment for race.            Troponin I Results         01/08/25 01/08/25 05/31/24     1558 1324 1128    HS Troponin I 4.3 7.9 9.7          Microbiology Results       ** No results found for the last 720 hours. **          UA Results         05/29/24     1258    Color Yellow    Clarity Clear    Glucose Negative    Bilirubin Negative    Ketones Negative    Sp Grav 1.020    Blood Negative    Ph 6.0    Protein Trace    Urobilinogen 2.0    Nitrite Negative    Leuk Est Negative    WBC 0 TO 3    RBC 0 TO 4    Bacteria None Seen           Comment for Blood at 1258 on 05/29/24:  The sensitivity of the occult blood test is equivalent to approximately 4 intact RBC/HPF.    Comment for Protein at 1258 on 05/29/24: Trace False Positive Protein can be seen with alkaline or highly buffered urines or urine with high specific gravity. Suggest clinical correlation.    Comment for Leuk Est at 1258 on 05/29/24: Results can be falsely negative due to high specific gravity, some antibiotics, glucose >3 g/dl, or WBC other than neutrophils.            Imaging  ECG 12 lead    (Results Pending)         ECG/Telemetry  Reviewed     ASSESSMENT AND PLAN           Assessment & Plan  Syncope and collapse  Acute   BP soft and bradycardic on initial evaluation ED  Encourage oral hydration  Continue cardiac telemetry  Home CHF/hypertension regimen ordered with specific hold parameters to avoid further hypotension/bradycardia  Cardiology consulted  Orthostatic vital signs ordered  Hypertension  Chronic  Continue home regimen with specific hold parameters to avoid hypotension/bradycardia   Chronic HFrEF (heart failure with reduced ejection fraction) (CMS/HCC)  Chronic  Continue home regimen with specific hold parameters to avoid hypotension/bradycardia   Pulmonary sarcoidosis (CMS/HCC)  Chronic  Continue home regimen  Hyperlipidemia  Chronic  Continue home regimen  GERD (gastroesophageal reflux disease)  Chronic  Continue home regimen  BPH (benign prostatic hyperplasia)  Chronic  Continue home regimen    VTE Assessment: Padua    VTE Prophylaxis Plan: SCDs (thrombocytopenia)   Code Status: Full Code       NEYDA Pillai  1/8/2025

## 2025-01-08 NOTE — ED ATTESTATION NOTE
I have personally seen and examined the patient.  I have performed the key components of the encounter and provided a substantive portion of the care and medical decision making for the patient.     I reviewed and agree with resident / physician assistant / nurse practitioner’s assessment and plan of care, with any exceptions as documented below.     My focused history, examination, assessment, and plan of care of  Michel Fang is as follows:       Vital Signs Review: Vital signs have been reviewed. The oxygen saturation is  SpO2: 99 % which is  normal.    The patient is an 89-year-old male with past medical history of CAD, hypertension, hyperlipidemia, CHF, who presented to the emergency department for evaluation of syncopal episode.  The patient had just finished cardiac rehab when he had an episode of syncope while seated in a chair.  The patient denies headache, neck pain, chest pain or dyspnea.  No nausea or vomiting.  No abdominal pain.  No lower extremity pain or rash.  No new medications.      Physical exam: Vital signs reviewed.  General: Patient appears comfortable lying in bed.  HEENT: NCAT, EOMI, MMM.  Neck: Supple, nontender.  Chest: Lungs clear.  Heart: Regular, bradycardic.  Heart murmur present.  Abdomen: Soft, nontender, no guarding.  Extremities: No cyanosis or clubbing.  +1 pitting bilateral leg edema.  Skin: Warm and dry, no rash.  Neuro: GCS 15.  Affect: Normal.      Plan: Check labs.  EKG, chest x-ray    Labs Reviewed   COMPREHENSIVE METABOLIC PANEL - Abnormal       Result Value    Sodium 138      Potassium 4.7      Chloride 105      CO2 26      BUN 28 (*)     Creatinine 1.3      Glucose 122 (*)     Calcium 8.6      AST (SGOT) 16      ALT (SGPT) 12      Alkaline Phosphatase 70      Total Protein 6.9      Albumin 3.8      Bilirubin, Total 0.6      eGFR 52.8 (*)     Anion Gap 7     CBC AND DIFF - Abnormal    WBC 5.80      RBC 4.11 (*)     Hemoglobin 12.9 (*)     Hematocrit 39.0 (*)     MCV 94.9       MCH 31.4      MCHC 33.1      RDW 13.5      Platelets 136 (*)     MPV 10.4      Differential Type Auto      nRBC 0.0      Immature Granulocytes 0.2      Neutrophils 59.6      Lymphocytes 23.3      Monocytes 12.4      Eosinophils 2.9      Basophils 1.6      Immature Granulocytes, Absolute 0.01      Neutrophils, Absolute 3.46      Lymphocytes, Absolute 1.35      Monocytes, Absolute 0.72      Eosinophils, Absolute 0.17      Basophils, Absolute 0.09     HIGH SENSITIVE TROPONIN I (BASELINE - REFLEX 2HR) - Normal    High Sens Troponin I 7.9     RAINBOW DRAW PANEL    Narrative:     The following orders were created for panel order [facility] Draw Panel.  Procedure                               Abnormality         Status                     ---------                               -----------         ------                     RAINBOW LT BLUE[[id number]]                                  In process                   Please view results for these tests on the individual orders.   HIGH SENSITIVE TROPONIN I (NO REFLEX)   POCT GLUCOSE   RAINBOW LT BLUE     ECG 12 lead    (Results Pending)           MDM: Patient test results reviewed.  Patient was noted to be hypotensive on initial arrival with bradycardia.  Doubt sepsis.  Doubt PE.  Syncopal episode post exertion.  Plan hospitalize patient.  Acute syncopal episode        Impression: Acute syncopal episode.  Acute hypotension     I was physically present for the key/critical portions of the following procedures: None    This document was created using Dragon dictation software.  There may be some typographical errors due to this technology.     Hector Zaman MD  01/08/25 5232

## 2025-01-08 NOTE — ED PROVIDER NOTES
Emergency Medicine Note  HPI   HISTORY OF PRESENT ILLNESS     HPI  Patient is an 89 yo M with PMH CHF (EF 53%), CAD s/p CABG in , HTN, HLD, sarcoidosis, presenting with syncope. Patient was in his usual state of health today, doing physical therapy. After finishing his session, he sat down and felt extremely lightheaded, sweaty, and passed out for seconds. He denies any chest pain or shortness of breath at the time. He denies any symptoms currently. He otherwise denies abd pain, n/v/d, urinary symptoms.      Patient History   PAST HISTORY     Reviewed from Nursing Triage:       Past Medical History:   Diagnosis Date    Arthritis     BPH (benign prostatic hyperplasia)     CHF (congestive heart failure) (CMS/HCC)     Coronary artery disease     Disease of thyroid gland     Heart disease     Hyperlipidemia     Hypertension     Sarcoidosis        Past Surgical History   Procedure Laterality Date    Adenoidectomy      Aortic valve replacement      Appendectomy      Cardiac surgery      Coronary artery bypass graft      Eye surgery Bilateral     cataracts     Popliteal venous aneurysm repair      Skin biopsy      Tonsillectomy      UPPER GASTROINTESTINAL ENDOSCOPY WITH BIOPSY N/A 2018    Performed by Niles Jane MD at [facility]       Family History   Problem Relation Name Age of Onset    Ovarian cancer Biological Mother      Lymphoma Other grandson         age  17    Lymphoma Biological Son          age 20's       Social History     Tobacco Use    Smoking status: Former     Current packs/day: 0.00     Types: Cigarettes     Quit date:      Years since quittin.0    Smokeless tobacco: Never   Substance Use Topics    Alcohol use: No    Drug use: No         Review of Systems   REVIEW OF SYSTEMS     Review of Systems      VITALS     ED Vitals      Date/Time Temp Pulse Resp BP SpO2 [facility]   25 1945 -- 60 24 165/69 98 %    25 1931 -- 59 16 177/74 96 %    25 1830 -- 57 14 188/74 99 %     01/08/25 1715 -- 52 12 150/65 98 % FR   01/08/25 1545 -- 52 20 150/61 99 % FR   01/08/25 1445 -- 56 13 129/57 97 % KAB   01/08/25 1430 -- 53 16 137/64 99 % KAB   01/08/25 1416 -- 53 17 141/59 97 % KAB   01/08/25 1331 -- 50 14 96/48 94 % KAB   01/08/25 1318 36.2 °C (97.2 °F) 52 18 97/52 99 % KAB                         Physical Exam   PHYSICAL EXAM     Physical Exam  Constitutional:       Appearance: He is not ill-appearing.   HENT:      Mouth/Throat:      Mouth: Mucous membranes are moist.   Cardiovascular:      Rate and Rhythm: Normal rate.      Pulses: Normal pulses.      Heart sounds: Normal heart sounds.   Pulmonary:      Effort: Pulmonary effort is normal.      Breath sounds: Normal breath sounds.   Abdominal:      General: Abdomen is flat. There is no distension.      Tenderness: There is no abdominal tenderness.   Musculoskeletal:      Right lower leg: No edema.      Left lower leg: No edema.   Neurological:      Mental Status: He is alert and oriented to person, place, and time.           PROCEDURES     Procedures     DATA     Results       Procedure Component Value Units Date/Time    CBC and differential [[id number]]  (Abnormal) Collected: 01/08/25 1324    Specimen: Blood, Venous Updated: 01/08/25 1405     WBC 5.80 K/uL      RBC 4.11 M/uL      Hemoglobin 12.9 g/dL      Hematocrit 39.0 %      MCV 94.9 fL      MCH 31.4 pg      MCHC 33.1 g/dL      RDW 13.5 %      Platelets 136 K/uL      Comment: SPECIMEN QUALITY CHECKEDALL RESULTS HAVE BEEN RECHECKED        MPV 10.4 fL      Differential Type Auto     nRBC 0.0 %      Immature Granulocytes 0.2 %      Neutrophils 59.6 %      Lymphocytes 23.3 %      Monocytes 12.4 %      Eosinophils 2.9 %      Basophils 1.6 %      Immature Granulocytes, Absolute 0.01 K/uL      Neutrophils, Absolute 3.46 K/uL      Lymphocytes, Absolute 1.35 K/uL      Monocytes, Absolute 0.72 K/uL      Eosinophils, Absolute 0.17 K/uL      Basophils, Absolute 0.09 K/uL     HS Troponin I (with 2 hour  reflex) [[id number]]  (Normal) Collected: 01/08/25 1324    Specimen: Blood, Venous Updated: 01/08/25 1403     High Sens Troponin I 7.9 pg/mL     Comprehensive metabolic panel [[id number]]  (Abnormal) Collected: 01/08/25 1324    Specimen: Blood, Venous Updated: 01/08/25 1356     Sodium 138 mEQ/L      Potassium 4.7 mEQ/L      Comment: Results obtained on plasma. Plasma Potassium values may be up to 0.4 mEQ/L less than serum values. The differences may be greater for patients with high platelet or white cell counts.        Chloride 105 mEQ/L      CO2 26 mEQ/L      BUN 28 mg/dL      Creatinine 1.3 mg/dL      Glucose 122 mg/dL      Calcium 8.6 mg/dL      AST (SGOT) 16 IU/L      ALT (SGPT) 12 IU/L      Alkaline Phosphatase 70 IU/L      Total Protein 6.9 g/dL      Comment: Test performed on plasma which typically contains approximately 0.4 g/dL more protein than serum.        Albumin 3.8 g/dL      Bilirubin, Total 0.6 mg/dL      eGFR 52.8 mL/min/1.73m*2      Comment: Calculation based on the Chronic Kidney Disease Epidemiology Collaboration (CKD-EPI) equation refit without adjustment for race.        Anion Gap 7 mEQ/L     [facility] Draw Panel [[id number]] Collected: 01/08/25 1326    Specimen: Blood, Venous Updated: 01/08/25 1339    Narrative:      The following orders were created for panel order [facility] Draw Panel.  Procedure                               Abnormality         Status                     ---------                               -----------         ------                     RAINBOW LT BLUE[[id number]]                                  In process                   Please view results for these tests on the individual orders.    [employer] LT BLUE [[id number]] Collected: 01/08/25 1326    Specimen: Blood, Venous Updated: 01/08/25 1339            Imaging Results    None         ECG 12 lead    (Results Pending)       Scoring tools                                  ED Course & MDM   MDM / ED COURSE / CLINICAL IMPRESSION /  DISPO     Medical Decision Making    Patient p/w syncope. Troponin nonischemic. EKG unchanged from prior, no ischemia. Anemia at baseline. No ABHIJEET or severe electrolyte abnormalities. Patient admitted for telemetry, cardiology consult, given his cardiovascular risk factors and relative hypotension on arrival.     Clinical Impression      Syncope, unspecified syncope type     _________________       ED Disposition   Admit / Observation                       Jet Villalobos MD  Resident  01/08/25 3051

## 2025-01-09 ENCOUNTER — APPOINTMENT (OUTPATIENT)
Dept: CARDIOLOGY | Facility: HOSPITAL | Age: 89
Setting detail: OBSERVATION
DRG: 312 | End: 2025-01-09
Attending: STUDENT IN AN ORGANIZED HEALTH CARE EDUCATION/TRAINING PROGRAM
Payer: MEDICARE

## 2025-01-09 PROBLEM — R55 SYNCOPE, UNSPECIFIED SYNCOPE TYPE: Status: ACTIVE | Noted: 2025-01-09

## 2025-01-09 LAB
ANION GAP SERPL CALC-SCNC: 6 MEQ/L (ref 3–15)
AORTIC VALVE MEAN VELOCITY: 1.34 M/S
AORTIC VALVE VELOCITY TIME INTEGRAL: 38 CM
ASCENDING AORTA: 3.5 CM
ATRIAL RATE: 54
AV MEAN GRADIENT: 8 MMHG
AV PEAK GRADIENT: 17 MMHG
AV PEAK VELOCITY-S: 2.08 M/S
AV VALVE AREA INDEX: 0.88
AV VALVE AREA: 1.47 CM2
AV VELOCITY RATIO: 0.41
AVA (VTI): 1.72 CM2
BSA FOR ECHO PROCEDURE: 1.95 M2
BUN SERPL-MCNC: 22 MG/DL (ref 7–25)
CALCIUM SERPL-MCNC: 8.5 MG/DL (ref 8.6–10.3)
CHLORIDE SERPL-SCNC: 105 MEQ/L (ref 98–107)
CO2 SERPL-SCNC: 25 MEQ/L (ref 21–31)
CREAT SERPL-MCNC: 1.2 MG/DL (ref 0.7–1.3)
DOP CALC LVOT STROKE VOLUME: 65.2 CM3
E WAVE DECELERATION TIME: 334 MS
E/A RATIO: 0.7
E/E' RATIO: 12.2
E/LAT E' RATIO: 4.8
EDV (BP): 123 CM3
EF (A4C): 51.5 %
EF A2C: 58 %
EGFRCR SERPLBLD CKD-EPI 2021: 58.2 ML/MIN/1.73M*2
EJECTION FRACTION: 55.5 %
ESV (BP): 54.7 CM3
FRACTIONAL SHORTENING: 30.34 %
GLUCOSE SERPL-MCNC: 155 MG/DL (ref 70–99)
INTERVENTRICULAR SEPTUM: 1.04 CM
LA ESV (BP): 49.3 CM3
LA ESV INDEX (A2C): 24.46 CM3/M2
LA ESV INDEX (BP): 25.28 CM3/M2
LAAS-AP2: 18 CM2
LAAS-AP4: 18.5 CM2
LAD 2D: 4.4 CM
LALD A4C: 5.42 CM
LALD A4C: 5.61 CM
LAV-S: 47.7 CM3
LEFT ATRIUM VOLUME INDEX: 25.38 CM3/M2
LEFT ATRIUM VOLUME: 49.5 CM3
LEFT INTERNAL DIMENSION IN SYSTOLE: 3.65 CM (ref 2.76–4.17)
LEFT VENTRICLE DIASTOLIC VOLUME INDEX: 57.44 CM3/M2
LEFT VENTRICLE DIASTOLIC VOLUME: 112 CM3
LEFT VENTRICLE SYSTOLIC VOLUME INDEX: 27.79 CM3/M2
LEFT VENTRICLE SYSTOLIC VOLUME: 54.2 CM3
LEFT VENTRICULAR INTERNAL DIMENSION IN DIASTOLE: 5.24 CM (ref 4.68–6.49)
LEFT VENTRICULAR POSTERIOR WALL IN END DIASTOLE: 1.2 CM (ref 0.61–1.14)
LV DIASTOLIC VOLUME: 132 CM3
LV ESV (APICAL 2 CHAMBER): 55.5 CM3
LVAD-AP2: 36.2 CM2
LVAD-AP4: 32.8 CM2
LVAS-AP2: 22.3 CM2
LVAS-AP4: 22.4 CM2
LVEDVI(A2C): 67.69 CM3/M2
LVEDVI(BP): 63.08 CM3/M2
LVESVI(A2C): 28.46 CM3/M2
LVESVI(BP): 28.05 CM3/M2
LVLD-AP2: 8.26 CM
LVLD-AP4: 8.03 CM
LVLS-AP2: 7.79 CM
LVLS-AP4: 7.69 CM
LVOT 2D: 2.3 CM
LVOT A: 4.15 CM2
LVOT MG: 2 MMHG
LVOT MV: 0.62 M/S
LVOT PEAK VELOCITY: 0.94 M/S
LVOT PG: 4 MMHG
LVOT STROKE VOLUME INDEX: 33.43 ML/M2
LVOT VTI: 15.7 CM
MLH CV ECHO AVA INDEX VELOCITY RATIO: 0.8
MV E'TISSUE VEL-LAT: 0.08 M/S
MV E'TISSUE VEL-MED: 0.03 M/S
MV PEAK A VEL: 0.59 M/S
MV PEAK E VEL: 0.41 M/S
MV STENOSIS PRESSURE HALF TIME: 98 MS
MV VALVE AREA P 1/2 METHOD: 2.24 CM2
P AXIS: 53
POSTERIOR WALL: 1.2 CM
POTASSIUM SERPL-SCNC: 4.5 MEQ/L (ref 3.5–5.1)
PR INTERVAL: 248
QRS DURATION: 142
QT INTERVAL: 466
QTC CALCULATION(BAZETT): 441
R AXIS: -58
SEPTAL TISSUE DOPPLER FREE WALL LATE DIA VELOCITY (APICAL 4 CHAMBER VIEW): 0.06 M/S
SODIUM SERPL-SCNC: 136 MEQ/L (ref 136–145)
T WAVE AXIS: 4
TAPSE: 1.37 CM
VENTRICULAR RATE: 54
Z-SCORE OF LEFT VENTRICULAR DIMENSION IN END DIASTOLE: -0.5
Z-SCORE OF LEFT VENTRICULAR DIMENSION IN END SYSTOLE: 0.58
Z-SCORE OF LEFT VENTRICULAR POSTERIOR WALL IN END DIASTOLE: 1.91

## 2025-01-09 PROCEDURE — 99233 SBSQ HOSP IP/OBS HIGH 50: CPT | Performed by: STUDENT IN AN ORGANIZED HEALTH CARE EDUCATION/TRAINING PROGRAM

## 2025-01-09 PROCEDURE — 25500000 HC DRUGS/INCIDENT RAD: Mod: JZ | Performed by: STUDENT IN AN ORGANIZED HEALTH CARE EDUCATION/TRAINING PROGRAM

## 2025-01-09 PROCEDURE — 63700000 HC SELF-ADMINISTRABLE DRUG: Performed by: PHYSICIAN ASSISTANT

## 2025-01-09 PROCEDURE — 25000000 HC PHARMACY GENERAL: Performed by: STUDENT IN AN ORGANIZED HEALTH CARE EDUCATION/TRAINING PROGRAM

## 2025-01-09 PROCEDURE — 36415 COLL VENOUS BLD VENIPUNCTURE: CPT | Performed by: STUDENT IN AN ORGANIZED HEALTH CARE EDUCATION/TRAINING PROGRAM

## 2025-01-09 PROCEDURE — C8929 TTE W OR WO FOL WCON,DOPPLER: HCPCS

## 2025-01-09 PROCEDURE — 82310 ASSAY OF CALCIUM: CPT | Performed by: STUDENT IN AN ORGANIZED HEALTH CARE EDUCATION/TRAINING PROGRAM

## 2025-01-09 PROCEDURE — 99223 1ST HOSP IP/OBS HIGH 75: CPT | Performed by: STUDENT IN AN ORGANIZED HEALTH CARE EDUCATION/TRAINING PROGRAM

## 2025-01-09 PROCEDURE — 93306 TTE W/DOPPLER COMPLETE: CPT | Mod: 26 | Performed by: STUDENT IN AN ORGANIZED HEALTH CARE EDUCATION/TRAINING PROGRAM

## 2025-01-09 PROCEDURE — G0378 HOSPITAL OBSERVATION PER HR: HCPCS

## 2025-01-09 PROCEDURE — 63700000 HC SELF-ADMINISTRABLE DRUG

## 2025-01-09 PROCEDURE — 63700000 HC SELF-ADMINISTRABLE DRUG: Performed by: HOSPITALIST

## 2025-01-09 PROCEDURE — 63600000 HC DRUGS/DETAIL CODE: Mod: JZ | Performed by: STUDENT IN AN ORGANIZED HEALTH CARE EDUCATION/TRAINING PROGRAM

## 2025-01-09 PROCEDURE — 21400000 HC ROOM AND CARE CCU/INTERMEDIATE

## 2025-01-09 RX ORDER — ENOXAPARIN SODIUM 100 MG/ML
40 INJECTION SUBCUTANEOUS
Status: DISCONTINUED | OUTPATIENT
Start: 2025-01-09 | End: 2025-01-10 | Stop reason: HOSPADM

## 2025-01-09 RX ORDER — ALUMINUM HYDROXIDE, MAGNESIUM HYDROXIDE, AND SIMETHICONE 1200; 120; 1200 MG/30ML; MG/30ML; MG/30ML
30 SUSPENSION ORAL ONCE
Status: COMPLETED | OUTPATIENT
Start: 2025-01-09 | End: 2025-01-09

## 2025-01-09 RX ADMIN — ALUMINUM HYDROXIDE, MAGNESIUM HYDROXIDE, AND DIMETHICONE 30 ML: 200; 20; 200 SUSPENSION ORAL at 01:23

## 2025-01-09 RX ADMIN — ENOXAPARIN SODIUM 40 MG: 40 INJECTION SUBCUTANEOUS at 19:58

## 2025-01-09 RX ADMIN — TAMSULOSIN HYDROCHLORIDE 0.4 MG: 0.4 CAPSULE ORAL at 22:11

## 2025-01-09 RX ADMIN — POLYETHYLENE GLYCOL 3350 17 G: 17 POWDER, FOR SOLUTION ORAL at 22:12

## 2025-01-09 RX ADMIN — SODIUM CHLORIDE: 9 INJECTION INTRAMUSCULAR; INTRAVENOUS; SUBCUTANEOUS at 15:36

## 2025-01-09 RX ADMIN — FLUTICASONE PROPIONATE 1 SPRAY: 50 SPRAY, METERED NASAL at 08:20

## 2025-01-09 RX ADMIN — ATORVASTATIN CALCIUM 20 MG: 20 TABLET, FILM COATED ORAL at 18:13

## 2025-01-09 RX ADMIN — ASPIRIN 325 MG: 325 TABLET, COATED ORAL at 08:20

## 2025-01-09 RX ADMIN — PANTOPRAZOLE SODIUM 40 MG: 40 TABLET, DELAYED RELEASE ORAL at 08:20

## 2025-01-09 ASSESSMENT — ENCOUNTER SYMPTOMS
SYNCOPE: 1
HEMATURIA: 0
SPUTUM PRODUCTION: 0
WEAKNESS: 0
HEMATOCHEZIA: 0
VERTIGO: 0
DIAPHORESIS: 0
DYSURIA: 0
COLOR CHANGE: 0
NEAR-SYNCOPE: 0
PALPITATIONS: 0
BLURRED VISION: 0
HOARSE VOICE: 0
IRREGULAR HEARTBEAT: 0
NAIL CHANGES: 0
NERVOUS/ANXIOUS: 0
HEMOPTYSIS: 0
CLAUDICATION: 0
LIGHT-HEADEDNESS: 0
DYSPNEA ON EXERTION: 0
SHORTNESS OF BREATH: 0
ALTERED MENTAL STATUS: 0
INSOMNIA: 0
MYALGIAS: 0
PND: 0
FEVER: 0
JAUNDICE: 0
ORTHOPNEA: 0
WHEEZING: 0
HEMATEMESIS: 0
ABDOMINAL PAIN: 0

## 2025-01-09 ASSESSMENT — COGNITIVE AND FUNCTIONAL STATUS - GENERAL
CLIMB 3 TO 5 STEPS WITH RAILING: 3 - A LITTLE
CLIMB 3 TO 5 STEPS WITH RAILING: 3 - A LITTLE
STANDING UP FROM CHAIR USING ARMS: 3 - A LITTLE
MOVING TO AND FROM BED TO CHAIR: 3 - A LITTLE
MOVING TO AND FROM BED TO CHAIR: 3 - A LITTLE
WALKING IN HOSPITAL ROOM: 3 - A LITTLE
STANDING UP FROM CHAIR USING ARMS: 3 - A LITTLE
WALKING IN HOSPITAL ROOM: 3 - A LITTLE

## 2025-01-09 NOTE — PROGRESS NOTES
[facility]     Daily Progress Note                SUBJECTIVE   Interval History: Patient seen and examined at bedside.  Vital signs and lab work reviewed.  Patient does feel better today.  Denies lightheadedness    When evaluated patient was orthostatic & some episodes of bradycardia per nursing.  Entresto on hold.      Medical update provided to patient's daughter over the phone.  She reports that patient already has outpatient appointment with primary cardiologist       OBJECTIVE      Vital signs in last 24 hours:  Temp:  [36.4 °C (97.5 °F)-36.8 °C (98.3 °F)] 36.6 °C (97.8 °F)  Heart Rate:  [55-70] 55  Resp:  [14-24] 18  BP: (107-188)/(53-98) 107/53    Intake/Output Summary (Last 24 hours) at 1/9/2025 1800  Last data filed at 1/9/2025 1300  Gross per 24 hour   Intake 720 ml   Output 700 ml   Net 20 ml         PHYSICAL EXAMINATION      Physical Exam      General exam : appears age stated, well nourished, not in distress  HEENT: atraumatic, normocephalic, EOMI, no pallor, no icterus, no lesions, oropharynx pink, mucous membranes moist   Neck: supple,  no JVD   CVS : bradycardic, normal rhythm, +murmur   Resp:normal accessory muscle usage, clear to auscultation Bilaterally  Abdomen : soft, Nt, BS +, no organomegaly   Extremities : no edema, no cyanosis   Neuro: AAO x3, moving all four extremities , no focal deficit  Psych: normal mood.cooperative      LINES, CATHETERS, DRAINS, AIRWAYS, AND WOUNDS   Lines, Drains, and Airways:  Wounds (agree with documentation and present on admission):  Peripheral IV (Adult) 01/08/25 Left;Posterior Hand (Active)   Number of days: 1       Peripheral IV (Adult) 01/08/25 Anterior;Proximal;Right Forearm (Active)   Number of days: 1         Comments:    LABS / IMAGING / TELE      Labs  Lab Results   Component Value Date    WBC 5.80 01/08/2025    HGB 12.9 (L) 01/08/2025    HCT 39.0 (L) 01/08/2025    MCV 94.9 01/08/2025     (L) 01/08/2025      Lab Results   Component Value  Date    GLUCOSE 155 (H) 01/09/2025    CALCIUM 8.5 (L) 01/09/2025     01/09/2025    K 4.5 01/09/2025    CO2 25 01/09/2025     01/09/2025    BUN 22 01/09/2025    CREATININE 1.2 01/09/2025      Lab Results   Component Value Date    ALT 12 01/08/2025    AST 16 01/08/2025    ALKPHOS 70 01/08/2025    BILITOT 0.6 01/08/2025          Imaging  ECHO pending    ECG/Telemetry  Reviewed     ASSESSMENT AND PLAN        Assessment & Plan  Syncope and collapse  Acute   Patient already on aspirin 325 mg daily  BP soft and bradycardic on initial evaluation ED  Encourage oral hydration  Continue cardiac telemetry  Entresto on hold  Continue to monitor for orthostatic hypotension  ECHO today  Although  [facility] ordered patient unable to receive due to low heart rate  Cardiology consulted  Orthostatic vital signs ordered  Continue telemetry monitor   Outpatient Holter monitor  Hypertension  Chronic  Continue home regimen with specific hold parameters to avoid hypotension/bradycardia   Chronic HFrEF (heart failure with reduced ejection fraction) (CMS/HCC)  Chronic  Continue home regimen with specific hold parameters to avoid hypotension/bradycardia   Pulmonary sarcoidosis (CMS/HCC)  Chronic  Continue home regimen  Hyperlipidemia  Chronic  Continue home regimen  GERD (gastroesophageal reflux disease)  Chronic  Continue home regimen  BPH (benign prostatic hyperplasia)  Chronic  Continue home regimen  Syncope, unspecified syncope type    Orthostatic hypotension  Contributing to the event  ?  May need midodrine    VTE Assessment: Padua    VTE Prophylaxis:  Current anticoagulants:  enoxaparin (LOVENOX) syringe 40 mg, subcutaneous, Daily (6p)      Code Status: Full Code      Estimated Discharge Date: 1/10/2025   Disposition Planning: Continue monitoring for bradycardia and orthostatic hypotension          Tomasa Parker MD  1/9/2025

## 2025-01-09 NOTE — ASSESSMENT & PLAN NOTE
-Syncopal episode while sitting down after 1 hour of physical therapy. Denies pre-syncopal symptoms, palpitations, nausea, heat intolerance, bowel/bladder incontinence.   -No history of prior syncope.   -No arrhythmias, blocks, pauses on telemetry.    Plan:   -TTE ordered.   -Patient was hypotensive on arrival. Orthostatic VS positive. Will continue to hold entresto today and resume at lower dose tomorrow as tolerated.    -Recommend outpatient zio/holter. Will follow up with his [place] cardiologist.

## 2025-01-09 NOTE — ASSESSMENT & PLAN NOTE
Acute   Patient already on aspirin 325 mg daily  BP soft and bradycardic on initial evaluation ED  Encourage oral hydration  Continue cardiac telemetry  Entresto on hold  Continue to monitor for orthostatic hypotension  ECHO today  Although  Coreg ordered patient unable to receive due to low heart rate  Cardiology consulted  Orthostatic vital signs ordered  Continue telemetry monitor   Outpatient Holter monitor

## 2025-01-09 NOTE — CONSULTS
Cardiology Consult Note    Reason for consult:    Primary cardiologist:    Cliff     Michel Fang is a 89 y.o. male with a past medical history of CAD s/p CABG in 2000, aortic stenosis s/p TAVR in 2016, HFrEF with recovered EF 50-55%, PVD, HTN, HLD, pulmonary sarcoidosis, BPH.     The patient presented to the ED after a syncopal episode on 1/8/25. He was in his usual state of health in the morning and ate breakfast. He went to PT and completed 1 hour of therapy, sat down in chair, and lost consciousness while sitting. He reports feeling off immediately before but can not articulate symptoms. Denies chest pain, palpitations, dizziness, nausea, heat intolerance, diaphoresis, bladder/bowel incontinence. No prior history of syncope. Lab evaluation unremarkable. HS trop negative x 2. EKG non-ischemic. No arrhythmias, blocks, pauses on telemetry. On arrival, he was mildly hypotensive with BP 97/52. Entresto has been held.This morning, he reports feeling well. Orthostatic VS positive, 131/60 supine to 107/52 standing although he is asymptomatic.      Medical History:   Past Medical History:   Diagnosis Date    Arthritis     BPH (benign prostatic hyperplasia)     CHF (congestive heart failure) (CMS/[facility])     Coronary artery disease     Disease of thyroid gland     Heart disease     Hyperlipidemia     Hypertension     Sarcoidosis        Surgical History:   Past Surgical History   Procedure Laterality Date    Adenoidectomy      Aortic valve replacement      Appendectomy      Cardiac surgery      Coronary artery bypass graft      Eye surgery Bilateral     cataracts     Popliteal venous aneurysm repair      Skin biopsy      Tonsillectomy      UPPER GASTROINTESTINAL ENDOSCOPY WITH BIOPSY N/A 9/7/2018    Performed by Niles Jane MD at [facility]       Social History:   Social History     Social History Narrative    Not on file      Social History     Tobacco Use   Smoking Status Former    Current packs/day: 0.00    Types:  Cigarettes    Quit date:     Years since quittin.0   Smokeless Tobacco Never       Family History:   Family History   Problem Relation Name Age of Onset    Ovarian cancer Biological Mother      Lymphoma Other grandson         age  17    Lymphoma Biological Son          age 20's       Allergies:   Allergies   Allergen Reactions    Amlodipine Tinnitus         Current Facility-Administered Medications   Medication Dose Route Frequency Provider Last Rate Last Admin    acetaminophen (TYLENOL) tablet 650 mg  650 mg oral q6h PRN Omar Coronado MD        albuterol nebulizer solution 2.5 mg  2.5 mg nebulization q4h PRN Meliza De Jesus PA C        aspirin EC tablet 325 mg  325 mg oral Daily Meliza De Jesus PA C   325 mg at 25 08    atorvastatin (LIPITOR) tablet 20 mg  20 mg oral q PM Omar Coronado MD   20 mg at 25 1938    carvediloL (COREG) tablet 3.125 mg  3.125 mg oral BID with meals Omar Coronado MD        glucose chewable tablet 16-32 g of dextrose  16-32 g of dextrose oral PRN Meliza De Jesus PA C        Or    dextrose 40 % oral gel 15-30 g of dextrose  15-30 g of dextrose oral PRN Meliza De Jesus PA C        Or    glucagon (GLUCAGEN) injection 1 mg  1 mg intramuscular PRN Meliza De Jesus PA C        Or    dextrose 50 % in water (D50) injection 12.5 g  25 mL intravenous PRN Meliza De Jesus PA C        fluticasone propionate (FLONASE) 50 mcg/actuation nasal spray 1 spray  1 spray Each Nostril Daily Omar Coronado MD   1 spray at 25    hydrALAZINE (APRESOLINE) injection 10 mg  10 mg intravenous q4h PRN Omar Coronado MD        levothyroxine (SYNTHROID) tablet 25 mcg  25 mcg oral Daily (6:30a) Omar Coronado MD        magnesium sulfate IVPB 2g in 50 mL NSS/D5W/SWFI  2 g intravenous PRN Omar Coronado MD        pantoprazole (PROTONIX) tablet,delayed release (DR/EC) 40 mg  40 mg oral Daily Omar Coronado MD   40 mg at 25 0820    polyethylene glycol (MIRALAX) 17 gram  "packet 17 g  17 g oral Daily PRN Omar Coronado MD        potassium chloride (KLOR-CON M) ER tablet (particles/crystals) 40 mEq  40 mEq oral PRN Omar Coronado MD        prochlorperazine (COMPAZINE) injection 5 mg  5 mg intravenous q6h PRN Meliza De Jesus PA C        [Provider Managed Hold] sacubitriL-valsartan 24-26 mg (ENTRESTO) per tablet 1 tablet  1 tablet oral BID Omar Coronado MD   1 tablet at 01/08/25 2109    tamsulosin (FLOMAX) 24 hr ER capsule 0.4 mg  0.4 mg oral Nightly Omar Coronado MD   0.4 mg at 01/08/25 2109         Review of Systems   Constitutional: Negative for diaphoresis, fever and malaise/fatigue.   HENT:  Negative for hoarse voice and nosebleeds.    Eyes:  Negative for blurred vision and visual disturbance.   Cardiovascular:  Positive for syncope. Negative for chest pain, claudication, cyanosis, dyspnea on exertion, irregular heartbeat, near-syncope, orthopnea, palpitations and paroxysmal nocturnal dyspnea.   Respiratory:  Negative for hemoptysis, shortness of breath, sputum production and wheezing.    Endocrine: Negative for cold intolerance and heat intolerance.   Hematologic/Lymphatic: Negative for bleeding problem.   Skin:  Negative for color change and nail changes.   Musculoskeletal:  Negative for muscle weakness and myalgias.   Gastrointestinal:  Negative for abdominal pain, hematemesis, hematochezia and jaundice.   Genitourinary:  Negative for dysuria and hematuria.   Neurological:  Negative for light-headedness, vertigo and weakness.   Psychiatric/Behavioral:  Negative for altered mental status. The patient does not have insomnia and is not nervous/anxious.          Objective       Visit Vitals  BP (!) 107/53 (BP Location: Right upper arm, Patient Position: Standing)   Pulse 60   Temp 36.6 °C (97.8 °F) (Temporal)   Resp 18   Ht 1.753 m (5' 9\")   Wt 78.5 kg (173 lb)   SpO2 94%   BMI 25.55 kg/m²       Physical Exam  Constitutional:       General: He is not in acute " distress.     Appearance: He is well-developed. He is not diaphoretic.   HENT:      Head: Normocephalic.   Cardiovascular:      Rate and Rhythm: Normal rate and regular rhythm.   Pulmonary:      Effort: No respiratory distress.      Breath sounds: Normal breath sounds. No wheezing.   Chest:      Chest wall: No tenderness.   Abdominal:      General: There is no distension.      Palpations: Abdomen is soft.      Tenderness: There is no abdominal tenderness.   Musculoskeletal:         General: Normal range of motion.      Cervical back: Normal range of motion.   Skin:     General: Skin is warm and dry.   Neurological:      Mental Status: He is alert and oriented to person, place, and time.          Labs   Lab Results   Component Value Date    WBC 5.80 01/08/2025    HGB 12.9 (L) 01/08/2025    HCT 39.0 (L) 01/08/2025     (L) 01/08/2025    ALT 12 01/08/2025    AST 16 01/08/2025     01/09/2025    K 4.5 01/09/2025     01/09/2025    CREATININE 1.2 01/09/2025    BUN 22 01/09/2025    CO2 25 01/09/2025    TSH 2.99 11/10/2019    INR 1.2 09/06/2018    GLUCOSE 155 (H) 01/09/2025    HGBA1C 6.1 (H) 06/04/2019       IMAGING    Cardiac Imaging    TRANSESOPHAGEAL ECHO (LEXUS) 05/15/2019    Interpretation Summary  · Normal-sized left ventricular cavity. Preserved systolic function. Estimated EF = 55%. There are regional wall motion abnormalities. Hypokinesis (severity) in the following segment(s): , basal inferoseptal, basal inferior, mid inferior.  · Thickening of the aortic valve. A bioprosthetic aortic valve is present. Moderate para-valvular regurgitation (anterior to the annulus). No vegetation.  · Tricuspid valve structure is normal. No evidence of tricuspid valve regurgitation. No vegetation  · Mild mitral valve regurgitation. There is No vegetation present. of the mitral valve.    7/15/2024 TTE at [place]    ECG       TELEMETRY  SR    Assessment & Plan  Syncope and collapse  -Syncopal episode while sitting down  after 1 hour of physical therapy. Denies pre-syncopal symptoms, palpitations, nausea, heat intolerance, bowel/bladder incontinence.   -No history of prior syncope.   -No arrhythmias, blocks, pauses on telemetry.    Plan:   -TTE ordered.   -Patient was hypotensive on arrival. Orthostatic VS positive. Will continue to hold entresto today and resume at lower dose tomorrow as tolerated.    -Recommend outpatient zio/holter. Will follow up with his [place] cardiologist.   Chronic HFrEF (heart failure with reduced ejection fraction) (CMS/HCC)  Compensated without evidence of acute HFrEF.   Hypertension  Hypotensive on arrival.        This patient note has been dictated using speech recognition software. Inadvertent speech recognition errors should be disregarded. Please do not hesitate to call my office for clarifications.    NOELLE Cleveland  1/9/2025  3:54 PM

## 2025-01-09 NOTE — PLAN OF CARE
Plan of Care Review  Plan of Care Reviewed With: patient  Progress: improving  Outcome Evaluation: Pt arrived on unit from ED. Heart monitor placed & verified w/MR. NSR on monitor, no events. VSS. Denies pain. Complained of indigestion overnight; one time dose of maalox ordered & given. Fall precautions maintained.

## 2025-01-09 NOTE — PLAN OF CARE
Problem: Adult Inpatient Plan of Care  Goal: Plan of Care Review  Flowsheets (Taken 1/9/2025 5477)  Progress: improving  Outcome Evaluation: Denies pain. Ambulatory to the bathroom with walker and supervision. Son at bedside, updated on plan. SR on tele. Hypotensive this morning, morning dose of entresto held per dr. Parker.   Plan of Care Review  Progress: improving  Outcome Evaluation: Denies pain. Ambulatory to the bathroom with walker and supervision. Son at bedside, updated on plan. SR on tele. Hypotensive this morning, morning dose of entresto held per dr. Parker.

## 2025-01-09 NOTE — PLAN OF CARE
Care Coordination Discharge Plan Summary    Admission Assessment Summary    General Information:  Readmission Within the last 30 days: no    Living Arrangements  Current Living Arrangements: home  People in Home: alone    Social Drivers of Health - Screenings  Housing Stability  In the last 12 months, was there a time when you were not able to pay the mortgage or rent on time?: No  At any time in the past 12 months, were you homeless or living in a shelter (including now)?: No  Utility Access  In the past 12 months has the electric, gas, oil, or water company threatened to shut off services in your home?: No  Transportation Needs  In the past 12 months, has lack of transportation kept you from medical appointments or from getting medications?: No  In the past 12 months, has lack of transportation kept you from meetings, work, or from getting things needed for daily living?: No    Functional Status Prior to Admission  Assistive Device/Animal Currently Used at Home: none     Discharge Plan Summary    Patient Choice  Offered/Gave Vendor List: no     Discharge Plan:  Disposition/Destination:  Home/ Home     Discharge Transportation:  Is Out of Hospital DNR needed at Discharge: no  Does patient need discharge transport? No

## 2025-01-10 VITALS
DIASTOLIC BLOOD PRESSURE: 63 MMHG | SYSTOLIC BLOOD PRESSURE: 131 MMHG | HEART RATE: 79 BPM | RESPIRATION RATE: 18 BRPM | BODY MASS INDEX: 25.62 KG/M2 | WEIGHT: 173 LBS | TEMPERATURE: 98.1 F | HEIGHT: 69 IN | OXYGEN SATURATION: 96 %

## 2025-01-10 LAB
ANION GAP SERPL CALC-SCNC: 6 MEQ/L (ref 3–15)
BUN SERPL-MCNC: 23 MG/DL (ref 7–25)
CALCIUM SERPL-MCNC: 8.4 MG/DL (ref 8.6–10.3)
CHLORIDE SERPL-SCNC: 108 MEQ/L (ref 98–107)
CO2 SERPL-SCNC: 22 MEQ/L (ref 21–31)
CREAT SERPL-MCNC: 1.3 MG/DL (ref 0.7–1.3)
EGFRCR SERPLBLD CKD-EPI 2021: 52.8 ML/MIN/1.73M*2
GLUCOSE SERPL-MCNC: 101 MG/DL (ref 70–99)
POTASSIUM SERPL-SCNC: 4.5 MEQ/L (ref 3.5–5.1)
SODIUM SERPL-SCNC: 136 MEQ/L (ref 136–145)

## 2025-01-10 PROCEDURE — 99232 SBSQ HOSP IP/OBS MODERATE 35: CPT | Performed by: NURSE PRACTITIONER

## 2025-01-10 PROCEDURE — 63700000 HC SELF-ADMINISTRABLE DRUG: Performed by: HOSPITALIST

## 2025-01-10 PROCEDURE — 63700000 HC SELF-ADMINISTRABLE DRUG: Performed by: NURSE PRACTITIONER

## 2025-01-10 PROCEDURE — 63700000 HC SELF-ADMINISTRABLE DRUG: Performed by: PHYSICIAN ASSISTANT

## 2025-01-10 PROCEDURE — 99239 HOSP IP/OBS DSCHRG MGMT >30: CPT | Performed by: STUDENT IN AN ORGANIZED HEALTH CARE EDUCATION/TRAINING PROGRAM

## 2025-01-10 PROCEDURE — 36415 COLL VENOUS BLD VENIPUNCTURE: CPT | Performed by: STUDENT IN AN ORGANIZED HEALTH CARE EDUCATION/TRAINING PROGRAM

## 2025-01-10 PROCEDURE — 80048 BASIC METABOLIC PNL TOTAL CA: CPT | Performed by: STUDENT IN AN ORGANIZED HEALTH CARE EDUCATION/TRAINING PROGRAM

## 2025-01-10 RX ORDER — SACUBITRIL AND VALSARTAN 24; 26 MG/1; MG/1
1 TABLET, FILM COATED ORAL 2 TIMES DAILY
Status: DISCONTINUED | OUTPATIENT
Start: 2025-01-10 | End: 2025-01-10 | Stop reason: HOSPADM

## 2025-01-10 RX ADMIN — SACUBITRIL AND VALSARTAN 1 TABLET: 24; 26 TABLET, FILM COATED ORAL at 11:43

## 2025-01-10 RX ADMIN — FLUTICASONE PROPIONATE 1 SPRAY: 50 SPRAY, METERED NASAL at 08:28

## 2025-01-10 RX ADMIN — ASPIRIN 325 MG: 325 TABLET, COATED ORAL at 08:28

## 2025-01-10 RX ADMIN — LEVOTHYROXINE SODIUM 25 MCG: 0.03 TABLET ORAL at 05:51

## 2025-01-10 RX ADMIN — PANTOPRAZOLE SODIUM 40 MG: 40 TABLET, DELAYED RELEASE ORAL at 08:28

## 2025-01-10 ASSESSMENT — COGNITIVE AND FUNCTIONAL STATUS - GENERAL
CLIMB 3 TO 5 STEPS WITH RAILING: 3 - A LITTLE
STANDING UP FROM CHAIR USING ARMS: 3 - A LITTLE
MOVING TO AND FROM BED TO CHAIR: 3 - A LITTLE
WALKING IN HOSPITAL ROOM: 3 - A LITTLE

## 2025-01-10 NOTE — DISCHARGE SUMMARY
Hospital Medicine    Inpatient Discharge Summary        BRIEF OVERVIEW     Patient: Michel Fang  Admission Date: 2025   : 1935 Discharge Date: 1/10/2025       PCP: Johnathan Leone MD  Disposition: Home     Destination: Home     Attending Provider: Tomasa Parker MD Attending phys phone: [phone]    Code Status At Discharge: Full Code        ASSESSMENT AND PLAN     Assessment & Plan  Syncope and collapse  Acute   Patient already on aspirin 325 mg daily  BP soft and bradycardic on initial evaluation ED  Encourage oral hydration  Continue cardiac telemetry  Entresto on hold  Continue to monitor for orthostatic hypotension  ECHO 25- reviewed  Coreg discontinued by cardiology given bradycardia  Cardiology appreciated  Orthostatic vital signs positive during hospitalization  Telemetry monitor showed bradycardia, no pauses  Hypertension  Chronic  Continue home regimen with specific hold parameters to avoid hypotension/bradycardia   Chronic HFrEF (heart failure with reduced ejection fraction) (CMS/HCC)  Chronic  Continue home regimen with specific hold parameters to avoid hypotension/bradycardia   Pulmonary sarcoidosis (CMS/[facility])  Chronic  Continue home regimen  Hyperlipidemia  Chronic  Continue home regimen  GERD (gastroesophageal reflux disease)  Chronic  Continue home regimen  BPH (benign prostatic hyperplasia)  Chronic  Continue home regimen  Syncope, unspecified syncope type    Orthostatic hypotension  Contributing to the event  Better prior to discharge      Brief Hospital Course  This is a 89 y.o. year-old male admitted on 2025 with Syncope and collapse.  Patient was admitted with episode of syncope after working with PT. Patient's high sensitivity troponin negative x 2. EKG non-ischemic. No arrhythmias, blocks, pauses on telemetry. On arrival, he was mildly hypotensive and entresto was on hold. He also had orthostatic hypotension. Evaluated by cardiology, Echo reviewed showed Normal  left ventricular systolic function with estimated EF 55 to 60%. TAVR valve in the aortic position with mild/moderate paravalvular leak in the 12 to 2 o'clock position which is stable. Plan to discontinue coreg given bradycardia and orthostasis in setting of syncope per cardiology. BP normal, no symptom, after resuming entresto.   Per daughter his primary cardiologist is made aware about the hospitalization and outpatient cardiac monitoring is being arranged.  Medically stable to be discharged home with close follow up with PCP & cardiologist.      Exam on Day of Discharge  Temp:  [36.2 °C (97.1 °F)-36.9 °C (98.5 °F)] 36.7 °C (98.1 °F)  Heart Rate:  [54-79] 79  Resp:  [16-18] 18  BP: (100-167)/(52-70) 131/63    Physical Exam    General exam : appears age stated, well nourished, not in acute distress  Head: atraumatic, normocephalic  Eyes : PERRLA, EOMI, no pallor, no icterus  ENT: no lesions, oropharynx pink, mucous membranes dry  Neck: supple, no Lymph nodes, no Thyromegaly, no JVD   CVS : normal rate, normal rhythm, S1 and S2 heard, no murmurs, rubs or gallops  Resp: no accessory muscle usage, clear to auscultation Bilaterally  Abdomen : soft, non distended, BS +, no organomegaly , nontender, no guarding.  Extremities : no edema, no cyanosis  MSK: no DJD, no joint swellings, no joint tenderness   Skin: intact, warm, no rash  Neuro: AAO x3, CN 2-12 intact,  motor strength in all extremities, sensations, DTRs, coordination intact.  Psych: normal mood, cooperative   DISCHARGE MEDICATIONS         Medication List        CONTINUE taking these medications      albuterol HFA 90 mcg/actuation inhaler  INHALE 2 PUFFS BY MOUTH EVERY 4 HOURS AS NEEDED FOR WHEEZE OR FOR SHORTNESS OF BREATH     amoxicillin 500 mg capsule  Commonly known as: AMOXIL  4 capsules Orally 1 hr prir to dental prcedure and and I think the first I's for 30 days     aspirin 325 mg EC tablet  Take 325 mg by mouth daily.  Dose: 325 mg     atorvastatin 20 mg  "tablet  Commonly known as: LIPITOR  Take 20 mg by mouth every evening.  Dose: 20 mg     fluticasone propionate 50 mcg/actuation nasal spray  Commonly known as: FLONASE  Administer 1 spray into each nostril daily.  Dose: 1 spray     guaiFENesin 600 mg 12 hr tablet  Commonly known as: MUCINEX  Take 1,200 mg by mouth 2 (two) times a day.  Dose: 1,200 mg     levothyroxine 25 mcg tablet  Commonly known as: SYNTHROID  Take 25 mcg by mouth daily.  Dose: 25 mcg     pantoprazole 40 mg EC tablet  Commonly known as: PROTONIX  Take 40 mg by mouth daily.  Dose: 40 mg     polyethylene glycol 17 gram packet  Commonly known as: MIRALAX  Take 17 g by mouth daily.  Dose: 17 g     predniSONE 2 mg tablet,delayed release (DR/EC)  Take by mouth.     PROSTATE CONTROL ORAL  Take 1 tablet by mouth once daily.  Dose: 1 tablet     sacubitriL-valsartan 24-26 mg 24-26 mg per tablet  Commonly known as: ENTRESTO  Take 1 tablet by mouth 2 (two) times a day.  Dose: 1 tablet     tamsulosin 0.4 mg capsule  Commonly known as: FLOMAX  Take 0.4 mg by mouth nightly.  Dose: 0.4 mg     THERACRAN ORAL  Take by mouth.            STOP taking these medications      carvediloL 3.125 mg tablet  Commonly known as: COREG             INSTRUCTIONS AND FOLLOW-UP     Instructions for after discharge       Call provider for:  difficulty breathing      Call provider for:  extreme fatigue      Call provider for:  persistent dizziness or light-headedness, headache, or visual disturbances      Call provider for:  persistent nausea or vomiting      Call provider for:  rash      Call provider for:  severe uncontrolled pain      Call provider for:  temperature >100.4      Discharge Diet      Diet Type / Texture: Regular    Normal Activity as Tolerated      Review additional activity direction in \"instructions\" section              Important Issues to Address in Follow-Up  Discharge Follow-up Issues: outpatient cardiac monitor    Scheduled Appointments  Encounter Information  "   This patient does not currently have any appointments scheduled.         Recommended Follow-up Visits      Test Results Pending at Discharge  Pending Inpatient Labs       Order Current Status    [place] Draw Panel In process            LABS AND IMAGING   Pertinent Labs  CHEMISTRIES   Results from last 7 days   Lab Units 01/10/25  0322 01/09/25  0949   SODIUM mEQ/L 136 136   CHLORIDE mEQ/L 108* 105   CO2 mEQ/L 22 25   BUN mg/dL 23 22   CREATININE mg/dL 1.3 1.2   GLUCOSE mg/dL 101* 155*   CALCIUM mg/dL 8.4* 8.5*   EGFR mL/min/1.73m*2 52.8* 58.2*   ANION GAP mEQ/L 6 6       Pertinent Imaging  No results found.  Cardiac Imaging    TRANSTHORACIC ECHO (TTE) COMPLETE 01/09/2025    Interpretation Summary  Technically difficult study requiring use of echocardiographic contrast.  Normal left ventricular systolic function with estimated EF 55 to 60%.  There is paradoxical septal wall motion in the setting of intraventricular conduction delay.  Indeterminate diastolic function.  Grossly normal right ventricular size and systolic function.  Grossly normal biatrial size.  There is a TAVR valve in the aortic position with mild/moderate paravalvular leak in the 12 to 2 o'clock position on short axis view.  Grossly normal mitral valve without evidence of significant stenosis or regurgitation.  Grossly normal tricuspid valve without evidence significant stenosis or regurgitation.  Pulmonic valve was not well-visualized.  Insufficient Doppler to accurately assess PASP.  No evidence of significant pericardial effusion.    No significant change compared to prior study.      OTHER SERVICES PROVIDED   Consults During Admission  IP CONSULT TO CARDIOLOGY    Procedures Performed  None      Thank you for allowing the Division of Hospital Medicine to care for your patient.        >30 mins spent for coordination/counselling related to discharge plan, including final examination of patient, discussion regarding discharge instructions,  reconciliation/prescription of medications, preparation of discharge records, etc.

## 2025-01-10 NOTE — ASSESSMENT & PLAN NOTE
-Syncopal episode while sitting down after 1 hour of physical therapy. Denies pre-syncopal symptoms, palpitations, nausea, heat intolerance, bowel/bladder incontinence.   -No history of prior syncope.   -No arrhythmias, blocks, pauses on telemetry.  -TTE stable.     Plan:   -Patient was hypotensive on arrival. Orthostatic VS positive.Entresto and coreg were held initially.   -Discontinue coreg given bradycardia and orthostasis in setting of syncope.   -Resume entresto 24/26 mg BID today and monitor blood pressures. If normotensive and asymptomatic, may be discharged home with close outpatient follow up with his [place] cardiologist.    -Recommend outpatient zio/holter.

## 2025-01-10 NOTE — PROGRESS NOTES
Cardiology Daily Progress Note       Subjective      Reason for consult: Syncope    Overview:  Michel Fang is a 89 y.o. male with a past medical history of CAD s/p CABG in 2000, aortic stenosis s/p TAVR in 2016, HFrEF with recovered EF 50-55%, PVD, HTN, HLD, pulmonary sarcoidosis, BPH.      The patient presented to the ED after a syncopal episode on 1/8/25. He was in his usual state of health in the morning and ate breakfast. He went to PT and completed 1 hour of therapy, sat down in chair, and lost consciousness while sitting. He reports feeling off immediately before but can not articulate symptoms. Denies chest pain, palpitations, dizziness, nausea, heat intolerance, diaphoresis, bladder/bowel incontinence. No prior history of syncope. Lab evaluation unremarkable. HS trop negative x 2. EKG non-ischemic. No arrhythmias, blocks, pauses on telemetry. On arrival, he was mildly hypotensive with BP 97/52. Entresto has been held.This morning, he reports feeling well. Orthostatic VS positive, although he is asymptomatic.      Interval history: TTE 1/9 stable LVEF 50-55% and mild TAVR paravalvular regurgitation unchanged. SB/SR on telemetry with rates 50 to 60s. Ortho /58 supine to 100/52 standing, pt asymptomatic. This morning, he is sitting up in bed eating breakfast and reports feeling well, no complaints.        Amlodipine  Current Facility-Administered Medications   Medication Dose Route Frequency Provider Last Rate Last Admin    acetaminophen (TYLENOL) tablet 650 mg  650 mg oral q6h PRN Omar Coronado MD        albuterol nebulizer solution 2.5 mg  2.5 mg nebulization q4h PRN Meliza De Jesus PA C        aspirin EC tablet 325 mg  325 mg oral Daily Meliza De Jesus PA C   325 mg at 01/10/25 0828    atorvastatin (LIPITOR) tablet 20 mg  20 mg oral q PM Omar Coronado MD   20 mg at 01/09/25 1813    glucose chewable tablet 16-32 g of dextrose  16-32 g of dextrose oral PRN Meliza De Jesus PA C        Or     "dextrose 40 % oral gel 15-30 g of dextrose  15-30 g of dextrose oral PRN Meliza De Jesus PA C        Or    glucagon (GLUCAGEN) injection 1 mg  1 mg intramuscular PRN Meliza De Jesus PA C        Or    dextrose 50 % in water (D50) injection 12.5 g  25 mL intravenous PRN Meliza De Jesus PA C        enoxaparin (LOVENOX) syringe 40 mg  40 mg subcutaneous Daily (6p) Tomasa Parker MD   40 mg at 01/09/25 1958    fluticasone propionate (FLONASE) 50 mcg/actuation nasal spray 1 spray  1 spray Each Nostril Daily Omar Coronado MD   1 spray at 01/10/25 0828    hydrALAZINE (APRESOLINE) injection 10 mg  10 mg intravenous q4h PRN Omar Coronado MD        levothyroxine (SYNTHROID) tablet 25 mcg  25 mcg oral Daily (6:30a) Omar Coronado MD   25 mcg at 01/10/25 0551    magnesium sulfate IVPB 2g in 50 mL NSS/D5W/SWFI  2 g intravenous PRN Omar Coronado MD        pantoprazole (PROTONIX) tablet,delayed release (DR/EC) 40 mg  40 mg oral Daily Omar Coronado MD   40 mg at 01/10/25 0828    polyethylene glycol (MIRALAX) 17 gram packet 17 g  17 g oral Daily PRN Omar Coronado MD   17 g at 01/09/25 2212    potassium chloride (KLOR-CON M) ER tablet (particles/crystals) 40 mEq  40 mEq oral PRN Omar Coronado MD        prochlorperazine (COMPAZINE) injection 5 mg  5 mg intravenous q6h PRN Meliza De Jesus PA C        sacubitriL-valsartan 24-26 mg (ENTRESTO) per tablet 1 tablet  1 tablet oral BID Gricelda Mathew CRNP   1 tablet at 01/10/25 1143    tamsulosin (FLOMAX) 24 hr ER capsule 0.4 mg  0.4 mg oral Nightly Omar Coronado MD   0.4 mg at 01/09/25 2211         Objective   Visit Vitals  BP (!) 100/52 (BP Location: Right upper arm, Patient Position: Standing)   Pulse 79   Temp 36.7 °C (98.1 °F) (Temporal)   Resp 18   Ht 1.753 m (5' 9\")   Wt 78.5 kg (173 lb)   SpO2 96%   BMI 25.55 kg/m²       No intake or output data in the 24 hours ending 01/10/25 1326    Physical Exam  Constitutional:       General: He is not in acute " distress.     Appearance: He is well-developed. He is not diaphoretic.   HENT:      Head: Normocephalic.   Cardiovascular:      Rate and Rhythm: Normal rate and regular rhythm.   Pulmonary:      Effort: No respiratory distress.      Breath sounds: Normal breath sounds. No wheezing.   Chest:      Chest wall: No tenderness.   Abdominal:      General: There is no distension.      Palpations: Abdomen is soft.      Tenderness: There is no abdominal tenderness.   Musculoskeletal:         General: Normal range of motion.      Cervical back: Normal range of motion.   Skin:     General: Skin is warm and dry.   Neurological:      Mental Status: He is alert and oriented to person, place, and time.         Labs   Lab Results   Component Value Date    WBC 5.80 01/08/2025    HGB 12.9 (L) 01/08/2025    HCT 39.0 (L) 01/08/2025     (L) 01/08/2025    ALT 12 01/08/2025    AST 16 01/08/2025     01/10/2025    K 4.5 01/10/2025     (H) 01/10/2025    CREATININE 1.3 01/10/2025    BUN 23 01/10/2025    CO2 22 01/10/2025    TSH 2.99 11/10/2019    INR 1.2 09/06/2018    GLUCOSE 101 (H) 01/10/2025    HGBA1C 6.1 (H) 06/04/2019    TROPONINI 0.03 11/10/2019       Imaging  I have independently reviewed the pertinent imaging from the last 24 hrs.    Cardiac Imaging    TRANSTHORACIC ECHO (TTE) COMPLETE 01/09/2025    Interpretation Summary  Technically difficult study requiring use of echocardiographic contrast.  Normal left ventricular systolic function with estimated EF 55 to 60%.  There is paradoxical septal wall motion in the setting of intraventricular conduction delay.  Indeterminate diastolic function.  Grossly normal right ventricular size and systolic function.  Grossly normal biatrial size.  There is a TAVR valve in the aortic position with mild/moderate paravalvular leak in the 12 to 2 o'clock position on short axis view.  Grossly normal mitral valve without evidence of significant stenosis or regurgitation.  Grossly normal  tricuspid valve without evidence significant stenosis or regurgitation.  Pulmonic valve was not well-visualized.  Insufficient Doppler to accurately assess PASP.  No evidence of significant pericardial effusion.    No significant change compared to prior study.      Telemetry  SB/SR 50-60s           Assessment & Plan  Syncope and collapse  -Syncopal episode while sitting down after 1 hour of physical therapy. Denies pre-syncopal symptoms, palpitations, nausea, heat intolerance, bowel/bladder incontinence.   -No history of prior syncope.   -No arrhythmias, blocks, pauses on telemetry.  -TTE stable.     Plan:   -Patient was hypotensive on arrival. Orthostatic VS positive.Entresto and coreg were held initially.   -Discontinue coreg given bradycardia and orthostasis in setting of syncope.   -Resume entresto 24/26 mg BID today and monitor blood pressures. If normotensive and asymptomatic, may be discharged home with close outpatient follow up with his [place] cardiologist.    -Recommend outpatient zio/holter.   Chronic HFrEF (heart failure with reduced ejection fraction) (CMS/[facility])  -EF recovered with GDMT, LVEF 50-55%.   -Compensated without evidence of acute HFrEF.   Hypertension  Hypotensive on arrival.   Orthostatic hypotension  See syncope section.   Syncope, unspecified syncope type        NOELLE Cleveland  1/10/2025  1:26 PM     This patient note has been dictated using speech recognition software. Inadvertent speech recognition errors should be disregarded. Please do not hesitate to call my office for clarifications.

## 2025-01-10 NOTE — PLAN OF CARE
Care Coordination Discharge Plan Note     Discharge Needs Assessment  Concerns to be Addressed: denies needs/concerns at this time, care coordination/care conferences  Current Discharge Risk: physical impairment    Anticipated Discharge Plan  Anticipated Discharge Disposition: home without assistance or services, home with assistance       Patient Choice  Offered/Gave Vendor List: no  Patient's Choice of Community Agency(s):           ---------------------------------------------------------------------------------------------------------------------    Interdisciplinary Discharge Plan Review:  Participants:, patient, nursing, physician    Concerns Comments: The patient was discussed in am care rounds.  RNCC met with the patient at the bedside for discharge planning.  The [facility] and [facility] with [facility].  The patient states that he lives at home with his son.  Offered home care and the patient declined.  The patient states that his son will provide transportation to home on discharge.  RNCC will continue to follow to support discharge planning.    Discharge Plan:   Disposition/Destination: Home  / Home  Discharge Facility:    Community Resources:      Discharge Transportation:  Is Out of Hospital DNR needed at Discharge: no  Does patient need discharge transport? No

## 2025-01-10 NOTE — NURSING NOTE
Discharged home with all belongings. Tele and iv d/c'd. Discharge instructions reviewed with patient, able to verbalize understanding of all discharge instructions provided. Escorted by PCT via w/c to [facility] for son to provide transportation home.

## 2025-01-10 NOTE — PLAN OF CARE
Plan of Care Review  Plan of Care Reviewed With: patient  Progress: improving  Outcome Evaluation: pt denies pain. orthostatic vitals performed and negative. pt able to ambulated back and forth from bathroom without reporting any symptom. alarm activated.

## 2025-01-15 NOTE — ASSESSMENT & PLAN NOTE
Acute   Patient already on aspirin 325 mg daily  BP soft and bradycardic on initial evaluation ED  Encourage oral hydration  Continue cardiac telemetry  Entresto on hold  Continue to monitor for orthostatic hypotension  ECHO 1/9/25- reviewed  Coreg discontinued by cardiology given bradycardia  Cardiology appreciated  Orthostatic vital signs positive during hospitalization  Telemetry monitor showed bradycardia, no pauses

## 2025-05-11 ENCOUNTER — HOSPITAL ENCOUNTER (INPATIENT)
Facility: HOSPITAL | Age: 89
LOS: 1 days | Discharge: HOME | DRG: 641 | End: 2025-05-12
Attending: STUDENT IN AN ORGANIZED HEALTH CARE EDUCATION/TRAINING PROGRAM | Admitting: INTERNAL MEDICINE
Payer: MEDICARE

## 2025-05-11 ENCOUNTER — APPOINTMENT (EMERGENCY)
Dept: RADIOLOGY | Facility: HOSPITAL | Age: 89
DRG: 641 | End: 2025-05-11
Payer: MEDICARE

## 2025-05-11 DIAGNOSIS — R55 NEAR SYNCOPE: Primary | ICD-10-CM

## 2025-05-11 LAB
ALBUMIN SERPL-MCNC: 3.6 G/DL (ref 3.5–5.7)
ALP SERPL-CCNC: 80 IU/L (ref 34–125)
ALT SERPL-CCNC: 13 IU/L (ref 7–52)
ANION GAP SERPL CALC-SCNC: 7 MEQ/L (ref 3–15)
AST SERPL-CCNC: 17 IU/L (ref 13–39)
BASOPHILS # BLD: 0.05 K/UL (ref 0.01–0.1)
BASOPHILS NFR BLD: 0.9 %
BILIRUB SERPL-MCNC: 0.8 MG/DL (ref 0.3–1.2)
BNP SERPL-MCNC: 127 PG/ML
BUN SERPL-MCNC: 21 MG/DL (ref 7–25)
CALCIUM SERPL-MCNC: 9 MG/DL (ref 8.6–10.3)
CHLORIDE SERPL-SCNC: 104 MEQ/L (ref 98–107)
CO2 SERPL-SCNC: 24 MEQ/L (ref 21–31)
CREAT SERPL-MCNC: 1.2 MG/DL (ref 0.7–1.3)
DIFFERENTIAL METHOD BLD: ABNORMAL
EGFRCR SERPLBLD CKD-EPI 2021: 58.2 ML/MIN/1.73M*2
EOSINOPHIL # BLD: 0.12 K/UL (ref 0.04–0.54)
EOSINOPHIL NFR BLD: 2.1 %
ERYTHROCYTE [DISTWIDTH] IN BLOOD BY AUTOMATED COUNT: 14.1 % (ref 11.6–14.4)
GLUCOSE SERPL-MCNC: 122 MG/DL (ref 70–99)
HCT VFR BLD AUTO: 38.5 % (ref 40.1–51)
HGB BLD-MCNC: 12.4 G/DL (ref 13.7–17.5)
IMM GRANULOCYTES # BLD AUTO: 0.01 K/UL (ref 0–0.08)
IMM GRANULOCYTES NFR BLD AUTO: 0.2 %
LYMPHOCYTES # BLD: 0.65 K/UL (ref 1.2–3.5)
LYMPHOCYTES NFR BLD: 11.2 %
MCH RBC QN AUTO: 30.5 PG (ref 28–33.2)
MCHC RBC AUTO-ENTMCNC: 32.2 G/DL (ref 32.2–36.5)
MCV RBC AUTO: 94.6 FL (ref 83–98)
MONOCYTES # BLD: 0.87 K/UL (ref 0.3–1)
MONOCYTES NFR BLD: 14.9 %
NEUTROPHILS # BLD: 4.12 K/UL (ref 1.7–7)
NEUTS SEG NFR BLD: 70.7 %
NRBC BLD-RTO: 0 %
OSMOLALITY SERPL: 296 MOSM/KG (ref 280–300)
OSMOLALITY UR: 714 MOSM/KG (ref 100–1400)
PLATELET # BLD AUTO: 148 K/UL (ref 150–350)
PMV BLD AUTO: 9.9 FL (ref 9.4–12.4)
POTASSIUM SERPL-SCNC: 4.3 MEQ/L (ref 3.5–5.1)
PROT SERPL-MCNC: 7 G/DL (ref 6–8.2)
RBC # BLD AUTO: 4.07 M/UL (ref 4.5–5.8)
SODIUM SERPL-SCNC: 135 MEQ/L (ref 136–145)
SODIUM UR-SCNC: 156 MEQ/L
TROPONIN I SERPL HS-MCNC: 10.5 PG/ML
TROPONIN I SERPL HS-MCNC: 8.8 PG/ML
TSH SERPL DL<=0.05 MIU/L-ACNC: 2.11 MIU/L (ref 0.34–5.6)
WBC # BLD AUTO: 5.82 K/UL (ref 3.8–10.5)

## 2025-05-11 PROCEDURE — 84300 ASSAY OF URINE SODIUM: CPT | Performed by: INTERNAL MEDICINE

## 2025-05-11 PROCEDURE — 84443 ASSAY THYROID STIM HORMONE: CPT | Performed by: INTERNAL MEDICINE

## 2025-05-11 PROCEDURE — 12000000 HC ROOM AND CARE MED/SURG

## 2025-05-11 PROCEDURE — 63600000 HC DRUGS/DETAIL CODE: Performed by: INTERNAL MEDICINE

## 2025-05-11 PROCEDURE — 84484 ASSAY OF TROPONIN QUANT: CPT

## 2025-05-11 PROCEDURE — 99223 1ST HOSP IP/OBS HIGH 75: CPT | Performed by: INTERNAL MEDICINE

## 2025-05-11 PROCEDURE — 83930 ASSAY OF BLOOD OSMOLALITY: CPT | Performed by: INTERNAL MEDICINE

## 2025-05-11 PROCEDURE — 1123F ACP DISCUSS/DSCN MKR DOCD: CPT | Performed by: INTERNAL MEDICINE

## 2025-05-11 PROCEDURE — 63700000 HC SELF-ADMINISTRABLE DRUG: Performed by: INTERNAL MEDICINE

## 2025-05-11 PROCEDURE — 36415 COLL VENOUS BLD VENIPUNCTURE: CPT

## 2025-05-11 PROCEDURE — 83880 ASSAY OF NATRIURETIC PEPTIDE: CPT | Performed by: STUDENT IN AN ORGANIZED HEALTH CARE EDUCATION/TRAINING PROGRAM

## 2025-05-11 PROCEDURE — 83935 ASSAY OF URINE OSMOLALITY: CPT | Performed by: INTERNAL MEDICINE

## 2025-05-11 PROCEDURE — 71046 X-RAY EXAM CHEST 2 VIEWS: CPT

## 2025-05-11 PROCEDURE — 99285 EMERGENCY DEPT VISIT HI MDM: CPT | Mod: 25

## 2025-05-11 PROCEDURE — 82040 ASSAY OF SERUM ALBUMIN: CPT

## 2025-05-11 PROCEDURE — 93005 ELECTROCARDIOGRAM TRACING: CPT

## 2025-05-11 PROCEDURE — 84484 ASSAY OF TROPONIN QUANT: CPT | Mod: 91

## 2025-05-11 PROCEDURE — 85025 COMPLETE CBC W/AUTO DIFF WBC: CPT

## 2025-05-11 PROCEDURE — 80053 COMPREHEN METABOLIC PANEL: CPT

## 2025-05-11 RX ORDER — ATORVASTATIN CALCIUM 20 MG/1
20 TABLET, FILM COATED ORAL DAILY
Status: DISCONTINUED | OUTPATIENT
Start: 2025-05-12 | End: 2025-05-12

## 2025-05-11 RX ORDER — ASPIRIN 325 MG
325 TABLET ORAL DAILY
Status: DISCONTINUED | OUTPATIENT
Start: 2025-05-12 | End: 2025-05-12 | Stop reason: HOSPADM

## 2025-05-11 RX ORDER — TAMSULOSIN HYDROCHLORIDE 0.4 MG/1
0.4 CAPSULE ORAL DAILY
Status: DISCONTINUED | OUTPATIENT
Start: 2025-05-12 | End: 2025-05-12

## 2025-05-11 RX ORDER — HEPARIN SODIUM 5000 [USP'U]/ML
5000 INJECTION, SOLUTION INTRAVENOUS; SUBCUTANEOUS EVERY 8 HOURS
Status: DISCONTINUED | OUTPATIENT
Start: 2025-05-11 | End: 2025-05-12 | Stop reason: HOSPADM

## 2025-05-11 RX ORDER — LEVOTHYROXINE SODIUM 25 UG/1
25 TABLET ORAL
Status: DISCONTINUED | OUTPATIENT
Start: 2025-05-12 | End: 2025-05-12 | Stop reason: HOSPADM

## 2025-05-11 RX ADMIN — HEPARIN SODIUM 5000 UNITS: 5000 INJECTION, SOLUTION INTRAVENOUS; SUBCUTANEOUS at 23:43

## 2025-05-11 RX ADMIN — SACUBITRIL AND VALSARTAN 1 TABLET: 24; 26 TABLET, FILM COATED ORAL at 22:16

## 2025-05-11 ASSESSMENT — ENCOUNTER SYMPTOMS
PALPITATIONS: 0
DIZZINESS: 1
DIARRHEA: 0
LIGHT-HEADEDNESS: 1
CHILLS: 0
FEVER: 0
VOMITING: 0
SHORTNESS OF BREATH: 0

## 2025-05-11 ASSESSMENT — COGNITIVE AND FUNCTIONAL STATUS - GENERAL
WALKING IN HOSPITAL ROOM: 3 - A LITTLE
MOVING TO AND FROM BED TO CHAIR: 3 - A LITTLE
CLIMB 3 TO 5 STEPS WITH RAILING: 2 - A LOT
STANDING UP FROM CHAIR USING ARMS: 3 - A LITTLE

## 2025-05-11 NOTE — ED PROVIDER NOTES
Emergency Medicine Note  HPI   HISTORY OF PRESENT ILLNESS     89 year old male with a history of coronary artery disease, CHF, presents to the ER for evaluation after dizziness/near syncope. Pt reports that he was taking his usual walk today when he started to get lightheaded and his vision got blurry. He say down on a bench and continued to feel unwell for approximately 1 hour. Some kind bystanders drove him home and then called 911. Patient feels better upon his arrival to the ER. He denies any chest pain or shortness of breath or palpitations. He states that he's been eating and drinking normally. No nausea or vomiting           Patient History   PAST HISTORY     Reviewed from Nursing Triage:       Past Medical History:   Diagnosis Date    Arthritis     BPH (benign prostatic hyperplasia)     CHF (congestive heart failure) (CMS/HCC)     Coronary artery disease     Disease of thyroid gland     Heart disease     Hyperlipidemia     Hypertension     Sarcoidosis        Past Surgical History   Procedure Laterality Date    Adenoidectomy      Aortic valve replacement      Appendectomy      Cardiac surgery      Coronary artery bypass graft      Eye surgery Bilateral     cataracts     Popliteal venous aneurysm repair      Skin biopsy      Tonsillectomy      UPPER GASTROINTESTINAL ENDOSCOPY WITH BIOPSY N/A 2018    Performed by Niles Jane MD at [facility]       Family History   Problem Relation Name Age of Onset    Ovarian cancer Biological Mother      Lymphoma Other grandson         age  17    Lymphoma Biological Son          age 20's       Social History     Tobacco Use    Smoking status: Former     Current packs/day: 0.00     Types: Cigarettes     Quit date:      Years since quittin.3    Smokeless tobacco: Never   Substance Use Topics    Alcohol use: No    Drug use: No         Review of Systems   REVIEW OF SYSTEMS     Review of Systems   Constitutional:  Negative for chills and fever.   Respiratory:   Negative for shortness of breath.    Cardiovascular:  Negative for chest pain and palpitations.   Gastrointestinal:  Negative for diarrhea and vomiting.   Neurological:  Positive for dizziness and light-headedness. Negative for syncope.         VITALS     ED Vitals      Date/Time Temp Pulse Resp BP SpO2 [employer]   05/11/25 1903 -- 64 20 162/67 -- AGO   05/11/25 1620 35.9 °C (96.6 °F) 68 17 154/68 99 % AGO                         Physical Exam   PHYSICAL EXAM     Physical Exam  Constitutional:       General: He is not in acute distress.     Appearance: Normal appearance. He is not ill-appearing, toxic-appearing or diaphoretic.   HENT:      Head: Normocephalic and atraumatic.   Cardiovascular:      Rate and Rhythm: Normal rate and regular rhythm.   Pulmonary:      Effort: Pulmonary effort is normal.      Breath sounds: Normal breath sounds.   Abdominal:      General: There is no distension.      Palpations: Abdomen is soft.      Tenderness: There is no abdominal tenderness. There is no guarding or rebound.   Musculoskeletal:      Right lower leg: No edema.      Left lower leg: No edema.   Neurological:      General: No focal deficit present.      Mental Status: He is alert and oriented to person, place, and time.           PROCEDURES     Procedures     DATA     Results       Procedure Component Value Units Date/Time    B-type natriuretic peptide [[id number]]  (Abnormal) Collected: 05/11/25 1634    Specimen: Blood, Venous Updated: 05/11/25 1849      pg/mL     HS Troponin (with 2 hour reflex) [[id number]]  (Normal) Collected: 05/11/25 1634    Specimen: Blood, Venous Updated: 05/11/25 1713     High Sens Troponin I 8.8 pg/mL     Comprehensive metabolic panel [[id number]]  (Abnormal) Collected: 05/11/25 1634    Specimen: Blood, Venous Updated: 05/11/25 1705     Sodium 135 mEQ/L      Potassium 4.3 mEQ/L      Comment: Results obtained on plasma. Plasma Potassium values may be up to 0.4 mEQ/L less than serum values. The  differences may be greater for patients with high platelet or white cell counts.        Chloride 104 mEQ/L      CO2 24 mEQ/L      BUN 21 mg/dL      Creatinine 1.2 mg/dL      Glucose 122 mg/dL      Calcium 9.0 mg/dL      AST (SGOT) 17 IU/L      ALT (SGPT) 13 IU/L      Alkaline Phosphatase 80 IU/L      Total Protein 7.0 g/dL      Comment: Test performed on plasma which typically contains approximately 0.4 g/dL more protein than serum.        Albumin 3.6 g/dL      Bilirubin, Total 0.8 mg/dL      eGFR 58.2 mL/min/1.73m*2      Comment: Calculation based on the Chronic Kidney Disease Epidemiology Collaboration (CKD-EPI) equation refit without adjustment for race.        Anion Gap 7 mEQ/L     CBC and differential [[id number]]  (Abnormal) Collected: 05/11/25 1634    Specimen: Blood, Venous Updated: 05/11/25 1643     WBC 5.82 K/uL      RBC 4.07 M/uL      Hemoglobin 12.4 g/dL      Hematocrit 38.5 %      MCV 94.6 fL      MCH 30.5 pg      MCHC 32.2 g/dL      RDW 14.1 %      Platelets 148 K/uL      MPV 9.9 fL      Differential Type Auto     nRBC 0.0 %      Immature Granulocytes 0.2 %      Neutrophils 70.7 %      Lymphocytes 11.2 %      Monocytes 14.9 %      Eosinophils 2.1 %      Basophils 0.9 %      Immature Granulocytes, Absolute 0.01 K/uL      Neutrophils, Absolute 4.12 K/uL      Lymphocytes, Absolute 0.65 K/uL      Monocytes, Absolute 0.87 K/uL      Eosinophils, Absolute 0.12 K/uL      Basophils, Absolute 0.05 K/uL             Imaging Results              X-RAY CHEST 2 VIEWS (Final result)  Result time 05/11/25 17:25:25      Final result                   Impression:    IMPRESSION:    1.  Borderline cardiomegaly. Postoperative changes, as above.  2.  Small, bilateral pleural effusions.  3.  Diffuse pulmonary vascular/interstitial prominence suggestive of  interstitial pulmonary edema. Clinical correlation and short-term follow-up  radiographs are recommended.               Narrative:    CLINICAL HISTORY:  89-year-old male  with near syncope.    COMPARISON:  CTA of chest for pulmonary embolism dated 5/29/2024; radiograph of  chest dated 12/13/2023.    TECHNIQUE:  PA and lateral views of the chest are obtained.    COMMENT:    The heart is top normal and stable in size. There are stable postoperative  changes related to median sternotomy and aortic valvular replacement. There is  atherosclerotic calcification in the aortic arch. No pneumothorax is seen.  Small, bilateral pleural effusions are present. Diffuse pulmonary vascular  and/or interstitial prominence in both lungs appears increased relative to the  December 2023 radiograph and is nonspecific but most suggestive of interstitial  pulmonary edema. Lucency projecting over the heart on the frontal view and  posterior to the heart on the lateral view suggests a moderate to large hiatal  hernia. Degenerative changes of the spine are noted.                                      ECG 12 lead    (Results Pending)       Scoring tools                                  ED Course & MDM   MDM / ED COURSE / CLINICAL IMPRESSION / DISPO     Medical Decision Making  Amount and/or Complexity of Data Reviewed  Labs: ordered. Decision-making details documented in ED Course.  Radiology: ordered. Decision-making details documented in ED Course.    Risk  Decision regarding hospitalization.        ED Course as of 05/11/25 2146   Sun May 11, 2025   1625 EKG interpreted by myself:  Sinus rhythm, first-degree AV block, evidence of right bundle branch block and left anterior fascicular block that can be appreciated on prior EKGs on file  Rate 67 bpm [EG]   1748 X-RAY CHEST 2 VIEWS  IMPRESSION:     1.  Borderline cardiomegaly. Postoperative changes, as above.  2.  Small, bilateral pleural effusions.  3.  Diffuse pulmonary vascular/interstitial prominence suggestive of  interstitial pulmonary edema. Clinical correlation and short-term follow-up  radiographs are recommended.   [EG]   1900 Pt presented after near  syncopal episode w/ 1 hour of feeling weak and dizzy. Feeling better in the ER. Trops are flat. CXR w/ mild edema. Will admit for obs and further management  [AC]   1905 BNP(!): 127  Last documented 6 years ago at 73 [EG]      ED Course User Index  [AC] Dotty Herrera PA C  [EG] Dary Kleler MD     Clinical Impression      Near syncope     _________________       ED Disposition   Admit / Observation                       Dotty Herrera PA C  05/11/25 9296

## 2025-05-11 NOTE — ED ATTESTATION NOTE
"I have personally seen and examined Oh Fang.  I was involved in the care and medical decision making for this patient.    I reviewed and agree with physician assistant / nurse practitioner’s assessment and plan of care; any exceptions are documented below.      My focused history, examination, assessment and plan of care of Oh Fang is as follows:    Brief History:  HPI    Patient is an 89-year-old gentleman past medical history significant for hypertension, hyperlipidemia, aortic stenosis, CAD multiple prior episodes of syncope here today following an episode of near syncope that occurred on a walk.  Was on usual walk, felt lightheaded, fuzzy vision, sat down on a bench without improvement x 1 hr. States he felt \"extreme exhaustion,\" during this time.  Neighbor drove him home and then called 911.  Denies chest pain, shortness of breath, fever, chills, nausea, vomiting or diarrhea.  Feels back to his baseline on interview.      Focused Physical Exam:  Physical Exam  Vitals and nursing note reviewed.   Constitutional:       Appearance: Normal appearance.   HENT:      Nose: No congestion or rhinorrhea.   Cardiovascular:      Rate and Rhythm: Normal rate and regular rhythm.   Pulmonary:      Effort: Pulmonary effort is normal. No respiratory distress.      Breath sounds: Normal breath sounds. No stridor. No wheezing or rhonchi.   Abdominal:      Palpations: Abdomen is soft.   Musculoskeletal:         General: No swelling, tenderness, deformity or signs of injury.      Right lower leg: No edema.      Left lower leg: No edema.   Skin:     General: Skin is warm and dry.      Capillary Refill: Capillary refill takes less than 2 seconds.   Neurological:      General: No focal deficit present.      Mental Status: He is alert. Mental status is at baseline.             Assessment / Plan:  Patient is an 81-year-old gentleman extensive cardiac history here today after a 1 hour episode of near syncope, significant " exhaustion preventing him from getting up off a bench in a park.  While patient's workup was reassuring given history and presentation admitting for cardiac ops.  Troponin and repeat troponin within normal limits.  BNP is 127 which is elevated from 73 documented 6 years ago.  X-ray reflective of mild edema.  [place] [facility]        Medical Decision Making  Amount and/or Complexity of Data Reviewed  Labs: ordered. Decision-making details documented in ED Course.  Radiology: ordered. Decision-making details documented in ED Course.    Risk  Decision regarding hospitalization.        I was physically present for the key/critical portions of the following procedures:  Procedures           Dary Keller MD  05/12/25 8930

## 2025-05-12 VITALS
HEIGHT: 69 IN | OXYGEN SATURATION: 95 % | HEART RATE: 70 BPM | SYSTOLIC BLOOD PRESSURE: 120 MMHG | DIASTOLIC BLOOD PRESSURE: 57 MMHG | RESPIRATION RATE: 16 BRPM | WEIGHT: 170 LBS | TEMPERATURE: 97.7 F | BODY MASS INDEX: 25.18 KG/M2

## 2025-05-12 PROBLEM — R93.89 ABNORMAL CXR: Status: ACTIVE | Noted: 2025-05-12

## 2025-05-12 LAB
ALBUMIN SERPL-MCNC: 3.4 G/DL (ref 3.5–5.7)
ALP SERPL-CCNC: 82 IU/L (ref 34–125)
ALT SERPL-CCNC: 11 IU/L (ref 7–52)
ANION GAP SERPL CALC-SCNC: 9 MEQ/L (ref 3–15)
AST SERPL-CCNC: 16 IU/L (ref 13–39)
ATRIAL RATE: 67
BILIRUB SERPL-MCNC: 0.9 MG/DL (ref 0.3–1.2)
BUN SERPL-MCNC: 21 MG/DL (ref 7–25)
CALCIUM SERPL-MCNC: 8.5 MG/DL (ref 8.6–10.3)
CHLORIDE SERPL-SCNC: 105 MEQ/L (ref 98–107)
CO2 SERPL-SCNC: 22 MEQ/L (ref 21–31)
CORTIS SERPL-MCNC: 8.9 UG/DL
CREAT SERPL-MCNC: 1 MG/DL (ref 0.7–1.3)
EGFRCR SERPLBLD CKD-EPI 2021: >60 ML/MIN/1.73M*2
GLUCOSE SERPL-MCNC: 87 MG/DL (ref 70–99)
P AXIS: 38
POTASSIUM SERPL-SCNC: 4.1 MEQ/L (ref 3.5–5.1)
PR INTERVAL: 234
PROT SERPL-MCNC: 6.4 G/DL (ref 6–8.2)
QRS DURATION: 150
QT INTERVAL: 476
QTC CALCULATION(BAZETT): 502
R AXIS: -54
SODIUM SERPL-SCNC: 136 MEQ/L (ref 136–145)
T WAVE AXIS: 11
VENTRICULAR RATE: 67

## 2025-05-12 PROCEDURE — 80053 COMPREHEN METABOLIC PANEL: CPT | Performed by: INTERNAL MEDICINE

## 2025-05-12 PROCEDURE — 63600000 HC DRUGS/DETAIL CODE: Performed by: INTERNAL MEDICINE

## 2025-05-12 PROCEDURE — 63700000 HC SELF-ADMINISTRABLE DRUG: Performed by: INTERNAL MEDICINE

## 2025-05-12 PROCEDURE — 99239 HOSP IP/OBS DSCHRG MGMT >30: CPT | Performed by: STUDENT IN AN ORGANIZED HEALTH CARE EDUCATION/TRAINING PROGRAM

## 2025-05-12 PROCEDURE — 36415 COLL VENOUS BLD VENIPUNCTURE: CPT | Performed by: INTERNAL MEDICINE

## 2025-05-12 PROCEDURE — 63700000 HC SELF-ADMINISTRABLE DRUG: Performed by: NURSE PRACTITIONER

## 2025-05-12 PROCEDURE — 82533 TOTAL CORTISOL: CPT | Performed by: INTERNAL MEDICINE

## 2025-05-12 RX ORDER — ATORVASTATIN CALCIUM 20 MG/1
20 TABLET, FILM COATED ORAL NIGHTLY
Status: DISCONTINUED | OUTPATIENT
Start: 2025-05-12 | End: 2025-05-12 | Stop reason: HOSPADM

## 2025-05-12 RX ORDER — FLUTICASONE PROPIONATE 50 MCG
1 SPRAY, SUSPENSION (ML) NASAL DAILY
Status: DISCONTINUED | OUTPATIENT
Start: 2025-05-12 | End: 2025-05-12 | Stop reason: HOSPADM

## 2025-05-12 RX ORDER — TAMSULOSIN HYDROCHLORIDE 0.4 MG/1
0.4 CAPSULE ORAL NIGHTLY
Status: DISCONTINUED | OUTPATIENT
Start: 2025-05-12 | End: 2025-05-12 | Stop reason: HOSPADM

## 2025-05-12 RX ORDER — ALBUTEROL SULFATE 0.83 MG/ML
SOLUTION RESPIRATORY (INHALATION) EVERY 4 HOURS PRN
Status: DISCONTINUED | OUTPATIENT
Start: 2025-05-12 | End: 2025-05-12 | Stop reason: HOSPADM

## 2025-05-12 RX ADMIN — SACUBITRIL AND VALSARTAN 1 TABLET: 24; 26 TABLET, FILM COATED ORAL at 09:10

## 2025-05-12 RX ADMIN — HEPARIN SODIUM 5000 UNITS: 5000 INJECTION, SOLUTION INTRAVENOUS; SUBCUTANEOUS at 06:07

## 2025-05-12 RX ADMIN — LEVOTHYROXINE SODIUM 25 MCG: 0.03 TABLET ORAL at 06:07

## 2025-05-12 RX ADMIN — ASPIRIN 325 MG ORAL TABLET 325 MG: 325 PILL ORAL at 09:10

## 2025-05-12 RX ADMIN — FLUTICASONE PROPIONATE 1 SPRAY: 50 SPRAY, METERED NASAL at 10:55

## 2025-05-12 ASSESSMENT — COGNITIVE AND FUNCTIONAL STATUS - GENERAL
CLIMB 3 TO 5 STEPS WITH RAILING: 2 - A LOT
MOVING TO AND FROM BED TO CHAIR: 3 - A LITTLE
WALKING IN HOSPITAL ROOM: 3 - A LITTLE
STANDING UP FROM CHAIR USING ARMS: 3 - A LITTLE

## 2025-05-12 NOTE — PLAN OF CARE
Care Coordination Admission Assessment Note    General Information:  Readmission Within the last 30 days: no previous admission in last 30 days  Does patient have a : No  Patient-Specific Goals (include timeframe): to fee better and go home.    Living Arrangements:  Arrived From: home  Current Living Arrangements: home  People in Home: child(arnol), adult  Home Accessibility: stairs within home (Group)  Living Arrangement Comments: Pt lives with his 2 adult sons in 2SH wtih 0 BUCK. Pt reported he stays on the 1st fl.    Housing Stability and Utility Access (SDOH):  In the last 12 months, was there a time when you were not able to pay the mortgage or rent on time?: No  In the past 12 months, how many times have you moved?: 0  At any time in the past 12 months, were you homeless or living in a shelter (including now)?: No  In the past 12 months has the electric, gas, oil, or water company threatened to shut off services in your home?: No    Functional Status Prior to Admission:   Assistive Device/Animal Currently Used at Home: walker, front-wheeled  Functional Status Comments: Pt uses a walker to ambulate at baseline.  IADL Comments: IND with ADLs and IADLs PTA     Supports and Services:  Current Outpatient/Agency/Support Group: none  Type of Current Home Care Services: none  History of home care episode or rehab stay: Hx of [facility]. No hx of SNF/[facility]    Discharge Needs Assessment:   Concerns to be Addressed: denies needs/concerns at this time, no discharge needs identified  Current Discharge Risk: none  Anticipated Changes Related to Illness: none    Patient/Family Anticipated Discharge Plan:  Patient/Family Anticipates Transition To: home with family  Patient/Family Anticipated Services at Transition:  None    Connection to Community  Not applicable    Patient Choice:   Offered/Gave Vendor List: no       Anticipated Discharge Plan:  Met with patient. Provided education and contact information for Care  Coordination services.: yes  Anticipated Discharge Disposition: home with assistance     Transportation Needs (SDOH):  Transportation Concerns: none  Transportation Anticipated: family or friend will provide  Is Out of Hospital DNR needed at discharge?: no    In the past 12 months, has lack of transportation kept you from medical appointments or from getting medications?: No  In the past 12 months, has lack of transportation kept you from meetings, work, or from getting things needed for daily living?: No    Concerns - comments: Sw discussed HH with pt. Pt decined HH services at this time.    Pt presented with near syncopal episode. Sw met with pt at bedside this morning. Pt confirmed demo, PCP, pharm (Jim Gant), insurance, and emergency contacts. Pts family will transport home.     IMM reviewed with pt     Anticipated DC Plans  Home   Family will transport

## 2025-05-12 NOTE — ASSESSMENT & PLAN NOTE
Followed by [place] cardiology, recent 2D-echo reviewed  To continue on outpatient regimen  Cardiology consult

## 2025-05-12 NOTE — H&P
"            Hospital Medicine    History & Physical        CHIEF COMPLAINT   Near syncopal episode     HISTORY OF PRESENT ILLNESS      iMchel Fang is a 89 y.o. male with a past medical history of arthritis, hx of CHF, CAD, s/p TAVr, HTN, dyslipidemia who presents with episode of dizziness while walking today.  Patient felt lightheaded, with blurry vision, sat down on a bench for 1 hr but still felt \"sick\", was driven home, but then called 911. Patient feels better upon arrival to ED.   He denies any chest pain or shortness of breath or palpitations. No nausea or vomiting.    Upon arrival to ED found to be hypothermic and HTN, also evidence of low sodium, EKG-first degree AV block and CX-ray with evidence of interstitial pulmonary edema.   Patient is a FULL CODE for this visit  Person to contact is Maeve Kavita [phone]  PAST MEDICAL AND SURGICAL HISTORY      Past Medical History:   Diagnosis Date    Arthritis     BPH (benign prostatic hyperplasia)     CHF (congestive heart failure) (CMS/HCC)     Coronary artery disease     Disease of thyroid gland     Heart disease     Hyperlipidemia     Hypertension     Sarcoidosis        Past Surgical History   Procedure Laterality Date    Adenoidectomy      Aortic valve replacement      Appendectomy      Cardiac surgery      Coronary artery bypass graft      Eye surgery Bilateral     cataracts     Popliteal venous aneurysm repair      Skin biopsy      Tonsillectomy      UPPER GASTROINTESTINAL ENDOSCOPY WITH BIOPSY N/A 9/7/2018    Performed by Niles Jane MD at [facility]       PCP: Johnathan Leone MD    MEDICATIONS      Prior to Admission medications    Medication Sig Start Date End Date Taking? Authorizing Provider   albuterol HFA 90 mcg/actuation inhaler INHALE 2 PUFFS BY MOUTH EVERY 4 HOURS AS NEEDED FOR WHEEZE OR FOR SHORTNESS OF BREATH 7/2/24   Provider, [facility] MD Art   amoxicillin (AMOXIL) 500 mg capsule 4 capsules Orally 1 hr prir to dental prcedure and and I " think the first I's for 30 days    Balbina Jorgensen MD   aspirin 325 mg EC tablet Take 325 mg by mouth daily. 9/18/18   Art Jorgensen MD   atorvastatin (LIPITOR) 20 mg tablet Take 20 mg by mouth every evening.    Art Jorgensen MD   cranberry fruit extract (THERACRAN ORAL) Take by mouth.    Balbina Jorgensen MD   fluticasone propionate (FLONASE) 50 mcg/actuation nasal spray Administer 1 spray into each nostril daily.    Balbina Jorgensen MD   guaiFENesin (MUCINEX) 600 mg 12 hr tablet Take 1,200 mg by mouth 2 (two) times a day.    Balbina Jorgensen MD   levothyroxine (SYNTHROID) 25 mcg tablet Take 25 mcg by mouth daily.     Art Jorgensen MD   pantoprazole (PROTONIX) 40 mg EC tablet Take 40 mg by mouth daily.    Art Jorgensen MD   polyethylene glycol (MIRALAX) 17 gram packet Take 17 g by mouth daily.    Balbina Jorgensen MD   predniSONE 2 mg tablet,delayed release (DR/EC) Take by mouth.    Balbina Jorgensen MD   sacubitriL-valsartan (ENTRESTO) 24-26 mg per tablet Take 1 tablet by mouth 2 (two) times a day.    Balbina Jorgensen MD   saw/py/net/pumpk/beta/ly/Zn/Cu (PROSTATE CONTROL ORAL) Take 1 tablet by mouth once daily.    Balbina Jorgensen MD   tamsulosin (FLOMAX) 0.4 mg capsule Take 0.4 mg by mouth nightly.    Art Jorgensen MD       ALLERGIES      Amlodipine    FAMILY HISTORY      Family History   Problem Relation Name Age of Onset    Ovarian cancer Biological Mother      Lymphoma Other grandson         age  17    Lymphoma Biological Son          age 20's       SOCIAL HISTORY      Social History     Substance and Sexual Activity   Drug Use No     Tobacco Use: Medium Risk (5/29/2024)    Patient History     Smoking Tobacco Use: Former     Smokeless Tobacco Use: Never     Passive Exposure: Not on file     Alcohol Use: Not At Risk (1/8/2025)    AUDIT-C     Frequency of Alcohol Consumption: Never     Average Number of  Drinks: Patient does not drink     Frequency of Binge Drinking: Never     Within the past week have you used heroin or used opioid medications other than as prescribed? (OxyContin, Fentanyl, Subutex): No  Do you get sick if you cannot use heroin, methadone, or opioid medications?: No         REVIEW OF SYSTEMS      All other systems reviewed and negative except as noted in HPI    PHYSICAL EXAMINATION      Temp:  [35.9 °C (96.6 °F)] 35.9 °C (96.6 °F)  Heart Rate:  [64-68] 64  Resp:  [17-20] 20  BP: (154-162)/(67-68) 162/67  Body mass index is 25.1 kg/m².    Physical Exam  HENT:      Head: Normocephalic.   Eyes:      Pupils: Pupils are equal, round, and reactive to light.   Cardiovascular:      Rate and Rhythm: Normal rate and regular rhythm.   Pulmonary:      Effort: Pulmonary effort is normal.      Breath sounds: Normal breath sounds.   Abdominal:      General: Abdomen is flat. Bowel sounds are normal.      Palpations: Abdomen is soft.   Musculoskeletal:      Cervical back: Neck supple.   Neurological:      General: No focal deficit present.      Mental Status: He is alert and oriented to person, place, and time. Mental status is at baseline.         LABS / IMAGING / EKG        Labs   Latest Reference Range & Units 05/11/25 16:34 05/11/25 19:04   Sodium 136 - 145 mEQ/L 135 (L)    Potassium, Bld 3.5 - 5.1 mEQ/L 4.3    Chloride 98 - 107 mEQ/L 104    CO2 21 - 31 mEQ/L 24    BUN 7 - 25 mg/dL 21    Creatinine 0.7 - 1.3 mg/dL 1.2    Glucose 70 - 99 mg/dL 122 (H)    Calcium 8.6 - 10.3 mg/dL 9.0    AST (SGOT) 13 - 39 IU/L 17    ALT (SGPT) 7 - 52 IU/L 13    Alkaline Phosphatase 34 - 125 IU/L 80    Total Protein 6.0 - 8.2 g/dL 7.0    Albumin 3.5 - 5.7 g/dL 3.6    Bilirubin, Total 0.3 - 1.2 mg/dL 0.8    eGFR >=60.0 mL/min/1.73m*2 58.2 (L)    Anion Gap 3 - 15 mEQ/L 7    High Sens Troponin I <15.0 pg/mL 8.8 10.5   BNP <=100 pg/mL 127 (H)    WBC 3.80 - 10.50 K/uL 5.82    RBC 4.50 - 5.80 M/uL 4.07 (L)    Hemoglobin 13.7 - 17.5 g/dL  "12.4 (L)    Hematocrit 40.1 - 51.0 % 38.5 (L)    MCV 83.0 - 98.0 fL 94.6    MCH 28.0 - 33.2 pg 30.5    MCHC 32.2 - 36.5 g/dL 32.2    RDW 11.6 - 14.4 % 14.1    Platelets 150 - 350 K/uL 148 (L)    MPV 9.4 - 12.4 fL 9.9    Differential Type  Auto    nRBC <=0.0 % 0.0    Immature Granulocytes % 0.2    Neutrophils % 70.7    Lymphocytes % 11.2        Imaging  CLINICAL HISTORY:  89-year-old male with near syncope.     COMPARISON:  CTA of chest for pulmonary embolism dated 5/29/2024; radiograph of  chest dated 12/13/2023.     TECHNIQUE:  PA and lateral views of the chest are obtained.     COMMENT:     The heart is top normal and stable in size. There are stable postoperative  changes related to median sternotomy and aortic valvular replacement. There is  atherosclerotic calcification in the aortic arch. No pneumothorax is seen.  Small, bilateral pleural effusions are present. Diffuse pulmonary vascular  and/or interstitial prominence in both lungs appears increased relative to the  December 2023 radiograph and is nonspecific but most suggestive of interstitial  pulmonary edema. Lucency projecting over the heart on the frontal view and  posterior to the heart on the lateral view suggests a moderate to large hiatal  hernia. Degenerative changes of the spine are noted.     --  IMPRESSION:     1.  Borderline cardiomegaly. Postoperative changes, as above.  2.  Small, bilateral pleural effusions.  3.  Diffuse pulmonary vascular/interstitial prominence suggestive of  interstitial pulmonary edema. Clinical correlation and short-term follow-up  radiographs are recommended.          ASSESSMENT AND PLAN              Assessment & Plan  Syncope, near  Acute episode of lightheadedness and blurry vision while walking, sat down on a bench but was still \"sick\" for 1 hr, able to go home but then called 911  Hx of similar episode while at PT-admitted to [facility] 01/2025  Recent 2D-echo with  low-normal left ventricular systolic " performance with mildly decreased right ventricular systolic performance.  There is grade 1  diastolic dysfunction.  There is a well-seated TAVR prosthesis with normal gradients and moderate paravalvular regurgitation. There is trace mitral and tricuspid regurgitation with a normal estimated peak right heart pressure.   Admit to Telemetry  Cardiology consult      BPH (benign prostatic hyperplasia)  To resume outpatient regimen  Aortic stenosis  Followed by [place] cardiology, recent 2D-echo reviewed  To continue on outpatient regimen  Cardiology consult  Hypothyroidism  To resume outpatient regimen with LT4 25 ug po qd   Hyponatremia  With evidence of low sodium level  To check urine and endocrine studies  For FR  VTE Assessment: [place] VTE Score: 4  VTE Prophylaxis: Current anticoagulants:  heparin (porcine) 5,000 unit/mL injection 5,000 Units, subcutaneous, q8h TIARA      Code Status: Full Code      Discussed advanced care planning.   Estimated Discharge Date: 5/13/2025  Disposition Planning: CODY Fleming MD  5/11/2025

## 2025-05-12 NOTE — DISCHARGE INSTRUCTIONS
We suspect your near syncope episode was related to dehydration.  Your workup here was unremarkable.    Make sure you are taking in oral fluids/water to remain hydrated especially before heat exposure.    Follow-up with your outpatient cardiologist, Dr. Isaacs in 1 week.    No changes were made to your home medications.        It was a pleasure taking care of you. Thank you for choosing [facility] for your care.

## 2025-05-12 NOTE — NURSING NOTE
Patient discharged to home with no needs. All discharge instructions, medications, and follow ups reviewed with patient and son at the bedside. All questions asked and all questions answered. IV and tele removed. Patient to be wheeled out to private vehicle by transport staff.

## 2025-05-12 NOTE — DISCHARGE SUMMARY
"              Hospital Medicine    Inpatient Discharge Summary        BRIEF OVERVIEW   Patient: Michel Fang  Admission Date: 2025   : 1935 Discharge Date: 2025       PCP: Johnathan Leone MD  Disposition:      Destination:       Attending Provider: Shruthi Diehl DO Attending phys phone: [phone]    Code Status At Discharge: Full Code         ASSESSMENT AND PLAN   Problem List on the Day of Discharge    # Syncope, near  Acute episode of lightheadedness and blurry vision while walking, sat down on a bench but was still \"sick\" for 1 hr, able to go home but then called 911  Hx of similar episode while at PT-admitted to [facility] 2025  Likely dehydrated, was out in the heat. Says he is often dehydrated  - Encourage PO fluids  - symptoms resolved  - Cardiology consult appreciated - He has 1st degree AVB, LAFB and RBBB. He is at risk of conduction system disease and Dr. Isaacs may consider MCOT. He should follow up with Dr. Isaacs within one week. He is advised to improve hydration, especially before heat exposure.      # BPH (benign prostatic hyperplasia)  - continue home: tamsulosin 0.4 mg po daily     # Congestive heart failure: chronic, systolic  - Echo transthoracic: \"diastolic dysfunction\", LV ejection fraction: 40 % to 45 % (25)  - continue home: sacubitril-valsartan 24 mg-26 mg 1 tabs po BID  CXR was read as \"Small, bilateral pleural effusions. Diffuse pulmonary vascular/interstitial prominence suggestive of interstitial pulmonary edema. Clinical correlation and short-term follow-up   radiographs are recommended.\"  - BNP: 127 (25)  does not appear volume overloaded. denies MANSFIELD or orthopnea  d/w Cardiology, no indication for diuresis     # Aortic stenosis  Followed by [place] cardiology, recent 2D-echo reviewed  s/p TAVR 2016     # Hypothyroidism  - continue home: levothyroxine 25 mcg po daily     # Hyponatremia  - Sodium: 136.0 mmol/L (25), low 135.0 mmol/L " (5/11/25)  - dehydrated  - encourage fluids     # Anemia: chronic, normocytic  - hemoglobin: 12.4 (5/11/25)  - Follow up with PCP     # Coronary artery disease  denies chest pain discomfort  - continue home: atorvastatin 20 mg po daily  - currently on: aspirin tablet 325 mg po daily     # Hypertension  - 24hr BP range: (113-176) / (57-72) (5/12/25)  - Orthostatics negative  - continue home: sacubitril-valsartan 24 mg-26 mg 1 tabs po BID  - Follow up with Cardiology     # Hyperlipidemia: with clinical ASCVD  - continue home: atorvastatin 20 mg po daily     # Thrombocytopenia  - Platelets: 148 1000/uL (5/11/25)  - likely r/t dehydration  - encourage fluids        Brief Hospital Course    Michel Fang is a 89 y.o. male with a past medical history of arthritis, hx of CHF, CAD, s/p TAVr, HTN, dyslipidemia who presents with episode of dizziness while walking today.  In the emergency room he felt better but was recommended for admission as his temperature was 96.6.  His admission lab work was notable for a sodium of 135 which is just below normal limits.  We suspect patient was dehydrated as he was out in the heat and reports being frequently dehydrated.  He was not given IV fluid but encouraged to take an oral fluids.  His symptoms have resolved.  Orthostatic vital signs were negative and he is ambulating at his baseline.  He was seen in consultation with cardiology who recommends close follow-up with his outpatient cardiologist and to consider outpatient MCOT.    Patient is medically stable for discharge home today.  Please see above assessment and plan and electronic record for further details of hospital course.    Exam on Day of Discharge  Temp:  [35.9 °C (96.6 °F)-37.2 °C (98.9 °F)] 36.5 °C (97.7 °F)  Heart Rate:  [56-80] 70  Resp:  [16-20] 16  BP: (113-176)/(57-72) 120/57    Physical Exam  Constitutional: Elderly male. Appears well-developed and well-nourished. No distress.   HENT:   Head: Normocephalic and  atraumatic.   Eyes: No scleral icterus.   Cardiovascular: S1, S2. Normal rate and regular rhythm. No m/r/g appreciated.    Pulmonary/Chest: CTAB. No r/r/w. Respirations unlabored.  Abdominal: Soft. Bowel sounds are normal. No tenderness or distension.  Musculoskeletal:  no edema.   Neurological: alert and oriented to person, place, and time.  No gross focal deficits.  Skin: Skin is warm and dry.  No rash on exposed skin.  Psychiatric: Calm, cooperative.       DISCHARGE MEDICATIONS      Medication List        CONTINUE taking these medications      albuterol HFA 90 mcg/actuation inhaler  INHALE 2 PUFFS BY MOUTH EVERY 4 HOURS AS NEEDED FOR WHEEZE OR FOR SHORTNESS OF BREATH     amoxicillin 500 mg capsule  Commonly known as: AMOXIL  4 capsules Orally 1 hr prir to dental prcedure and and I think the first I's for 30 days     aspirin 325 mg EC tablet  Take 325 mg by mouth daily.  Dose: 325 mg     atorvastatin 20 mg tablet  Commonly known as: LIPITOR  Take 20 mg by mouth every evening.  Dose: 20 mg     fluticasone propionate 50 mcg/actuation nasal spray  Commonly known as: FLONASE  Administer 1 spray into each nostril daily.  Dose: 1 spray     levothyroxine 25 mcg tablet  Commonly known as: SYNTHROID  Take 25 mcg by mouth daily.  Dose: 25 mcg     pantoprazole 40 mg EC tablet  Commonly known as: PROTONIX  Take 40 mg by mouth daily.  Dose: 40 mg     polyethylene glycol 17 gram packet  Commonly known as: MIRALAX  Take 17 g by mouth daily.  Dose: 17 g     PROSTATE CONTROL ORAL  Take 1 tablet by mouth once daily.  Dose: 1 tablet     sacubitriL-valsartan 24-26 mg 24-26 mg per tablet  Commonly known as: ENTRESTO  Take 1 tablet by mouth 2 (two) times a day.  Dose: 1 tablet     tamsulosin 0.4 mg capsule  Commonly known as: FLOMAX  Take 0.4 mg by mouth nightly.  Dose: 0.4 mg     THERACRAN ORAL  Take by mouth.                INSTRUCTIONS AND FOLLOW-UP   Instructions for after discharge       Call provider for:  difficulty breathing    "   Call provider for:  persistent nausea or vomiting      Call provider for:  severe uncontrolled pain      Call provider for:  temperature >100.4      Discharge Diet      Diet Type / Texture: Cardiac (Low Sodium, Low Fat)    Normal Activity as Tolerated      Review additional discharge directions in \"Instructions\" section on the last page              Important Issues to Address in Follow-Up  Discharge Follow-up Issues: Instructions to Patient:   Additional Discharge Instructions    We suspect your near syncope episode was related to dehydration.  Your workup here was unremarkable.    Make sure you are taking in oral fluids/water to remain hydrated especially before heat exposure.    Follow-up with your outpatient cardiologist, Dr. Isaacs in 1 week.    No changes were made to your home medications.        It was a pleasure taking care of you. Thank you for choosing [facility] for your care.             Scheduled Appointments  Encounter Information    This patient does not currently have any appointments scheduled.         Recommended Follow-up Visits  Follow-Up After Discharge       Johnathan Leone MD   Specialty: Family Medicine   Relationship: PCP - General        Follow up    Service: 1-2 weeks for hospital follow up    Aline Isaacs DO   Specialty: Cardiovascular Disease        Follow up    Service: In 1 week            Test Results Pending at Discharge       LABS AND IMAGING   Pertinent Labs  Results from last 7 days   Lab Units 05/12/25  0441 05/11/25  1634   SODIUM mEQ/L 136 135*   POTASSIUM mEQ/L 4.1 4.3   CHLORIDE mEQ/L 105 104   CO2 mEQ/L 22 24   BUN mg/dL 21 21   CREATININE mg/dL 1.0 1.2   CALCIUM mg/dL 8.5* 9.0   ALBUMIN g/dL 3.4* 3.6   BILIRUBIN TOTAL mg/dL 0.9 0.8   ALK PHOS IU/L 82 80   ALT IU/L 11 13   AST IU/L 16 17   GLUCOSE mg/dL 87 122*     Results from last 7 days   Lab Units 05/11/25  1634   WBC K/uL 5.82   HEMOGLOBIN g/dL 12.4*   HEMATOCRIT % 38.5*   PLATELETS K/uL 148* "         Pertinent Imaging  X-RAY CHEST 2 VIEWS  Result Date: 5/11/2025  IMPRESSION: 1.  Borderline cardiomegaly. Postoperative changes, as above. 2.  Small, bilateral pleural effusions. 3.  Diffuse pulmonary vascular/interstitial prominence suggestive of interstitial pulmonary edema. Clinical correlation and short-term follow-up radiographs are recommended.    Cardiac Imaging    TRANSTHORACIC ECHO (TTE) COMPLETE 01/09/2025    Interpretation Summary  Technically difficult study requiring use of echocardiographic contrast.  Normal left ventricular systolic function with estimated EF 55 to 60%.  There is paradoxical septal wall motion in the setting of intraventricular conduction delay.  Indeterminate diastolic function.  Grossly normal right ventricular size and systolic function.  Grossly normal biatrial size.  There is a TAVR valve in the aortic position with mild/moderate paravalvular leak in the 12 to 2 o'clock position on short axis view.  Grossly normal mitral valve without evidence of significant stenosis or regurgitation.  Grossly normal tricuspid valve without evidence significant stenosis or regurgitation.  Pulmonic valve was not well-visualized.  Insufficient Doppler to accurately assess PASP.  No evidence of significant pericardial effusion.    No significant change compared to prior study.       OTHER SERVICES PROVIDED   Consults During Admission  IP CONSULT TO CARDIOLOGY    Procedures Performed         Thank you for allowing the Division of Hospital Medicine to care for your patient.

## 2025-05-12 NOTE — CONSULTS
Cardiology Consult Note    Subjective     Michel Fang is a 89 y.o. male, known to Dr. Aline Isaacs of [facility], with a past medical history of CAD, CABG 2000, Htn, PVD with chronic claudication, HLD, AS with TAVR 2016 - now with moderate paravalvular leak, pulmonary sarcoidosis, popliteal aneurysm, DJD, GIB from gastritis, mild cardiomyopathy with EF=40-45% by TTE 2023 was admitted on 5/29 with episode of dizziness while walking today. He goes to the park and walks for 30 minutes about 5 times per week. He was walking and suddenly felt lightheaded with blurry vision. He sat down on a bench for 1 hr but still felt the same. Some bystanders checked on him and helped him to the car. He felt unsafe to drive and asked them to take him home. At home he still felt unwell and the bystanders called 911 for him.     He started to feel better while lying down in the ambulance. He recalls feeling back to normal by the time he arrived in the ED. He thinks he did not drink enough yesterday and that the heat may have caused this. He has no history of similar events. He denies recent fever, chills, nausea, vomiting or diarrhea.     His initial temperature was low upon on arrival to ED but then resolved. Sodium was borderline low at 135. His CXR was read as abnormal and he was advised to be admitted. He denies shortness of breath, cough, orthopnea or edema. He has had no chest pain, palpitations or other episodes of dizziness.     Allergies  Amlodipine    Prior to Admission medications    Medication Sig Start Date End Date Taking? Authorizing Provider   albuterol HFA 90 mcg/actuation inhaler INHALE 2 PUFFS BY MOUTH EVERY 4 HOURS AS NEEDED FOR WHEEZE OR FOR SHORTNESS OF BREATH 7/2/24   Provider, Balbina Cordova MD   amoxicillin (AMOXIL) 500 mg capsule 4 capsules Orally 1 hr prir to dental prcedure and and I think the first I's for 30 days    Provider, Balbina Cordova MD   aspirin 325 mg EC tablet Take 325 mg by mouth  daily. 9/18/18   Atr Jorgensen MD   atorvastatin (LIPITOR) 20 mg tablet Take 20 mg by mouth every evening.    Art Jorgensen MD   cranberry fruit extract (THERACRAN ORAL) Take by mouth.    Balbina Jorgensen MD   fluticasone propionate (FLONASE) 50 mcg/actuation nasal spray Administer 1 spray into each nostril daily.    Balbina Jorgensen MD   guaiFENesin (MUCINEX) 600 mg 12 hr tablet Take 1,200 mg by mouth 2 (two) times a day.    Balbina Jorgensen MD   levothyroxine (SYNTHROID) 25 mcg tablet Take 25 mcg by mouth daily.     Art Jorgensen MD   pantoprazole (PROTONIX) 40 mg EC tablet Take 40 mg by mouth daily.    Art Jorgensen MD   polyethylene glycol (MIRALAX) 17 gram packet Take 17 g by mouth daily.    Balbina Jorgensen MD   predniSONE 2 mg tablet,delayed release (DR/EC) Take by mouth.    Balbina Jorgensen MD   sacubitriL-valsartan (ENTRESTO) 24-26 mg per tablet Take 1 tablet by mouth 2 (two) times a day.    Balbina Jorgensen MD   saw/py/net/pumpk/beta/ly/Zn/Cu (PROSTATE CONTROL ORAL) Take 1 tablet by mouth once daily.    Balbina Jorgensen MD   tamsulosin (FLOMAX) 0.4 mg capsule Take 0.4 mg by mouth nightly.    Art Jorgensen MD       Current Facility-Administered Medications   Medication Dose Route Frequency Provider Last Rate Last Admin    aspirin tablet 325 mg  325 mg oral Daily Kimberly Fleming MD   325 mg at 05/12/25 0910    atorvastatin (LIPITOR) tablet 20 mg  20 mg oral Nightly Shruthi Diehl Q, DO        fluticasone propionate (FLONASE) 50 mcg/actuation nasal spray 1 spray  1 spray Each Nostril Daily Emely Carlson CRNP        heparin (porcine) 5,000 unit/mL injection 5,000 Units  5,000 Units subcutaneous q8h TIARA Kimberly Fleming MD   5,000 Units at 05/12/25 0607    levothyroxine (SYNTHROID) tablet 25 mcg  25 mcg oral Daily (6:30a) Kimberly Fleming MD   25 mcg at 05/12/25 0607    sacubitriL-valsartan  "24-26 mg (ENTRESTO) per tablet 1 tablet  1 tablet oral BID Kimberly Fleming MD   1 tablet at 25 0910    tamsulosin (FLOMAX) 24 hr ER capsule 0.4 mg  0.4 mg oral Nightly Shruthi Diehl Q, DO           Past Medical History:   Diagnosis Date    Arthritis     BPH (benign prostatic hyperplasia)     CHF (congestive heart failure) (CMS/HCC)     Coronary artery disease     Disease of thyroid gland     Heart disease     Hyperlipidemia     Hypertension     Sarcoidosis        Past Surgical History   Procedure Laterality Date    Adenoidectomy      Aortic valve replacement      Appendectomy      Cardiac surgery      Coronary artery bypass graft      Eye surgery Bilateral     cataracts     Popliteal venous aneurysm repair      Skin biopsy      Tonsillectomy      UPPER GASTROINTESTINAL ENDOSCOPY WITH BIOPSY N/A 2018    Performed by Niles Jane MD at [facility]       Social History  Social History     Tobacco Use    Smoking status: Former     Current packs/day: 0.00     Types: Cigarettes     Quit date:      Years since quittin.3    Smokeless tobacco: Never   Substance Use Topics    Alcohol use: No    Drug use: No       Family History   Problem Relation Name Age of Onset    Ovarian cancer Biological Mother      Lymphoma Other grandson         age  17    Lymphoma Biological Son          age 20's         Review of Systems: All other systems reviewed and negative except as noted in the HPI.    Physical Exam     Visit Vitals  BP (!) 123/58 (BP Location: Left upper arm, Patient Position: Standing)   Pulse 80   Temp 37.2 °C (98.9 °F) (Oral)   Resp 16   Ht 1.753 m (5' 9\")   Wt 77.1 kg (170 lb)   SpO2 96%   BMI 25.10 kg/m²       The patient appears comfortable and is in no apparent distress,    Eyes: Eyelids and sclera normal,   Neck: No jugular venous distention, no thyromegaly, no lymphadenopathy, no carotid bruits,    Lungs: Clear to auscultation, normal respiratory effort,   Heart: Regular rhythm, normal S1 and " S2, no murmurs rubs or gallops,   Abdomen: Soft, nontender, NABS x 4  Extremities: No edema, no calf tenderness or swelling, pulses intact,   Skin: No rashes,   Neuro: Alert and oriented without focal deficit,   Psych: Affect normal.      Objective     Labs   Lab Results   Component Value Date    WBC 5.82 05/11/2025    HGB 12.4 (L) 05/11/2025     (L) 05/11/2025    ALT 11 05/12/2025    AST 16 05/12/2025     05/12/2025    K 4.1 05/12/2025     05/12/2025    CREATININE 1.0 05/12/2025    BUN 21 05/12/2025    CO2 22 05/12/2025    TSH 2.11 05/11/2025    INR 1.2 09/06/2018    HGBA1C 6.1 (H) 06/04/2019     (H) 05/11/2025    HSTROPONINI 10.5 05/11/2025         Imaging  I have independently reviewed the pertinent imaging from the last 24 hrs.    Results for orders placed during the hospital encounter of 01/08/25    Transthoracic echo (TTE) complete    Interpretation Summary  Technically difficult study requiring use of echocardiographic contrast.  Normal left ventricular systolic function with estimated EF 55 to 60%.  There is paradoxical septal wall motion in the setting of intraventricular conduction delay.  Indeterminate diastolic function.  Grossly normal right ventricular size and systolic function.  Grossly normal biatrial size.  There is a TAVR valve in the aortic position with mild/moderate paravalvular leak in the 12 to 2 o'clock position on short axis view.  Grossly normal mitral valve without evidence of significant stenosis or regurgitation.  Grossly normal tricuspid valve without evidence significant stenosis or regurgitation.  Pulmonic valve was not well-visualized.  Insufficient Doppler to accurately assess PASP.  No evidence of significant pericardial effusion.    No significant change compared to prior study.      ECG personally reviewed and interpreted  sinus rhythm, 1st degree AVB and RBBB, LAFB    Telemetry  sinus rhythm    Outside records reviewed..    Assessment     Assessment &  "Plan  Syncope, near  He was walking today and had a prolonged episode of dizziness with visual changes. He eventually felt better after lying down. He was not given IVF. Labs are overall unremarkable. He had an echocardiogram 1/2025 that show normal EF  and no significant VHD.   He has 1st degree AVB, LAFB and RBBB. He is at risk of conduction system disease and Dr. Isaacs may consider MCOT. He should follow up with Dr. Isaacs within one week. He is advised to improve hydration, especially before heat exposure.   Aortic stenosis  He is s/p TAVR 2016. There is a mild to moderate paravalvular lead on TTE that is stable.   Hyponatremia  Sodium was 135 on admission and is improved at 136 today.   Abnormal CXR  CXR was read as \"Small, bilateral pleural effusions. Diffuse pulmonary vascular/interstitial prominence suggestive of interstitial pulmonary edema. Clinical correlation and short-term follow-up   radiographs are recommended.\" He denies dyspnea on exertion, orthopnea. Lungs are clear on exam and BNP is low at 127. He presents with dehydration and near syncope. There is no indication for diuresis.   CAD (coronary artery disease)  He is active and denies chest pain. Continue medical management with ASA/statin.  HTN (hypertension)  Blood pressure has been controlled. Continue Entresto 24/26mg bid. The indication for Entresto is unclear but assumed that he had heart failure in the past. He had mild cardiomyopathy with EF=40-45% by TTE 2023. His blood pressure is stable here but should be monitored closely at home.   Hyperlipidemia  Continue statin.     I saw and evaluated the patient.  I provided a substantive portion of the care for this patient. I personally performed all aspects of the medical decision making for this encounter.  I have reviewed and verified this documentation and it accurately reflects our care. I have edited the note as appropriate.    MD Ligia Lopez PA C  5/12/2025      "

## 2025-05-12 NOTE — PLAN OF CARE
Care Coordination Discharge Plan Summary    Admission Assessment Summary    General Information  Readmission Within the last 30 days: no previous admission in last 30 days  Does patient have a : No  Patient-Specific Goals (include timeframe): to fee better and go home.    Living Arrangements  Arrived From: home  Current Living Arrangements: home  People in Home: child(arnol), adult  Home Accessibility: stairs within home (Group)  Living Arrangement Comments: Pt lives with his 2 adult sons in 2SH wtih 0 BUCK. Pt reported he stays on the 1st fl.    National Institutes of Health (NIH) Drivers of Health - Screenings  Housing Stability  In the last 12 months, was there a time when you were not able to pay the mortgage or rent on time?: No  In the past 12 months, how many times have you moved where you were living?: 0  At any time in the past 12 months, were you homeless or living in a shelter (including now)?: No  Utility Access  In the past 12 months has the electric, gas, oil, or water company threatened to shut off services in your home?: No  Transportation Needs  In the past 12 months, has lack of transportation kept you from medical appointments or from getting medications?: No  In the past 12 months, has lack of transportation kept you from meetings, work, or from getting things needed for daily living?: No    Functional Status Prior to Admission  Assistive Device/Animal Currently Used at Home: walker, front-wheeled  Functional Status Comments: Pt uses a walker to ambulate at baseline.  IADL Comments: IND with ADLs and IADLs PTA    Discharge Needs Assessment    Concerns to be Addressed: denies needs/concerns at this time, no discharge needs identified  Current Discharge Risk: none  Anticipated Changes Related to Illness: none    Discharge Plan Summary    Patient Choice  Offered/Gave Vendor List: no       Concerns / Comments: Pt medically stable for DC to home today. No DC needs identified. Family will transport home.    Discharge  Plan:  Disposition/Destination: Home  / Home       Connection to Community  Not applicable       Discharge Transportation:  Is Out of Hospital DNR needed at Discharge: no  Does patient need discharge transport?  No    DC Plans  Home   Family will transport

## 2025-05-12 NOTE — PLAN OF CARE
Problem: Adult Inpatient Plan of Care  Goal: Plan of Care Review  Outcome: Progressing  Flowsheets (Taken 5/12/2025 0413)  Progress: improving  Outcome Evaluation: Patient new adm to the unit with syncope. Denied dizziness. VSS and NSR with 1st AV block on monitor. AAOx4. cardiology consulted. Call bell on reach.  Plan of Care Reviewed With: patient   Plan of Care Review  Plan of Care Reviewed With: patient  Progress: improving  Outcome Evaluation: Patient new adm to the unit with syncope. Denied dizziness. VSS and NSR with 1st AV block on monitor. AAOx4. cardiology consulted. Call bell on reach.

## 2025-05-12 NOTE — ASSESSMENT & PLAN NOTE
"CXR was read as \"Small, bilateral pleural effusions. Diffuse pulmonary vascular/interstitial prominence suggestive of interstitial pulmonary edema. Clinical correlation and short-term follow-up   radiographs are recommended.\" He denies dyspnea on exertion, orthopnea. Lungs are clear on exam and BNP is low at 127. He presents with dehydration and near syncope. There is no indication for diuresis.   "

## 2025-05-12 NOTE — ASSESSMENT & PLAN NOTE
He was walking today and had a prolonged episode of dizziness with visual changes. He eventually felt better after lying down. He was not given IVF. Labs are overall unremarkable. He had an echocardiogram 1/2025 that show normal EF  and no significant VHD.   He has 1st degree AVB, LAFB and RBBB. He is at risk of conduction system disease and Dr. Isaacs may consider MCOT. He should follow up with Dr. Isaacs within one week. He is advised to improve hydration, especially before heat exposure.

## 2025-05-12 NOTE — ASSESSMENT & PLAN NOTE
"Acute episode of lightheadedness and blurry vision while walking, sat down on a bench but was still \"sick\" for 1 hr, able to go home but then called 911  Hx of similar episode while at PT-admitted to [facility] 01/2025  Recent 2D-echo with  low-normal left ventricular systolic performance with mildly decreased right ventricular systolic performance.  There is grade 1  diastolic dysfunction.  There is a well-seated TAVR prosthesis with normal gradients and moderate paravalvular regurgitation. There is trace mitral and tricuspid regurgitation with a normal estimated peak right heart pressure.   Admit to Telemetry  Cardiology consult      "

## 2025-05-13 NOTE — UM PHYSICIAN REVIEW NOTE
Utilization Secondary Review Note      Patient Name: Michel Fang      MRN: [id number]  Insurance: MEDICARE  Admission date: 5/11/2025  Post Discharge Review: Yes            Outpatient services are appropriate for this 89 y.o. year old patient who was admitted for near syncope and felt like to have had vasovagal symptoms due to poor oral intake prior to walking in the heat.  He required <2MN of HLOC.    Will require second review.      Shannan Anderson MD  5/13/2025

## 2025-05-13 NOTE — UM PHYSICIAN REVIEW NOTE
Outpatient services appropriate for this 89yo with <2MN stay for near syncope from volume depletion vs vasovagal    Chanelle López MD

## (undated) DEVICE — ENDO GATOR AUXILIARY WATER PORT CONNETOR

## (undated) DEVICE — GOWN SURG X-LARGE MICROCOOL

## (undated) DEVICE — FORCEP COLD RADIAL JAW

## (undated) DEVICE — VALVE DISPOSABLE AIR/WATER SUCTION AND BIOPSY

## (undated) DEVICE — GOWN SURG LARGE MICROCOOL

## (undated) DEVICE — MOUTHPIECE 60FR ENDO BITEBLOCK